# Patient Record
Sex: FEMALE | Race: WHITE | Employment: UNEMPLOYED | URBAN - METROPOLITAN AREA
[De-identification: names, ages, dates, MRNs, and addresses within clinical notes are randomized per-mention and may not be internally consistent; named-entity substitution may affect disease eponyms.]

---

## 2017-01-17 ENCOUNTER — TELEPHONE (OUTPATIENT)
Dept: FAMILY MEDICINE CLINIC | Facility: CLINIC | Age: 52
End: 2017-01-17

## 2017-01-17 NOTE — TELEPHONE ENCOUNTER
LMTCB,    Pt had 1300 appt today 1/17/17 to be seen by Dr. Scott Nunes, pt did not show. Per Dr. Eladia Mayfield request called pt to f/u on pts reason for not showing up. LMTCB to pt.

## 2017-01-25 ENCOUNTER — OFFICE VISIT (OUTPATIENT)
Dept: FAMILY MEDICINE CLINIC | Facility: CLINIC | Age: 52
End: 2017-01-25

## 2017-01-25 VITALS
WEIGHT: 182 LBS | TEMPERATURE: 98 F | HEART RATE: 69 BPM | BODY MASS INDEX: 28 KG/M2 | SYSTOLIC BLOOD PRESSURE: 128 MMHG | RESPIRATION RATE: 20 BRPM | DIASTOLIC BLOOD PRESSURE: 76 MMHG

## 2017-01-25 DIAGNOSIS — J01.01 ACUTE RECURRENT MAXILLARY SINUSITIS: ICD-10-CM

## 2017-01-25 DIAGNOSIS — G50.0 TRIGEMINAL NEURALGIA OF RIGHT SIDE OF FACE: Primary | ICD-10-CM

## 2017-01-25 PROCEDURE — 99213 OFFICE O/P EST LOW 20 MIN: CPT | Performed by: FAMILY MEDICINE

## 2017-01-25 PROCEDURE — 99212 OFFICE O/P EST SF 10 MIN: CPT | Performed by: FAMILY MEDICINE

## 2017-01-25 RX ORDER — POTASSIUM CHLORIDE 20 MEQ/1
20 TABLET, EXTENDED RELEASE ORAL DAILY
Qty: 90 TABLET | Refills: 1 | Status: SHIPPED | OUTPATIENT
Start: 2017-01-25 | End: 2017-08-15

## 2017-01-25 RX ORDER — AMOXICILLIN AND CLAVULANATE POTASSIUM 875; 125 MG/1; MG/1
1 TABLET, FILM COATED ORAL 2 TIMES DAILY
Qty: 20 TABLET | Refills: 0 | Status: SHIPPED | OUTPATIENT
Start: 2017-01-25 | End: 2017-02-04

## 2017-01-25 RX ORDER — FUROSEMIDE 20 MG/1
TABLET ORAL
Qty: 90 TABLET | Refills: 1 | Status: SHIPPED | OUTPATIENT
Start: 2017-01-25 | End: 2017-08-15

## 2017-01-25 NOTE — PROGRESS NOTES
HPI:    Patient ID: Tata Damico is a 47year old female.   Patient presents with:  Post Nasal Drip: x10 days  Headache: x10 days  Sinus Problem: facial pain    HPI  Pt is now back locally after spending considerable time in [place] with daughter who got ma Rfl: 1   Estrogens, Conjugated (PREMARIN) 0.625 MG/GM Vaginal Cream ONE APPLICATION EVERY OTHER DAY Disp: 1 Tube Rfl: 5     Allergies:No Known Allergies   PHYSICAL EXAM:   Physical Exam  Constitutional: appears well hydrated alert and responsive no acute d daily.      furosemide 20 MG Oral Tab 90 tablet 1      Sig: TAKE 1 TABLET BY MOUTH ONCE A DAY           Imaging & Referrals:  OP REFERRAL PAIN MANGEMENT         CD#[id number]

## 2017-03-13 RX ORDER — TEMAZEPAM 30 MG/1
CAPSULE ORAL
Qty: 30 CAPSULE | Refills: 5 | OUTPATIENT
Start: 2017-03-13 | End: 2017-08-15

## 2017-03-13 NOTE — TELEPHONE ENCOUNTER
Medication pending for review. If approved, please route to staff to call med in to pharmacy; or fax.     Last Rx: 9/14/16 #30 & 5-R    Recent Visits       Provider Department Primary Dx    1 month ago Beatriz Tobias MD [facility], [address] [place] [address],

## 2017-08-08 NOTE — TELEPHONE ENCOUNTER
Pt calling to request refill for furosemide 20 MG Oral Tab. Pt is completely out of medication also she has schedule appt with Dr. Roopa England on Aug 15 at 4:45. Shreya Mercedes please advise

## 2017-08-10 RX ORDER — FUROSEMIDE 20 MG/1
TABLET ORAL
Qty: 90 TABLET | Refills: 1 | Status: CANCELLED | OUTPATIENT
Start: 2017-08-10

## 2017-08-10 NOTE — TELEPHONE ENCOUNTER
Hypertensive Medications  Protocol Criteria:  · Appointment scheduled in the past 6 months or in the next 3 months  · BMP or CMP in the past 12 months  · Creatinine result < 2  Recent Outpatient Visits            6 months ago Trigeminal neuralgia of right

## 2017-08-11 NOTE — TELEPHONE ENCOUNTER
Patient called and stated still waiting for refill request for medication    Patient is currently out of medication-  Requesting a call back from nurse-

## 2017-08-11 NOTE — TELEPHONE ENCOUNTER
Pt called in to follow up on refill. Pt states she has been without medication since the 8th. Pt did make an appt with Dr. Rich Seth for next week. Please advise on refill.

## 2017-08-14 RX ORDER — FUROSEMIDE 20 MG/1
TABLET ORAL
Qty: 90 TABLET | Refills: 0 | OUTPATIENT
Start: 2017-08-14

## 2017-08-14 NOTE — TELEPHONE ENCOUNTER
Dr Magdalena Romeo -pt out of furosemide has appt tomorrow, requesting refill. Failed protocol due to no recent bloodwork on file.

## 2017-08-15 ENCOUNTER — OFFICE VISIT (OUTPATIENT)
Dept: FAMILY MEDICINE CLINIC | Facility: CLINIC | Age: 52
End: 2017-08-15

## 2017-08-15 VITALS
DIASTOLIC BLOOD PRESSURE: 76 MMHG | HEIGHT: 68 IN | WEIGHT: 182 LBS | TEMPERATURE: 98 F | SYSTOLIC BLOOD PRESSURE: 118 MMHG | HEART RATE: 66 BPM | RESPIRATION RATE: 18 BRPM | BODY MASS INDEX: 27.58 KG/M2

## 2017-08-15 DIAGNOSIS — G47.9 SLEEP DISORDER: ICD-10-CM

## 2017-08-15 DIAGNOSIS — I83.93 VARICOSE VEINS OF BOTH LOWER EXTREMITIES: Primary | ICD-10-CM

## 2017-08-15 PROCEDURE — 99212 OFFICE O/P EST SF 10 MIN: CPT | Performed by: FAMILY MEDICINE

## 2017-08-15 PROCEDURE — 99214 OFFICE O/P EST MOD 30 MIN: CPT | Performed by: FAMILY MEDICINE

## 2017-08-15 RX ORDER — TEMAZEPAM 30 MG/1
CAPSULE ORAL
Qty: 30 CAPSULE | Refills: 5 | Status: SHIPPED
Start: 2017-08-15 | End: 2018-03-01

## 2017-08-15 RX ORDER — TOPIRAMATE 25 MG/1
TABLET ORAL
COMMUNITY
Start: 2014-12-30 | End: 2017-08-15

## 2017-08-15 RX ORDER — POTASSIUM CHLORIDE 20 MEQ/1
TABLET, EXTENDED RELEASE ORAL
COMMUNITY
Start: 2013-09-28 | End: 2017-08-15 | Stop reason: CLARIF

## 2017-08-15 RX ORDER — ZOLMITRIPTAN 5 MG/1
SPRAY NASAL
COMMUNITY
Start: 2015-01-16 | End: 2018-03-20

## 2017-08-15 RX ORDER — TEMAZEPAM 30 MG/1
CAPSULE ORAL
Qty: 30 CAPSULE | Refills: 5 | Status: SHIPPED | OUTPATIENT
Start: 2017-08-15 | End: 2017-08-15

## 2017-08-15 RX ORDER — FUROSEMIDE 20 MG/1
TABLET ORAL
Qty: 90 TABLET | Refills: 3 | Status: SHIPPED | OUTPATIENT
Start: 2017-08-15 | End: 2018-11-15

## 2017-08-15 RX ORDER — POTASSIUM CHLORIDE 20 MEQ/1
20 TABLET, EXTENDED RELEASE ORAL DAILY
Qty: 90 TABLET | Refills: 1 | Status: SHIPPED | OUTPATIENT
Start: 2017-08-15 | End: 2019-02-18

## 2017-08-15 RX ORDER — FUROSEMIDE 20 MG/1
TABLET ORAL
COMMUNITY
Start: 2013-09-08 | End: 2017-08-15 | Stop reason: CLARIF

## 2017-08-15 RX ORDER — ELETRIPTAN HYDROBROMIDE 40 MG/1
TABLET, FILM COATED ORAL
COMMUNITY
Start: 2014-12-30 | End: 2018-03-20

## 2017-08-15 RX ORDER — AMITRIPTYLINE HYDROCHLORIDE 150 MG/1
TABLET, FILM COATED ORAL
COMMUNITY
Start: 2014-12-30 | End: 2017-08-15

## 2017-08-15 NOTE — PROGRESS NOTES
HPI:    Patient ID: Ju Vincent is a 47year old female. HPI  Patient presents with:  Establish Care  Medication Request  Varicose Veins  painful varicose veins. Review of Systems   Constitutional: Negative. Respiratory: Negative.     Ghislaine Fear NEEDED FOR SLEEP           Imaging & Referrals:  None       #[phone]

## 2018-03-02 NOTE — TELEPHONE ENCOUNTER
please advise as does not meet RN protocol for refill criteria    tamazepam LR 8/15/17 #30 W/5RF    Please respond to pool: EM [facility] LPN/CMA  Refill Protocol Appointment Criteria  · Appointment scheduled in the past 6 months or in the next 3 months  Rece

## 2018-03-05 NOTE — TELEPHONE ENCOUNTER
MJS please sign off so Rx can be phoned in - Thank You     Please respond to pool: LYN Perez 61 LPN/CMA

## 2018-03-08 NOTE — TELEPHONE ENCOUNTER
rx phoned in for 1 month. Pt has an appt for physical. Please just sign off on script so it will reflect on MAR. Also do you want labs prior to appt?

## 2018-03-09 RX ORDER — TEMAZEPAM 30 MG/1
CAPSULE ORAL
Qty: 30 CAPSULE | Refills: 0 | OUTPATIENT
Start: 2018-03-09 | End: 2018-03-20

## 2018-03-20 ENCOUNTER — OFFICE VISIT (OUTPATIENT)
Dept: FAMILY MEDICINE CLINIC | Facility: CLINIC | Age: 53
End: 2018-03-20

## 2018-03-20 VITALS
BODY MASS INDEX: 26.98 KG/M2 | HEART RATE: 69 BPM | WEIGHT: 178 LBS | DIASTOLIC BLOOD PRESSURE: 69 MMHG | SYSTOLIC BLOOD PRESSURE: 106 MMHG | TEMPERATURE: 98 F | HEIGHT: 68 IN

## 2018-03-20 DIAGNOSIS — R60.9 CHRONIC EDEMA: ICD-10-CM

## 2018-03-20 DIAGNOSIS — Z78.0 POST-MENOPAUSAL: ICD-10-CM

## 2018-03-20 DIAGNOSIS — Z00.00 ROUTINE GENERAL MEDICAL EXAMINATION AT A HEALTH CARE FACILITY: Primary | ICD-10-CM

## 2018-03-20 DIAGNOSIS — Z85.828 HISTORY OF BASAL CELL CANCER: ICD-10-CM

## 2018-03-20 PROCEDURE — 99396 PREV VISIT EST AGE 40-64: CPT | Performed by: FAMILY MEDICINE

## 2018-03-20 RX ORDER — TEMAZEPAM 30 MG/1
CAPSULE ORAL
Qty: 30 CAPSULE | Refills: 5 | Status: SHIPPED
Start: 2018-03-20 | End: 2018-10-01

## 2018-03-20 NOTE — PROGRESS NOTES
HPI:    Patient ID: Rossi Hastings is a 46year old female.     HPI  Patient presents with:  Routine Physical: medication refill for temezapan, permarin    Past Medical History:   Diagnosis Date   • Bowel obstruction ([facility] [address]    • Diabetes ([facility] [address]     TYPE NO nicking and no hemorrhage. The left eye shows no AV nicking and no hemorrhage. Neck: Normal range of motion. Neck supple. No thyroid mass and no thyromegaly present. Cardiovascular: Normal heart sounds.     Pulmonary/Chest: Breath sounds normal.

## 2018-05-03 ENCOUNTER — LAB ENCOUNTER (OUTPATIENT)
Dept: LAB | Age: 53
End: 2018-05-03
Attending: FAMILY MEDICINE
Payer: COMMERCIAL

## 2018-05-03 DIAGNOSIS — Z00.00 ROUTINE GENERAL MEDICAL EXAMINATION AT A HEALTH CARE FACILITY: ICD-10-CM

## 2018-05-03 PROCEDURE — 80061 LIPID PANEL: CPT

## 2018-05-03 PROCEDURE — 85025 COMPLETE CBC W/AUTO DIFF WBC: CPT

## 2018-05-03 PROCEDURE — 80053 COMPREHEN METABOLIC PANEL: CPT

## 2018-05-03 PROCEDURE — 82306 VITAMIN D 25 HYDROXY: CPT

## 2018-05-03 PROCEDURE — 36415 COLL VENOUS BLD VENIPUNCTURE: CPT

## 2018-05-03 PROCEDURE — 84443 ASSAY THYROID STIM HORMONE: CPT

## 2018-06-08 ENCOUNTER — OFFICE VISIT (OUTPATIENT)
Dept: FAMILY MEDICINE CLINIC | Facility: CLINIC | Age: 53
End: 2018-06-08

## 2018-06-08 VITALS
HEART RATE: 71 BPM | TEMPERATURE: 98 F | SYSTOLIC BLOOD PRESSURE: 113 MMHG | WEIGHT: 171 LBS | DIASTOLIC BLOOD PRESSURE: 70 MMHG | BODY MASS INDEX: 26 KG/M2

## 2018-06-08 DIAGNOSIS — G50.0 TRIGEMINAL NEURALGIA OF RIGHT SIDE OF FACE: ICD-10-CM

## 2018-06-08 DIAGNOSIS — D17.21 LIPOMA OF RIGHT UPPER EXTREMITY: Primary | ICD-10-CM

## 2018-06-08 PROCEDURE — 99212 OFFICE O/P EST SF 10 MIN: CPT | Performed by: FAMILY MEDICINE

## 2018-06-08 PROCEDURE — 99214 OFFICE O/P EST MOD 30 MIN: CPT | Performed by: FAMILY MEDICINE

## 2018-06-08 RX ORDER — CARBAMAZEPINE 200 MG/1
200 TABLET ORAL DAILY
Qty: 30 TABLET | Refills: 0 | Status: SHIPPED
Start: 2018-06-08 | End: 2018-07-19

## 2018-06-08 NOTE — PROGRESS NOTES
HPI:    Patient ID: Leonel Smith is a 46year old female. HPI  Patient presents with:  Lump: on RT forearm, no pain, lump is growing, for 6 months ago   also she is in a methadone clinic for trigeminal neuralgia and is weaning off.  She would like a

## 2018-06-12 ENCOUNTER — OFFICE VISIT (OUTPATIENT)
Dept: SURGERY | Facility: CLINIC | Age: 53
End: 2018-06-12

## 2018-06-12 VITALS — BODY MASS INDEX: 25.76 KG/M2 | WEIGHT: 170 LBS | HEIGHT: 68 IN

## 2018-06-12 DIAGNOSIS — K43.2 INCISIONAL HERNIA, WITHOUT OBSTRUCTION OR GANGRENE: ICD-10-CM

## 2018-06-12 DIAGNOSIS — D17.21 LIPOMA OF RIGHT UPPER EXTREMITY: Primary | ICD-10-CM

## 2018-06-12 PROCEDURE — 99204 OFFICE O/P NEW MOD 45 MIN: CPT | Performed by: SURGERY

## 2018-06-12 PROCEDURE — 99212 OFFICE O/P EST SF 10 MIN: CPT | Performed by: SURGERY

## 2018-06-13 NOTE — PROGRESS NOTES
HPI:    Patient ID: Sandy Kolb is a 46year old female presenting with Patient presents with:  Lump: Lump on right forearm. Recently began growing. No pain, no drainage. Has had the lump for six to nine months. Was very small. Slater August     HPI    Past Problem Relation Age of Onset   • Diabetes Father    • Arthritis Mother    • Seizure Disorder Mother    • Cancer Maternal Grandfather      lung; smoker   • Hypertension Brother        Social History  Social History   Marital status:   Spouse name: N Head: Normocephalic and atraumatic. Neck: Normal range of motion. Neck supple. Cardiovascular: Normal rate, regular rhythm and intact distal pulses. Pulmonary/Chest: Effort normal and breath sounds normal.   Abdominal: Soft. Normal appearance.  She e

## 2018-06-29 ENCOUNTER — TELEPHONE (OUTPATIENT)
Dept: SURGERY | Facility: CLINIC | Age: 53
End: 2018-06-29

## 2018-06-29 DIAGNOSIS — R10.84 GENERALIZED ABDOMINAL PAIN: ICD-10-CM

## 2018-06-29 DIAGNOSIS — K43.2 INCISIONAL HERNIA, WITHOUT OBSTRUCTION OR GANGRENE: ICD-10-CM

## 2018-06-29 DIAGNOSIS — D17.30 LIPOMA OF SKIN: Primary | ICD-10-CM

## 2018-07-18 ENCOUNTER — OFFICE VISIT (OUTPATIENT)
Dept: SURGERY | Facility: CLINIC | Age: 53
End: 2018-07-18
Payer: COMMERCIAL

## 2018-07-18 DIAGNOSIS — K43.2 INCISIONAL HERNIA, WITHOUT OBSTRUCTION OR GANGRENE: ICD-10-CM

## 2018-07-18 DIAGNOSIS — R10.84 GENERALIZED ABDOMINAL PAIN: Primary | ICD-10-CM

## 2018-07-18 PROCEDURE — 99212 OFFICE O/P EST SF 10 MIN: CPT | Performed by: SURGERY

## 2018-07-18 PROCEDURE — L0625 LO FLEX L1-BELOW L5 PRE OTS: HCPCS | Performed by: SURGERY

## 2018-07-18 RX ORDER — ONDANSETRON 4 MG/1
4 TABLET, ORALLY DISINTEGRATING ORAL EVERY 8 HOURS PRN
Qty: 30 TABLET | Refills: 0 | Status: SHIPPED | OUTPATIENT
Start: 2018-07-18 | End: 2018-08-06

## 2018-07-19 RX ORDER — DOCUSATE SODIUM 100 MG/1
100 CAPSULE, LIQUID FILLED ORAL DAILY
COMMUNITY

## 2018-07-19 RX ORDER — MULTIVIT-MIN/IRON FUM/FOLIC AC 7.5 MG-4
1 TABLET ORAL DAILY
COMMUNITY

## 2018-07-19 RX ORDER — POLYETHYLENE GLYCOL 3350 17 G/17G
17 POWDER, FOR SOLUTION ORAL DAILY
COMMUNITY
End: 2018-09-21

## 2018-07-19 NOTE — TELEPHONE ENCOUNTER
Received call from PATs to change surgery to AM admit rather than outpatient surgery.  Surgical case change request submitted per request.

## 2018-07-19 NOTE — PROGRESS NOTES
Pt now reports intermittent obstructive symptoms and diarrhea. Improves with lying down and reducing bulge. Abd:  Incisional hernias reducible and non tender. AP:  Symptomatic reducible incisional hernias.   Plan for open incisional hernia repair wit

## 2018-07-22 ENCOUNTER — APPOINTMENT (OUTPATIENT)
Dept: LAB | Facility: HOSPITAL | Age: 53
End: 2018-07-22
Attending: SURGERY
Payer: COMMERCIAL

## 2018-07-22 ENCOUNTER — HOSPITAL ENCOUNTER (OUTPATIENT)
Dept: CT IMAGING | Facility: HOSPITAL | Age: 53
Discharge: HOME | End: 2018-07-22
Attending: SURGERY
Payer: COMMERCIAL

## 2018-07-22 DIAGNOSIS — Z01.818 PREOP TESTING: ICD-10-CM

## 2018-07-22 DIAGNOSIS — R10.84 GENERALIZED ABDOMINAL PAIN: ICD-10-CM

## 2018-07-22 LAB
ANION GAP SERPL CALC-SCNC: 7 MMOL/L (ref 0–18)
BUN SERPL-MCNC: 9 MG/DL (ref 8–20)
BUN/CREAT SERPL: 8.3 (ref 10–20)
CALCIUM SERPL-MCNC: 9.2 MG/DL (ref 8.5–10.5)
CHLORIDE SERPL-SCNC: 102 MMOL/L (ref 95–110)
CO2 SERPL-SCNC: 28 MMOL/L (ref 22–32)
CREAT BLD-MCNC: 1.1 MG/DL (ref 0.5–1.5)
CREAT SERPL-MCNC: 1.08 MG/DL (ref 0.5–1.5)
GLUCOSE SERPL-MCNC: 78 MG/DL (ref 70–99)
OSMOLALITY UR CALC.SUM OF ELEC: 282 MOSM/KG (ref 275–295)
POTASSIUM SERPL-SCNC: 4.9 MMOL/L (ref 3.3–5.1)
SODIUM SERPL-SCNC: 137 MMOL/L (ref 136–144)

## 2018-07-22 PROCEDURE — 80048 BASIC METABOLIC PNL TOTAL CA: CPT

## 2018-07-22 PROCEDURE — 36415 COLL VENOUS BLD VENIPUNCTURE: CPT

## 2018-07-22 PROCEDURE — 74177 CT ABD & PELVIS W/CONTRAST: CPT | Performed by: SURGERY

## 2018-07-22 PROCEDURE — 82565 ASSAY OF CREATININE: CPT

## 2018-07-23 ENCOUNTER — TELEPHONE (OUTPATIENT)
Dept: SURGERY | Facility: CLINIC | Age: 53
End: 2018-07-23

## 2018-07-24 ENCOUNTER — TELEPHONE (OUTPATIENT)
Dept: SURGERY | Facility: CLINIC | Age: 53
End: 2018-07-24

## 2018-07-24 NOTE — TELEPHONE ENCOUNTER
Contacted patient. Informed her of results per Dr. Lalo Reyes: two incisional hernias containing intestine as expected. Proceed with surgery as planned. Patient verbalized understanding of results.     Patient asked if the surgery will result in a colostomy and wa

## 2018-07-24 NOTE — TELEPHONE ENCOUNTER
MD Mary Lou Braswell, RN   Caller: Unspecified (Today, 12:10 PM)             Two incisional hernias containing intestine as expected.  Proceed with surgery as planned.     Previous Messages

## 2018-07-25 NOTE — TELEPHONE ENCOUNTER
MD [facility], RN   Caller: Unspecified (Yesterday, 12:10 PM)             No colostomy but the incision will be the length of her current midline scar.

## 2018-07-25 NOTE — TELEPHONE ENCOUNTER
Contacted patient. Informed her of Dr. De La Fuente Born response below. She verbalized understanding and asked about recovery time. I informed patient that generally activity restrictions are no heavy lifting over 15 pounds for at least 4-6 weeks after surgery.  The f

## 2018-07-26 ENCOUNTER — ANESTHESIA (OUTPATIENT)
Dept: SURGERY | Facility: HOSPITAL | Age: 53
DRG: 355 | End: 2018-07-26
Payer: COMMERCIAL

## 2018-07-26 ENCOUNTER — ANESTHESIA EVENT (OUTPATIENT)
Dept: SURGERY | Facility: HOSPITAL | Age: 53
DRG: 355 | End: 2018-07-26
Payer: COMMERCIAL

## 2018-07-26 ENCOUNTER — HOSPITAL ENCOUNTER (INPATIENT)
Facility: HOSPITAL | Age: 53
LOS: 5 days | Discharge: HOME | DRG: 355 | End: 2018-07-31
Attending: SURGERY | Admitting: SURGERY
Payer: COMMERCIAL

## 2018-07-26 ENCOUNTER — SURGERY (OUTPATIENT)
Age: 53
End: 2018-07-26

## 2018-07-26 DIAGNOSIS — D17.30 LIPOMA OF SKIN: ICD-10-CM

## 2018-07-26 DIAGNOSIS — Z01.818 PREOP TESTING: Primary | ICD-10-CM

## 2018-07-26 PROBLEM — K43.0 INCISIONAL HERNIA WITH OBSTRUCTION BUT NO GANGRENE: Status: ACTIVE | Noted: 2018-06-12

## 2018-07-26 PROCEDURE — 49568 REPAIR INCIS HERNIA W MESH: CPT | Performed by: SURGERY

## 2018-07-26 PROCEDURE — 49561 REPAIR INCISIONAL HERNIA,STRANG: CPT | Performed by: SURGERY

## 2018-07-26 PROCEDURE — 0KXK0ZZ TRANSFER RIGHT ABDOMEN MUSCLE, OPEN APPROACH: ICD-10-PCS | Performed by: SURGERY

## 2018-07-26 PROCEDURE — 99232 SBSQ HOSP IP/OBS MODERATE 35: CPT | Performed by: HOSPITALIST

## 2018-07-26 PROCEDURE — 0WUF0JZ SUPPLEMENT ABDOMINAL WALL WITH SYNTHETIC SUBSTITUTE, OPEN APPROACH: ICD-10-PCS | Performed by: SURGERY

## 2018-07-26 DEVICE — BARD MESH
Type: IMPLANTABLE DEVICE | Site: ABDOMEN | Status: FUNCTIONAL
Brand: BARD MESH

## 2018-07-26 RX ORDER — POLYETHYLENE GLYCOL 3350 17 G/17G
17 POWDER, FOR SOLUTION ORAL DAILY PRN
Status: DISCONTINUED | OUTPATIENT
Start: 2018-07-26 | End: 2018-07-31

## 2018-07-26 RX ORDER — MORPHINE SULFATE 4 MG/ML
2 INJECTION, SOLUTION INTRAMUSCULAR; INTRAVENOUS EVERY 10 MIN PRN
Status: DISCONTINUED | OUTPATIENT
Start: 2018-07-26 | End: 2018-07-26 | Stop reason: HOSPADM

## 2018-07-26 RX ORDER — HYDROMORPHONE HYDROCHLORIDE 1 MG/ML
0.5 INJECTION, SOLUTION INTRAMUSCULAR; INTRAVENOUS; SUBCUTANEOUS EVERY 5 MIN PRN
Status: COMPLETED | OUTPATIENT
Start: 2018-07-26 | End: 2018-07-26

## 2018-07-26 RX ORDER — SODIUM CHLORIDE, SODIUM LACTATE, POTASSIUM CHLORIDE, CALCIUM CHLORIDE 600; 310; 30; 20 MG/100ML; MG/100ML; MG/100ML; MG/100ML
INJECTION, SOLUTION INTRAVENOUS CONTINUOUS
Status: DISCONTINUED | OUTPATIENT
Start: 2018-07-26 | End: 2018-07-26 | Stop reason: HOSPADM

## 2018-07-26 RX ORDER — LIDOCAINE HYDROCHLORIDE 10 MG/ML
INJECTION, SOLUTION EPIDURAL; INFILTRATION; INTRACAUDAL; PERINEURAL AS NEEDED
Status: DISCONTINUED | OUTPATIENT
Start: 2018-07-26 | End: 2018-07-26 | Stop reason: SURG

## 2018-07-26 RX ORDER — HYDROCODONE BITARTRATE AND ACETAMINOPHEN 5; 325 MG/1; MG/1
1 TABLET ORAL AS NEEDED
Status: DISCONTINUED | OUTPATIENT
Start: 2018-07-26 | End: 2018-07-26 | Stop reason: HOSPADM

## 2018-07-26 RX ORDER — NALOXONE HYDROCHLORIDE 0.4 MG/ML
80 INJECTION, SOLUTION INTRAMUSCULAR; INTRAVENOUS; SUBCUTANEOUS AS NEEDED
Status: DISCONTINUED | OUTPATIENT
Start: 2018-07-26 | End: 2018-07-26 | Stop reason: HOSPADM

## 2018-07-26 RX ORDER — MORPHINE SULFATE 2 MG/ML
2 INJECTION, SOLUTION INTRAMUSCULAR; INTRAVENOUS EVERY 2 HOUR PRN
Status: DISCONTINUED | OUTPATIENT
Start: 2018-07-26 | End: 2018-07-31

## 2018-07-26 RX ORDER — ONDANSETRON 2 MG/ML
4 INJECTION INTRAMUSCULAR; INTRAVENOUS EVERY 6 HOURS PRN
Status: DISCONTINUED | OUTPATIENT
Start: 2018-07-26 | End: 2018-07-31

## 2018-07-26 RX ORDER — ROCURONIUM BROMIDE 10 MG/ML
INJECTION, SOLUTION INTRAVENOUS AS NEEDED
Status: DISCONTINUED | OUTPATIENT
Start: 2018-07-26 | End: 2018-07-26 | Stop reason: SURG

## 2018-07-26 RX ORDER — GLYCOPYRROLATE 0.2 MG/ML
INJECTION INTRAMUSCULAR; INTRAVENOUS AS NEEDED
Status: DISCONTINUED | OUTPATIENT
Start: 2018-07-26 | End: 2018-07-26 | Stop reason: SURG

## 2018-07-26 RX ORDER — MORPHINE SULFATE 4 MG/ML
8 INJECTION, SOLUTION INTRAMUSCULAR; INTRAVENOUS EVERY 2 HOUR PRN
Status: DISCONTINUED | OUTPATIENT
Start: 2018-07-26 | End: 2018-07-31

## 2018-07-26 RX ORDER — BUPIVACAINE HYDROCHLORIDE 5 MG/ML
INJECTION, SOLUTION EPIDURAL; INTRACAUDAL AS NEEDED
Status: DISCONTINUED | OUTPATIENT
Start: 2018-07-26 | End: 2018-07-26 | Stop reason: HOSPADM

## 2018-07-26 RX ORDER — HYDROCODONE BITARTRATE AND ACETAMINOPHEN 5; 325 MG/1; MG/1
2 TABLET ORAL AS NEEDED
Status: DISCONTINUED | OUTPATIENT
Start: 2018-07-26 | End: 2018-07-26 | Stop reason: HOSPADM

## 2018-07-26 RX ORDER — MORPHINE SULFATE 4 MG/ML
2 INJECTION, SOLUTION INTRAMUSCULAR; INTRAVENOUS EVERY 30 MIN PRN
Status: DISCONTINUED | OUTPATIENT
Start: 2018-07-26 | End: 2018-07-31

## 2018-07-26 RX ORDER — ONDANSETRON 2 MG/ML
4 INJECTION INTRAMUSCULAR; INTRAVENOUS EVERY 6 HOURS PRN
Status: DISCONTINUED | OUTPATIENT
Start: 2018-07-26 | End: 2018-07-30

## 2018-07-26 RX ORDER — FAMOTIDINE 20 MG/1
20 TABLET ORAL ONCE
Status: DISCONTINUED | OUTPATIENT
Start: 2018-07-26 | End: 2018-07-26 | Stop reason: HOSPADM

## 2018-07-26 RX ORDER — ACETAMINOPHEN 500 MG
1000 TABLET ORAL ONCE
Status: COMPLETED | OUTPATIENT
Start: 2018-07-26 | End: 2018-07-26

## 2018-07-26 RX ORDER — KETOROLAC TROMETHAMINE 15 MG/ML
15 INJECTION, SOLUTION INTRAMUSCULAR; INTRAVENOUS EVERY 6 HOURS PRN
Status: ACTIVE | OUTPATIENT
Start: 2018-07-26 | End: 2018-07-28

## 2018-07-26 RX ORDER — MORPHINE SULFATE 4 MG/ML
4 INJECTION, SOLUTION INTRAMUSCULAR; INTRAVENOUS EVERY 2 HOUR PRN
Status: DISCONTINUED | OUTPATIENT
Start: 2018-07-26 | End: 2018-07-31

## 2018-07-26 RX ORDER — HYDROCODONE BITARTRATE AND ACETAMINOPHEN 5; 325 MG/1; MG/1
2 TABLET ORAL EVERY 4 HOURS PRN
Status: DISCONTINUED | OUTPATIENT
Start: 2018-07-26 | End: 2018-07-27

## 2018-07-26 RX ORDER — NALBUPHINE HCL 10 MG/ML
2.5 AMPUL (ML) INJECTION EVERY 4 HOURS PRN
Status: DISCONTINUED | OUTPATIENT
Start: 2018-07-26 | End: 2018-07-31

## 2018-07-26 RX ORDER — MORPHINE SULFATE 4 MG/ML
4 INJECTION, SOLUTION INTRAMUSCULAR; INTRAVENOUS EVERY 10 MIN PRN
Status: DISCONTINUED | OUTPATIENT
Start: 2018-07-26 | End: 2018-07-26 | Stop reason: HOSPADM

## 2018-07-26 RX ORDER — SODIUM PHOSPHATE, DIBASIC AND SODIUM PHOSPHATE, MONOBASIC 7; 19 G/133ML; G/133ML
1 ENEMA RECTAL ONCE AS NEEDED
Status: DISCONTINUED | OUTPATIENT
Start: 2018-07-26 | End: 2018-07-31

## 2018-07-26 RX ORDER — KETOROLAC TROMETHAMINE 30 MG/ML
30 INJECTION, SOLUTION INTRAMUSCULAR; INTRAVENOUS EVERY 6 HOURS PRN
Status: DISPENSED | OUTPATIENT
Start: 2018-07-26 | End: 2018-07-28

## 2018-07-26 RX ORDER — NALOXONE HYDROCHLORIDE 0.4 MG/ML
0.08 INJECTION, SOLUTION INTRAMUSCULAR; INTRAVENOUS; SUBCUTANEOUS
Status: DISCONTINUED | OUTPATIENT
Start: 2018-07-26 | End: 2018-07-31

## 2018-07-26 RX ORDER — MORPHINE SULFATE 10 MG/ML
6 INJECTION, SOLUTION INTRAMUSCULAR; INTRAVENOUS EVERY 10 MIN PRN
Status: DISCONTINUED | OUTPATIENT
Start: 2018-07-26 | End: 2018-07-26 | Stop reason: HOSPADM

## 2018-07-26 RX ORDER — NEOSTIGMINE METHYLSULFATE 0.5 MG/ML
INJECTION INTRAVENOUS AS NEEDED
Status: DISCONTINUED | OUTPATIENT
Start: 2018-07-26 | End: 2018-07-26 | Stop reason: SURG

## 2018-07-26 RX ORDER — ONDANSETRON 2 MG/ML
4 INJECTION INTRAMUSCULAR; INTRAVENOUS ONCE AS NEEDED
Status: COMPLETED | OUTPATIENT
Start: 2018-07-26 | End: 2018-07-26

## 2018-07-26 RX ORDER — SODIUM CHLORIDE, SODIUM LACTATE, POTASSIUM CHLORIDE, CALCIUM CHLORIDE 600; 310; 30; 20 MG/100ML; MG/100ML; MG/100ML; MG/100ML
INJECTION, SOLUTION INTRAVENOUS CONTINUOUS
Status: DISCONTINUED | OUTPATIENT
Start: 2018-07-26 | End: 2018-07-26 | Stop reason: ALTCHOICE

## 2018-07-26 RX ORDER — ONDANSETRON 2 MG/ML
INJECTION INTRAMUSCULAR; INTRAVENOUS AS NEEDED
Status: DISCONTINUED | OUTPATIENT
Start: 2018-07-26 | End: 2018-07-26 | Stop reason: SURG

## 2018-07-26 RX ORDER — FIBRINOGEN HUMAN AND THROMBIN HUMAN 1 ML
KIT TOPICAL AS NEEDED
Status: DISCONTINUED | OUTPATIENT
Start: 2018-07-26 | End: 2018-07-26 | Stop reason: HOSPADM

## 2018-07-26 RX ORDER — DOCUSATE SODIUM 100 MG/1
100 CAPSULE, LIQUID FILLED ORAL 2 TIMES DAILY
Status: DISCONTINUED | OUTPATIENT
Start: 2018-07-26 | End: 2018-07-31

## 2018-07-26 RX ORDER — DIPHENHYDRAMINE HYDROCHLORIDE 50 MG/ML
12.5 INJECTION INTRAMUSCULAR; INTRAVENOUS EVERY 4 HOURS PRN
Status: DISCONTINUED | OUTPATIENT
Start: 2018-07-26 | End: 2018-07-31

## 2018-07-26 RX ORDER — BISACODYL 10 MG
10 SUPPOSITORY, RECTAL RECTAL
Status: DISCONTINUED | OUTPATIENT
Start: 2018-07-26 | End: 2018-07-31

## 2018-07-26 RX ORDER — HYDROCODONE BITARTRATE AND ACETAMINOPHEN 5; 325 MG/1; MG/1
1 TABLET ORAL EVERY 4 HOURS PRN
Status: DISCONTINUED | OUTPATIENT
Start: 2018-07-26 | End: 2018-07-27

## 2018-07-26 RX ORDER — MIDAZOLAM HYDROCHLORIDE 1 MG/ML
INJECTION INTRAMUSCULAR; INTRAVENOUS AS NEEDED
Status: DISCONTINUED | OUTPATIENT
Start: 2018-07-26 | End: 2018-07-26 | Stop reason: SURG

## 2018-07-26 RX ORDER — CEFAZOLIN SODIUM/WATER 2 G/20 ML
2 SYRINGE (ML) INTRAVENOUS ONCE
Status: COMPLETED | OUTPATIENT
Start: 2018-07-26 | End: 2018-07-26

## 2018-07-26 RX ORDER — DEXAMETHASONE SODIUM PHOSPHATE 4 MG/ML
VIAL (ML) INJECTION AS NEEDED
Status: DISCONTINUED | OUTPATIENT
Start: 2018-07-26 | End: 2018-07-26 | Stop reason: SURG

## 2018-07-26 RX ORDER — METOCLOPRAMIDE 10 MG/1
10 TABLET ORAL ONCE
Status: DISCONTINUED | OUTPATIENT
Start: 2018-07-26 | End: 2018-07-26 | Stop reason: HOSPADM

## 2018-07-26 RX ORDER — METOCLOPRAMIDE HYDROCHLORIDE 5 MG/ML
10 INJECTION INTRAMUSCULAR; INTRAVENOUS EVERY 6 HOURS PRN
Status: DISCONTINUED | OUTPATIENT
Start: 2018-07-26 | End: 2018-07-31

## 2018-07-26 RX ADMIN — ROCURONIUM BROMIDE 40 MG: 10 INJECTION, SOLUTION INTRAVENOUS at 15:20:00

## 2018-07-26 RX ADMIN — SODIUM CHLORIDE, SODIUM LACTATE, POTASSIUM CHLORIDE, CALCIUM CHLORIDE: 600; 310; 30; 20 INJECTION, SOLUTION INTRAVENOUS at 15:45:00

## 2018-07-26 RX ADMIN — MIDAZOLAM HYDROCHLORIDE 2 MG: 1 INJECTION INTRAMUSCULAR; INTRAVENOUS at 15:00:00

## 2018-07-26 RX ADMIN — CEFAZOLIN SODIUM/WATER 2 G: 2 G/20 ML SYRINGE (ML) INTRAVENOUS at 15:19:00

## 2018-07-26 RX ADMIN — ONDANSETRON 4 MG: 2 INJECTION INTRAMUSCULAR; INTRAVENOUS at 16:54:00

## 2018-07-26 RX ADMIN — ROCURONIUM BROMIDE 20 MG: 10 INJECTION, SOLUTION INTRAVENOUS at 15:50:00

## 2018-07-26 RX ADMIN — DEXAMETHASONE SODIUM PHOSPHATE 4 MG: 4 MG/ML VIAL (ML) INJECTION at 15:10:00

## 2018-07-26 RX ADMIN — SODIUM CHLORIDE, SODIUM LACTATE, POTASSIUM CHLORIDE, CALCIUM CHLORIDE: 600; 310; 30; 20 INJECTION, SOLUTION INTRAVENOUS at 17:22:00

## 2018-07-26 RX ADMIN — NEOSTIGMINE METHYLSULFATE 4 MG: 0.5 INJECTION INTRAVENOUS at 16:59:00

## 2018-07-26 RX ADMIN — GLYCOPYRROLATE 0.6 MG: 0.2 INJECTION INTRAMUSCULAR; INTRAVENOUS at 16:59:00

## 2018-07-26 RX ADMIN — SODIUM CHLORIDE, SODIUM LACTATE, POTASSIUM CHLORIDE, CALCIUM CHLORIDE: 600; 310; 30; 20 INJECTION, SOLUTION INTRAVENOUS at 14:59:00

## 2018-07-26 RX ADMIN — LIDOCAINE HYDROCHLORIDE 50 MG: 10 INJECTION, SOLUTION EPIDURAL; INFILTRATION; INTRACAUDAL; PERINEURAL at 15:10:00

## 2018-07-26 RX ADMIN — ROCURONIUM BROMIDE 10 MG: 10 INJECTION, SOLUTION INTRAVENOUS at 15:10:00

## 2018-07-26 NOTE — OPERATIVE REPORT
Operative Report    Patient Name:  Naila Washington  MR:  [id number]  :  1965  DOS:  17    Preop Dx: Incisional hernia  Postop Dx:   Incisional hernia, and adhesions  Procedure:  Procedure(s):  incisional hernia repair with mesh, lysis of adh time by at least 30 minutes. We excised the hernia sacs from the subcutaneous tissue and the fascia. The fascia edges were debrided. The fascia defect measured approximately 5 by 5 cm and 3 by 3 cm.   I made the decision to fix the hernia via the retrore

## 2018-07-26 NOTE — ANESTHESIA POSTPROCEDURE EVALUATION
Patient: Naila Washington    Procedure Summary     Date:  07/26/18 Room / Location:  [address] [place] [address] / [address] [place] [address] MAIN OR    Anesthesia Start:  9257 Anesthesia Stop:  2362    Procedure:   HERNIA INCISIONAL REPAIR (N/A Abdomen) Diagnosis:       Lipoma of skin

## 2018-07-26 NOTE — H&P
HP Update:    Pt seen and examined in the preoperative holding area. No significant clinical update noted. Plan to proceed with an incisional hernia repair with mesh possible abdominal wall reconstruction via myocutaneous flap advancement.       Alveta Flow, with release of internal herniations and                resolution of the bowel obstruction  2010: REVISE/REPAIR FINGER/TOE NERVE Right      Comment: repair R finger nerve injury  No date: SINUS SURGERY PROC UNLISTED      Comment: sinus proced carBAMazepine 200 MG Oral Tab Take 1 tablet (200 mg total) by mouth daily.  Dx: trigeminal neurolgia Disp: 30 tablet Rfl: 0         Allergies:No Known Allergies   PHYSICAL EXAM:   Ht 5' 8\" (1.727 m)   Wt 170 lb (77.1 kg)   BMI 25.85 kg/m²   Physical Exam

## 2018-07-26 NOTE — ANESTHESIA PREPROCEDURE EVALUATION
Anesthesia PreOp Note    HPI:     Shi Rooney is a 46year old female who presents for preoperative consultation requested by: Jamison Wynne MD    Date of Surgery: 7/26/2018    Procedure(s):   HERNIA INCISIONAL REPAIR  Indication: Lipoma of skin [D17.30] adhesions                with release of internal herniations and                resolution of the bowel obstruction  07/13/2014: PART REMOVAL COLON W END COLOSTOMY      Comment: after bowel obstruction  2010: REVISE/REPAIR FINGER/TOE NERVE Right      Comm 20 MEQ Oral Tab CR Take 1 tablet (20 mEq total) by mouth daily.  Disp: 90 tablet Rfl: 1 7/25/2018 at Unknown time       Current Facility-Administered Medications Ordered in Epic:  lactated ringers infusion  Intravenous Continuous Yobani Ahn MD Last Rate: 20 Her temperature is 98 °F (36.7 °C). Her pulse is 72. Her respiration is 14 and oxygen saturation is 98%.     07/19/18  1450 07/26/18  1313   Pulse:  72   Resp:  14   Temp:  98 °F (36.7 °C)   SpO2:  98%   Weight: 75.3 kg (166 lb) 74.1 kg (163 lb 5 oz)   Alan

## 2018-07-26 NOTE — PROGRESS NOTES
[facility]OSP - [facility]    Progress Note    Anabel Adkins Patient Status:  Hospital Outpatient Surgery    1965 MRN [id number]   Location [address] [facility] Attending Gayla Best MD   Hosp Day # 0 BROOKS Caballero WBC 5.8 05/03/2018   HGB 12.5 05/03/2018   HCT 36.2 05/03/2018    05/03/2018   CREATSERUM 1.08 07/22/2018   BUN 9 07/22/2018    07/22/2018   K 4.9 07/22/2018    07/22/2018   CO2 28 07/22/2018   GLU 78 07/22/2018   CA 9.2 07/22/2018   A

## 2018-07-26 NOTE — ANESTHESIA PROCEDURE NOTES
Airway  Date/Time: 7/26/2018 3:25 PM  Urgency: elective      General Information and Staff    Patient location during procedure: OR  Anesthesiologist: Jerson Garcia  Resident/CRNA: Trell ELLER  Performed: CRNA     Indications and Patient Conditi

## 2018-07-27 PROCEDURE — 99233 SBSQ HOSP IP/OBS HIGH 50: CPT | Performed by: HOSPITALIST

## 2018-07-27 RX ORDER — SODIUM CHLORIDE 9 MG/ML
INJECTION, SOLUTION INTRAVENOUS
Status: COMPLETED
Start: 2018-07-27 | End: 2018-07-27

## 2018-07-27 RX ORDER — 0.9 % SODIUM CHLORIDE 0.9 %
VIAL (ML) INJECTION
Status: DISPENSED
Start: 2018-07-27 | End: 2018-07-28

## 2018-07-27 RX ORDER — 0.9 % SODIUM CHLORIDE 0.9 %
VIAL (ML) INJECTION
Status: COMPLETED
Start: 2018-07-27 | End: 2018-07-27

## 2018-07-27 RX ORDER — HYDROCODONE BITARTRATE AND ACETAMINOPHEN 5; 325 MG/1; MG/1
2 TABLET ORAL EVERY 4 HOURS
Status: DISCONTINUED | OUTPATIENT
Start: 2018-07-27 | End: 2018-07-30

## 2018-07-27 NOTE — PROGRESS NOTES
[place] [facility] - [facility]    Progress Note    Cathypeonielruss Lucille Patient Status:  Inpatient    1965 MRN [id number]   Location [facility] 4W/SW/SE Attending Snehal Miller MD   Hosp Day # 1 PCP Kasia Elliott DO     Assessment and Plan: I.V. (mL/kg)  2950 (39.8)     Total Intake(mL/kg)  2950 (39.8) 716 (9.7)    Urine (mL/kg/hr)  625 250 (0.5)    Blood  100     Total Output   725 250    Net   +2225 +466                 Exam:   /61 (BP Location: Right arm)   Pulse 56   Temp 98.6 °F (3

## 2018-07-27 NOTE — PROGRESS NOTES
[facility]    Progress Note    Souravonielruss Adkins Patient Status:  Inpatient    1965 MRN [id number]   Location [facility] 4W/SW/SE Attending Maria East MD   [facility] Day # 1 PCP Lindsay George DO       Subjective:   Maci Huntley lysis of adhesions, bilateral myocutaneous advancement, PAIN CONTROL WITH PCA PUMP IV TORADOL, CLD, DVT PROPHYLAXIS.    Full liquid diet and advance to soft   Ambulate TID       Quality:  · DVT Prophylaxis: SCDs  · CODE status: Full code       Brianna Lee

## 2018-07-28 LAB
ANION GAP SERPL CALC-SCNC: 4 MMOL/L (ref 0–18)
BASOPHILS # BLD: 0 K/UL (ref 0–0.2)
BASOPHILS NFR BLD: 0 %
BUN SERPL-MCNC: 12 MG/DL (ref 8–20)
BUN/CREAT SERPL: 11.8 (ref 10–20)
CALCIUM SERPL-MCNC: 8.7 MG/DL (ref 8.5–10.5)
CHLORIDE SERPL-SCNC: 104 MMOL/L (ref 95–110)
CO2 SERPL-SCNC: 28 MMOL/L (ref 22–32)
CREAT SERPL-MCNC: 1.02 MG/DL (ref 0.5–1.5)
EOSINOPHIL # BLD: 0.1 K/UL (ref 0–0.7)
EOSINOPHIL NFR BLD: 3 %
ERYTHROCYTE [DISTWIDTH] IN BLOOD BY AUTOMATED COUNT: 14 % (ref 11–15)
GLUCOSE SERPL-MCNC: 77 MG/DL (ref 70–99)
HCT VFR BLD AUTO: 30.9 % (ref 35–48)
HGB BLD-MCNC: 10.7 G/DL (ref 12–16)
LYMPHOCYTES # BLD: 2.4 K/UL (ref 1–4)
LYMPHOCYTES NFR BLD: 48 %
MCH RBC QN AUTO: 30.2 PG (ref 27–32)
MCHC RBC AUTO-ENTMCNC: 34.4 G/DL (ref 32–37)
MCV RBC AUTO: 87.6 FL (ref 80–100)
MONOCYTES # BLD: 0.2 K/UL (ref 0–1)
MONOCYTES NFR BLD: 5 %
NEUTROPHILS # BLD AUTO: 2.3 K/UL (ref 1.8–7.7)
NEUTROPHILS NFR BLD: 45 %
OSMOLALITY UR CALC.SUM OF ELEC: 281 MOSM/KG (ref 275–295)
PLATELET # BLD AUTO: 81 K/UL (ref 140–400)
PMV BLD AUTO: 8.4 FL (ref 7.4–10.3)
POTASSIUM SERPL-SCNC: 4.5 MMOL/L (ref 3.3–5.1)
RBC # BLD AUTO: 3.53 M/UL (ref 3.7–5.4)
SODIUM SERPL-SCNC: 136 MMOL/L (ref 136–144)
WBC # BLD AUTO: 5.1 K/UL (ref 4–11)

## 2018-07-28 PROCEDURE — 99232 SBSQ HOSP IP/OBS MODERATE 35: CPT | Performed by: HOSPITALIST

## 2018-07-28 RX ORDER — 0.9 % SODIUM CHLORIDE 0.9 %
VIAL (ML) INJECTION
Status: COMPLETED
Start: 2018-07-28 | End: 2018-07-28

## 2018-07-28 RX ORDER — SODIUM CHLORIDE 9 MG/ML
INJECTION, SOLUTION INTRAVENOUS
Status: COMPLETED
Start: 2018-07-28 | End: 2018-07-28

## 2018-07-28 NOTE — PROGRESS NOTES
[place] [facility] - [facility]    Progress Note    Lilianakirillpeonielruss Adkins Patient Status:  Inpatient    1965 MRN [id number]   Location [facility] 4W/SW/SE Attending Derrek Chaudhry MD   Hosp Day # 2 PCP Swetha Luna DO     Assessment and Plan:

## 2018-07-28 NOTE — PROGRESS NOTES
[place] [facility] - [facility]    Progress Note    Anabel Adkins Patient Status:  Inpatient    1965 MRN [id number]   Location [facility] 4W/SW/SE Attending Nguyen Srinivasan MD   [address] Day # 2 PCP Yo Vyas DO       Subjective:   An Hernandez obstruction but no gangrene  S/P incisional hernia repair with mesh, lysis of adhesions, bilateral myocutaneous advancement, PAIN CONTROL WITH PCA PUMP, NORCO, AND IV TORADOL, CLD, DVT PROPHYLAXIS.    Full liquid diet and advance to soft   Ambulate TID   PC

## 2018-07-29 PROCEDURE — 99233 SBSQ HOSP IP/OBS HIGH 50: CPT | Performed by: HOSPITALIST

## 2018-07-29 RX ORDER — 0.9 % SODIUM CHLORIDE 0.9 %
VIAL (ML) INJECTION
Status: COMPLETED
Start: 2018-07-29 | End: 2018-07-29

## 2018-07-29 NOTE — PROGRESS NOTES
[place] [facility] - [facility]    Progress Note    Anabel Adkins Patient Status:  Inpatient    1965 MRN [id number]   Location [facility] 4W/SW/SE Attending Lydia Parikh MD   Hosp Day # 3 PCP Marylu Ferris DO     Assessment and Plan: 2.72 05/03/2018                     Jaymie Gomez MD  7/29/2018

## 2018-07-29 NOTE — PROGRESS NOTES
[place] [facility] - [facility]    Progress Note    Anabel Adkins Patient Status:  Inpatient    1965 MRN [id number]   Location [facility] 4W/SW/SE Attending Carly Toledo MD   [address] Day # 3 PCP Sesar Garcia DO       Subjective:   Ana Strickland **OR** morphINE sulfate **OR** morphINE sulfate, PEG 3350, magnesium hydroxide, bisacodyl, FLEET ENEMA, ondansetron HCl, Metoclopramide HCl, ondansetron HCl, Naloxone HCl, DiphenhydrAMINE HCl, Nalbuphine HCl, morphINE sulfate      Assessment and Plan:

## 2018-07-30 LAB
ANION GAP SERPL CALC-SCNC: 5 MMOL/L (ref 0–18)
BASOPHILS # BLD: 0 K/UL (ref 0–0.2)
BASOPHILS NFR BLD: 0 %
BUN SERPL-MCNC: 10 MG/DL (ref 8–20)
BUN/CREAT SERPL: 13.5 (ref 10–20)
CALCIUM SERPL-MCNC: 9 MG/DL (ref 8.5–10.5)
CHLORIDE SERPL-SCNC: 107 MMOL/L (ref 95–110)
CO2 SERPL-SCNC: 26 MMOL/L (ref 22–32)
CREAT SERPL-MCNC: 0.74 MG/DL (ref 0.5–1.5)
EOSINOPHIL # BLD: 0.2 K/UL (ref 0–0.7)
EOSINOPHIL NFR BLD: 5 %
ERYTHROCYTE [DISTWIDTH] IN BLOOD BY AUTOMATED COUNT: 14 % (ref 11–15)
GLUCOSE SERPL-MCNC: 82 MG/DL (ref 70–99)
HCT VFR BLD AUTO: 32.3 % (ref 35–48)
HGB BLD-MCNC: 11.2 G/DL (ref 12–16)
LYMPHOCYTES # BLD: 2.1 K/UL (ref 1–4)
LYMPHOCYTES NFR BLD: 44 %
MCH RBC QN AUTO: 30.1 PG (ref 27–32)
MCHC RBC AUTO-ENTMCNC: 34.6 G/DL (ref 32–37)
MCV RBC AUTO: 87 FL (ref 80–100)
MONOCYTES # BLD: 0.2 K/UL (ref 0–1)
MONOCYTES NFR BLD: 5 %
NEUTROPHILS # BLD AUTO: 2.2 K/UL (ref 1.8–7.7)
NEUTROPHILS NFR BLD: 47 %
OSMOLALITY UR CALC.SUM OF ELEC: 284 MOSM/KG (ref 275–295)
PLATELET # BLD AUTO: 116 K/UL (ref 140–400)
PMV BLD AUTO: 8.1 FL (ref 7.4–10.3)
POTASSIUM SERPL-SCNC: 4.3 MMOL/L (ref 3.3–5.1)
RBC # BLD AUTO: 3.71 M/UL (ref 3.7–5.4)
SODIUM SERPL-SCNC: 138 MMOL/L (ref 136–144)
WBC # BLD AUTO: 4.7 K/UL (ref 4–11)

## 2018-07-30 PROCEDURE — 99233 SBSQ HOSP IP/OBS HIGH 50: CPT | Performed by: HOSPITALIST

## 2018-07-30 RX ORDER — 0.9 % SODIUM CHLORIDE 0.9 %
VIAL (ML) INJECTION
Status: COMPLETED
Start: 2018-07-30 | End: 2018-07-30

## 2018-07-30 RX ORDER — IBUPROFEN 600 MG/1
600 TABLET ORAL EVERY 6 HOURS PRN
Status: DISCONTINUED | OUTPATIENT
Start: 2018-07-30 | End: 2018-07-31

## 2018-07-30 RX ORDER — HYDROCODONE BITARTRATE AND ACETAMINOPHEN 10; 325 MG/1; MG/1
2 TABLET ORAL EVERY 4 HOURS PRN
Status: DISCONTINUED | OUTPATIENT
Start: 2018-07-30 | End: 2018-07-30

## 2018-07-30 RX ORDER — HYDROCODONE BITARTRATE AND ACETAMINOPHEN 10; 325 MG/1; MG/1
2 TABLET ORAL EVERY 4 HOURS
Status: DISCONTINUED | OUTPATIENT
Start: 2018-07-30 | End: 2018-07-31

## 2018-07-30 NOTE — PLAN OF CARE
PAIN - ADULT    • Verbalizes/displays adequate comfort level or patient's stated pain goal Not Progressing          Patient Centered Care    • Patient preferences are identified and integrated in the patient's plan of care [address]

## 2018-07-30 NOTE — PROGRESS NOTES
[place] [facility] - [facility]    Progress Note    Cathypeonielruss Adkins Patient Status:  Inpatient    1965 MRN [id number]   Location [facility] 4W/SW/SE Attending Maria Esther Goyal MD   [address] Day # 4 PCP Bill Corrales DO       Subjective:   Lynnette Matute **OR** morphINE sulfate **OR** morphINE sulfate, PEG 3350, magnesium hydroxide, bisacodyl, FLEET ENEMA, ondansetron HCl, Metoclopramide HCl, ondansetron HCl, Naloxone HCl, DiphenhydrAMINE HCl, Nalbuphine HCl, morphINE sulfate      Assessment and Plan:

## 2018-07-31 VITALS
HEIGHT: 68 IN | RESPIRATION RATE: 18 BRPM | WEIGHT: 163.31 LBS | OXYGEN SATURATION: 96 % | DIASTOLIC BLOOD PRESSURE: 64 MMHG | TEMPERATURE: 98 F | HEART RATE: 65 BPM | SYSTOLIC BLOOD PRESSURE: 110 MMHG | BODY MASS INDEX: 24.75 KG/M2

## 2018-07-31 LAB
ANION GAP SERPL CALC-SCNC: 9 MMOL/L (ref 0–18)
BASOPHILS # BLD: 0 K/UL (ref 0–0.2)
BASOPHILS NFR BLD: 1 %
BUN SERPL-MCNC: 10 MG/DL (ref 8–20)
BUN/CREAT SERPL: 12.3 (ref 10–20)
CALCIUM SERPL-MCNC: 8.9 MG/DL (ref 8.5–10.5)
CHLORIDE SERPL-SCNC: 103 MMOL/L (ref 95–110)
CO2 SERPL-SCNC: 25 MMOL/L (ref 22–32)
CREAT SERPL-MCNC: 0.81 MG/DL (ref 0.5–1.5)
EOSINOPHIL # BLD: 0.2 K/UL (ref 0–0.7)
EOSINOPHIL NFR BLD: 4 %
ERYTHROCYTE [DISTWIDTH] IN BLOOD BY AUTOMATED COUNT: 14.1 % (ref 11–15)
GLUCOSE SERPL-MCNC: 76 MG/DL (ref 70–99)
HCT VFR BLD AUTO: 30.7 % (ref 35–48)
HGB BLD-MCNC: 10.6 G/DL (ref 12–16)
LYMPHOCYTES # BLD: 2.7 K/UL (ref 1–4)
LYMPHOCYTES NFR BLD: 54 %
MCH RBC QN AUTO: 30 PG (ref 27–32)
MCHC RBC AUTO-ENTMCNC: 34.7 G/DL (ref 32–37)
MCV RBC AUTO: 86.5 FL (ref 80–100)
MONOCYTES # BLD: 0.2 K/UL (ref 0–1)
MONOCYTES NFR BLD: 4 %
NEUTROPHILS # BLD AUTO: 1.8 K/UL (ref 1.8–7.7)
NEUTROPHILS NFR BLD: 37 %
OSMOLALITY UR CALC.SUM OF ELEC: 282 MOSM/KG (ref 275–295)
PLATELET # BLD AUTO: 120 K/UL (ref 140–400)
PMV BLD AUTO: 7.7 FL (ref 7.4–10.3)
POTASSIUM SERPL-SCNC: 3.9 MMOL/L (ref 3.3–5.1)
RBC # BLD AUTO: 3.55 M/UL (ref 3.7–5.4)
SODIUM SERPL-SCNC: 137 MMOL/L (ref 136–144)
WBC # BLD AUTO: 4.9 K/UL (ref 4–11)

## 2018-07-31 PROCEDURE — 99239 HOSP IP/OBS DSCHRG MGMT >30: CPT | Performed by: HOSPITALIST

## 2018-07-31 RX ORDER — IBUPROFEN 600 MG/1
600 TABLET ORAL EVERY 6 HOURS PRN
Qty: 12 TABLET | Refills: 0 | Status: SHIPPED | OUTPATIENT
Start: 2018-07-31 | End: 2018-09-21 | Stop reason: ALTCHOICE

## 2018-07-31 RX ORDER — HYDROCODONE BITARTRATE AND ACETAMINOPHEN 10; 325 MG/1; MG/1
1 TABLET ORAL EVERY 4 HOURS PRN
Qty: 42 TABLET | Refills: 0 | Status: SHIPPED | OUTPATIENT
Start: 2018-07-31 | End: 2018-08-06

## 2018-07-31 NOTE — PROGRESS NOTES
[facility]    Progress Note    Anabel Adkins Patient Status:  Inpatient    1965 MRN [id number]   Location [facility] 4W/SW/SE Attending Lydia Parikh MD   Hosp Day # 5 PCP Marylu Ferris DO     Assessment and Plan:

## 2018-07-31 NOTE — PLAN OF CARE
DISCHARGE PLANNING    • Discharge to home or other facility with appropriate resources Adequate for Discharge        GASTROINTESTINAL - ADULT    • Maintains or returns to baseline bowel function Adequate for Discharge        PAIN - ADULT    • Verbalizes/di

## 2018-07-31 NOTE — DISCHARGE SUMMARY
[facility]    Discharge Summary    Anabel Adkins Patient Status:  Inpatient    1965 MRN [id number]   Location [facility] 4W/SW/SE Attending Kalee Lagos MD   Hosp Day # 5 PCP Guzman Hurtado DO     Date of Admission: 7 7/26/18 HERNIA INCISIONAL REPAIR Felecia Gibbs  [facility] MAIN OR Comp            Discharge Plan:   Discharge Condition: Stable    Current Discharge Medication List    New Orders    HYDROcodone-acetaminophen  MG Oral Tab  Take 1 tablet by mouth every 4 (fou 0     docusate sodium 100 MG Caps  Commonly known as:  COLACE      Take 100 mg by mouth daily.    Refills:  0     Estrogens, Conjugated 0.625 MG/GM Crea  Commonly known as:  PREMARIN      ONE APPLICATION EVERY OTHER DAY   Quantity:  1 Tube  Refills:  11 ventral/umbilical/inguinal/incisional hernia repair      1. You have an incision with absorbable sutures underneath the skin so no suture removal are needed. You can remove the clear plastic dressing called a “Tegaderm” covering your incision.  You may not that occurs, then you might need a rectal suppository or an enema to treat the constipation. 7.  You may drive when you are no longer taking prescription pain medications with narcotics.   If your degree of discomfort is minimal, extra strength tylenol a

## 2018-07-31 NOTE — PROGRESS NOTES
[place] [facility] - [facility]    Progress Note    Lilianakirillpeonielruss Adkins Patient Status:  Inpatient    1965 MRN [id number]   Location [facility] 4W/SW/SE Attending Niels Dunn MD   Hosp Day # 4 PCP Bettie Manuel DO     Assessment and Plan: Catarina Ugarte MD  7/30/2018

## 2018-08-02 ENCOUNTER — TELEPHONE (OUTPATIENT)
Dept: FAMILY MEDICINE CLINIC | Facility: CLINIC | Age: 53
End: 2018-08-02

## 2018-08-06 ENCOUNTER — OFFICE VISIT (OUTPATIENT)
Dept: FAMILY MEDICINE CLINIC | Facility: CLINIC | Age: 53
End: 2018-08-06
Payer: COMMERCIAL

## 2018-08-06 VITALS
TEMPERATURE: 98 F | WEIGHT: 167.5 LBS | BODY MASS INDEX: 25 KG/M2 | HEART RATE: 68 BPM | SYSTOLIC BLOOD PRESSURE: 112 MMHG | DIASTOLIC BLOOD PRESSURE: 69 MMHG

## 2018-08-06 DIAGNOSIS — K43.9 VENTRAL HERNIA WITHOUT OBSTRUCTION OR GANGRENE: Primary | ICD-10-CM

## 2018-08-06 PROCEDURE — 99214 OFFICE O/P EST MOD 30 MIN: CPT | Performed by: FAMILY MEDICINE

## 2018-08-06 PROCEDURE — 99212 OFFICE O/P EST SF 10 MIN: CPT | Performed by: FAMILY MEDICINE

## 2018-08-06 RX ORDER — HYDROCODONE BITARTRATE AND ACETAMINOPHEN 10; 325 MG/1; MG/1
1 TABLET ORAL EVERY 6 HOURS PRN
Qty: 30 TABLET | Refills: 0 | Status: SHIPPED | OUTPATIENT
Start: 2018-08-06 | End: 2018-09-21 | Stop reason: ALTCHOICE

## 2018-08-06 NOTE — PROGRESS NOTES
HPI:    Patient ID: Devendra Ford is a 46year old female. HPI  Patient presents with:  Post-Op: f/u Hernia Repair 7/26  Pain controlled with medication. Review of Systems   Constitutional: Negative. Respiratory: Negative.     Cardiovascular: Ventral hernia without obstruction or gangrene  (primary encounter diagnosis)    Pain controlled on medication. May continue until seen by general surgeon. No orders of the defined types were placed in this encounter.       Meds This Visit:  No prescri

## 2018-08-08 ENCOUNTER — TELEPHONE (OUTPATIENT)
Dept: SURGERY | Facility: CLINIC | Age: 53
End: 2018-08-08

## 2018-08-15 ENCOUNTER — OFFICE VISIT (OUTPATIENT)
Dept: SURGERY | Facility: CLINIC | Age: 53
End: 2018-08-15
Payer: COMMERCIAL

## 2018-08-15 DIAGNOSIS — Z09 POSTOPERATIVE EXAMINATION: Primary | ICD-10-CM

## 2018-08-15 PROCEDURE — 99024 POSTOP FOLLOW-UP VISIT: CPT | Performed by: SURGERY

## 2018-08-15 PROCEDURE — 99212 OFFICE O/P EST SF 10 MIN: CPT | Performed by: SURGERY

## 2018-08-16 NOTE — PAYOR COMM NOTE
Mirian Nones: [id number]  APPROVED 7/26/18 - 7/28/18-  REQUESTING ADDITIONAL DAYS      7/29/18  Subjective:   Sandy Kolb is a(n) 46year old female who still  States her abdominal pain is better 6/10.  She had a bowel movement this morning.      Objective:   Bl obstruction but no gangrene  S/P incisional hernia repair with mesh POD 3, lysis of adhesions, bilateral myocutaneous advancement, PAIN CONTROL WITH PCA PUMP, NORCO  DVT PROPHYLAXIS.    General diet   Ambulate TID   No bowel movement yet - continue stool so 07/30/2018    07/30/2018   CO2 26 07/30/2018   GLU 82 07/30/2018   CA 9.0 07/30/2018             Date of Discharge: 7/31/2018

## 2018-09-18 ENCOUNTER — OFFICE VISIT (OUTPATIENT)
Dept: SURGERY | Facility: CLINIC | Age: 53
End: 2018-09-18
Payer: COMMERCIAL

## 2018-09-18 DIAGNOSIS — Z09 POSTOPERATIVE EXAMINATION: Primary | ICD-10-CM

## 2018-09-18 PROCEDURE — 99024 POSTOP FOLLOW-UP VISIT: CPT | Performed by: SURGERY

## 2018-09-18 PROCEDURE — 99212 OFFICE O/P EST SF 10 MIN: CPT | Performed by: SURGERY

## 2018-09-18 NOTE — PROGRESS NOTES
S:  Parvin Garcia is a 46year old female sp ventral hernia repair with mesh  Doing well, tolerating a general diet with normal bowel habits      O:  VSS  GEN:  No acute distress  Abd:  Soft, NTND, incision C/D/I, hernia repair intact      Assessment

## 2018-09-19 ENCOUNTER — TELEPHONE (OUTPATIENT)
Dept: SURGERY | Facility: CLINIC | Age: 53
End: 2018-09-19

## 2018-09-19 DIAGNOSIS — L98.9 SKIN LESION OF RIGHT ARM: Primary | ICD-10-CM

## 2018-10-02 RX ORDER — TEMAZEPAM 30 MG/1
CAPSULE ORAL
Qty: 30 CAPSULE | Refills: 4 | OUTPATIENT
Start: 2018-10-02 | End: 2019-02-18

## 2018-10-03 NOTE — TELEPHONE ENCOUNTER
Please respond to pool: LYN Perez 62 LPN/MONO--please call in    [phone] [facility], [phone]   Medication Detail      Disp Refills Start End    TEMAZEPAM 30 MG Oral Cap 30 capsule 4 10/2/2018

## 2018-11-15 RX ORDER — FUROSEMIDE 20 MG/1
TABLET ORAL
Qty: 90 TABLET | Refills: 0 | Status: SHIPPED | OUTPATIENT
Start: 2018-11-15 | End: 2019-02-18

## 2018-12-12 ENCOUNTER — TELEPHONE (OUTPATIENT)
Dept: FAMILY MEDICINE CLINIC | Facility: CLINIC | Age: 53
End: 2018-12-12

## 2018-12-12 NOTE — TELEPHONE ENCOUNTER
Per pharmacy fax med on back order pharmacy asking if can dispense 15 mg please fax new rx with new dosage and qty or call pharmacy to authorize change      Current Outpatient Medications:   •  TEMAZEPAM 30 MG Oral Cap, TAKE ONE CAPSULE BY MOUTH NIGHTLY AS

## 2019-01-15 RX ORDER — TEMAZEPAM 15 MG/1
CAPSULE ORAL
Qty: 60 CAPSULE | Refills: 0 | Status: SHIPPED
Start: 2019-01-15 | End: 2019-02-18

## 2019-01-15 NOTE — TELEPHONE ENCOUNTER
Faxed Temazepam prescription to [place] in [facility]. Fax was successful.

## 2019-01-15 NOTE — TELEPHONE ENCOUNTER
No Protocol on this med.      Requested Prescriptions     Pending Prescriptions Disp Refills   • TEMAZEPAM 15 MG Oral Cap [Pharmacy Med Name: Temazepam Oral Capsule 15 MG] 60 capsule 0     Sig: TAKE 2 CAPSULES BY MOUTH NIGHTLY AS NEEDED FOR SLEEP       Last

## 2019-02-12 NOTE — TELEPHONE ENCOUNTER
No Protocol on this med. Controlled medication pending for review. If approved needs to be called in or faxed by on-site staff.       Requested Prescriptions     Pending Prescriptions Disp Refills   • TEMAZEPAM 15 MG Oral Cap [Pharmacy Med Name: Erin Dowd

## 2019-02-16 RX ORDER — TEMAZEPAM 15 MG/1
CAPSULE ORAL
Qty: 60 CAPSULE | Refills: 0 | Status: CANCELLED | OUTPATIENT
Start: 2019-02-16

## 2019-02-18 ENCOUNTER — APPOINTMENT (OUTPATIENT)
Dept: LAB | Age: 54
End: 2019-02-18
Attending: FAMILY MEDICINE
Payer: COMMERCIAL

## 2019-02-18 ENCOUNTER — OFFICE VISIT (OUTPATIENT)
Dept: FAMILY MEDICINE CLINIC | Facility: CLINIC | Age: 54
End: 2019-02-18
Payer: COMMERCIAL

## 2019-02-18 VITALS
HEART RATE: 67 BPM | SYSTOLIC BLOOD PRESSURE: 117 MMHG | WEIGHT: 163 LBS | TEMPERATURE: 98 F | BODY MASS INDEX: 25 KG/M2 | DIASTOLIC BLOOD PRESSURE: 76 MMHG

## 2019-02-18 DIAGNOSIS — R11.0 NAUSEA: ICD-10-CM

## 2019-02-18 DIAGNOSIS — K59.09 OTHER CONSTIPATION: Primary | ICD-10-CM

## 2019-02-18 PROCEDURE — 99212 OFFICE O/P EST SF 10 MIN: CPT | Performed by: FAMILY MEDICINE

## 2019-02-18 PROCEDURE — 99214 OFFICE O/P EST MOD 30 MIN: CPT | Performed by: FAMILY MEDICINE

## 2019-02-18 RX ORDER — FUROSEMIDE 20 MG/1
TABLET ORAL
Qty: 90 TABLET | Refills: 1 | Status: SHIPPED | OUTPATIENT
Start: 2019-02-18 | End: 2020-04-02

## 2019-02-18 RX ORDER — POTASSIUM CHLORIDE 20 MEQ/1
20 TABLET, EXTENDED RELEASE ORAL DAILY
Qty: 90 TABLET | Refills: 1 | Status: SHIPPED | OUTPATIENT
Start: 2019-02-18 | End: 2019-08-12

## 2019-02-18 RX ORDER — TEMAZEPAM 15 MG/1
30 CAPSULE ORAL NIGHTLY
Qty: 60 CAPSULE | Refills: 5 | Status: SHIPPED
Start: 2019-02-18 | End: 2019-09-13

## 2019-02-19 NOTE — PROGRESS NOTES
HPI:    Patient ID: Sayra Croft is a 53year old female. HPI  Patient presents with:  Loss Of Appetite  Constipation  History of abdominal surgeries and bowel obstruction. Review of Systems   Constitutional: Negative. Respiratory: Negative. Platelet      TSH [E]      Meds This Visit:  Requested Prescriptions     Signed Prescriptions Disp Refills   • furosemide 20 MG Oral Tab 90 tablet 1     Sig: TAKE ONE TABLET BY MOUTH ONE TIME DAILY as needed.    • Potassium Chloride ER 20 MEQ Oral Tab CR 90

## 2019-07-01 ENCOUNTER — TELEPHONE (OUTPATIENT)
Dept: FAMILY MEDICINE CLINIC | Facility: CLINIC | Age: 54
End: 2019-07-01
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
## 2019-07-02 ENCOUNTER — OFFICE VISIT (OUTPATIENT)
Dept: FAMILY MEDICINE CLINIC | Facility: CLINIC | Age: 54
End: 2019-07-02
Payer: COMMERCIAL

## 2019-07-02 VITALS
SYSTOLIC BLOOD PRESSURE: 122 MMHG | DIASTOLIC BLOOD PRESSURE: 75 MMHG | BODY MASS INDEX: 23.79 KG/M2 | HEIGHT: 68 IN | TEMPERATURE: 98 F | HEART RATE: 67 BPM | WEIGHT: 157 LBS

## 2019-07-02 DIAGNOSIS — M54.50 ACUTE MIDLINE LOW BACK PAIN WITHOUT SCIATICA: Primary | ICD-10-CM

## 2019-07-02 PROCEDURE — 99214 OFFICE O/P EST MOD 30 MIN: CPT | Performed by: FAMILY MEDICINE

## 2019-07-02 RX ORDER — CYCLOBENZAPRINE HCL 10 MG
10 TABLET ORAL 3 TIMES DAILY
Qty: 30 TABLET | Refills: 0 | Status: SHIPPED | OUTPATIENT
Start: 2019-07-02 | End: 2019-07-22

## 2019-07-02 RX ORDER — DICLOFENAC SODIUM 75 MG/1
75 TABLET, DELAYED RELEASE ORAL 2 TIMES DAILY
Qty: 30 TABLET | Refills: 0 | Status: SHIPPED | OUTPATIENT
Start: 2019-07-02 | End: 2019-07-14

## 2019-07-02 RX ORDER — CYCLOBENZAPRINE HCL 10 MG
TABLET ORAL
COMMUNITY
Start: 2019-06-20 | End: 2020-07-23 | Stop reason: ALTCHOICE
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
## 2019-07-09 ENCOUNTER — OFFICE VISIT (OUTPATIENT)
Dept: FAMILY MEDICINE CLINIC | Facility: CLINIC | Age: 54
End: 2019-07-09
Payer: COMMERCIAL

## 2019-07-09 VITALS
WEIGHT: 158 LBS | SYSTOLIC BLOOD PRESSURE: 106 MMHG | HEART RATE: 69 BPM | TEMPERATURE: 98 F | DIASTOLIC BLOOD PRESSURE: 65 MMHG | HEIGHT: 68 IN | BODY MASS INDEX: 23.95 KG/M2

## 2019-07-09 DIAGNOSIS — M54.50 ACUTE MIDLINE LOW BACK PAIN WITHOUT SCIATICA: Primary | ICD-10-CM

## 2019-07-09 DIAGNOSIS — M54.50 ACUTE BILATERAL LOW BACK PAIN WITHOUT SCIATICA: ICD-10-CM

## 2019-07-09 PROCEDURE — 99214 OFFICE O/P EST MOD 30 MIN: CPT | Performed by: FAMILY MEDICINE
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
## 2019-07-15 NOTE — TELEPHONE ENCOUNTER
Patient was left a message to call back. Transfer to [phone]    Does patient still need medication?     Requested Prescriptions     Pending Prescriptions Disp Refills   • DICLOFENAC SODIUM 75 MG Oral Tab EC [Pharmacy Med Name: Diclofenac Sodium Dr 75 Mg Tab P

## 2019-07-19 RX ORDER — DICLOFENAC SODIUM 75 MG/1
75 TABLET, DELAYED RELEASE ORAL 2 TIMES DAILY
Qty: 30 TABLET | Refills: 0 | Status: SHIPPED | OUTPATIENT
Start: 2019-07-19 | End: 2019-08-01

## 2019-08-02 RX ORDER — DICLOFENAC SODIUM 75 MG/1
TABLET, DELAYED RELEASE ORAL
Qty: 60 TABLET | Refills: 1 | Status: SHIPPED | OUTPATIENT
Start: 2019-08-02 | End: 2020-07-23 | Stop reason: ALTCHOICE

## 2019-08-05 ENCOUNTER — MED REC SCAN ONLY (OUTPATIENT)
Dept: FAMILY MEDICINE CLINIC | Facility: CLINIC | Age: 54
End: 2019-08-05

## 2019-08-05 NOTE — PROGRESS NOTES
[facility] inital evaluation  consult from 8/1/19, forms put in yellow folder, will be faxed and  sent to scan

## 2019-08-16 RX ORDER — POTASSIUM CHLORIDE 20 MEQ/1
TABLET, EXTENDED RELEASE ORAL
Qty: 30 TABLET | Refills: 0 | Status: SHIPPED | OUTPATIENT
Start: 2019-08-16 | End: 2020-07-23

## 2019-08-16 NOTE — TELEPHONE ENCOUNTER
Script for 30-day supply with note to pharmacy about patient needs appt with PCP sent to pharmacy.     Please reply to pool: EM CALL CENTER--please schedule patient with Dr Jayro Mims for follow-up appt, medications

## 2019-09-13 NOTE — TELEPHONE ENCOUNTER
Controlled medication pending for review. If approved needs to be called in or faxed by on-site staff.     Last Rx: 02/18/19  LOV: 07/09/19

## 2019-09-14 RX ORDER — TEMAZEPAM 15 MG/1
CAPSULE ORAL
Qty: 60 CAPSULE | Refills: 3 | Status: SHIPPED | OUTPATIENT
Start: 2019-09-14 | End: 2019-11-18

## 2019-09-17 ENCOUNTER — MED REC SCAN ONLY (OUTPATIENT)
Dept: FAMILY MEDICINE CLINIC | Facility: CLINIC | Age: 54
End: 2019-09-17

## 2019-09-25 ENCOUNTER — MED REC SCAN ONLY (OUTPATIENT)
Dept: FAMILY MEDICINE CLINIC | Facility: CLINIC | Age: 54
End: 2019-09-25

## 2019-09-28 NOTE — PROGRESS NOTES
[facility] progress note from 9/19/19, form signed, fax was successful forms sent to scan

## 2019-11-18 ENCOUNTER — TELEPHONE (OUTPATIENT)
Dept: FAMILY MEDICINE CLINIC | Facility: CLINIC | Age: 54
End: 2019-11-18

## 2019-11-18 RX ORDER — TEMAZEPAM 15 MG/1
CAPSULE ORAL
Qty: 60 CAPSULE | Refills: 3 | Status: SHIPPED | OUTPATIENT
Start: 2019-11-18 | End: 2020-05-01

## 2019-11-18 NOTE — TELEPHONE ENCOUNTER
Patient is calling to follow up regarding medication Temazepam. Patient states she has been out of medication for a couple days.     Please advise

## 2019-11-18 NOTE — TELEPHONE ENCOUNTER
Lissa from [place] stated TEMAZEPAM 15 MG Oral Cap take 2 tablets once daily is on back order. Can we send TEMAZEPAM 30 MG Oral Cap QD instead? Please advise. Thank you.

## 2020-04-02 RX ORDER — FUROSEMIDE 20 MG/1
TABLET ORAL
Qty: 90 TABLET | Refills: 0 | Status: SHIPPED | OUTPATIENT
Start: 2020-04-02 | End: 2020-07-23

## 2020-05-01 RX ORDER — TEMAZEPAM 15 MG/1
CAPSULE ORAL
Qty: 60 CAPSULE | Refills: 1 | Status: SHIPPED | OUTPATIENT
Start: 2020-05-01 | End: 2020-07-03

## 2020-07-03 RX ORDER — TEMAZEPAM 15 MG/1
CAPSULE ORAL
Qty: 60 CAPSULE | Refills: 0 | Status: SHIPPED | OUTPATIENT
Start: 2020-07-03 | End: 2020-07-23

## 2020-07-23 ENCOUNTER — OFFICE VISIT (OUTPATIENT)
Dept: FAMILY MEDICINE CLINIC | Facility: CLINIC | Age: 55
End: 2020-07-23
Payer: COMMERCIAL

## 2020-07-23 ENCOUNTER — LAB ENCOUNTER (OUTPATIENT)
Dept: LAB | Age: 55
End: 2020-07-23
Attending: FAMILY MEDICINE
Payer: COMMERCIAL

## 2020-07-23 VITALS
TEMPERATURE: 99 F | WEIGHT: 149 LBS | HEART RATE: 68 BPM | SYSTOLIC BLOOD PRESSURE: 117 MMHG | HEIGHT: 68 IN | BODY MASS INDEX: 22.58 KG/M2 | DIASTOLIC BLOOD PRESSURE: 73 MMHG

## 2020-07-23 DIAGNOSIS — I83.813 VARICOSE VEINS OF BOTH LOWER EXTREMITIES WITH PAIN: Primary | ICD-10-CM

## 2020-07-23 DIAGNOSIS — D50.8 OTHER IRON DEFICIENCY ANEMIA: ICD-10-CM

## 2020-07-23 DIAGNOSIS — I83.813 VARICOSE VEINS OF BOTH LOWER EXTREMITIES WITH PAIN: ICD-10-CM

## 2020-07-23 PROBLEM — Z01.818 PREOP TESTING: Status: RESOLVED | Noted: 2018-07-26 | Resolved: 2020-07-23

## 2020-07-23 LAB
ALBUMIN SERPL-MCNC: 4 G/DL (ref 3.4–5)
ALBUMIN/GLOB SERPL: 1.1 {RATIO} (ref 1–2)
ALP LIVER SERPL-CCNC: 95 U/L (ref 46–118)
ALT SERPL-CCNC: 17 U/L (ref 13–56)
ANION GAP SERPL CALC-SCNC: 1 MMOL/L (ref 0–18)
AST SERPL-CCNC: 15 U/L (ref 15–37)
BASOPHILS # BLD AUTO: 0.01 X10(3) UL (ref 0–0.2)
BASOPHILS NFR BLD AUTO: 0.2 %
BILIRUB SERPL-MCNC: 0.5 MG/DL (ref 0.1–2)
BUN BLD-MCNC: 13 MG/DL (ref 7–18)
BUN/CREAT SERPL: 11.8 (ref 10–20)
CALCIUM BLD-MCNC: 9.2 MG/DL (ref 8.5–10.1)
CHLORIDE SERPL-SCNC: 105 MMOL/L (ref 98–112)
CO2 SERPL-SCNC: 32 MMOL/L (ref 21–32)
CREAT BLD-MCNC: 1.1 MG/DL (ref 0.55–1.02)
DEPRECATED RDW RBC AUTO: 41.1 FL (ref 35.1–46.3)
EOSINOPHIL # BLD AUTO: 0.03 X10(3) UL (ref 0–0.7)
EOSINOPHIL NFR BLD AUTO: 0.7 %
ERYTHROCYTE [DISTWIDTH] IN BLOOD BY AUTOMATED COUNT: 12.9 % (ref 11–15)
GLOBULIN PLAS-MCNC: 3.5 G/DL (ref 2.8–4.4)
GLUCOSE BLD-MCNC: 77 MG/DL (ref 70–99)
HCT VFR BLD AUTO: 35.7 % (ref 35–48)
HGB BLD-MCNC: 12.2 G/DL (ref 12–16)
IMM GRANULOCYTES # BLD AUTO: 0.01 X10(3) UL (ref 0–1)
IMM GRANULOCYTES NFR BLD: 0.2 %
LYMPHOCYTES # BLD AUTO: 1.73 X10(3) UL (ref 1–4)
LYMPHOCYTES NFR BLD AUTO: 37.8 %
M PROTEIN MFR SERPL ELPH: 7.5 G/DL (ref 6.4–8.2)
MCH RBC QN AUTO: 29.8 PG (ref 26–34)
MCHC RBC AUTO-ENTMCNC: 34.2 G/DL (ref 31–37)
MCV RBC AUTO: 87.3 FL (ref 80–100)
MONOCYTES # BLD AUTO: 0.26 X10(3) UL (ref 0.1–1)
MONOCYTES NFR BLD AUTO: 5.7 %
NEUTROPHILS # BLD AUTO: 2.54 X10 (3) UL (ref 1.5–7.7)
NEUTROPHILS # BLD AUTO: 2.54 X10(3) UL (ref 1.5–7.7)
NEUTROPHILS NFR BLD AUTO: 55.4 %
OSMOLALITY SERPL CALC.SUM OF ELEC: 285 MOSM/KG (ref 275–295)
PATIENT FASTING Y/N/NP: YES
PLATELET # BLD AUTO: 135 10(3)UL (ref 150–450)
POTASSIUM SERPL-SCNC: 4.5 MMOL/L (ref 3.5–5.1)
RBC # BLD AUTO: 4.09 X10(6)UL (ref 3.8–5.3)
SODIUM SERPL-SCNC: 138 MMOL/L (ref 136–145)
WBC # BLD AUTO: 4.6 X10(3) UL (ref 4–11)

## 2020-07-23 PROCEDURE — 80053 COMPREHEN METABOLIC PANEL: CPT

## 2020-07-23 PROCEDURE — 99214 OFFICE O/P EST MOD 30 MIN: CPT | Performed by: FAMILY MEDICINE

## 2020-07-23 PROCEDURE — 85025 COMPLETE CBC W/AUTO DIFF WBC: CPT

## 2020-07-23 PROCEDURE — 3078F DIAST BP <80 MM HG: CPT | Performed by: FAMILY MEDICINE

## 2020-07-23 PROCEDURE — 3074F SYST BP LT 130 MM HG: CPT | Performed by: FAMILY MEDICINE

## 2020-07-23 PROCEDURE — 3008F BODY MASS INDEX DOCD: CPT | Performed by: FAMILY MEDICINE

## 2020-07-23 PROCEDURE — 36415 COLL VENOUS BLD VENIPUNCTURE: CPT

## 2020-07-23 RX ORDER — FUROSEMIDE 20 MG/1
TABLET ORAL
Qty: 90 TABLET | Refills: 1 | Status: SHIPPED | OUTPATIENT
Start: 2020-07-23 | End: 2021-05-22

## 2020-07-23 RX ORDER — PANTOPRAZOLE SODIUM 40 MG/1
40 TABLET, DELAYED RELEASE ORAL
Qty: 30 TABLET | Refills: 2 | Status: SHIPPED | OUTPATIENT
Start: 2020-07-23 | End: 2020-10-16

## 2020-07-23 RX ORDER — ZOLPIDEM TARTRATE 10 MG/1
10 TABLET ORAL NIGHTLY
Qty: 30 TABLET | Refills: 2 | Status: SHIPPED | OUTPATIENT
Start: 2020-07-23 | End: 2020-08-18

## 2020-07-23 RX ORDER — POTASSIUM CHLORIDE 20 MEQ/1
TABLET, EXTENDED RELEASE ORAL
Qty: 90 TABLET | Refills: 1 | Status: SHIPPED | OUTPATIENT
Start: 2020-07-23 | End: 2021-05-22

## 2020-07-23 NOTE — PROGRESS NOTES
HPI:    Patient ID: Giovanna Shankar is a 63year old female.     HPI  Patient presents with:  Leg Pain: leg pain on both legs where vericose veins are with buldging, has not taken potassium  Nausea: stomach feeling nausea, taking OTC meds     Review of Sy Differential W Platelet [E]      Comp Metabolic Panel (14) [E]      Meds This Visit:  Requested Prescriptions     Signed Prescriptions Disp Refills   • furosemide 20 MG Oral Tab 90 tablet 1     Sig: TAKE ONE TABLET BY MOUTH DAILY AS NEEDED   • Potassium Ch

## 2020-07-27 NOTE — H&P
[facility], [facility] - Gastroenterology                                                                                                               Reason for consult: a from Last 6 Encounters:  07/28/20 : 149 lb (67.6 kg)  07/23/20 : 149 lb (67.6 kg)  07/09/19 : 158 lb (71.7 kg)  07/02/19 : 157 lb (71.2 kg)  02/18/19 : 163 lb (73.9 kg)  08/06/18 : 167 lb 8 oz (76 kg)       History, Medications, Allergies, ROS:      Past M procedure, extensive lysis of adhesions, partial colon resection, excision of accessory spleen, takedown of splenic flexure, insertion of drains, rigid proctosigmoidoscopy for eval of distal anataomy and bowel prep of distal rectum, ureterolysis   • SINUS Allergies    ROS:   CONSTITUTIONAL: negative for fevers, chills, sweats and weight loss  EYES Negative for red eyes, yellow eyes, changes in vision  HEENT: Negative for dysphagia and hoarseness  RESPIRATORY: Negative for cough and shortness of breath  CARD uL    RBC 4.09 3.80 - 5.30 x10(6)uL    HGB 12.2 12.0 - 16.0 g/dL    HCT 35.7 35.0 - 48.0 %    MCV 87.3 80.0 - 100.0 fL    MCH 29.8 26.0 - 34.0 pg    MCHC 34.2 31.0 - 37.0 g/dL    RDW-SD 41.1 35.1 - 46.3 fL    RDW 12.9 11.0 - 15.0 %    .0 (L) 150.0 - complete set of results can be found in Results Review.      ASSESSMENT/PLAN:   Naheed Mane is a 63year old year-old female with history as detailed below:    #constipation  #gen abd pain  #nausea   #dyspepsia  She is here today for evaluation of her were prescribed Suprep for the bowel prep and it is too expensive or not covered by insurance, it is okay to substitute Trilyte (or any similar generic prep). This can be done by notifying the pharmacy or calling our office.      Orders This Visit:  No orde

## 2020-07-28 ENCOUNTER — OFFICE VISIT (OUTPATIENT)
Dept: GASTROENTEROLOGY | Facility: CLINIC | Age: 55
End: 2020-07-28
Payer: COMMERCIAL

## 2020-07-28 ENCOUNTER — TELEPHONE (OUTPATIENT)
Dept: GASTROENTEROLOGY | Facility: CLINIC | Age: 55
End: 2020-07-28

## 2020-07-28 VITALS
SYSTOLIC BLOOD PRESSURE: 116 MMHG | BODY MASS INDEX: 22.58 KG/M2 | HEART RATE: 62 BPM | DIASTOLIC BLOOD PRESSURE: 65 MMHG | HEIGHT: 68 IN | WEIGHT: 149 LBS

## 2020-07-28 DIAGNOSIS — R10.13 DYSPEPSIA: ICD-10-CM

## 2020-07-28 DIAGNOSIS — R10.84 GENERALIZED ABDOMINAL PAIN: Primary | ICD-10-CM

## 2020-07-28 DIAGNOSIS — K59.00 CONSTIPATION, UNSPECIFIED CONSTIPATION TYPE: ICD-10-CM

## 2020-07-28 DIAGNOSIS — R11.0 NAUSEA: ICD-10-CM

## 2020-07-28 PROCEDURE — 99244 OFF/OP CNSLTJ NEW/EST MOD 40: CPT | Performed by: NURSE PRACTITIONER

## 2020-07-28 PROCEDURE — 3078F DIAST BP <80 MM HG: CPT | Performed by: NURSE PRACTITIONER

## 2020-07-28 PROCEDURE — 3008F BODY MASS INDEX DOCD: CPT | Performed by: NURSE PRACTITIONER

## 2020-07-28 PROCEDURE — 3074F SYST BP LT 130 MM HG: CPT | Performed by: NURSE PRACTITIONER

## 2020-07-28 RX ORDER — ONDANSETRON 4 MG/1
4 TABLET, ORALLY DISINTEGRATING ORAL EVERY 8 HOURS PRN
Qty: 60 TABLET | Refills: 0 | Status: SHIPPED | OUTPATIENT
Start: 2020-07-28 | End: 2020-09-03

## 2020-07-28 NOTE — PATIENT INSTRUCTIONS
-pantoprazole 40 mg once daily  -miralax twice daily every day and squatty potty  -ct abdomen and pelvis  1. Schedule colonoscopy/egd with Dr. Paula thompson/ [facility] [Diagnosis: dyspepsia, abd pain, constipation]    2.   bowel prep from pharmacy ([facility])

## 2020-07-28 NOTE — TELEPHONE ENCOUNTER
Scheduled for:  Colonoscopy [facility] [facility]  Provider Name: Dr Iqra Santoyo  Date: Cristobal Corley 8/04/2020   Location: [facility]   Sedation: MAC    Time: 12:00 pm, pt is aware that [address] will day before procedure with arrival time   Prep: split dose suprep    Meds/Allergies Graham

## 2020-08-01 ENCOUNTER — LAB REQUISITION (OUTPATIENT)
Dept: LAB | Facility: HOSPITAL | Age: 55
End: 2020-08-01
Payer: COMMERCIAL

## 2020-08-01 ENCOUNTER — HOSPITAL ENCOUNTER (OUTPATIENT)
Dept: CT IMAGING | Facility: HOSPITAL | Age: 55
Discharge: HOME | End: 2020-08-01
Attending: NURSE PRACTITIONER
Payer: COMMERCIAL

## 2020-08-01 DIAGNOSIS — Z20.828 CONTACT WITH AND (SUSPECTED) EXPOSURE TO OTHER VIRAL COMMUNICABLE DISEASES: ICD-10-CM

## 2020-08-01 DIAGNOSIS — K59.00 CONSTIPATION, UNSPECIFIED CONSTIPATION TYPE: ICD-10-CM

## 2020-08-01 DIAGNOSIS — R11.0 NAUSEA: ICD-10-CM

## 2020-08-01 DIAGNOSIS — R10.84 GENERALIZED ABDOMINAL PAIN: ICD-10-CM

## 2020-08-01 PROCEDURE — 74177 CT ABD & PELVIS W/CONTRAST: CPT | Performed by: NURSE PRACTITIONER

## 2020-08-02 LAB — SARS-COV-2 RNA RESP QL NAA+PROBE: NOT DETECTED

## 2020-08-03 ENCOUNTER — TELEPHONE (OUTPATIENT)
Dept: GASTROENTEROLOGY | Facility: CLINIC | Age: 55
End: 2020-08-03

## 2020-08-03 DIAGNOSIS — R16.1 SPLENOMEGALY: Primary | ICD-10-CM

## 2020-08-03 DIAGNOSIS — D69.6 THROMBOCYTOPENIA (HCC): ICD-10-CM

## 2020-08-03 NOTE — TELEPHONE ENCOUNTER
Ct A/P 8/1/120: Advised to start miralax and squatty at last visit and ppi. Splenomegaly and plt 135- She has not seen hematology and placed referral for consultation.   Plan for cln/egd as scheduled    All results and recommendations reviewed with pt a

## 2020-08-04 ENCOUNTER — SURGERY CENTER DOCUMENTATION (OUTPATIENT)
Dept: SURGERY | Age: 55
End: 2020-08-04

## 2020-08-04 ENCOUNTER — LAB REQUISITION (OUTPATIENT)
Dept: LAB | Facility: HOSPITAL | Age: 55
End: 2020-08-04
Payer: COMMERCIAL

## 2020-08-04 DIAGNOSIS — R10.9 UNSPECIFIED ABDOMINAL PAIN: ICD-10-CM

## 2020-08-04 DIAGNOSIS — R11.0 NAUSEA: ICD-10-CM

## 2020-08-04 DIAGNOSIS — K59.00 CONSTIPATION, UNSPECIFIED: ICD-10-CM

## 2020-08-04 PROCEDURE — 45385 COLONOSCOPY W/LESION REMOVAL: CPT | Performed by: INTERNAL MEDICINE

## 2020-08-04 PROCEDURE — 88305 TISSUE EXAM BY PATHOLOGIST: CPT | Performed by: INTERNAL MEDICINE

## 2020-08-04 PROCEDURE — 88312 SPECIAL STAINS GROUP 1: CPT | Performed by: INTERNAL MEDICINE

## 2020-08-04 PROCEDURE — 45380 COLONOSCOPY AND BIOPSY: CPT | Performed by: INTERNAL MEDICINE

## 2020-08-04 PROCEDURE — 43239 EGD BIOPSY SINGLE/MULTIPLE: CPT | Performed by: INTERNAL MEDICINE

## 2020-08-04 NOTE — PROCEDURES
ESOPHAGOGASTRODUODENOSCOPY (EGD) & COLONOSCOPY REPORT    Sandy JAMES 1965 Age 63year old   PCP Amaury Hermosillo DO Endoscopist Monica Bishop MD     Date of procedure: 20    Procedure: EGD w/ cold biopsy & Colonoscopy w/cold snare, co the colon were good with washing. I then carefully withdrew the instrument from the patient who tolerated the procedure well. Complications: None    EGD findings:      1. Esophagus: The squamocolumnar junction was noted at 39 cm and appeared regluar.  Guero Rodrigez duodenal and gastric biopsies.  Ascending and rectal polyps

## 2020-08-06 ENCOUNTER — OFFICE VISIT (OUTPATIENT)
Dept: HEMATOLOGY/ONCOLOGY | Facility: HOSPITAL | Age: 55
End: 2020-08-06
Attending: INTERNAL MEDICINE
Payer: COMMERCIAL

## 2020-08-06 ENCOUNTER — LAB ENCOUNTER (OUTPATIENT)
Dept: LAB | Facility: HOSPITAL | Age: 55
End: 2020-08-06
Attending: INTERNAL MEDICINE
Payer: COMMERCIAL

## 2020-08-06 VITALS
HEIGHT: 68 IN | OXYGEN SATURATION: 100 % | RESPIRATION RATE: 16 BRPM | TEMPERATURE: 97 F | SYSTOLIC BLOOD PRESSURE: 127 MMHG | BODY MASS INDEX: 22.58 KG/M2 | DIASTOLIC BLOOD PRESSURE: 71 MMHG | HEART RATE: 71 BPM | WEIGHT: 149 LBS

## 2020-08-06 DIAGNOSIS — D69.6 THROMBOCYTOPENIA (HCC): ICD-10-CM

## 2020-08-06 DIAGNOSIS — R22.43 LOCALIZED SWELLING OF BOTH LOWER LEGS: ICD-10-CM

## 2020-08-06 DIAGNOSIS — R16.1 SPLENOMEGALY: Primary | ICD-10-CM

## 2020-08-06 DIAGNOSIS — R16.1 SPLENOMEGALY: ICD-10-CM

## 2020-08-06 LAB
HAV AB SER QL IA: NONREACTIVE
HBV CORE AB SERPL QL IA: NONREACTIVE
HBV SURFACE AB SER QL: NONREACTIVE
HBV SURFACE AB SERPL IA-ACNC: <3.1 MIU/ML
HBV SURFACE AG SERPL QL IA: NONREACTIVE
HCV AB SERPL QL IA: NONREACTIVE
IGA SERPL-MCNC: <7.83 MG/DL (ref 70–312)
IGM SERPL-MCNC: 693 MG/DL (ref 43–279)
IMMUNOGLOBULIN PNL SER-MCNC: 494 MG/DL (ref 791–1643)

## 2020-08-06 PROCEDURE — 81270 JAK2 GENE: CPT

## 2020-08-06 PROCEDURE — 99244 OFF/OP CNSLTJ NEW/EST MOD 40: CPT | Performed by: INTERNAL MEDICINE

## 2020-08-06 PROCEDURE — 80500 HEPATITIS A B + C PROFILE: CPT

## 2020-08-06 PROCEDURE — 86334 IMMUNOFIX E-PHORESIS SERUM: CPT

## 2020-08-06 PROCEDURE — 86704 HEP B CORE ANTIBODY TOTAL: CPT

## 2020-08-06 PROCEDURE — 83883 ASSAY NEPHELOMETRY NOT SPEC: CPT

## 2020-08-06 PROCEDURE — 87340 HEPATITIS B SURFACE AG IA: CPT

## 2020-08-06 PROCEDURE — 86706 HEP B SURFACE ANTIBODY: CPT

## 2020-08-06 PROCEDURE — 87389 HIV-1 AG W/HIV-1&-2 AB AG IA: CPT

## 2020-08-06 PROCEDURE — 86708 HEPATITIS A ANTIBODY: CPT

## 2020-08-06 PROCEDURE — 82784 ASSAY IGA/IGD/IGG/IGM EACH: CPT

## 2020-08-06 PROCEDURE — 36415 COLL VENOUS BLD VENIPUNCTURE: CPT

## 2020-08-06 PROCEDURE — 86803 HEPATITIS C AB TEST: CPT

## 2020-08-06 PROCEDURE — 84165 PROTEIN E-PHORESIS SERUM: CPT

## 2020-08-06 PROCEDURE — 81403 MOPATH PROCEDURE LEVEL 4: CPT

## 2020-08-06 PROCEDURE — 86038 ANTINUCLEAR ANTIBODIES: CPT

## 2020-08-06 NOTE — PROGRESS NOTES
Hematology Consultation Note    Patient Name: Ghulam Peres   YOB: 1965   Medical Record Number: [id number]   CSN: [id number]   Consulting Physician: Alon Mcneill MD  Referring Physician(s): Hoda Hernandez  Date of Consultation: 8/6/ extremities    • Visual impairment     wears eye glasses       Past Surgical History:  Past Surgical History:   Procedure Laterality Date   • BRAIN SURGERY Right 2005    craniotomy to treat trigeminal neuralgia   • CHOLECYSTECTOMY  2013   • COLONOSCOPY  10 History:  Social History    Socioeconomic History      Marital status:       Spouse name: Not on file      Number of children: Not on file      Years of education: Not on file      Highest education level: Not on file    Occupational History      No Bike Helmet: Not Asked        Seat Belt: Not Asked        Self-Exams: Not Asked    Social History Narrative      Kay Klinefelter is  x 35 yrs to [facility]. Mother of 2 children. 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 She and her family live in [place].       Annamaria Yang symptoms, nausea, vomiting, change in bowel habits, diarrhea, constipation. +nausea with some abdominal pain, +GERD symptoms  Integument/breast: Negative for rash, skin lesions, and pruritus.   Hematologic/lymphatic: Negative for easy bruising, bleeding, an 07/23/2020 03:06 PM    GFRNAA 56 (L) 07/23/2020 03:06 PM    CA 9.2 07/23/2020 03:06 PM    ALB 4.0 07/23/2020 03:06 PM     07/23/2020 03:06 PM    K 4.5 07/23/2020 03:06 PM     07/23/2020 03:06 PM    CO2 32.0 07/23/2020 03:06 PM    ALKPHO 95 07/2 completed CT abdominal imaging    --We discussed the plan to rule out possible infections with HIV and hepatitis testing which she agreed to as well as to screen for autoimmune phenomenon with UBALDO with reflex titer  --Discussed with patient that we would s

## 2020-08-10 LAB
JAK2 GENE, V617F MUTATION, QUALITATIVE: NOT DETECTED
NUCLEAR IGG TITR SER IF: NEGATIVE {TITER}

## 2020-08-11 ENCOUNTER — NURSE TRIAGE (OUTPATIENT)
Dept: FAMILY MEDICINE CLINIC | Facility: CLINIC | Age: 55
End: 2020-08-11

## 2020-08-11 ENCOUNTER — LAB ENCOUNTER (OUTPATIENT)
Dept: LAB | Facility: HOSPITAL | Age: 55
End: 2020-08-11
Attending: INTERNAL MEDICINE
Payer: COMMERCIAL

## 2020-08-11 DIAGNOSIS — R16.1 SPLENOMEGALY: ICD-10-CM

## 2020-08-11 LAB
JAK2 EXON 12 MUTATION ANAL PCR: NOT DETECTED
KAPPA FREE LIGHT CHAIN: 2.04 MG/DL (ref 0.33–1.94)
KAPPA/LAMBDA FLC RATIO: 0.86 (ref 0.26–1.65)
LAMBDA FREE LIGHT CHAIN: 2.38 MG/DL (ref 0.57–2.63)
M PROTEIN 24H UR ELPH-MRATE: 176 MG/24 HR (ref ?–149.1)
SPECIMEN VOL UR: 1600 ML

## 2020-08-11 PROCEDURE — 86335 IMMUNFIX E-PHORSIS/URINE/CSF: CPT

## 2020-08-11 PROCEDURE — 84156 ASSAY OF PROTEIN URINE: CPT

## 2020-08-11 PROCEDURE — 84166 PROTEIN E-PHORESIS/URINE/CSF: CPT

## 2020-08-11 NOTE — TELEPHONE ENCOUNTER
Action Requested: Summary for Provider     []  Critical Lab, Recommendations Needed  [] Need Additional Advice  []   FYI    []   Need Orders  [] Need Medications Sent to Pharmacy  []  Other     SUMMARY:  Per patient she is experiencing nausea and gastric r

## 2020-08-13 ENCOUNTER — HOSPITAL ENCOUNTER (OUTPATIENT)
Dept: CV DIAGNOSTICS | Facility: HOSPITAL | Age: 55
Discharge: HOME | End: 2020-08-13
Attending: INTERNAL MEDICINE
Payer: COMMERCIAL

## 2020-08-13 ENCOUNTER — HOSPITAL ENCOUNTER (OUTPATIENT)
Dept: CT IMAGING | Facility: HOSPITAL | Age: 55
Discharge: HOME | End: 2020-08-13
Attending: INTERNAL MEDICINE
Payer: COMMERCIAL

## 2020-08-13 ENCOUNTER — HOSPITAL ENCOUNTER (OUTPATIENT)
Dept: GENERAL RADIOLOGY | Facility: HOSPITAL | Age: 55
Discharge: HOME | End: 2020-08-13
Attending: INTERNAL MEDICINE
Payer: COMMERCIAL

## 2020-08-13 DIAGNOSIS — R16.1 SPLENOMEGALY: ICD-10-CM

## 2020-08-13 DIAGNOSIS — R22.43 LOCALIZED SWELLING OF BOTH LOWER LEGS: ICD-10-CM

## 2020-08-13 LAB
ALBUMIN SERPL ELPH-MCNC: 4.29 G/DL (ref 3.75–5.21)
ALBUMIN/GLOB SERPL: 1.71 {RATIO} (ref 1–2)
ALPHA1 GLOB SERPL ELPH-MCNC: 0.33 G/DL (ref 0.19–0.46)
ALPHA2 GLOB SERPL ELPH-MCNC: 0.65 G/DL (ref 0.48–1.05)
B-GLOBULIN SERPL ELPH-MCNC: 0.64 G/DL (ref 0.68–1.23)
GAMMA GLOB SERPL ELPH-MCNC: 0.88 G/DL (ref 0.62–1.7)
M PROTEIN MFR SERPL ELPH: 6.8 G/DL (ref 6.4–8.2)

## 2020-08-13 PROCEDURE — 70491 CT SOFT TISSUE NECK W/DYE: CPT | Performed by: INTERNAL MEDICINE

## 2020-08-13 PROCEDURE — 93306 TTE W/DOPPLER COMPLETE: CPT | Performed by: INTERNAL MEDICINE

## 2020-08-13 PROCEDURE — 77075 RADEX OSSEOUS SURVEY COMPL: CPT | Performed by: INTERNAL MEDICINE

## 2020-08-13 PROCEDURE — 71260 CT THORAX DX C+: CPT | Performed by: INTERNAL MEDICINE

## 2020-08-14 ENCOUNTER — TELEPHONE (OUTPATIENT)
Dept: GASTROENTEROLOGY | Facility: CLINIC | Age: 55
End: 2020-08-14

## 2020-08-14 ENCOUNTER — OFFICE VISIT (OUTPATIENT)
Dept: FAMILY MEDICINE CLINIC | Facility: CLINIC | Age: 55
End: 2020-08-14
Payer: COMMERCIAL

## 2020-08-14 VITALS
DIASTOLIC BLOOD PRESSURE: 70 MMHG | HEART RATE: 80 BPM | BODY MASS INDEX: 23.86 KG/M2 | WEIGHT: 152 LBS | OXYGEN SATURATION: 98 % | HEIGHT: 67 IN | TEMPERATURE: 98 F | SYSTOLIC BLOOD PRESSURE: 126 MMHG

## 2020-08-14 DIAGNOSIS — R16.1 SPLEEN ENLARGEMENT: Primary | ICD-10-CM

## 2020-08-14 DIAGNOSIS — D69.3 THROMBOCYTOPENIA, IDIOPATHIC (HCC): ICD-10-CM

## 2020-08-14 PROCEDURE — 3074F SYST BP LT 130 MM HG: CPT | Performed by: FAMILY MEDICINE

## 2020-08-14 PROCEDURE — 3008F BODY MASS INDEX DOCD: CPT | Performed by: FAMILY MEDICINE

## 2020-08-14 PROCEDURE — 99214 OFFICE O/P EST MOD 30 MIN: CPT | Performed by: FAMILY MEDICINE

## 2020-08-14 PROCEDURE — 3078F DIAST BP <80 MM HG: CPT | Performed by: FAMILY MEDICINE

## 2020-08-14 NOTE — TELEPHONE ENCOUNTER
----- Message from Monica Bishop MD sent at 8/14/2020  1:51 PM CDT -----  GI staff: please place recall for colonoscopy in 3 years   e

## 2020-08-14 NOTE — TELEPHONE ENCOUNTER
Called pt per MD direction to inform that that there are no Â new changes based on labs, 1 of the tumor markers as mildly elevated, as we expected. Â She has treatment-related cytopenias, but no other new concerning findings. Â Pt verbalized understanding. Discussed with pt that we are in the middle of veriifying insurance authorization for her Yasmin Bowles. Py usually receives from [employer]. Pt currently on last week of Ibrance and then has week off. Will contact pt when auth obtained and plan in place. Recall colonoscopy in 3 years placed. HM updated. Dr. Vinny Cuevas notified patient in result note.

## 2020-08-14 NOTE — PROGRESS NOTES
HPI:    Patient ID: Shauna Velazquez is a 63year old female. HPI  Review recent radiology and lab tests ordered through other physicians. All test results are not completed. She has mild abdominal pain and mild nausea.   Had upper endoscopy awaiting r ([facility])    Chronic low plts. For the past 3-4 years. Not below 100k. Seen by hematology due to mild spleen enlargement on CT ordered by GI. Has follow up for both. Takes lasix as needed for mild ankle edema. Never severe edema.  Other mild diuretics did

## 2020-08-18 ENCOUNTER — OFFICE VISIT (OUTPATIENT)
Dept: FAMILY MEDICINE CLINIC | Facility: CLINIC | Age: 55
End: 2020-08-18
Payer: COMMERCIAL

## 2020-08-18 VITALS
DIASTOLIC BLOOD PRESSURE: 66 MMHG | BODY MASS INDEX: 23.86 KG/M2 | WEIGHT: 152 LBS | HEART RATE: 55 BPM | HEIGHT: 67 IN | TEMPERATURE: 97 F | SYSTOLIC BLOOD PRESSURE: 127 MMHG

## 2020-08-18 DIAGNOSIS — M54.50 ACUTE RIGHT-SIDED LOW BACK PAIN WITHOUT SCIATICA: Primary | ICD-10-CM

## 2020-08-18 DIAGNOSIS — G47.9 SLEEP DISORDER: ICD-10-CM

## 2020-08-18 PROCEDURE — 99214 OFFICE O/P EST MOD 30 MIN: CPT | Performed by: FAMILY MEDICINE

## 2020-08-18 PROCEDURE — 3008F BODY MASS INDEX DOCD: CPT | Performed by: FAMILY MEDICINE

## 2020-08-18 PROCEDURE — 3074F SYST BP LT 130 MM HG: CPT | Performed by: FAMILY MEDICINE

## 2020-08-18 PROCEDURE — 3078F DIAST BP <80 MM HG: CPT | Performed by: FAMILY MEDICINE

## 2020-08-18 RX ORDER — TEMAZEPAM 15 MG/1
15 CAPSULE ORAL NIGHTLY PRN
Qty: 30 CAPSULE | Refills: 5 | Status: SHIPPED | OUTPATIENT
Start: 2020-08-18 | End: 2020-10-05

## 2020-08-18 NOTE — PROGRESS NOTES
HPI:    Patient ID: Eris Nelson is a 63year old female.     HPI  Patient presents with:  Back Pain: peristant back pain  follow up   Medication Problem: current medication for sleeping is not helping, having nightmare, awake at night, wants to change anxious. She does not exhibit a depressed mood. ASSESSMENT/PLAN:   Acute right-sided low back pain without sciatica  (primary encounter diagnosis)  Sleep disorder    Treated her back today with some mild OMT. Tolerated well.    Change sleeper

## 2020-08-21 ENCOUNTER — OFFICE VISIT (OUTPATIENT)
Dept: HEMATOLOGY/ONCOLOGY | Facility: HOSPITAL | Age: 55
End: 2020-08-21
Attending: INTERNAL MEDICINE
Payer: COMMERCIAL

## 2020-08-21 VITALS
BODY MASS INDEX: 22.73 KG/M2 | RESPIRATION RATE: 16 BRPM | SYSTOLIC BLOOD PRESSURE: 115 MMHG | WEIGHT: 150 LBS | HEIGHT: 68 IN | TEMPERATURE: 97 F | HEART RATE: 71 BPM | OXYGEN SATURATION: 99 % | DIASTOLIC BLOOD PRESSURE: 61 MMHG

## 2020-08-21 DIAGNOSIS — R91.1 PULMONARY NODULE: ICD-10-CM

## 2020-08-21 DIAGNOSIS — R76.8 ELEVATED SERUM IMMUNOGLOBULIN FREE LIGHT CHAIN LEVEL: Primary | ICD-10-CM

## 2020-08-21 DIAGNOSIS — Z87.891 HISTORY OF TOBACCO USE: ICD-10-CM

## 2020-08-21 PROCEDURE — 99214 OFFICE O/P EST MOD 30 MIN: CPT | Performed by: INTERNAL MEDICINE

## 2020-08-21 NOTE — PROGRESS NOTES
Hematology Progress Note    Patient Name: Emely Guo   YOB: 1965   Medical Record Number: [id number]   CSN: [id number]   Consulting Physician: Galina Thakkar MD  Referring Physician(s): No ref.  provider found  Date of Visit: 8/21/2020 ([facility] [address] 2011    basal cell on right eyebrow   • Hemorrhoids     hemoorhoidectomy   • History of MRSA infection     blister on toe MRSA+ approximately 2012   • Infected dental carries 2010    dental extractions   • Injury of digital nerve of finger 2010    R ureterolysis   • SINUS SURGERY        to treat sinusitis   • VENTRAL HERNIA REPAIR  07/26/2018    incisional hernia repair with mesh, lysis of adhesions, bilateral myocutaneous advancement       Family Medical History:  Family History   Problem Relation Ag Emotionally abused: Not on file        Physically abused: Not on file        Forced sexual activity: Not on file    Other Topics      Concerns:         Service: Not Asked        Blood Transfusions: Not Asked        Caffeine Concern:  Yes Negative for chills, fevers. +fatigue, +night sweats x 1.5 yrs, +decreased appetite with weight loss in the last few weeks  Eyes: Negative for visual disturbance, irritation and redness.   Respiratory: Negative for cough, hemoptysis, chest pain, or dyspnea 07/23/2020 03:06 PM    MCH 29.8 07/23/2020 03:06 PM    MCHC 34.2 07/23/2020 03:06 PM    RDW 12.9 07/23/2020 03:06 PM    NEPRELIM 2.54 07/23/2020 03:06 PM    .0 (L) 07/23/2020 03:06 PM       Lab Results   Component Value Date/Time    GLU 77 07/23/202 were normal.  2. Tricuspid valve: Mild-moderate regurgitation. No previous study was available for comparison.     Impression:    (R76.8) Elevated serum immunoglobulin free light chain level  (primary encounter diagnosis)  Plan: Archana Iglesias PROTEIN, 24 VINOD pulmonary nodule noted on imaging    --discussed her negative HIV and hepatitis testing results as well as negative screen for autoimmune phenomenon with UBALDO with reflex titer  --Discussed with patient that we would screen for multiple myeloma with appropr

## 2020-08-25 ENCOUNTER — OFFICE VISIT (OUTPATIENT)
Dept: SURGERY | Facility: CLINIC | Age: 55
End: 2020-08-25
Payer: COMMERCIAL

## 2020-08-25 VITALS — HEIGHT: 68 IN | BODY MASS INDEX: 22.73 KG/M2 | WEIGHT: 150 LBS

## 2020-08-25 DIAGNOSIS — D17.21 LIPOMA OF RIGHT UPPER EXTREMITY: Primary | ICD-10-CM

## 2020-08-25 PROCEDURE — 3008F BODY MASS INDEX DOCD: CPT | Performed by: SURGERY

## 2020-08-25 PROCEDURE — 99213 OFFICE O/P EST LOW 20 MIN: CPT | Performed by: SURGERY

## 2020-08-25 NOTE — H&P (VIEW-ONLY)
HPI:    Patient ID: Shyann Pepe is a 63year old female presenting with Patient presents with:  Lump: Pt here to discuss removal of two lipomas on right arm. Pt c/o mild tenderness to area. Pt states lumps are increasing in size. Sunny Cagle     HPI    Past • REVISION OF COLOSTOMY,COMPLICATED  80/71/5232    lap reversal of Syomne procedure, extensive lysis of adhesions, partial colon resection, excision of accessory spleen, takedown of splenic flexure, insertion of drains, rigid proctosigmoidoscopy for eval Attends Temple service: Not on file        Active member of club or organization: Not on file        Attends meetings of clubs or organizations: Not on file        Relationship status: Not on file      Intimate partner violence:        Fear of cur • Potassium Chloride ER 20 MEQ Oral Tab CR TAKE ONE TABLET BY MOUTH ONE TIME DAILY as needed with furosemide 90 tablet 1   • Pantoprazole Sodium 40 MG Oral Tab EC Take 1 tablet (40 mg total) by mouth every morning before breakfast. 30 tablet 2   • Estrogen

## 2020-08-25 NOTE — H&P
HPI:    Patient ID: Ravindra Yeager is a 63year old female presenting with Patient presents with:  Lump: Pt here to discuss removal of two lipomas on right arm. Pt c/o mild tenderness to area. Pt states lumps are increasing in size. Deacon Washington     HPI    Past • REVISION OF COLOSTOMY,COMPLICATED  57/85/4469    lap reversal of Symone procedure, extensive lysis of adhesions, partial colon resection, excision of accessory spleen, takedown of splenic flexure, insertion of drains, rigid proctosigmoidoscopy for eval Attends Adventism service: Not on file        Active member of club or organization: Not on file        Attends meetings of clubs or organizations: Not on file        Relationship status: Not on file      Intimate partner violence:        Fear of cur • Potassium Chloride ER 20 MEQ Oral Tab CR TAKE ONE TABLET BY MOUTH ONE TIME DAILY as needed with furosemide 90 tablet 1   • Pantoprazole Sodium 40 MG Oral Tab EC Take 1 tablet (40 mg total) by mouth every morning before breakfast. 30 tablet 2   • Estrogen

## 2020-09-01 ENCOUNTER — APPOINTMENT (OUTPATIENT)
Dept: LAB | Age: 55
End: 2020-09-01
Attending: SURGERY
Payer: COMMERCIAL

## 2020-09-01 ENCOUNTER — TELEPHONE (OUTPATIENT)
Dept: SURGERY | Facility: CLINIC | Age: 55
End: 2020-09-01

## 2020-09-01 DIAGNOSIS — Z01.818 PRE-OP TESTING: ICD-10-CM

## 2020-09-01 DIAGNOSIS — D17.21 LIPOMA OF RIGHT UPPER EXTREMITY: Primary | ICD-10-CM

## 2020-09-01 LAB — SARS-COV-2 RNA RESP QL NAA+PROBE: NOT DETECTED

## 2020-09-01 NOTE — TELEPHONE ENCOUNTER
Patient accepted offer for procedure 9/2/2020. Changed procedure date and added to Dr. Chencho Haji calendar.

## 2020-09-01 NOTE — TELEPHONE ENCOUNTER
Left detailed message on personal VM to call back and let us know if she would like her surgery performed on 9/2/2020.

## 2020-09-02 ENCOUNTER — ANESTHESIA (OUTPATIENT)
Dept: SURGERY | Facility: HOSPITAL | Age: 55
End: 2020-09-02
Payer: COMMERCIAL

## 2020-09-02 ENCOUNTER — HOSPITAL ENCOUNTER (OUTPATIENT)
Facility: HOSPITAL | Age: 55
Setting detail: HOSPITAL OUTPATIENT SURGERY
Discharge: HOME | End: 2020-09-02
Attending: SURGERY | Admitting: SURGERY
Payer: COMMERCIAL

## 2020-09-02 ENCOUNTER — ANESTHESIA EVENT (OUTPATIENT)
Dept: SURGERY | Facility: HOSPITAL | Age: 55
End: 2020-09-02
Payer: COMMERCIAL

## 2020-09-02 VITALS
SYSTOLIC BLOOD PRESSURE: 108 MMHG | OXYGEN SATURATION: 100 % | RESPIRATION RATE: 16 BRPM | BODY MASS INDEX: 22.9 KG/M2 | DIASTOLIC BLOOD PRESSURE: 54 MMHG | HEIGHT: 68 IN | TEMPERATURE: 98 F | HEART RATE: 51 BPM | WEIGHT: 151.13 LBS

## 2020-09-02 DIAGNOSIS — D17.21 LIPOMA OF RIGHT UPPER EXTREMITY: ICD-10-CM

## 2020-09-02 DIAGNOSIS — Z01.818 PRE-OP TESTING: Primary | ICD-10-CM

## 2020-09-02 PROCEDURE — 11403 EXC TR-EXT B9+MARG 2.1-3CM: CPT | Performed by: SURGERY

## 2020-09-02 PROCEDURE — 11402 EXC TR-EXT B9+MARG 1.1-2 CM: CPT | Performed by: SURGERY

## 2020-09-02 PROCEDURE — 0JBG0ZZ EXCISION OF RIGHT LOWER ARM SUBCUTANEOUS TISSUE AND FASCIA, OPEN APPROACH: ICD-10-PCS | Performed by: SURGERY

## 2020-09-02 RX ORDER — CEFAZOLIN SODIUM/WATER 2 G/20 ML
2 SYRINGE (ML) INTRAVENOUS ONCE
Status: COMPLETED | OUTPATIENT
Start: 2020-09-02 | End: 2020-09-02

## 2020-09-02 RX ORDER — MORPHINE SULFATE 4 MG/ML
2 INJECTION, SOLUTION INTRAMUSCULAR; INTRAVENOUS EVERY 10 MIN PRN
Status: DISCONTINUED | OUTPATIENT
Start: 2020-09-02 | End: 2020-09-02

## 2020-09-02 RX ORDER — HYDROCODONE BITARTRATE AND ACETAMINOPHEN 5; 325 MG/1; MG/1
1 TABLET ORAL AS NEEDED
Status: DISCONTINUED | OUTPATIENT
Start: 2020-09-02 | End: 2020-09-02

## 2020-09-02 RX ORDER — POLYETHYLENE GLYCOL 3350 17 G/17G
17 POWDER, FOR SOLUTION ORAL DAILY
Qty: 14 PACKET | Refills: 0 | Status: SHIPPED | OUTPATIENT
Start: 2020-09-02 | End: 2020-09-16

## 2020-09-02 RX ORDER — HYDROCODONE BITARTRATE AND ACETAMINOPHEN 5; 325 MG/1; MG/1
2 TABLET ORAL AS NEEDED
Status: DISCONTINUED | OUTPATIENT
Start: 2020-09-02 | End: 2020-09-02

## 2020-09-02 RX ORDER — HYDROCODONE BITARTRATE AND ACETAMINOPHEN 5; 325 MG/1; MG/1
1 TABLET ORAL EVERY 4 HOURS PRN
Qty: 15 TABLET | Refills: 0 | Status: SHIPPED | OUTPATIENT
Start: 2020-09-02 | End: 2020-09-14 | Stop reason: ALTCHOICE

## 2020-09-02 RX ORDER — HYDROMORPHONE HYDROCHLORIDE 1 MG/ML
0.4 INJECTION, SOLUTION INTRAMUSCULAR; INTRAVENOUS; SUBCUTANEOUS EVERY 5 MIN PRN
Status: DISCONTINUED | OUTPATIENT
Start: 2020-09-02 | End: 2020-09-02

## 2020-09-02 RX ORDER — MIDAZOLAM HYDROCHLORIDE 1 MG/ML
INJECTION INTRAMUSCULAR; INTRAVENOUS AS NEEDED
Status: DISCONTINUED | OUTPATIENT
Start: 2020-09-02 | End: 2020-09-02 | Stop reason: SURG

## 2020-09-02 RX ORDER — METOCLOPRAMIDE 10 MG/1
10 TABLET ORAL ONCE
Status: DISCONTINUED | OUTPATIENT
Start: 2020-09-02 | End: 2020-09-02 | Stop reason: HOSPADM

## 2020-09-02 RX ORDER — ONDANSETRON 2 MG/ML
4 INJECTION INTRAMUSCULAR; INTRAVENOUS ONCE AS NEEDED
Status: DISCONTINUED | OUTPATIENT
Start: 2020-09-02 | End: 2020-09-02

## 2020-09-02 RX ORDER — SODIUM CHLORIDE, SODIUM LACTATE, POTASSIUM CHLORIDE, CALCIUM CHLORIDE 600; 310; 30; 20 MG/100ML; MG/100ML; MG/100ML; MG/100ML
INJECTION, SOLUTION INTRAVENOUS CONTINUOUS
Status: DISCONTINUED | OUTPATIENT
Start: 2020-09-02 | End: 2020-09-02

## 2020-09-02 RX ORDER — ACETAMINOPHEN 500 MG
1000 TABLET ORAL ONCE
Status: COMPLETED | OUTPATIENT
Start: 2020-09-02 | End: 2020-09-02

## 2020-09-02 RX ORDER — FAMOTIDINE 20 MG/1
20 TABLET ORAL ONCE
Status: DISCONTINUED | OUTPATIENT
Start: 2020-09-02 | End: 2020-09-02 | Stop reason: HOSPADM

## 2020-09-02 RX ORDER — MORPHINE SULFATE 4 MG/ML
4 INJECTION, SOLUTION INTRAMUSCULAR; INTRAVENOUS EVERY 10 MIN PRN
Status: DISCONTINUED | OUTPATIENT
Start: 2020-09-02 | End: 2020-09-02

## 2020-09-02 RX ORDER — HYDROMORPHONE HYDROCHLORIDE 1 MG/ML
0.6 INJECTION, SOLUTION INTRAMUSCULAR; INTRAVENOUS; SUBCUTANEOUS EVERY 5 MIN PRN
Status: DISCONTINUED | OUTPATIENT
Start: 2020-09-02 | End: 2020-09-02

## 2020-09-02 RX ORDER — ONDANSETRON 2 MG/ML
INJECTION INTRAMUSCULAR; INTRAVENOUS AS NEEDED
Status: DISCONTINUED | OUTPATIENT
Start: 2020-09-02 | End: 2020-09-02 | Stop reason: SURG

## 2020-09-02 RX ORDER — PROCHLORPERAZINE EDISYLATE 5 MG/ML
5 INJECTION INTRAMUSCULAR; INTRAVENOUS ONCE AS NEEDED
Status: DISCONTINUED | OUTPATIENT
Start: 2020-09-02 | End: 2020-09-02

## 2020-09-02 RX ORDER — LIDOCAINE HYDROCHLORIDE 10 MG/ML
INJECTION, SOLUTION EPIDURAL; INFILTRATION; INTRACAUDAL; PERINEURAL AS NEEDED
Status: DISCONTINUED | OUTPATIENT
Start: 2020-09-02 | End: 2020-09-02 | Stop reason: SURG

## 2020-09-02 RX ORDER — HALOPERIDOL 5 MG/ML
0.25 INJECTION INTRAMUSCULAR ONCE AS NEEDED
Status: DISCONTINUED | OUTPATIENT
Start: 2020-09-02 | End: 2020-09-02

## 2020-09-02 RX ORDER — BUPIVACAINE HYDROCHLORIDE AND EPINEPHRINE 5; 5 MG/ML; UG/ML
INJECTION, SOLUTION PERINEURAL AS NEEDED
Status: DISCONTINUED | OUTPATIENT
Start: 2020-09-02 | End: 2020-09-02 | Stop reason: HOSPADM

## 2020-09-02 RX ORDER — HYDROMORPHONE HYDROCHLORIDE 1 MG/ML
0.2 INJECTION, SOLUTION INTRAMUSCULAR; INTRAVENOUS; SUBCUTANEOUS EVERY 5 MIN PRN
Status: DISCONTINUED | OUTPATIENT
Start: 2020-09-02 | End: 2020-09-02

## 2020-09-02 RX ORDER — NALOXONE HYDROCHLORIDE 0.4 MG/ML
80 INJECTION, SOLUTION INTRAMUSCULAR; INTRAVENOUS; SUBCUTANEOUS AS NEEDED
Status: DISCONTINUED | OUTPATIENT
Start: 2020-09-02 | End: 2020-09-02

## 2020-09-02 RX ORDER — MORPHINE SULFATE 10 MG/ML
6 INJECTION, SOLUTION INTRAMUSCULAR; INTRAVENOUS EVERY 10 MIN PRN
Status: DISCONTINUED | OUTPATIENT
Start: 2020-09-02 | End: 2020-09-02

## 2020-09-02 RX ORDER — BUPIVACAINE HYDROCHLORIDE 5 MG/ML
INJECTION, SOLUTION EPIDURAL; INTRACAUDAL AS NEEDED
Status: DISCONTINUED | OUTPATIENT
Start: 2020-09-02 | End: 2020-09-02 | Stop reason: HOSPADM

## 2020-09-02 RX ADMIN — CEFAZOLIN SODIUM/WATER 2 G: 2 G/20 ML SYRINGE (ML) INTRAVENOUS at 07:35:00

## 2020-09-02 RX ADMIN — ONDANSETRON 4 MG: 2 INJECTION INTRAMUSCULAR; INTRAVENOUS at 07:52:00

## 2020-09-02 RX ADMIN — SODIUM CHLORIDE, SODIUM LACTATE, POTASSIUM CHLORIDE, CALCIUM CHLORIDE: 600; 310; 30; 20 INJECTION, SOLUTION INTRAVENOUS at 08:18:00

## 2020-09-02 RX ADMIN — MIDAZOLAM HYDROCHLORIDE 2 MG: 1 INJECTION INTRAMUSCULAR; INTRAVENOUS at 07:31:00

## 2020-09-02 RX ADMIN — LIDOCAINE HYDROCHLORIDE 30 MG: 10 INJECTION, SOLUTION EPIDURAL; INFILTRATION; INTRACAUDAL; PERINEURAL at 07:33:00

## 2020-09-02 NOTE — OPERATIVE REPORT
Operative Report    Patient Name:  Devendra Blackwell  MR:  [id number]  :  1965  DOS:  20    Preop Dx:  Lipoma of right upper extremity [D17.21] x 2  Postop Dx:  same  Procedure:   Excision of right forearm lipoma times two (3x2 cm and 2 x 2 cm

## 2020-09-02 NOTE — INTERVAL H&P NOTE
Pre-op Diagnosis: Lipoma of right upper extremity [D17.21]    The above referenced H&P was reviewed by Georgina Virgen MD on 9/2/2020, the patient was examined and no significant changes have occurred in the patient's condition since the H&P was performed.   I di

## 2020-09-02 NOTE — ANESTHESIA POSTPROCEDURE EVALUATION
Patient: Mitzy Figueroa    Procedure Summary     Date:  09/02/20 Room / Location:  [facility] OR  / [facility] OR    Anesthesia Start:  0861 Anesthesia Stop:  0055    Procedure:  EXCISION OF LESION/LIPOMA (Right ) Diagnosis:       Lipoma of right upper ex

## 2020-09-03 RX ORDER — ONDANSETRON 4 MG/1
4 TABLET, ORALLY DISINTEGRATING ORAL EVERY 8 HOURS PRN
Qty: 60 TABLET | Refills: 0 | Status: SHIPPED | OUTPATIENT
Start: 2020-09-03 | End: 2020-09-30

## 2020-09-03 NOTE — TELEPHONE ENCOUNTER
Requested Prescriptions     Pending Prescriptions Disp Refills   • ondansetron 4 MG Oral Tablet Dispersible 60 tablet 0     Sig: Take 1 tablet (4 mg total) by mouth every 8 (eight) hours as needed for Nausea.      Last seen CLN/EGD 8/04/2020  LR 7/28/2020

## 2020-09-14 ENCOUNTER — OFFICE VISIT (OUTPATIENT)
Dept: SURGERY | Facility: CLINIC | Age: 55
End: 2020-09-14
Payer: COMMERCIAL

## 2020-09-14 DIAGNOSIS — M67.442 GANGLION OF LEFT HAND: Primary | ICD-10-CM

## 2020-09-14 PROCEDURE — 99243 OFF/OP CNSLTJ NEW/EST LOW 30: CPT | Performed by: PLASTIC SURGERY

## 2020-09-14 NOTE — PROGRESS NOTES
Patient request for surgery signed by patient and witnessed and signed by RN. Patient to take OTC meds post-operatively; patient instructed to call the office if post-operative pain is not relieved w/OTC meds.   Pre-Surgical Instruction Handout, Vee Carrion

## 2020-09-14 NOTE — H&P (VIEW-ONLY)
Yonny Ruby is a 63year old female that presents with Patient presents with:  Cyst: left small finger      REFERRED BY:  Ramandeep Kelly    Pacemaker: No  Latex Allergy: no  Coumadin: No  Plavix: No  Other anticoagulants: No  Cardiac stents: No    H impairment     wears eye glasses        SURGICAL  Past Surgical History:   Procedure Laterality Date   • BRAIN SURGERY Right 2005    craniotomy to treat trigeminal neuralgia   • CHOLECYSTECTOMY  2013   • COLONOSCOPY  10/01/2014    Dr Jhony Stiles   • DESTRUCTION S Powd Pack Take 17 g by mouth daily for 14 days. 14 packet 0   • temazepam 15 MG Oral Cap Take 1 capsule (15 mg total) by mouth nightly as needed for Sleep.  30 capsule 5   • furosemide 20 MG Oral Tab TAKE ONE TABLET BY MOUTH DAILY AS NEEDED 90 tablet 1   • Inspection - Normal, No abdominal tenderness  NEURO: Memory intact  PSYCH: Oriented to person, place, time, and situation, Appropriate mood and affect      Hand Physical Exam:     LSF radial PIP 1 cm firm freely movable nontender mass    ASSESSMENT/PLAN:

## 2020-09-14 NOTE — H&P
Dominguez Cook is a 63year old female that presents with Patient presents with:  Cyst: left small finger      REFERRED BY:  Carole Mcgregor    Pacemaker: No  Latex Allergy: no  Coumadin: No  Plavix: No  Other anticoagulants: No  Cardiac stents: No    H impairment     wears eye glasses        SURGICAL  Past Surgical History:   Procedure Laterality Date   • BRAIN SURGERY Right 2005    craniotomy to treat trigeminal neuralgia   • CHOLECYSTECTOMY  2013   • COLONOSCOPY  10/01/2014    Dr Richi Nation   • DESTRUCTION S Powd Pack Take 17 g by mouth daily for 14 days. 14 packet 0   • temazepam 15 MG Oral Cap Take 1 capsule (15 mg total) by mouth nightly as needed for Sleep.  30 capsule 5   • furosemide 20 MG Oral Tab TAKE ONE TABLET BY MOUTH DAILY AS NEEDED 90 tablet 1   • Inspection - Normal, No abdominal tenderness  NEURO: Memory intact  PSYCH: Oriented to person, place, time, and situation, Appropriate mood and affect      Hand Physical Exam:     LSF radial PIP 1 cm firm freely movable nontender mass    ASSESSMENT/PLAN:

## 2020-09-17 ENCOUNTER — TELEPHONE (OUTPATIENT)
Dept: SURGERY | Facility: CLINIC | Age: 55
End: 2020-09-17

## 2020-09-17 DIAGNOSIS — M67.442: Primary | ICD-10-CM

## 2020-09-30 ENCOUNTER — LAB ENCOUNTER (OUTPATIENT)
Dept: LAB | Facility: HOSPITAL | Age: 55
End: 2020-09-30
Attending: PLASTIC SURGERY
Payer: COMMERCIAL

## 2020-09-30 DIAGNOSIS — Z01.812 PRE-PROCEDURE LAB EXAM: Primary | ICD-10-CM

## 2020-09-30 RX ORDER — ONDANSETRON 4 MG/1
TABLET, ORALLY DISINTEGRATING ORAL
Qty: 60 TABLET | Refills: 0 | Status: SHIPPED | OUTPATIENT
Start: 2020-09-30 | End: 2020-10-29

## 2020-09-30 NOTE — TELEPHONE ENCOUNTER
Requested Prescriptions     Pending Prescriptions Disp Refills   • ONDANSETRON 4 MG Oral Tablet Dispersible [Pharmacy Med Name: Ondansetron 4 Mg Odt Tab Auro] 60 tablet 0     Sig: DISSOLVE ONE TABLET IN MOUTH EVERY EIGHT HOURS AS NEEDED FOR NAUSEA     lov-

## 2020-10-02 ENCOUNTER — HOSPITAL ENCOUNTER (OUTPATIENT)
Facility: HOSPITAL | Age: 55
Setting detail: HOSPITAL OUTPATIENT SURGERY
Discharge: HOME | End: 2020-10-02
Attending: PLASTIC SURGERY | Admitting: PLASTIC SURGERY
Payer: COMMERCIAL

## 2020-10-02 ENCOUNTER — ANESTHESIA EVENT (OUTPATIENT)
Dept: SURGERY | Facility: HOSPITAL | Age: 55
End: 2020-10-02
Payer: COMMERCIAL

## 2020-10-02 ENCOUNTER — ANESTHESIA (OUTPATIENT)
Dept: SURGERY | Facility: HOSPITAL | Age: 55
End: 2020-10-02
Payer: COMMERCIAL

## 2020-10-02 ENCOUNTER — HOSPITAL DOCUMENTATION (OUTPATIENT)
Dept: SURGERY | Facility: CLINIC | Age: 55
End: 2020-10-02

## 2020-10-02 VITALS
OXYGEN SATURATION: 100 % | HEART RATE: 54 BPM | SYSTOLIC BLOOD PRESSURE: 126 MMHG | RESPIRATION RATE: 16 BRPM | HEIGHT: 68 IN | DIASTOLIC BLOOD PRESSURE: 63 MMHG | TEMPERATURE: 98 F | WEIGHT: 145 LBS | BODY MASS INDEX: 21.98 KG/M2

## 2020-10-02 DIAGNOSIS — D21.12 SOFT TISSUE BENIGN NEOPLASM, UPPER EXTREMITY, LEFT: Primary | ICD-10-CM

## 2020-10-02 DIAGNOSIS — Z01.818 PREOP TESTING: Primary | ICD-10-CM

## 2020-10-02 PROCEDURE — 0LB80ZZ EXCISION OF LEFT HAND TENDON, OPEN APPROACH: ICD-10-PCS | Performed by: PLASTIC SURGERY

## 2020-10-02 PROCEDURE — 26116 EXC HAND TUM DEEP < 1.5 CM: CPT | Performed by: PLASTIC SURGERY

## 2020-10-02 RX ORDER — HYDROCODONE BITARTRATE AND ACETAMINOPHEN 5; 325 MG/1; MG/1
1 TABLET ORAL AS NEEDED
Status: DISCONTINUED | OUTPATIENT
Start: 2020-10-02 | End: 2020-10-02

## 2020-10-02 RX ORDER — HYDROCODONE BITARTRATE AND ACETAMINOPHEN 7.5; 325 MG/1; MG/1
1 TABLET ORAL
Qty: 10 TABLET | Refills: 0 | Status: SHIPPED | OUTPATIENT
Start: 2020-10-02 | End: 2020-10-05 | Stop reason: ALTCHOICE

## 2020-10-02 RX ORDER — HYDROMORPHONE HYDROCHLORIDE 1 MG/ML
0.4 INJECTION, SOLUTION INTRAMUSCULAR; INTRAVENOUS; SUBCUTANEOUS EVERY 5 MIN PRN
Status: DISCONTINUED | OUTPATIENT
Start: 2020-10-02 | End: 2020-10-02

## 2020-10-02 RX ORDER — MIDAZOLAM HYDROCHLORIDE 1 MG/ML
INJECTION INTRAMUSCULAR; INTRAVENOUS AS NEEDED
Status: DISCONTINUED | OUTPATIENT
Start: 2020-10-02 | End: 2020-10-02 | Stop reason: SURG

## 2020-10-02 RX ORDER — MORPHINE SULFATE 4 MG/ML
4 INJECTION, SOLUTION INTRAMUSCULAR; INTRAVENOUS EVERY 10 MIN PRN
Status: DISCONTINUED | OUTPATIENT
Start: 2020-10-02 | End: 2020-10-02

## 2020-10-02 RX ORDER — MORPHINE SULFATE 4 MG/ML
2 INJECTION, SOLUTION INTRAMUSCULAR; INTRAVENOUS EVERY 10 MIN PRN
Status: DISCONTINUED | OUTPATIENT
Start: 2020-10-02 | End: 2020-10-02

## 2020-10-02 RX ORDER — SODIUM CHLORIDE, SODIUM LACTATE, POTASSIUM CHLORIDE, CALCIUM CHLORIDE 600; 310; 30; 20 MG/100ML; MG/100ML; MG/100ML; MG/100ML
INJECTION, SOLUTION INTRAVENOUS CONTINUOUS
Status: DISCONTINUED | OUTPATIENT
Start: 2020-10-02 | End: 2020-10-02

## 2020-10-02 RX ORDER — HYDROMORPHONE HYDROCHLORIDE 1 MG/ML
0.6 INJECTION, SOLUTION INTRAMUSCULAR; INTRAVENOUS; SUBCUTANEOUS EVERY 5 MIN PRN
Status: DISCONTINUED | OUTPATIENT
Start: 2020-10-02 | End: 2020-10-02

## 2020-10-02 RX ORDER — NALOXONE HYDROCHLORIDE 0.4 MG/ML
80 INJECTION, SOLUTION INTRAMUSCULAR; INTRAVENOUS; SUBCUTANEOUS AS NEEDED
Status: DISCONTINUED | OUTPATIENT
Start: 2020-10-02 | End: 2020-10-02

## 2020-10-02 RX ORDER — HYDROCODONE BITARTRATE AND ACETAMINOPHEN 5; 325 MG/1; MG/1
2 TABLET ORAL AS NEEDED
Status: DISCONTINUED | OUTPATIENT
Start: 2020-10-02 | End: 2020-10-02

## 2020-10-02 RX ORDER — METOCLOPRAMIDE 10 MG/1
10 TABLET ORAL ONCE
Status: DISCONTINUED | OUTPATIENT
Start: 2020-10-02 | End: 2020-10-02 | Stop reason: HOSPADM

## 2020-10-02 RX ORDER — LIDOCAINE HYDROCHLORIDE 10 MG/ML
INJECTION, SOLUTION EPIDURAL; INFILTRATION; INTRACAUDAL; PERINEURAL AS NEEDED
Status: DISCONTINUED | OUTPATIENT
Start: 2020-10-02 | End: 2020-10-02 | Stop reason: SURG

## 2020-10-02 RX ORDER — HYDROMORPHONE HYDROCHLORIDE 1 MG/ML
0.2 INJECTION, SOLUTION INTRAMUSCULAR; INTRAVENOUS; SUBCUTANEOUS EVERY 5 MIN PRN
Status: DISCONTINUED | OUTPATIENT
Start: 2020-10-02 | End: 2020-10-02

## 2020-10-02 RX ORDER — LIDOCAINE HYDROCHLORIDE 10 MG/ML
INJECTION, SOLUTION EPIDURAL; INFILTRATION; INTRACAUDAL; PERINEURAL AS NEEDED
Status: DISCONTINUED | OUTPATIENT
Start: 2020-10-02 | End: 2020-10-02 | Stop reason: HOSPADM

## 2020-10-02 RX ORDER — HALOPERIDOL 5 MG/ML
0.25 INJECTION INTRAMUSCULAR ONCE AS NEEDED
Status: DISCONTINUED | OUTPATIENT
Start: 2020-10-02 | End: 2020-10-02

## 2020-10-02 RX ORDER — ACETAMINOPHEN 500 MG
500 TABLET ORAL EVERY 4 HOURS PRN
Status: DISCONTINUED | OUTPATIENT
Start: 2020-10-02 | End: 2020-10-02

## 2020-10-02 RX ORDER — MORPHINE SULFATE 10 MG/ML
6 INJECTION, SOLUTION INTRAMUSCULAR; INTRAVENOUS EVERY 10 MIN PRN
Status: DISCONTINUED | OUTPATIENT
Start: 2020-10-02 | End: 2020-10-02

## 2020-10-02 RX ORDER — ACETAMINOPHEN 500 MG
1000 TABLET ORAL ONCE
Status: COMPLETED | OUTPATIENT
Start: 2020-10-02 | End: 2020-10-02

## 2020-10-02 RX ORDER — FAMOTIDINE 20 MG/1
20 TABLET ORAL ONCE
Status: DISCONTINUED | OUTPATIENT
Start: 2020-10-02 | End: 2020-10-02 | Stop reason: HOSPADM

## 2020-10-02 RX ORDER — KETOROLAC TROMETHAMINE 30 MG/ML
INJECTION, SOLUTION INTRAMUSCULAR; INTRAVENOUS AS NEEDED
Status: DISCONTINUED | OUTPATIENT
Start: 2020-10-02 | End: 2020-10-02 | Stop reason: SURG

## 2020-10-02 RX ORDER — ONDANSETRON 2 MG/ML
4 INJECTION INTRAMUSCULAR; INTRAVENOUS ONCE AS NEEDED
Status: DISCONTINUED | OUTPATIENT
Start: 2020-10-02 | End: 2020-10-02

## 2020-10-02 RX ORDER — PROCHLORPERAZINE EDISYLATE 5 MG/ML
5 INJECTION INTRAMUSCULAR; INTRAVENOUS ONCE AS NEEDED
Status: DISCONTINUED | OUTPATIENT
Start: 2020-10-02 | End: 2020-10-02

## 2020-10-02 RX ADMIN — KETOROLAC TROMETHAMINE 30 MG: 30 INJECTION, SOLUTION INTRAMUSCULAR; INTRAVENOUS at 11:51:00

## 2020-10-02 RX ADMIN — MIDAZOLAM HYDROCHLORIDE 2 MG: 1 INJECTION INTRAMUSCULAR; INTRAVENOUS at 11:14:00

## 2020-10-02 RX ADMIN — LIDOCAINE HYDROCHLORIDE 50 MG: 10 INJECTION, SOLUTION EPIDURAL; INFILTRATION; INTRACAUDAL; PERINEURAL at 11:17:00

## 2020-10-02 RX ADMIN — SODIUM CHLORIDE, SODIUM LACTATE, POTASSIUM CHLORIDE, CALCIUM CHLORIDE: 600; 310; 30; 20 INJECTION, SOLUTION INTRAVENOUS at 11:55:00

## 2020-10-02 NOTE — INTERVAL H&P NOTE
Pre-op Diagnosis: ganglion    The above referenced H&P was reviewed by Tank Almendarez MD on 10/2/2020, the patient was examined and no significant changes have occurred in the patient's condition since the H&P was performed.   I discussed with the p

## 2020-10-02 NOTE — ANESTHESIA PREPROCEDURE EVALUATION
Anesthesia PreOp Note    HPI:     Eris Nelson is a 63year old female who presents for preoperative consultation requested by: Daysi Avilez MD    Date of Surgery: 10/2/2020    Procedure(s):  HAND GANGLION EXCISION  Indication: ganglion    R • DESTRUCTION SKIN LESION(S)  2010    destrruction of lesions, vulva, simple; metrorrhagia   • DILATION/CURETTAGE,DIAGNOSTIC  2013   • EXCISION OF LESION/LIPOMA Right 9/2/2020    Performed by Venancio Masters MD at [facility] OR   • EXTRACTION ERUPTED TOOTH/EXR  2 •  Potassium Chloride ER 20 MEQ Oral Tab CR, TAKE ONE TABLET BY MOUTH ONE TIME DAILY as needed with furosemide, Disp: 90 tablet, Rfl: 1, 9/30/2020    •  Pantoprazole Sodium 40 MG Oral Tab EC, Take 1 tablet (40 mg total) by mouth every morning before breakf Worry: Not on file        Inability: Not on file      Transportation needs        Medical: Not on file        Non-medical: Not on file    Tobacco Use      Smoking status: Former Smoker        Packs/day: 0.25        Years: 4.00        Pack years: 1 Bad sunburns in the past: Not Asked        Tanning Salons in the Past: Not Asked        Hx of Radiation Treatments: Not Asked        Regular use of sun block: Not Asked    Social History Narrative      Santosh Soto is  x 35 yrs to [facility].  Mother of 2 I have informed Amber Will and/or legal guardian or family member of the nature of the anesthetic plan, benefits, risks including possible dental damage if relevant, major complications, and any alternative forms of anesthetic management.    All of t

## 2020-10-02 NOTE — BRIEF OP NOTE
Pre-Operative Diagnosis: ganglion     Post-Operative Diagnosis: ganglion      Procedure Performed:   Procedure(s):  excision mass left small finger    Surgeon(s) and Role:     * Junito Lawson MD - Primary    Assistant(s):        Surgical Findings:

## 2020-10-02 NOTE — ANESTHESIA POSTPROCEDURE EVALUATION
Patient: Rossi Hastings    Procedure Summary     Date: 10/02/20 Room / Location: [facility] OR 01 / [facility] OR    Anesthesia Start: 0635 Anesthesia Stop: 1200    Procedure: HAND GANGLION EXCISION (Left Hand) Diagnosis: (ganglion)    Surgeons: Giulia Davis

## 2020-10-03 ENCOUNTER — TELEPHONE (OUTPATIENT)
Dept: SURGERY | Facility: CLINIC | Age: 55
End: 2020-10-03

## 2020-10-03 NOTE — TELEPHONE ENCOUNTER
Left message for post-operative patient to please call the office with any questions and/or concerns. Reminded patient of next OT appointment 10/5/2020  Dr. Annita Ingram notified.

## 2020-10-03 NOTE — OPERATIVE REPORT
[facility]    PATIENT'S NAME: Judd Grove   ATTENDING PHYSICIAN: Francesca Wade MD   OPERATING PHYSICIAN: Francesca Wade MD   PATIENT ACCOUNT#:   [id number]    LOCATION:  [address]  MEDICAL RECORD #:   [id number] 21:39:15  [facility] [id number]  [facility]/    cc: MD Brendan Manning DO

## 2020-10-05 ENCOUNTER — TELEPHONE (OUTPATIENT)
Dept: SURGERY | Facility: CLINIC | Age: 55
End: 2020-10-05

## 2020-10-05 ENCOUNTER — OFFICE VISIT (OUTPATIENT)
Dept: FAMILY MEDICINE CLINIC | Facility: CLINIC | Age: 55
End: 2020-10-05
Payer: COMMERCIAL

## 2020-10-05 ENCOUNTER — OFFICE VISIT (OUTPATIENT)
Dept: SURGERY | Facility: CLINIC | Age: 55
End: 2020-10-05
Payer: COMMERCIAL

## 2020-10-05 VITALS
DIASTOLIC BLOOD PRESSURE: 60 MMHG | HEIGHT: 68 IN | WEIGHT: 152 LBS | SYSTOLIC BLOOD PRESSURE: 106 MMHG | HEART RATE: 65 BPM | BODY MASS INDEX: 23.04 KG/M2 | TEMPERATURE: 97 F

## 2020-10-05 DIAGNOSIS — G47.9 SLEEP DISORDER: ICD-10-CM

## 2020-10-05 DIAGNOSIS — R11.0 NAUSEA: Primary | ICD-10-CM

## 2020-10-05 DIAGNOSIS — K21.9 GASTROESOPHAGEAL REFLUX DISEASE WITHOUT ESOPHAGITIS: ICD-10-CM

## 2020-10-05 DIAGNOSIS — M62.81 DISTAL MUSCLE WEAKNESS: ICD-10-CM

## 2020-10-05 DIAGNOSIS — M25.642 STIFFNESS OF JOINT, HAND, LEFT: Primary | ICD-10-CM

## 2020-10-05 PROCEDURE — 3074F SYST BP LT 130 MM HG: CPT | Performed by: FAMILY MEDICINE

## 2020-10-05 PROCEDURE — 3008F BODY MASS INDEX DOCD: CPT | Performed by: FAMILY MEDICINE

## 2020-10-05 PROCEDURE — 3078F DIAST BP <80 MM HG: CPT | Performed by: FAMILY MEDICINE

## 2020-10-05 PROCEDURE — 99214 OFFICE O/P EST MOD 30 MIN: CPT | Performed by: FAMILY MEDICINE

## 2020-10-05 RX ORDER — TEMAZEPAM 15 MG/1
15 CAPSULE ORAL NIGHTLY PRN
Qty: 30 CAPSULE | Refills: 5 | Status: SHIPPED | OUTPATIENT
Start: 2020-10-05 | End: 2021-04-20

## 2020-10-05 NOTE — PROGRESS NOTES
HPI:    Patient ID: Butch Llanos is a 63year old female.     HPI  Patient presents with:  Nausea: persistant nausea for months, starts in morning, worsens with dairy,    Lab Results: explain results of endoscopy, which since procedure symptoms started 30 tablet 2   • Estrogens, Conjugated (PREMARIN) 0.625 MG/GM Vaginal Cream      • Digestive Enzymes (DIGESTIVE ENZYME OR) Take 1 capsule by mouth daily. • VITAMIN D, ERGOCALCIFEROL, OR Take 1 capsule by mouth daily.      • Multiple Vitamins-Minerals (MU

## 2020-10-05 NOTE — PROGRESS NOTES
Left small finger mass Post - Op Note:    Subjective:  The LSF is a little stiff:      Objective:  Occupational Therapy completed the following educational areas status post elective Left small finger massremoval procedure:    1)  Dressing was removed and h

## 2020-10-07 NOTE — TELEPHONE ENCOUNTER
Spoke w/ patient. Patient told per Dr. Manas Ivy pathology results from surgery,  Was a benign cyst.  Patient Verbalized understanding. Patient Instructed to call the office with any questions and/or concerns. Dr. Manas Ivy notified.

## 2020-10-15 ENCOUNTER — OFFICE VISIT (OUTPATIENT)
Dept: SURGERY | Facility: CLINIC | Age: 55
End: 2020-10-15
Payer: COMMERCIAL

## 2020-10-15 DIAGNOSIS — M62.81 DISTAL MUSCLE WEAKNESS: ICD-10-CM

## 2020-10-15 DIAGNOSIS — M25.642 STIFFNESS OF JOINT, HAND, LEFT: Primary | ICD-10-CM

## 2020-10-15 PROCEDURE — 97110 THERAPEUTIC EXERCISES: CPT | Performed by: OCCUPATIONAL THERAPIST

## 2020-10-15 PROCEDURE — 97166 OT EVAL MOD COMPLEX 45 MIN: CPT | Performed by: OCCUPATIONAL THERAPIST

## 2020-10-15 NOTE — PROGRESS NOTES
OCCUPATIONAL THERAPY EVALUATION:   Naheed Mane   [id number]       SUBJECTIVE:    HX of Injury: LSF mass  Chief Complaint:   No complaints.     Precautions: None  Premorbid Functional Status: Independent w/ Occ. duties, Independent w/ driving / sitting plan and concur.    Francesca Wade MD

## 2020-10-16 RX ORDER — PANTOPRAZOLE SODIUM 40 MG/1
40 TABLET, DELAYED RELEASE ORAL
Qty: 30 TABLET | Refills: 0 | Status: SHIPPED | OUTPATIENT
Start: 2020-10-16 | End: 2020-11-23

## 2020-10-29 RX ORDER — ONDANSETRON 4 MG/1
4 TABLET, ORALLY DISINTEGRATING ORAL EVERY 8 HOURS PRN
Qty: 60 TABLET | Refills: 0 | Status: SHIPPED | OUTPATIENT
Start: 2020-10-29 | End: 2020-11-23

## 2020-10-29 NOTE — TELEPHONE ENCOUNTER
Requested Prescriptions     Pending Prescriptions Disp Refills   • ondansetron 4 MG Oral Tablet Dispersible 60 tablet 0     Sig: Place 1 tablet (4 mg total) under the tongue every 8 (eight) hours as needed for Nausea.      lov-8/4/20  lr-9/30/20

## 2020-11-23 RX ORDER — PANTOPRAZOLE SODIUM 40 MG/1
40 TABLET, DELAYED RELEASE ORAL
Qty: 30 TABLET | Refills: 5 | Status: SHIPPED | OUTPATIENT
Start: 2020-11-23 | End: 2021-05-17

## 2020-11-23 RX ORDER — ONDANSETRON 4 MG/1
4 TABLET, ORALLY DISINTEGRATING ORAL EVERY 8 HOURS PRN
Qty: 60 TABLET | Refills: 0 | Status: SHIPPED | OUTPATIENT
Start: 2020-11-23 | End: 2020-12-18

## 2020-11-23 NOTE — TELEPHONE ENCOUNTER
Requested Prescriptions     Pending Prescriptions Disp Refills   • ondansetron 4 MG Oral Tablet Dispersible 60 tablet 0     Sig: Place 1 tablet (4 mg total) under the tongue every 8 (eight) hours as needed for Nausea.    Lr: 10/29/2020  Lov: 7/28/2020  Had

## 2020-12-18 NOTE — TELEPHONE ENCOUNTER
Requested Prescriptions     Pending Prescriptions Disp Refills   • ondansetron 4 MG Oral Tablet Dispersible 60 tablet 0     Sig: Place 1 tablet (4 mg total) under the tongue every 8 (eight) hours as needed for Nausea.      lov-7/28/20  lr-11/23/20

## 2020-12-21 RX ORDER — ONDANSETRON 4 MG/1
4 TABLET, ORALLY DISINTEGRATING ORAL EVERY 8 HOURS PRN
Qty: 60 TABLET | Refills: 0 | Status: SHIPPED | OUTPATIENT
Start: 2020-12-21 | End: 2021-01-21

## 2020-12-21 RX ORDER — ONDANSETRON 4 MG/1
4 TABLET, ORALLY DISINTEGRATING ORAL EVERY 8 HOURS PRN
Qty: 60 TABLET | Refills: 0 | OUTPATIENT
Start: 2020-12-21

## 2021-01-20 NOTE — TELEPHONE ENCOUNTER
Requested Prescriptions     Pending Prescriptions Disp Refills   • ONDANSETRON 4 MG Oral Tablet Dispersible [Pharmacy Med Name: Ondansetron 4 Mg Odt Tab Auro] 60 tablet 0     Sig: DISSOLVE ONE TABLET IN MOUTH EVERY EIGHT HOURS AS NEEDED FOR NAUSEA     LR:

## 2021-01-21 RX ORDER — ONDANSETRON 4 MG/1
TABLET, ORALLY DISINTEGRATING ORAL
Qty: 60 TABLET | Refills: 0 | Status: SHIPPED | OUTPATIENT
Start: 2021-01-21 | End: 2021-02-15

## 2021-02-01 ENCOUNTER — LAB ENCOUNTER (OUTPATIENT)
Dept: LAB | Facility: HOSPITAL | Age: 56
End: 2021-02-01
Attending: INTERNAL MEDICINE
Payer: COMMERCIAL

## 2021-02-01 ENCOUNTER — OFFICE VISIT (OUTPATIENT)
Dept: GASTROENTEROLOGY | Facility: CLINIC | Age: 56
End: 2021-02-01
Payer: COMMERCIAL

## 2021-02-01 VITALS
HEART RATE: 69 BPM | SYSTOLIC BLOOD PRESSURE: 128 MMHG | WEIGHT: 154 LBS | DIASTOLIC BLOOD PRESSURE: 76 MMHG | BODY MASS INDEX: 23.34 KG/M2 | HEIGHT: 68 IN | TEMPERATURE: 99 F

## 2021-02-01 DIAGNOSIS — R76.8 ELEVATED SERUM IMMUNOGLOBULIN FREE LIGHT CHAIN LEVEL: ICD-10-CM

## 2021-02-01 DIAGNOSIS — Z86.010 HISTORY OF COLON POLYPS: ICD-10-CM

## 2021-02-01 DIAGNOSIS — K59.00 CONSTIPATION, UNSPECIFIED CONSTIPATION TYPE: Primary | ICD-10-CM

## 2021-02-01 DIAGNOSIS — R14.0 BLOATING: ICD-10-CM

## 2021-02-01 DIAGNOSIS — R11.0 NAUSEA: ICD-10-CM

## 2021-02-01 LAB
ALBUMIN SERPL-MCNC: 3.8 G/DL
ALBUMIN SERPL-MCNC: 3.8 G/DL (ref 3.4–5)
ALBUMIN/GLOB SERPL: 1.2 {RATIO} (ref 1–2)
ALP LIVER SERPL-CCNC: 92 U/L
ALP SERPL-CCNC: 92 U/L
ALT SERPL-CCNC: 18 U/L
ALT SERPL-CCNC: 18 UNITS/L
ANION GAP SERPL CALC-SCNC: 3 MMOL/L
ANION GAP SERPL CALC-SCNC: 3 MMOL/L (ref 0–18)
AST SERPL-CCNC: 15 U/L (ref 15–37)
AST SERPL-CCNC: 15 UNITS/L
BASOPHILS # BLD AUTO: 0.02 X10(3) UL (ref 0–0.2)
BASOPHILS NFR BLD AUTO: 0.6 %
BILIRUB SERPL-MCNC: 0.5 MG/DL
BILIRUB SERPL-MCNC: 0.5 MG/DL (ref 0.1–2)
BUN BLD-MCNC: 15 MG/DL (ref 7–18)
BUN SERPL-MCNC: 15 MG/DL
BUN/CREAT SERPL: 13.8
BUN/CREAT SERPL: 13.8 (ref 10–20)
CALCIUM BLD-MCNC: 9.4 MG/DL (ref 8.5–10.1)
CALCIUM SERPL-MCNC: 9.4 MG/DL
CHLORIDE SERPL-SCNC: 104 MMOL/L
CHLORIDE SERPL-SCNC: 104 MMOL/L (ref 98–112)
CO2 SERPL-SCNC: 31 MMOL/L
CO2 SERPL-SCNC: 31 MMOL/L (ref 21–32)
CREAT BLD-MCNC: 1.09 MG/DL
CREAT SERPL-MCNC: 1.09 MG/DL
DEPRECATED RDW RBC AUTO: 41.2 FL (ref 35.1–46.3)
EOSINOPHIL # BLD AUTO: 0.03 X10(3) UL (ref 0–0.7)
EOSINOPHIL NFR BLD AUTO: 0.9 %
ERYTHROCYTE [DISTWIDTH] IN BLOOD BY AUTOMATED COUNT: 13.3 % (ref 11–15)
GLOBULIN PLAS-MCNC: 3.3 G/DL (ref 2.8–4.4)
GLOBULIN SER-MCNC: 3.3 G/DL
GLUCOSE BLD-MCNC: 78 MG/DL (ref 70–99)
GLUCOSE SERPL-MCNC: 78 MG/DL
HCT VFR BLD AUTO: 33.2 %
HGB BLD-MCNC: 11.3 G/DL
IGA SERPL-MCNC: 35.3 MG/DL (ref 70–312)
IGM SERPL-MCNC: 563 MG/DL (ref 43–279)
IMM GRANULOCYTES # BLD AUTO: 0.01 X10(3) UL (ref 0–1)
IMM GRANULOCYTES NFR BLD: 0.3 %
IMMUNOGLOBULIN PNL SER-MCNC: 515 MG/DL (ref 791–1643)
LENGTH OF FAST TIME PATIENT: YES H
LYMPHOCYTES # BLD AUTO: 1.49 X10(3) UL (ref 1–4)
LYMPHOCYTES NFR BLD AUTO: 43.1 %
M PROTEIN MFR SERPL ELPH: 7.1 G/DL (ref 6.4–8.2)
MCH RBC QN AUTO: 29 PG (ref 26–34)
MCHC RBC AUTO-ENTMCNC: 34 G/DL (ref 31–37)
MCV RBC AUTO: 85.3 FL
MONOCYTES # BLD AUTO: 0.16 X10(3) UL (ref 0.1–1)
MONOCYTES NFR BLD AUTO: 4.6 %
NEUTROPHILS # BLD AUTO: 1.75 X10 (3) UL (ref 1.5–7.7)
NEUTROPHILS # BLD AUTO: 1.75 X10(3) UL (ref 1.5–7.7)
NEUTROPHILS NFR BLD AUTO: 50.5 %
OSMOLALITY SERPL CALC.SUM OF ELEC: 286 MOSM/KG (ref 275–295)
PATIENT FASTING Y/N/NP: YES
PLATELET # BLD AUTO: 128 10(3)UL (ref 150–450)
POTASSIUM SERPL-SCNC: 4.3 MMOL/L
POTASSIUM SERPL-SCNC: 4.3 MMOL/L (ref 3.5–5.1)
PROT SERPL-MCNC: 7.1 G/DL
RBC # BLD AUTO: 3.89 X10(6)UL
SODIUM SERPL-SCNC: 138 MMOL/L
SODIUM SERPL-SCNC: 138 MMOL/L (ref 136–145)
WBC # BLD AUTO: 3.5 X10(3) UL (ref 4–11)

## 2021-02-01 PROCEDURE — 82784 ASSAY IGA/IGD/IGG/IGM EACH: CPT

## 2021-02-01 PROCEDURE — 84165 PROTEIN E-PHORESIS SERUM: CPT

## 2021-02-01 PROCEDURE — 3008F BODY MASS INDEX DOCD: CPT | Performed by: NURSE PRACTITIONER

## 2021-02-01 PROCEDURE — 83883 ASSAY NEPHELOMETRY NOT SPEC: CPT

## 2021-02-01 PROCEDURE — 99214 OFFICE O/P EST MOD 30 MIN: CPT | Performed by: NURSE PRACTITIONER

## 2021-02-01 PROCEDURE — 80053 COMPREHEN METABOLIC PANEL: CPT

## 2021-02-01 PROCEDURE — 36415 COLL VENOUS BLD VENIPUNCTURE: CPT

## 2021-02-01 PROCEDURE — 86334 IMMUNOFIX E-PHORESIS SERUM: CPT

## 2021-02-01 PROCEDURE — 3074F SYST BP LT 130 MM HG: CPT | Performed by: NURSE PRACTITIONER

## 2021-02-01 PROCEDURE — 85025 COMPLETE CBC W/AUTO DIFF WBC: CPT

## 2021-02-01 PROCEDURE — 3078F DIAST BP <80 MM HG: CPT | Performed by: NURSE PRACTITIONER

## 2021-02-01 RX ORDER — LINACLOTIDE 72 UG/1
72 CAPSULE, GELATIN COATED ORAL DAILY
Qty: 30 CAPSULE | Refills: 3 | Status: SHIPPED | OUTPATIENT
Start: 2021-02-01 | End: 2021-03-03

## 2021-02-01 NOTE — PATIENT INSTRUCTIONS
-repeat colonoscopy 8/2023  -linzess for constipation  -fodmap diet trial  -call Burt Franksimi [phone] in 2 weeks and then consider antibiotic for treatment of small intestinal bacterial overgrowth if no improvement

## 2021-02-01 NOTE — PROGRESS NOTES
[address] [facility] [address] Gastroenterology                                                                                                               Reason for consult: f on right eyebrow   • Hemorrhoids     hemoorhoidectomy   • History of MRSA infection     blister on toe MRSA+ approximately 2012   • Infected dental carries 2010    dental extractions   • Injury of digital nerve of finger 2010    R finger nerve injury   • M Symone procedure, extensive lysis of adhesions, partial colon resection, excision of accessory spleen, takedown of splenic flexure, insertion of drains, rigid proctosigmoidoscopy for eval of distal anataomy and bowel prep of distal rectum, ureterolysis (DIGESTIVE ENZYME OR) Take 1 capsule by mouth daily. • VITAMIN D, ERGOCALCIFEROL, OR Take 1 capsule by mouth daily. • Multiple Vitamins-Minerals (MULTI-VITAMIN/MINERALS) Oral Tab Take 1 tablet by mouth daily.      • docusate sodium 100 MG Oral Cap T pylorus was patent. The gastric mucosa appeared erythematous so biopsies taken for h pylori. Retroflexion revealed a normal fundus and a normal cardia. 3. Duodenum: The duodenal mucosa appeared normal in the 1st and 2nd portion of the duodenum.  Biopsies related to post cholecystectomy changes. 3. Interval repair of previously seen bowel containing ventral hernias without evidence for recurrent hernia. 4. Large amount of stool throughout the colon suggests constipation.      5. Lesser findings as ab every attempt is made to correct errors during dictation, discrepancies may still exist.

## 2021-02-02 LAB
KAPPA LC FREE SER-MCNC: 2.74 MG/DL (ref 0.33–1.94)
KAPPA LC FREE/LAMBDA FREE SER NEPH: 1 {RATIO} (ref 0.26–1.65)
LAMBDA LC FREE SERPL-MCNC: 2.73 MG/DL (ref 0.57–2.63)

## 2021-02-04 LAB
ALBUMIN SERPL ELPH-MCNC: 4.33 G/DL (ref 3.75–5.21)
ALBUMIN/GLOB SERPL: 1.69 {RATIO} (ref 1–2)
ALPHA1 GLOB SERPL ELPH-MCNC: 0.32 G/DL (ref 0.19–0.46)
ALPHA2 GLOB SERPL ELPH-MCNC: 0.69 G/DL (ref 0.48–1.05)
B-GLOBULIN SERPL ELPH-MCNC: 0.66 G/DL (ref 0.68–1.23)
GAMMA GLOB SERPL ELPH-MCNC: 0.89 G/DL (ref 0.62–1.7)
M PROTEIN MFR SERPL ELPH: 6.9 G/DL (ref 6.4–8.2)
M-SPIKE 1: 0.17 G/DL (ref ?–0)

## 2021-02-05 ENCOUNTER — HOSPITAL ENCOUNTER (OUTPATIENT)
Dept: GENERAL RADIOLOGY | Facility: HOSPITAL | Age: 56
Discharge: HOME | End: 2021-02-05
Attending: INTERNAL MEDICINE
Payer: COMMERCIAL

## 2021-02-05 DIAGNOSIS — R76.8 ELEVATED SERUM IMMUNOGLOBULIN FREE LIGHT CHAIN LEVEL: ICD-10-CM

## 2021-02-05 PROCEDURE — 77075 RADEX OSSEOUS SURVEY COMPL: CPT | Performed by: INTERNAL MEDICINE

## 2021-02-11 ENCOUNTER — LAB ENCOUNTER (OUTPATIENT)
Dept: LAB | Facility: HOSPITAL | Age: 56
End: 2021-02-11
Attending: INTERNAL MEDICINE
Payer: COMMERCIAL

## 2021-02-11 DIAGNOSIS — R76.8 ELEVATED SERUM IMMUNOGLOBULIN FREE LIGHT CHAIN LEVEL: ICD-10-CM

## 2021-02-11 LAB
M PROTEIN 24H UR ELPH-MRATE: 123 MG/24 HR (ref ?–149.1)
SPECIMEN VOL UR: 1500 ML

## 2021-02-11 PROCEDURE — 86335 IMMUNFIX E-PHORSIS/URINE/CSF: CPT

## 2021-02-11 PROCEDURE — 84166 PROTEIN E-PHORESIS/URINE/CSF: CPT

## 2021-02-11 PROCEDURE — 84156 ASSAY OF PROTEIN URINE: CPT

## 2021-02-15 RX ORDER — ONDANSETRON 4 MG/1
4 TABLET, ORALLY DISINTEGRATING ORAL EVERY 8 HOURS PRN
Qty: 60 TABLET | Refills: 0 | Status: SHIPPED | OUTPATIENT
Start: 2021-02-15 | End: 2021-03-05

## 2021-02-15 RX ORDER — ONDANSETRON 4 MG/1
4 TABLET, ORALLY DISINTEGRATING ORAL EVERY 8 HOURS PRN
Qty: 60 TABLET | Refills: 0 | Status: CANCELLED | OUTPATIENT
Start: 2021-02-15

## 2021-02-15 NOTE — TELEPHONE ENCOUNTER
Requested Prescriptions     Pending Prescriptions Disp Refills   • ondansetron 4 MG Oral Tablet Dispersible 60 tablet 0     Sig: Place 1 tablet (4 mg total) under the tongue every 8 (eight) hours as needed for Nausea.      LOV: 02/01/2021  LR: 01/21/2021

## 2021-02-22 ENCOUNTER — OFFICE VISIT (OUTPATIENT)
Dept: HEMATOLOGY/ONCOLOGY | Facility: HOSPITAL | Age: 56
End: 2021-02-22
Attending: INTERNAL MEDICINE
Payer: COMMERCIAL

## 2021-02-22 ENCOUNTER — LAB ENCOUNTER (OUTPATIENT)
Dept: LAB | Facility: HOSPITAL | Age: 56
End: 2021-02-22
Attending: INTERNAL MEDICINE
Payer: COMMERCIAL

## 2021-02-22 VITALS
TEMPERATURE: 98 F | RESPIRATION RATE: 16 BRPM | DIASTOLIC BLOOD PRESSURE: 67 MMHG | OXYGEN SATURATION: 100 % | WEIGHT: 150 LBS | SYSTOLIC BLOOD PRESSURE: 117 MMHG | HEIGHT: 68 IN | BODY MASS INDEX: 22.73 KG/M2 | HEART RATE: 72 BPM

## 2021-02-22 DIAGNOSIS — D47.2 MGUS (MONOCLONAL GAMMOPATHY OF UNKNOWN SIGNIFICANCE): Primary | ICD-10-CM

## 2021-02-22 DIAGNOSIS — D47.2 MGUS (MONOCLONAL GAMMOPATHY OF UNKNOWN SIGNIFICANCE): ICD-10-CM

## 2021-02-22 DIAGNOSIS — D47.2 IGM LAMBDA MONOCLONAL GAMMOPATHY: ICD-10-CM

## 2021-02-22 LAB
BASOPHILS # BLD AUTO: 0.03 X10(3) UL (ref 0–0.2)
BASOPHILS NFR BLD AUTO: 0.6 %
DEPRECATED RDW RBC AUTO: 41 FL (ref 35.1–46.3)
EOSINOPHIL # BLD AUTO: 0.02 X10(3) UL (ref 0–0.7)
EOSINOPHIL NFR BLD AUTO: 0.4 %
ERYTHROCYTE [DISTWIDTH] IN BLOOD BY AUTOMATED COUNT: 13.1 % (ref 11–15)
HCT VFR BLD AUTO: 35.8 %
HCT VFR BLD CALC: 35.8 %
HGB BLD-MCNC: 12.1 G/DL
HGB BLD-MCNC: 12.1 G/DL
IMM GRANULOCYTES # BLD AUTO: 0.01 X10(3) UL (ref 0–1)
IMM GRANULOCYTES NFR BLD: 0.2 %
LYMPHOCYTES # BLD AUTO: 1.96 X10(3) UL (ref 1–4)
LYMPHOCYTES NFR BLD AUTO: 42.4 %
MCH RBC QN AUTO: 29.2 PG
MCH RBC QN AUTO: 29.2 PG (ref 26–34)
MCHC RBC AUTO-ENTMCNC: 33.8 G/DL
MCHC RBC AUTO-ENTMCNC: 33.8 G/DL (ref 31–37)
MCV RBC AUTO: 86.3 FL
MCV RBC AUTO: 86.3 FL
MONOCYTES # BLD AUTO: 0.32 X10(3) UL (ref 0.1–1)
MONOCYTES NFR BLD AUTO: 6.9 %
NEUTROPHILS # BLD AUTO: 2.28 X10 (3) UL (ref 1.5–7.7)
NEUTROPHILS # BLD AUTO: 2.28 X10(3) UL (ref 1.5–7.7)
NEUTROPHILS NFR BLD AUTO: 49.5 %
PLATELET # BLD AUTO: 152 10(3)UL (ref 150–450)
PLATELET # BLD: 152 K/MCL
RBC # BLD AUTO: 4.15 X10(6)UL
RBC # BLD: 4.15 10*6/UL
WBC # BLD AUTO: 4.6 X10(3) UL (ref 4–11)
WBC # BLD: 4.6 K/MCL

## 2021-02-22 PROCEDURE — 36415 COLL VENOUS BLD VENIPUNCTURE: CPT

## 2021-02-22 PROCEDURE — 85025 COMPLETE CBC W/AUTO DIFF WBC: CPT

## 2021-02-22 PROCEDURE — 99214 OFFICE O/P EST MOD 30 MIN: CPT | Performed by: INTERNAL MEDICINE

## 2021-02-22 NOTE — PROGRESS NOTES
Hematology Progress Note    Patient Name: Sandy Kolb   YOB: 1965   Medical Record Number: [id number]   CSN: [id number]   Consulting Physician: Lawson Del Valle MD  Referring Physician(s): Amaury Hermosillo  Date of Visit: 2/22/2021     [facility] her 8/2020 initial evaluation that has continued. Pain is intermittent and moves to different locations of her bafk. Her repeat skeletal survey imaging again shows findings of age-related changes of the spine.  Patient has some chronic fatigue which is stab 10/2/2020    Performed by Hector Serrano MD at [facility] OR   • HEMORRHOIDECTOMY     • [address] [facility] N/A 7/26/2018    Performed by Kandaec Silverman MD at [facility] OR   • HERNIA SURGERY  07/26/2018    incisional hernia repair with mesh, lysis of needs        Medical: Not on file        Non-medical: Not on file    Tobacco Use      Smoking status: Former Smoker        Packs/day: 0.25        Years: 4.00        Pack years: 1        Types: Cigarettes        Quit date: 7/16/2018        Years since [facility] Past: Not Asked        Hx of Radiation Treatments: Not Asked        Regular use of sun block: Not Asked    Social History Narrative      Cynthia Landers is  x 35 yrs to [facility]. Mother of 2 children. 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  She and her family live in [place] vomiting, change in bowel habits, diarrhea, +nausea with some abdominal pain; consistent with IBS with constipation, +GERD symptoms  Integument/breast: Negative for rash, skin lesions, and pruritus.   Hematologic/lymphatic: Negative for easy bruising, bleed 1.09 (H) 02/01/2021 11:05 AM    GFRNAA 56 (L) 02/01/2021 11:05 AM    CA 9.4 02/01/2021 11:05 AM    ALB 3.8 02/01/2021 11:05 AM    ALB 4.33 02/01/2021 11:05 AM     02/01/2021 11:05 AM    K 4.3 02/01/2021 11:05 AM     02/01/2021 11:05 AM    CO2 3 8/1/2020 prior to consultation    --rec for repeat CT chest in 1 yr (8/2021) based on small pulmonary nodule noted on imaging    --discussed her negative HIV and hepatitis testing results as well as negative screen for autoimmune phenomenon with UBALDO with r Favorably, her repeat CBC+differential which is now within normal limits.      --However, as she has an IgM subclass and inform the patient that she might have another condition known as Waldenstrom's macroglobulinemia, which can be associated with hypervis

## 2021-02-26 RX ORDER — MIDAZOLAM HYDROCHLORIDE 1 MG/ML
1 INJECTION INTRAMUSCULAR; INTRAVENOUS EVERY 5 MIN PRN
Status: DISCONTINUED | OUTPATIENT
Start: 2021-03-01 | End: 2021-03-03

## 2021-02-26 RX ORDER — NALOXONE HYDROCHLORIDE 0.4 MG/ML
80 INJECTION, SOLUTION INTRAMUSCULAR; INTRAVENOUS; SUBCUTANEOUS AS NEEDED
Status: DISCONTINUED | OUTPATIENT
Start: 2021-02-26 | End: 2021-03-03

## 2021-02-26 RX ORDER — SODIUM CHLORIDE 9 MG/ML
INJECTION, SOLUTION INTRAVENOUS CONTINUOUS
Status: DISCONTINUED | OUTPATIENT
Start: 2021-02-26 | End: 2021-03-03

## 2021-02-26 RX ORDER — FLUMAZENIL 0.1 MG/ML
0.2 INJECTION, SOLUTION INTRAVENOUS AS NEEDED
Status: DISCONTINUED | OUTPATIENT
Start: 2021-02-26 | End: 2021-03-03

## 2021-02-27 ENCOUNTER — LAB ENCOUNTER (OUTPATIENT)
Dept: LAB | Facility: HOSPITAL | Age: 56
End: 2021-02-27
Attending: INTERNAL MEDICINE
Payer: COMMERCIAL

## 2021-02-27 DIAGNOSIS — D47.2 MGUS (MONOCLONAL GAMMOPATHY OF UNKNOWN SIGNIFICANCE): ICD-10-CM

## 2021-02-27 LAB — SARS-COV-2 RNA RESP QL NAA+PROBE: NOT DETECTED

## 2021-03-01 ENCOUNTER — HOSPITAL ENCOUNTER (OUTPATIENT)
Dept: CT IMAGING | Facility: HOSPITAL | Age: 56
Discharge: HOME | End: 2021-03-01
Attending: INTERNAL MEDICINE
Payer: COMMERCIAL

## 2021-03-01 VITALS
OXYGEN SATURATION: 100 % | BODY MASS INDEX: 22.5 KG/M2 | HEART RATE: 62 BPM | HEIGHT: 66 IN | RESPIRATION RATE: 14 BRPM | SYSTOLIC BLOOD PRESSURE: 101 MMHG | WEIGHT: 140 LBS | DIASTOLIC BLOOD PRESSURE: 67 MMHG

## 2021-03-01 DIAGNOSIS — D47.2 MGUS (MONOCLONAL GAMMOPATHY OF UNKNOWN SIGNIFICANCE): ICD-10-CM

## 2021-03-01 PROCEDURE — 88342 IMHCHEM/IMCYTCHM 1ST ANTB: CPT | Performed by: INTERNAL MEDICINE

## 2021-03-01 PROCEDURE — 85097 BONE MARROW INTERPRETATION: CPT | Performed by: INTERNAL MEDICINE

## 2021-03-01 PROCEDURE — 88291 CYTO/MOLECULAR REPORT: CPT | Performed by: RADIOLOGY

## 2021-03-01 PROCEDURE — 77012 CT SCAN FOR NEEDLE BIOPSY: CPT | Performed by: INTERNAL MEDICINE

## 2021-03-01 PROCEDURE — 88237 TISSUE CULTURE BONE MARROW: CPT | Performed by: RADIOLOGY

## 2021-03-01 PROCEDURE — 88311 DECALCIFY TISSUE: CPT | Performed by: INTERNAL MEDICINE

## 2021-03-01 PROCEDURE — 88185 FLOWCYTOMETRY/TC ADD-ON: CPT | Performed by: RADIOLOGY

## 2021-03-01 PROCEDURE — 88184 FLOWCYTOMETRY/ TC 1 MARKER: CPT | Performed by: RADIOLOGY

## 2021-03-01 PROCEDURE — 88341 IMHCHEM/IMCYTCHM EA ADD ANTB: CPT | Performed by: INTERNAL MEDICINE

## 2021-03-01 PROCEDURE — 88360 TUMOR IMMUNOHISTOCHEM/MANUAL: CPT | Performed by: INTERNAL MEDICINE

## 2021-03-01 PROCEDURE — 88313 SPECIAL STAINS GROUP 2: CPT | Performed by: INTERNAL MEDICINE

## 2021-03-01 PROCEDURE — 38222 DX BONE MARROW BX & ASPIR: CPT | Performed by: INTERNAL MEDICINE

## 2021-03-01 PROCEDURE — 88264 CHROMOSOME ANALYSIS 20-25: CPT | Performed by: RADIOLOGY

## 2021-03-01 PROCEDURE — 88305 TISSUE EXAM BY PATHOLOGIST: CPT | Performed by: INTERNAL MEDICINE

## 2021-03-01 PROCEDURE — 88189 FLOWCYTOMETRY/READ 16 & >: CPT | Performed by: RADIOLOGY

## 2021-03-01 RX ORDER — MIDAZOLAM HYDROCHLORIDE 1 MG/ML
INJECTION INTRAMUSCULAR; INTRAVENOUS
Status: COMPLETED | OUTPATIENT
Start: 2021-03-01 | End: 2021-03-01

## 2021-03-01 RX ORDER — MIDAZOLAM HYDROCHLORIDE 1 MG/ML
INJECTION INTRAMUSCULAR; INTRAVENOUS
Status: DISPENSED
Start: 2021-03-01 | End: 2021-03-01

## 2021-03-01 RX ADMIN — MIDAZOLAM HYDROCHLORIDE 1 MG: 1 INJECTION INTRAMUSCULAR; INTRAVENOUS at 10:38:20

## 2021-03-01 RX ADMIN — MIDAZOLAM HYDROCHLORIDE 2 MG: 1 INJECTION INTRAMUSCULAR; INTRAVENOUS at 10:33:46

## 2021-03-01 NOTE — PROGRESS NOTES
Patient stable with recovery, juice provided. Review discharge, no NSAIDS post recovery.  Remains with ache pain, treat with tylenol when home

## 2021-03-01 NOTE — H&P
History & Physical Examination    Patient Name: Anika Alonso  MRN: [id number]  CSN: [id number]  YOB: 1965    Diagnosis: MGUS    Present Illness: MGUS    (Not in a hospital admission)      •  Midazolam HCl (VERSED) 2 MG/2ML injection, , , destrruction of lesions, vulva, simple; metrorrhagia   • DILATION/CURETTAGE,DIAGNOSTIC  2013   • EXCIS TUMOR/AVM,DEEP,HAND/FINGR Left 10/02/2020    Left small finger mass excision    • EXCISION OF LESION/LIPOMA Right 9/2/2020    Performed by Jonas Lainez MD 0.25        Years: 4.00        Pack years: 1        Types: Cigarettes        Quit date: 2018        Years since quittin.6      Smokeless tobacco: Never Used    Alcohol use: No      Alcohol/week: 0.0 standard drinks      SYSTEM Check if Review is N

## 2021-03-01 NOTE — PROGRESS NOTES
HEMATOLOGY PRESENT, SITE VERIFIED AND PREP WITH CHLORAPREP AND 1% LIDOCAINE.  [address] BONE MARROW COAXIAL GUIDE

## 2021-03-01 NOTE — PROGRESS NOTES
TRANSFER TO CART AFTER SITE CLEANED AND DRESSED WITH [employer] AND [address].  RETURN TO [facility] FOR RECOVERY

## 2021-03-02 LAB
CD10 CELLS NFR SPEC: 4 %
CD11C CELLS NFR SPEC: 7 %
CD14 CELLS NFR SPEC: <1 %
CD19 CELLS NFR SPEC: 34 %
CD19/CD10 CELLS: 3 %
CD20 CELLS NFR SPEC: 34 %
CD22 CELLS NFR SPEC: 34 %
CD23 CELLS NFR SPEC: 17 %
CD3 CELLS NFR SPEC: 50 %
CD3+CD4+ CELLS NFR SPEC: 30 %
CD3+CD4+ CELLS/CD3+CD8+ CLL SPEC: 1.8
CD3+CD8+ CELLS NFR SPEC: 17 %
CD33 CELLS NFR SPEC: 1 %
CD34 CELLS NFR SPEC: 46 %
CD45 CELLS NFR SPEC: 99 %
CD45-/CD38+ KAPPA: 38 %
CD45-/CD38+ LAMBDA: 37 %
CD5 CELLS NFR SPEC: 49 %
CD5/CD19 CELLS: 2 %
CD56 CELLS NFR SPEC: 10 %
CD56 CELLS NFR SPEC: 6 %
CD7 CELLS NFR SPEC: 51 %
CELL SURF KAPPA/LAMBDA RATIO: 1
CELL SURF LAMBDA LIGHT CHAIN: 16 %
CELL SURFACE KAPPA LIGHT CHAIN: 16 %
FMC7 CELLS NFR SPEC: 24 %

## 2021-03-05 NOTE — TELEPHONE ENCOUNTER
Requested Prescriptions     Pending Prescriptions Disp Refills   • ondansetron 4 MG Oral Tablet Dispersible 60 tablet 0     Sig: Place 1 tablet (4 mg total) under the tongue every 8 (eight) hours as needed for Nausea.      LOV  2/01/2021  LR  2/15/2021    e

## 2021-03-08 ENCOUNTER — OFFICE VISIT (OUTPATIENT)
Dept: HEMATOLOGY/ONCOLOGY | Facility: HOSPITAL | Age: 56
End: 2021-03-08
Attending: INTERNAL MEDICINE
Payer: COMMERCIAL

## 2021-03-08 VITALS
HEIGHT: 68 IN | HEART RATE: 73 BPM | WEIGHT: 150 LBS | DIASTOLIC BLOOD PRESSURE: 71 MMHG | BODY MASS INDEX: 22.73 KG/M2 | OXYGEN SATURATION: 100 % | RESPIRATION RATE: 16 BRPM | TEMPERATURE: 98 F | SYSTOLIC BLOOD PRESSURE: 128 MMHG

## 2021-03-08 DIAGNOSIS — D47.2 MGUS (MONOCLONAL GAMMOPATHY OF UNKNOWN SIGNIFICANCE): Primary | ICD-10-CM

## 2021-03-08 DIAGNOSIS — R16.1 SPLENOMEGALY: ICD-10-CM

## 2021-03-08 PROCEDURE — 99214 OFFICE O/P EST MOD 30 MIN: CPT | Performed by: INTERNAL MEDICINE

## 2021-03-08 RX ORDER — ONDANSETRON 4 MG/1
4 TABLET, ORALLY DISINTEGRATING ORAL EVERY 8 HOURS PRN
Qty: 60 TABLET | Refills: 0 | Status: SHIPPED | OUTPATIENT
Start: 2021-03-08 | End: 2021-03-29

## 2021-03-08 NOTE — PROGRESS NOTES
Hematology Progress Note    Patient Name: Roopa Butterfield   YOB: 1965   Medical Record Number: [id number]   CSN: [id number]   Consulting Physician: Ivan Israel MD  Referring Physician(s): Ayala Johnson  Date of Visit: 3/08/2021     [facility] metastatic disease identified. Patient is noted to have trilineage hematopoiesis. She tolerated the procedure well, without any pain, bleeding complications or signs of infection.  She was recently found to have an M-spike of only 0.17 g/dL, and IgM Lamda DILATION/CURETTAGE,DIAGNOSTIC  2013   • EXCIS TUMOR/AVM,DEEP,HAND/FINGR Left 10/02/2020    Left small finger mass excision    • EXCISION OF LESION/LIPOMA Right 9/2/2020    Performed by Pa Barrera MD at [address] [place] [address] OR   • EXTRACTION ERUPTED TOOTH/EXR  2010 file      Number of children: Not on file      Years of education: Not on file      Highest education level: Not on file    Occupational History      Not on file    Tobacco Use      Smoking status: Former Smoker        Packs/day: 0.25        Years: 4.00   Lack of Transportation (Medical):       Lack of Transportation (Non-Medical):   Physical Activity:       Days of Exercise per Week:       Minutes of Exercise per Session:   Stress:       Feeling of Stress :   Social Connections:       Frequency of Commun Negative for visual disturbance, irritation and redness. Respiratory: Negative for cough, hemoptysis, chest pain, or dyspnea on exertion.   Gastrointestinal: Negative for dysphagia, odynophagia, reflux symptoms, nausea, vomiting, change in bowel habits, di RDW 13.1 02/22/2021 02:10 PM    NEPRELIM 2.28 02/22/2021 02:10 PM    .0 02/22/2021 02:10 PM       Lab Results   Component Value Date/Time    GLU 78 02/01/2021 11:05 AM    BUN 15 02/01/2021 11:05 AM    CREATSERUM 1.09 (H) 02/01/2021 11:05 AM    GFRNA neutrophils 44%, lymphocytes 10%, blasts <1%, monocytes <1%. Prussian blue iron staining of single bone marrow aspirate smear shows mildly decreased storage iron. No ring sideroblasts seen.        Immunophenotyping of the bone marrow aspirate by flow c patient my strong clinical suspicion for this to be either undiagnosed heart failure based on her regular use of Lasix in the last 10 years associated with lower extremity edema; rec for TTE to assess her cardiac function  --her EF was preserved, but still just be a light chain MGUS. Recommending repeat MGUS testing in 6 months    --In late 2/2021, she returned for planned follow up and her repeat testing shows she has an IgM lambda MGUS with M spike of 0.17 g/dL.  Pt has no hypercalcemia, some mild renal im process only MGUS with low percentage involvement with plasma cells.     --Therefore, would rec she explores other nonmalignant causes for her splenomegaly      2.) Thrombocytopenia    --The patient has not been initiated on any new medications, suspect her

## 2021-03-09 RX ORDER — ONDANSETRON 4 MG/1
4 TABLET, ORALLY DISINTEGRATING ORAL EVERY 8 HOURS PRN
Qty: 60 TABLET | Refills: 0 | Status: CANCELLED | OUTPATIENT
Start: 2021-03-09

## 2021-03-17 ENCOUNTER — PATIENT MESSAGE (OUTPATIENT)
Dept: GASTROENTEROLOGY | Facility: CLINIC | Age: 56
End: 2021-03-17

## 2021-03-18 NOTE — TELEPHONE ENCOUNTER
Angeline Morton below, patient requesting a note as medical clearance for receiving COVID vaccine. Please advise if appropriate. Thank you!

## 2021-03-18 NOTE — TELEPHONE ENCOUNTER
From: Lisa Ascencio  To: [facility]. Nivia Barthel  Sent: 3/17/2021 7:05 PM CDT  Subject: Other    Hi Dr. Jose Loo is Jing Brock. My daughter was able to get me a Covid vaccine appointment for this Friday.  Since I’m under 65 she believes I may need a

## 2021-03-29 RX ORDER — LINACLOTIDE 145 UG/1
145 CAPSULE, GELATIN COATED ORAL DAILY
Qty: 30 CAPSULE | Refills: 1 | Status: SHIPPED | OUTPATIENT
Start: 2021-03-29 | End: 2021-06-01

## 2021-03-29 RX ORDER — ONDANSETRON 4 MG/1
4 TABLET, ORALLY DISINTEGRATING ORAL EVERY 8 HOURS PRN
Qty: 60 TABLET | Refills: 0 | Status: SHIPPED | OUTPATIENT
Start: 2021-03-29 | End: 2021-04-23

## 2021-03-29 NOTE — TELEPHONE ENCOUNTER
Requested Prescriptions     Pending Prescriptions Disp Refills   • ondansetron 4 MG Oral Tablet Dispersible 60 tablet 0     Sig: Place 1 tablet (4 mg total) under the tongue every 8 (eight) hours as needed for Nausea.          LOV:   02/01/2021  LR:   03/08

## 2021-04-09 ENCOUNTER — PATIENT MESSAGE (OUTPATIENT)
Dept: GASTROENTEROLOGY | Facility: CLINIC | Age: 56
End: 2021-04-09

## 2021-04-09 NOTE — TELEPHONE ENCOUNTER
From: Giovanna Shankar  To: Lawson Alonzo  Sent: 4/9/2021 9:44 AM CDT  Subject: Non-Urgent Medical Question    Hi. This is Sabrina Hiss. I’m still having bloating and stomach cramping and nauseous.  Could you please call in the medicine that

## 2021-04-20 RX ORDER — TEMAZEPAM 15 MG/1
15 CAPSULE ORAL NIGHTLY PRN
Qty: 30 CAPSULE | Refills: 5 | Status: SHIPPED | OUTPATIENT
Start: 2021-04-20 | End: 2021-09-30

## 2021-04-22 RX ORDER — TEMAZEPAM 15 MG/1
15 CAPSULE ORAL NIGHTLY PRN
Qty: 30 CAPSULE | Refills: 5 | OUTPATIENT
Start: 2021-04-22

## 2021-04-23 RX ORDER — ONDANSETRON 4 MG/1
4 TABLET, ORALLY DISINTEGRATING ORAL EVERY 8 HOURS PRN
Qty: 60 TABLET | Refills: 0 | Status: SHIPPED | OUTPATIENT
Start: 2021-04-23 | End: 2021-05-14

## 2021-04-23 NOTE — TELEPHONE ENCOUNTER
Requested Prescriptions     Pending Prescriptions Disp Refills   • ondansetron 4 MG Oral Tablet Dispersible 60 tablet 0     Sig: Place 1 tablet (4 mg total) under the tongue every 8 (eight) hours as needed for Nausea.      LOV  2/01/2021  Last CLN 8/02/2020

## 2021-05-14 RX ORDER — ELETRIPTAN HYDROBROMIDE 40 MG/1
40 TABLET, FILM COATED ORAL
COMMUNITY
Start: 2014-12-30 | End: 2021-05-17

## 2021-05-14 RX ORDER — TOPIRAMATE 25 MG/1
50 TABLET ORAL 2 TIMES DAILY
COMMUNITY
Start: 2014-12-30 | End: 2021-05-17

## 2021-05-14 RX ORDER — HYDROCODONE BITARTRATE AND ACETAMINOPHEN 5; 325 MG/1; MG/1
1 TABLET ORAL EVERY 6 HOURS PRN
COMMUNITY
Start: 2014-08-23 | End: 2021-05-17

## 2021-05-14 RX ORDER — NAPROXEN 500 MG/1
500 TABLET ORAL 2 TIMES DAILY
COMMUNITY
Start: 2014-08-23 | End: 2021-05-17

## 2021-05-14 RX ORDER — POTASSIUM CHLORIDE 20 MEQ/1
1 TABLET, EXTENDED RELEASE ORAL DAILY PRN
COMMUNITY
Start: 2013-09-28

## 2021-05-14 RX ORDER — TEMAZEPAM 15 MG/1
15 CAPSULE ORAL NIGHTLY
COMMUNITY
Start: 2021-04-20

## 2021-05-14 RX ORDER — ONDANSETRON 4 MG/1
4 TABLET, ORALLY DISINTEGRATING ORAL EVERY 8 HOURS PRN
COMMUNITY
Start: 2021-03-29

## 2021-05-14 RX ORDER — AMITRIPTYLINE HYDROCHLORIDE 150 MG/1
150 TABLET ORAL AT BEDTIME
COMMUNITY
Start: 2014-12-30 | End: 2021-05-17

## 2021-05-14 RX ORDER — PSEUDOEPHEDRINE HCL 30 MG
100 TABLET ORAL DAILY
COMMUNITY

## 2021-05-14 RX ORDER — PANTOPRAZOLE SODIUM 40 MG/1
40 TABLET, DELAYED RELEASE ORAL
COMMUNITY
Start: 2020-11-23

## 2021-05-14 RX ORDER — PROMETHAZINE HYDROCHLORIDE 25 MG/1
1 TABLET ORAL DAILY
COMMUNITY

## 2021-05-14 RX ORDER — ONDANSETRON 4 MG/1
4 TABLET, ORALLY DISINTEGRATING ORAL EVERY 8 HOURS PRN
Qty: 60 TABLET | Refills: 0 | Status: SHIPPED | OUTPATIENT
Start: 2021-05-14 | End: 2021-06-06

## 2021-05-14 RX ORDER — FUROSEMIDE 20 MG/1
20 TABLET ORAL 2 TIMES DAILY
COMMUNITY
Start: 2013-09-08 | End: 2021-06-16 | Stop reason: SDUPTHER

## 2021-05-14 NOTE — TELEPHONE ENCOUNTER
Dr. Farah Court    Patient requesting refill of below medication. LOV 2/1/2021  LR 4/23/2021    ondansetron 4 MG Oral Tablet Dispersible 60 tablet 0 4/23/2021    Sig:   Place 1 tablet (4 mg total) under the tongue every 8 (eight) hours as needed for Nausea.

## 2021-05-17 ENCOUNTER — OFFICE VISIT (OUTPATIENT)
Dept: CARDIOLOGY | Age: 56
End: 2021-05-17

## 2021-05-17 VITALS
RESPIRATION RATE: 18 BRPM | HEIGHT: 68 IN | HEART RATE: 69 BPM | SYSTOLIC BLOOD PRESSURE: 112 MMHG | BODY MASS INDEX: 22.88 KG/M2 | DIASTOLIC BLOOD PRESSURE: 72 MMHG | WEIGHT: 151 LBS

## 2021-05-17 DIAGNOSIS — R06.02 SHORTNESS OF BREATH: ICD-10-CM

## 2021-05-17 DIAGNOSIS — R94.31 ABNORMAL ELECTROCARDIOGRAM (ECG) (EKG): Primary | ICD-10-CM

## 2021-05-17 PROCEDURE — 93000 ELECTROCARDIOGRAM COMPLETE: CPT | Performed by: INTERNAL MEDICINE

## 2021-05-17 PROCEDURE — 99245 OFF/OP CONSLTJ NEW/EST HI 55: CPT | Performed by: INTERNAL MEDICINE

## 2021-05-17 RX ORDER — LINACLOTIDE 145 UG/1
145 CAPSULE, GELATIN COATED ORAL DAILY
COMMUNITY
Start: 2021-04-27 | End: 2022-08-13 | Stop reason: SDUPTHER

## 2021-05-17 RX ORDER — PANTOPRAZOLE SODIUM 40 MG/1
TABLET, DELAYED RELEASE ORAL
Qty: 30 TABLET | Refills: 0 | Status: SHIPPED | OUTPATIENT
Start: 2021-05-17 | End: 2021-06-17

## 2021-05-17 ASSESSMENT — PATIENT HEALTH QUESTIONNAIRE - PHQ9
2. FEELING DOWN, DEPRESSED OR HOPELESS: NOT AT ALL
SUM OF ALL RESPONSES TO PHQ9 QUESTIONS 1 AND 2: 0
CLINICAL INTERPRETATION OF PHQ9 SCORE: NO FURTHER SCREENING NEEDED
1. LITTLE INTEREST OR PLEASURE IN DOING THINGS: NOT AT ALL
SUM OF ALL RESPONSES TO PHQ9 QUESTIONS 1 AND 2: 0
CLINICAL INTERPRETATION OF PHQ2 SCORE: NO FURTHER SCREENING NEEDED

## 2021-05-22 RX ORDER — POTASSIUM CHLORIDE 20 MEQ/1
TABLET, EXTENDED RELEASE ORAL
Qty: 90 TABLET | Refills: 1 | Status: SHIPPED | OUTPATIENT
Start: 2021-05-22 | End: 2021-11-22

## 2021-05-22 RX ORDER — FUROSEMIDE 20 MG/1
TABLET ORAL
Qty: 90 TABLET | Refills: 1 | Status: SHIPPED | OUTPATIENT
Start: 2021-05-22 | End: 2022-06-09

## 2021-06-01 RX ORDER — LINACLOTIDE 145 UG/1
CAPSULE, GELATIN COATED ORAL
Qty: 30 CAPSULE | Refills: 0 | Status: SHIPPED | OUTPATIENT
Start: 2021-06-01 | End: 2021-06-28

## 2021-06-01 NOTE — TELEPHONE ENCOUNTER
Requested Prescriptions     Pending Prescriptions Disp Refills   • LINZESS 145 MCG Oral Cap [Pharmacy Med Name: Linzess 145 Mcg Cap [place]] 30 capsule 0     Sig: TAKE ONE CAPSULE BY MOUTH ONE TIME DAILY     LOV 2/01/2021  LR 3/29/2021 #30 w/ 1 refill

## 2021-06-08 ENCOUNTER — MED REC SCAN ONLY (OUTPATIENT)
Dept: FAMILY MEDICINE CLINIC | Facility: CLINIC | Age: 56
End: 2021-06-08

## 2021-06-08 RX ORDER — ONDANSETRON 4 MG/1
4 TABLET, ORALLY DISINTEGRATING ORAL EVERY 8 HOURS PRN
Qty: 60 TABLET | Refills: 0 | Status: SHIPPED | OUTPATIENT
Start: 2021-06-08 | End: 2021-06-28

## 2021-06-08 RX ORDER — ONDANSETRON 4 MG/1
4 TABLET, ORALLY DISINTEGRATING ORAL EVERY 8 HOURS PRN
Qty: 60 TABLET | Refills: 0 | Status: SHIPPED | OUTPATIENT
Start: 2021-06-08 | End: 2021-09-07

## 2021-06-08 NOTE — TELEPHONE ENCOUNTER
Requested Prescriptions     Pending Prescriptions Disp Refills   • ondansetron 4 MG Oral Tablet Dispersible 60 tablet 0     Sig: Place 1 tablet (4 mg total) under the tongue every 8 (eight) hours as needed for Nausea.      DUPLICATE

## 2021-06-08 NOTE — TELEPHONE ENCOUNTER
Requested Prescriptions     Pending Prescriptions Disp Refills   • ondansetron 4 MG Oral Tablet Dispersible 60 tablet 0     Sig: Place 1 tablet (4 mg total) under the tongue every 8 (eight) hours as needed for Nausea.        DUPLICATE REQUEST

## 2021-06-09 RX ORDER — ONDANSETRON 4 MG/1
TABLET, ORALLY DISINTEGRATING ORAL
Qty: 60 TABLET | Refills: 0 | OUTPATIENT
Start: 2021-06-09

## 2021-06-09 RX ORDER — ONDANSETRON 4 MG/1
4 TABLET, ORALLY DISINTEGRATING ORAL EVERY 8 HOURS PRN
Qty: 60 TABLET | Refills: 0 | OUTPATIENT
Start: 2021-06-09

## 2021-06-11 ENCOUNTER — LAB ENCOUNTER (OUTPATIENT)
Dept: LAB | Facility: HOSPITAL | Age: 56
End: 2021-06-11
Attending: INTERNAL MEDICINE
Payer: COMMERCIAL

## 2021-06-11 DIAGNOSIS — Z01.818 ENCOUNTER FOR OTHER PREPROCEDURAL EXAMINATION: ICD-10-CM

## 2021-06-11 DIAGNOSIS — Z11.59 ENCOUNTER FOR SCREENING FOR OTHER VIRAL DISEASES: ICD-10-CM

## 2021-06-12 LAB — SARS-COV-2 RNA RESP QL NAA+PROBE: NOT DETECTED

## 2021-06-14 ENCOUNTER — CLINICAL ABSTRACT (OUTPATIENT)
Dept: CARDIOLOGY | Age: 56
End: 2021-06-14

## 2021-06-14 ENCOUNTER — HOSPITAL ENCOUNTER (OUTPATIENT)
Dept: CV DIAGNOSTICS | Facility: HOSPITAL | Age: 56
Discharge: HOME | End: 2021-06-14
Attending: INTERNAL MEDICINE
Payer: COMMERCIAL

## 2021-06-14 DIAGNOSIS — I07.1 TRICUSPID REGURGITATION: ICD-10-CM

## 2021-06-14 DIAGNOSIS — R06.02 SHORTNESS OF BREATH: ICD-10-CM

## 2021-06-14 DIAGNOSIS — R93.1 ABNORMAL ULTRASOUND CARDIOGRAM: ICD-10-CM

## 2021-06-14 PROCEDURE — 93018 CV STRESS TEST I&R ONLY: CPT | Performed by: INTERNAL MEDICINE

## 2021-06-14 PROCEDURE — 93350 STRESS TTE ONLY: CPT | Performed by: INTERNAL MEDICINE

## 2021-06-14 PROCEDURE — 93017 CV STRESS TEST TRACING ONLY: CPT | Performed by: INTERNAL MEDICINE

## 2021-06-15 ENCOUNTER — HOSPITAL ENCOUNTER (OUTPATIENT)
Dept: CV DIAGNOSTICS | Age: 56
Discharge: HOME | End: 2021-06-15
Attending: INTERNAL MEDICINE
Payer: COMMERCIAL

## 2021-06-15 DIAGNOSIS — I07.1 TRICUSPID REGURGITATION: ICD-10-CM

## 2021-06-15 DIAGNOSIS — R93.1 ABNORMAL ULTRASOUND CARDIOGRAM: ICD-10-CM

## 2021-06-15 DIAGNOSIS — R06.02 SHORTNESS OF BREATH: ICD-10-CM

## 2021-06-15 PROCEDURE — 93306 TTE W/DOPPLER COMPLETE: CPT | Performed by: INTERNAL MEDICINE

## 2021-06-16 ENCOUNTER — OFFICE VISIT (OUTPATIENT)
Dept: CARDIOLOGY | Age: 56
End: 2021-06-16

## 2021-06-16 VITALS
SYSTOLIC BLOOD PRESSURE: 120 MMHG | DIASTOLIC BLOOD PRESSURE: 80 MMHG | BODY MASS INDEX: 22.73 KG/M2 | WEIGHT: 150 LBS | OXYGEN SATURATION: 95 % | HEART RATE: 65 BPM | HEIGHT: 68 IN

## 2021-06-16 DIAGNOSIS — I36.1 NONRHEUMATIC TRICUSPID (VALVE) INSUFFICIENCY: ICD-10-CM

## 2021-06-16 DIAGNOSIS — R94.31 ABNORMAL ELECTROCARDIOGRAM (ECG) (EKG): Primary | ICD-10-CM

## 2021-06-16 DIAGNOSIS — I10 ESSENTIAL HYPERTENSION: ICD-10-CM

## 2021-06-16 PROCEDURE — 3074F SYST BP LT 130 MM HG: CPT | Performed by: INTERNAL MEDICINE

## 2021-06-16 PROCEDURE — 99213 OFFICE O/P EST LOW 20 MIN: CPT | Performed by: INTERNAL MEDICINE

## 2021-06-16 RX ORDER — FUROSEMIDE 20 MG/1
20 TABLET ORAL 2 TIMES DAILY
Qty: 180 TABLET | Refills: 3 | Status: SHIPPED | OUTPATIENT
Start: 2021-06-16

## 2021-06-16 ASSESSMENT — PATIENT HEALTH QUESTIONNAIRE - PHQ9
CLINICAL INTERPRETATION OF PHQ2 SCORE: NO FURTHER SCREENING NEEDED
SUM OF ALL RESPONSES TO PHQ9 QUESTIONS 1 AND 2: 0
CLINICAL INTERPRETATION OF PHQ9 SCORE: NO FURTHER SCREENING NEEDED
SUM OF ALL RESPONSES TO PHQ9 QUESTIONS 1 AND 2: 0
2. FEELING DOWN, DEPRESSED OR HOPELESS: NOT AT ALL
1. LITTLE INTEREST OR PLEASURE IN DOING THINGS: NOT AT ALL

## 2021-06-17 RX ORDER — PANTOPRAZOLE SODIUM 40 MG/1
40 TABLET, DELAYED RELEASE ORAL
Qty: 30 TABLET | Refills: 5 | Status: SHIPPED | OUTPATIENT
Start: 2021-06-17 | End: 2021-09-08 | Stop reason: ALTCHOICE

## 2021-06-28 RX ORDER — LINACLOTIDE 145 UG/1
1 CAPSULE, GELATIN COATED ORAL DAILY
Qty: 30 CAPSULE | Refills: 0 | Status: SHIPPED | OUTPATIENT
Start: 2021-06-28 | End: 2021-07-26

## 2021-06-28 RX ORDER — ONDANSETRON 4 MG/1
4 TABLET, ORALLY DISINTEGRATING ORAL EVERY 8 HOURS PRN
Qty: 60 TABLET | Refills: 0 | Status: SHIPPED | OUTPATIENT
Start: 2021-06-28 | End: 2021-07-16

## 2021-06-28 NOTE — TELEPHONE ENCOUNTER
Dr. Garrison Essex-    Patient calling to refill zofran 4MG. Please review and sign pended orders if appropriate. Spoke with patient who confirmed still utilizing zofran prn and needs refill.      LOV: 02/01/2021 with Emmett Todd   LR: 06/08/2021   Future Appts: No

## 2021-06-28 NOTE — TELEPHONE ENCOUNTER
Areli Tafoya-    Patient requesting refill for Linzess 145 MCG. Please review and sign pended orders if appropriate. Confirmed with patient still utilizing medication for symptoms.      LOV: 02/01/2021   LR: 06/01/2021   Future Appts: None     LINZESS 145

## 2021-07-16 RX ORDER — ONDANSETRON 4 MG/1
4 TABLET, ORALLY DISINTEGRATING ORAL EVERY 8 HOURS PRN
Qty: 60 TABLET | Refills: 0 | Status: SHIPPED | OUTPATIENT
Start: 2021-07-16 | End: 2021-07-30

## 2021-07-16 NOTE — TELEPHONE ENCOUNTER
114 Roxanne Lentz to inquire on refill request received for ondansetron 4 mg. Prescription last filled 2021. Spoke with Mojgan Bray (pharmacist tech). Verified patient name and .      Per Mojgan Bray, last refill was sent in for a 20 day supply a

## 2021-07-16 NOTE — TELEPHONE ENCOUNTER
Dr. Yuli Cotter-    Pharmacy requesting refill for . Please review and sign pended orders if appropriate.      LOV: 02/01/2021   LR: 06/28/2021   Future Appts: None     ondansetron 4 MG Oral Tablet Dispersible 60 tablet 0 6/28/2021    Sig:   Place 1 tablet (4 mg

## 2021-07-26 RX ORDER — LINACLOTIDE 145 UG/1
CAPSULE, GELATIN COATED ORAL
Qty: 30 CAPSULE | Refills: 0 | Status: SHIPPED | OUTPATIENT
Start: 2021-07-26 | End: 2021-08-26

## 2021-07-26 NOTE — TELEPHONE ENCOUNTER
Requested Prescriptions     Pending Prescriptions Disp Refills   • LINZESS 145 MCG Oral Cap [Pharmacy Med Name: Linzess 145 Mcg Cap [place]] 30 capsule 0     Sig: TAKE ONE CAPSULE BY MOUTH ONE TIME DAILY       LOV:   02/01/2021  LR:   06/28/2021

## 2021-07-30 RX ORDER — ONDANSETRON 4 MG/1
4 TABLET, ORALLY DISINTEGRATING ORAL EVERY 8 HOURS PRN
Qty: 60 TABLET | Refills: 0 | Status: SHIPPED | OUTPATIENT
Start: 2021-07-30 | End: 2021-08-16

## 2021-07-30 NOTE — TELEPHONE ENCOUNTER
Dr. Vinny Cuevas-    Patient requesting refill for ondansetron 4 MG.  Please review and sign pended orders if appropriate.      LOV: 02/01/2021   LR: 07/16/2021   Future Appts: None      ondansetron 4 MG Oral Tablet Dispersible 60 tablet 0 6/28/2021     Sig:   Kirt

## 2021-08-16 ENCOUNTER — LAB ENCOUNTER (OUTPATIENT)
Dept: LAB | Facility: HOSPITAL | Age: 56
End: 2021-08-16
Attending: INTERNAL MEDICINE
Payer: COMMERCIAL

## 2021-08-16 DIAGNOSIS — D47.2 MGUS (MONOCLONAL GAMMOPATHY OF UNKNOWN SIGNIFICANCE): ICD-10-CM

## 2021-08-16 LAB
ALBUMIN SERPL-MCNC: 3.7 G/DL (ref 3.4–5)
ALBUMIN/GLOB SERPL: 1.1 {RATIO} (ref 1–2)
ALP LIVER SERPL-CCNC: 99 U/L
ALT SERPL-CCNC: 22 U/L
ANION GAP SERPL CALC-SCNC: 3 MMOL/L (ref 0–18)
AST SERPL-CCNC: 21 U/L (ref 15–37)
BASOPHILS # BLD AUTO: 0.02 X10(3) UL (ref 0–0.2)
BASOPHILS NFR BLD AUTO: 0.5 %
BILIRUB SERPL-MCNC: 0.5 MG/DL (ref 0.1–2)
BUN BLD-MCNC: 13 MG/DL (ref 7–18)
BUN/CREAT SERPL: 13.3 (ref 10–20)
CALCIUM BLD-MCNC: 9.4 MG/DL (ref 8.5–10.1)
CHLORIDE SERPL-SCNC: 102 MMOL/L (ref 98–112)
CO2 SERPL-SCNC: 30 MMOL/L (ref 21–32)
CREAT BLD-MCNC: 0.98 MG/DL
DEPRECATED RDW RBC AUTO: 40.8 FL (ref 35.1–46.3)
EOSINOPHIL # BLD AUTO: 0.02 X10(3) UL (ref 0–0.7)
EOSINOPHIL NFR BLD AUTO: 0.5 %
ERYTHROCYTE [DISTWIDTH] IN BLOOD BY AUTOMATED COUNT: 12.8 % (ref 11–15)
GLOBULIN PLAS-MCNC: 3.5 G/DL (ref 2.8–4.4)
GLUCOSE BLD-MCNC: 93 MG/DL (ref 70–99)
HCT VFR BLD AUTO: 34.9 %
HGB BLD-MCNC: 11.8 G/DL
IGA SERPL-MCNC: 33.6 MG/DL (ref 70–312)
IGM SERPL-MCNC: 772 MG/DL (ref 43–279)
IMM GRANULOCYTES # BLD AUTO: 0.01 X10(3) UL (ref 0–1)
IMM GRANULOCYTES NFR BLD: 0.2 %
IMMUNOGLOBULIN PNL SER-MCNC: 459 MG/DL (ref 791–1643)
LYMPHOCYTES # BLD AUTO: 1.95 X10(3) UL (ref 1–4)
LYMPHOCYTES NFR BLD AUTO: 45.1 %
M PROTEIN MFR SERPL ELPH: 7.2 G/DL (ref 6.4–8.2)
MCH RBC QN AUTO: 29.3 PG (ref 26–34)
MCHC RBC AUTO-ENTMCNC: 33.8 G/DL (ref 31–37)
MCV RBC AUTO: 86.6 FL
MONOCYTES # BLD AUTO: 0.27 X10(3) UL (ref 0.1–1)
MONOCYTES NFR BLD AUTO: 6.3 %
NEUTROPHILS # BLD AUTO: 2.05 X10 (3) UL (ref 1.5–7.7)
NEUTROPHILS # BLD AUTO: 2.05 X10(3) UL (ref 1.5–7.7)
NEUTROPHILS NFR BLD AUTO: 47.4 %
OSMOLALITY SERPL CALC.SUM OF ELEC: 280 MOSM/KG (ref 275–295)
PATIENT FASTING Y/N/NP: YES
PLATELET # BLD AUTO: 159 10(3)UL (ref 150–450)
POTASSIUM SERPL-SCNC: 4 MMOL/L (ref 3.5–5.1)
RBC # BLD AUTO: 4.03 X10(6)UL
SODIUM SERPL-SCNC: 135 MMOL/L (ref 136–145)
WBC # BLD AUTO: 4.3 X10(3) UL (ref 4–11)

## 2021-08-16 PROCEDURE — 86334 IMMUNOFIX E-PHORESIS SERUM: CPT

## 2021-08-16 PROCEDURE — 82784 ASSAY IGA/IGD/IGG/IGM EACH: CPT

## 2021-08-16 PROCEDURE — 83883 ASSAY NEPHELOMETRY NOT SPEC: CPT

## 2021-08-16 PROCEDURE — 85025 COMPLETE CBC W/AUTO DIFF WBC: CPT

## 2021-08-16 PROCEDURE — 80053 COMPREHEN METABOLIC PANEL: CPT

## 2021-08-16 PROCEDURE — 36415 COLL VENOUS BLD VENIPUNCTURE: CPT

## 2021-08-16 PROCEDURE — 84165 PROTEIN E-PHORESIS SERUM: CPT

## 2021-08-16 RX ORDER — ONDANSETRON 4 MG/1
4 TABLET, ORALLY DISINTEGRATING ORAL EVERY 8 HOURS PRN
Qty: 60 TABLET | Refills: 0 | Status: CANCELLED | OUTPATIENT
Start: 2021-08-16

## 2021-08-17 LAB
ALBUMIN SERPL ELPH-MCNC: 4.2 G/DL (ref 3.75–5.21)
ALBUMIN/GLOB SERPL: 1.68 {RATIO} (ref 1–2)
ALPHA1 GLOB SERPL ELPH-MCNC: 0.31 G/DL (ref 0.19–0.46)
ALPHA2 GLOB SERPL ELPH-MCNC: 0.67 G/DL (ref 0.48–1.05)
B-GLOBULIN SERPL ELPH-MCNC: 0.65 G/DL (ref 0.68–1.23)
GAMMA GLOB SERPL ELPH-MCNC: 0.87 G/DL (ref 0.62–1.7)
KAPPA LC FREE SER-MCNC: 2.48 MG/DL (ref 0.33–1.94)
KAPPA LC FREE/LAMBDA FREE SER NEPH: 1.1 {RATIO} (ref 0.26–1.65)
LAMBDA LC FREE SERPL-MCNC: 2.24 MG/DL (ref 0.57–2.63)
M PROTEIN MFR SERPL ELPH: 6.7 G/DL (ref 6.4–8.2)

## 2021-08-17 RX ORDER — ONDANSETRON 4 MG/1
TABLET, ORALLY DISINTEGRATING ORAL
Qty: 60 TABLET | Refills: 0 | OUTPATIENT
Start: 2021-08-17

## 2021-08-17 RX ORDER — ONDANSETRON 4 MG/1
4 TABLET, ORALLY DISINTEGRATING ORAL EVERY 8 HOURS PRN
Qty: 60 TABLET | Refills: 0 | Status: SHIPPED | OUTPATIENT
Start: 2021-08-17 | End: 2021-08-31

## 2021-08-20 ENCOUNTER — HOSPITAL ENCOUNTER (OUTPATIENT)
Dept: CT IMAGING | Age: 56
Discharge: HOME | End: 2021-08-20
Attending: INTERNAL MEDICINE
Payer: COMMERCIAL

## 2021-08-20 DIAGNOSIS — Z87.891 HISTORY OF TOBACCO USE: ICD-10-CM

## 2021-08-20 DIAGNOSIS — R91.1 PULMONARY NODULE: ICD-10-CM

## 2021-08-20 PROCEDURE — 71260 CT THORAX DX C+: CPT | Performed by: INTERNAL MEDICINE

## 2021-08-23 RX ORDER — LINACLOTIDE 145 UG/1
1 CAPSULE, GELATIN COATED ORAL DAILY
Qty: 30 CAPSULE | Refills: 0 | OUTPATIENT
Start: 2021-08-23

## 2021-08-27 NOTE — TELEPHONE ENCOUNTER
Rhonda Holder,     Patient requesting refill for Linzess 145 MCG/daily. Please review and sign pended orders if appropriate.      LOV: 02/01/2021   LR: 07/26/2021   Future Appts: 09/21/2021     LINZESS 145 MCG Oral Cap 30 capsule 0 7/26/2021     Sig: TAKE ON

## 2021-08-31 RX ORDER — ONDANSETRON 4 MG/1
4 TABLET, ORALLY DISINTEGRATING ORAL EVERY 8 HOURS PRN
Qty: 60 TABLET | Refills: 0 | Status: SHIPPED | OUTPATIENT
Start: 2021-08-31 | End: 2021-09-14

## 2021-08-31 RX ORDER — LINACLOTIDE 145 UG/1
1 CAPSULE, GELATIN COATED ORAL DAILY
Qty: 30 CAPSULE | Refills: 0 | Status: SHIPPED | OUTPATIENT
Start: 2021-08-31 | End: 2021-09-08 | Stop reason: DRUGHIGH

## 2021-08-31 NOTE — TELEPHONE ENCOUNTER
Diana Oshea,     Patient requesting refill for ondansetron 4 MG (q8hr prn). Please review and sign pended orders if appropriate.      LOV: 02/01/2021   LR: 08/17/2021   Future Appts: Office visit follow up 09/21/2021     ondansetron 4 MG Oral Tablet Disper

## 2021-09-01 ENCOUNTER — HOSPITAL ENCOUNTER (OUTPATIENT)
Dept: GENERAL RADIOLOGY | Age: 56
Discharge: HOME | End: 2021-09-01
Attending: INTERNAL MEDICINE
Payer: COMMERCIAL

## 2021-09-01 DIAGNOSIS — D47.2 MGUS (MONOCLONAL GAMMOPATHY OF UNKNOWN SIGNIFICANCE): ICD-10-CM

## 2021-09-01 PROCEDURE — 77075 RADEX OSSEOUS SURVEY COMPL: CPT | Performed by: INTERNAL MEDICINE

## 2021-09-06 NOTE — PROGRESS NOTES
[facility], [facility] - Gastroenterology                                                                                                               Reason for consult: f/u    Requesting physician or provider: DO Teddy Dubois Bowel obstruction ([facility] [address]    • Cancer ([facility] [address] 2011    basal cell on right eyebrow   • Hemorrhoids     hemoorhoidectomy   • History of MRSA infection     blister on toe MRSA+ approximately 2012   • Infected dental carries 2010    dental extractions   • Injury o prep of distal rectum, ureterolysis   • SINUS SURGERY        to treat sinusitis   • VENTRAL HERNIA REPAIR  07/26/2018    incisional hernia repair with mesh, lysis of adhesions, bilateral myocutaneous advancement      Family Hx:   Family History   Problem R ENZYME OR) Take 1 capsule by mouth daily. • VITAMIN D, ERGOCALCIFEROL, OR Take 1 capsule by mouth daily. • Multiple Vitamins-Minerals (MULTI-VITAMIN/MINERALS) Oral Tab Take 1 tablet by mouth daily.      • docusate sodium 100 MG Oral Cap Take 100 mg symptoms. If no improvement consider trial of xifaxan. Discussed avoidance of high fodmap foods.     #h/o cln polyps  Due for crc screening 2023 unless otherwise indicated    #gerd  #nausea  She has on-going reflux complaints while on pantoprazole 40 mg/da

## 2021-09-07 ENCOUNTER — OFFICE VISIT (OUTPATIENT)
Dept: FAMILY MEDICINE CLINIC | Facility: CLINIC | Age: 56
End: 2021-09-07
Payer: COMMERCIAL

## 2021-09-07 VITALS
SYSTOLIC BLOOD PRESSURE: 111 MMHG | HEART RATE: 59 BPM | HEIGHT: 68 IN | DIASTOLIC BLOOD PRESSURE: 63 MMHG | WEIGHT: 156 LBS | BODY MASS INDEX: 23.64 KG/M2

## 2021-09-07 DIAGNOSIS — M54.50 ACUTE RIGHT-SIDED LOW BACK PAIN WITHOUT SCIATICA: Primary | ICD-10-CM

## 2021-09-07 DIAGNOSIS — M99.03 LUMBAR REGION SOMATIC DYSFUNCTION: ICD-10-CM

## 2021-09-07 PROCEDURE — 3008F BODY MASS INDEX DOCD: CPT | Performed by: FAMILY MEDICINE

## 2021-09-07 PROCEDURE — 3074F SYST BP LT 130 MM HG: CPT | Performed by: FAMILY MEDICINE

## 2021-09-07 PROCEDURE — 99213 OFFICE O/P EST LOW 20 MIN: CPT | Performed by: FAMILY MEDICINE

## 2021-09-07 PROCEDURE — 98925 OSTEOPATH MANJ 1-2 REGIONS: CPT | Performed by: FAMILY MEDICINE

## 2021-09-07 PROCEDURE — 3078F DIAST BP <80 MM HG: CPT | Performed by: FAMILY MEDICINE

## 2021-09-07 NOTE — PROGRESS NOTES
HPI/Subjective:   Patient ID: Rossi Hastings is a 61year old female. HPI  Patient presents with:  Back Pain: lower back pain for 1 month on and off, using heading pad  and advil,     History/Other:   Review of Systems   Constitutional: Negative. energy. Assessment & Plan:   Acute right-sided low back pain without sciatica  (primary encounter diagnosis)  Lumbar region somatic dysfunction    OMT today and home exercises demonstrated. She will return for reevaluation.   Recent basic bone surve

## 2021-09-08 ENCOUNTER — OFFICE VISIT (OUTPATIENT)
Dept: GASTROENTEROLOGY | Facility: CLINIC | Age: 56
End: 2021-09-08
Payer: COMMERCIAL

## 2021-09-08 VITALS
HEART RATE: 68 BPM | WEIGHT: 156 LBS | SYSTOLIC BLOOD PRESSURE: 105 MMHG | HEIGHT: 68 IN | DIASTOLIC BLOOD PRESSURE: 73 MMHG | BODY MASS INDEX: 23.64 KG/M2

## 2021-09-08 DIAGNOSIS — R14.0 BLOATING: ICD-10-CM

## 2021-09-08 DIAGNOSIS — R10.30 LOWER ABDOMINAL PAIN: ICD-10-CM

## 2021-09-08 DIAGNOSIS — K21.9 GASTROESOPHAGEAL REFLUX DISEASE, UNSPECIFIED WHETHER ESOPHAGITIS PRESENT: ICD-10-CM

## 2021-09-08 DIAGNOSIS — Z86.010 HISTORY OF COLON POLYPS: ICD-10-CM

## 2021-09-08 DIAGNOSIS — R11.0 NAUSEA: Primary | ICD-10-CM

## 2021-09-08 DIAGNOSIS — R14.0 GASSINESS: ICD-10-CM

## 2021-09-08 DIAGNOSIS — K59.00 CONSTIPATION, UNSPECIFIED CONSTIPATION TYPE: ICD-10-CM

## 2021-09-08 PROCEDURE — 3008F BODY MASS INDEX DOCD: CPT | Performed by: NURSE PRACTITIONER

## 2021-09-08 PROCEDURE — 3074F SYST BP LT 130 MM HG: CPT | Performed by: NURSE PRACTITIONER

## 2021-09-08 PROCEDURE — 99215 OFFICE O/P EST HI 40 MIN: CPT | Performed by: NURSE PRACTITIONER

## 2021-09-08 PROCEDURE — 3078F DIAST BP <80 MM HG: CPT | Performed by: NURSE PRACTITIONER

## 2021-09-08 RX ORDER — OMEPRAZOLE 40 MG/1
40 CAPSULE, DELAYED RELEASE ORAL DAILY
Qty: 30 CAPSULE | Refills: 11 | Status: SHIPPED | OUTPATIENT
Start: 2021-09-08

## 2021-09-08 RX ORDER — PROMETHAZINE HYDROCHLORIDE 25 MG/1
1 TABLET ORAL DAILY
COMMUNITY
End: 2022-02-07

## 2021-09-08 NOTE — PATIENT INSTRUCTIONS
-linzess 290 mcg constipation  -omeprazole 40 mg for reflux  -reflux diet modification  -cln 2023 unless otherwise indicated  -Call rajendra [place] [phone] with update in 2-4 weeks    Tips to Control Acid Reflux    To control acid reflux, you’ll need to make

## 2021-09-09 ENCOUNTER — OFFICE VISIT (OUTPATIENT)
Dept: HEMATOLOGY/ONCOLOGY | Facility: HOSPITAL | Age: 56
End: 2021-09-09
Attending: INTERNAL MEDICINE
Payer: COMMERCIAL

## 2021-09-09 VITALS
RESPIRATION RATE: 16 BRPM | TEMPERATURE: 98 F | OXYGEN SATURATION: 100 % | WEIGHT: 154 LBS | DIASTOLIC BLOOD PRESSURE: 73 MMHG | HEART RATE: 65 BPM | HEIGHT: 68 IN | SYSTOLIC BLOOD PRESSURE: 129 MMHG | BODY MASS INDEX: 23.34 KG/M2

## 2021-09-09 DIAGNOSIS — D47.2 MGUS (MONOCLONAL GAMMOPATHY OF UNKNOWN SIGNIFICANCE): Primary | ICD-10-CM

## 2021-09-09 PROCEDURE — 99213 OFFICE O/P EST LOW 20 MIN: CPT | Performed by: INTERNAL MEDICINE

## 2021-09-09 NOTE — PROGRESS NOTES
Hematology Progress Note    Patient Name: Ghulam Peres   YOB: 1965   Medical Record Number: [id number]   CSN: [id number]   Consulting Physician: Alon Mcneill MD  Referring Physician(s): Esdras Lin  Date of Visit: 9/09/2021     [facility] hematopoiesis. Interval History:  Patient returns for follow up of MGUS. She was recently found to have an M-spike of only 0.17 g/dL, and IgM Lamda noted.  Current labs show only a faint band of an IgM Lambda present    Lindsay Anton has some low lumbar back pain TUMOR/AVM,DEEP,HAND/FINGR Left 10/02/2020    Left small finger mass excision    • EXTRACTION ERUPTED TOOTH/EXR  2010    dental extractions, dental carries   • HEMORRHOIDECTOMY     • HERNIA SURGERY  07/26/2018    incisional hernia repair with mesh, lysis of Take 1 capsule by mouth daily before breakfast., Disp: 30 capsule, Rfl: 11  linaCLOtide (LINZESS) 290 MCG Oral Cap, Take 1 capsule (290 mcg total) by mouth every morning before breakfast., Disp: 30 capsule, Rfl: 3  ondansetron 4 MG Oral Tablet Dispersible, speech problems, gait problems and weakness. A comprehensive 14 point review of systems was completed. Pertinent positives and negatives noted in the the HPI.      Vital Signs:  /73 (BP Location: Left arm, Patient Position: Sitting, Cuff Size: medhat 9/1/2021  Impression   CONCLUSION:       1. No evidence of osteolytic or osteoblastic lesions. 2. Osteoarthritis. 3. Scoliosis. 4. Demineralization. 5. Atherosclerosis. 6. Postop changes abdomen/pelvis.    7. Little change from August 13, 2020.   and chest to rule out possible lymphoma as she just completed CT abdominal imaging    --In 8/2020, I was pleased to inform her that her imaging shows no concerning lymphadenopathy via CT nect+chest. She is already working with GI for the thickening of dist bone marrow to look for cause for the splenomegaly likely causing her thrombocytopenia. She was noted to have a new mild leukopenia as well.  I have discussed a plan of care for her to have a repeat CBC drawnp prior to her procedure to help bone marrow eval [facility]

## 2021-09-14 RX ORDER — ONDANSETRON 4 MG/1
4 TABLET, ORALLY DISINTEGRATING ORAL EVERY 8 HOURS PRN
Qty: 60 TABLET | Refills: 0 | Status: SHIPPED | OUTPATIENT
Start: 2021-09-14 | End: 2021-10-03

## 2021-09-14 NOTE — TELEPHONE ENCOUNTER
Requested Prescriptions     Pending Prescriptions Disp Refills   • ondansetron 4 MG Oral Tablet Dispersible 60 tablet 0     Sig: Place 1 tablet (4 mg total) under the tongue every 8 (eight) hours as needed for Nausea.         LOV    LR  9/8/21 - #30 with 11

## 2021-09-16 ENCOUNTER — OFFICE VISIT (OUTPATIENT)
Dept: FAMILY MEDICINE CLINIC | Facility: CLINIC | Age: 56
End: 2021-09-16
Payer: COMMERCIAL

## 2021-09-16 VITALS
BODY MASS INDEX: 22.88 KG/M2 | WEIGHT: 151 LBS | HEIGHT: 68 IN | HEART RATE: 58 BPM | SYSTOLIC BLOOD PRESSURE: 99 MMHG | DIASTOLIC BLOOD PRESSURE: 59 MMHG

## 2021-09-16 DIAGNOSIS — M99.03 LUMBAR REGION SOMATIC DYSFUNCTION: Primary | ICD-10-CM

## 2021-09-16 PROCEDURE — 3008F BODY MASS INDEX DOCD: CPT | Performed by: FAMILY MEDICINE

## 2021-09-16 PROCEDURE — 99213 OFFICE O/P EST LOW 20 MIN: CPT | Performed by: FAMILY MEDICINE

## 2021-09-16 PROCEDURE — 98925 OSTEOPATH MANJ 1-2 REGIONS: CPT | Performed by: FAMILY MEDICINE

## 2021-09-16 PROCEDURE — 3078F DIAST BP <80 MM HG: CPT | Performed by: FAMILY MEDICINE

## 2021-09-16 PROCEDURE — 3074F SYST BP LT 130 MM HG: CPT | Performed by: FAMILY MEDICINE

## 2021-09-16 RX ORDER — TRAMADOL HYDROCHLORIDE 50 MG/1
50 TABLET ORAL EVERY 6 HOURS PRN
Qty: 30 TABLET | Refills: 1 | Status: SHIPPED | OUTPATIENT
Start: 2021-09-16 | End: 2021-10-07

## 2021-09-16 RX ORDER — PANTOPRAZOLE SODIUM 40 MG/1
TABLET, DELAYED RELEASE ORAL
COMMUNITY
Start: 2021-09-11 | End: 2021-09-16

## 2021-09-16 NOTE — PROGRESS NOTES
Subjective:   Patient ID: Leonel Smith is a 61year old female. HPI  Patient presents with:  Back Pain  some improvement  Stomach issues still remain. Sees GI. Seeing Cards. History/Other:   Review of Systems   Constitutional: Negative.     Teena lumbar spine and SI right. Tolerated well. Used soft tissue and muscle energy. Assessment & Plan:   Lumbar region somatic dysfunction  (primary encounter diagnosis)    OMT today and home exercises demonstrated.    Orders Placed This Encounter

## 2021-09-16 NOTE — PROCEDURES
OMT today performed on the lumbar spine and SI right. Tolerated well. Used soft tissue and muscle energy.

## 2021-09-23 ENCOUNTER — OFFICE VISIT (OUTPATIENT)
Dept: FAMILY MEDICINE CLINIC | Facility: CLINIC | Age: 56
End: 2021-09-23
Payer: COMMERCIAL

## 2021-09-23 VITALS
RESPIRATION RATE: 16 BRPM | SYSTOLIC BLOOD PRESSURE: 108 MMHG | HEART RATE: 75 BPM | DIASTOLIC BLOOD PRESSURE: 68 MMHG | HEIGHT: 68 IN | WEIGHT: 151 LBS | BODY MASS INDEX: 22.88 KG/M2

## 2021-09-23 DIAGNOSIS — M99.03 LUMBAR REGION SOMATIC DYSFUNCTION: Primary | ICD-10-CM

## 2021-09-23 DIAGNOSIS — M54.50 ACUTE LEFT-SIDED LOW BACK PAIN WITHOUT SCIATICA: ICD-10-CM

## 2021-09-23 PROCEDURE — 3008F BODY MASS INDEX DOCD: CPT | Performed by: FAMILY MEDICINE

## 2021-09-23 PROCEDURE — 3074F SYST BP LT 130 MM HG: CPT | Performed by: FAMILY MEDICINE

## 2021-09-23 PROCEDURE — 98925 OSTEOPATH MANJ 1-2 REGIONS: CPT | Performed by: FAMILY MEDICINE

## 2021-09-23 PROCEDURE — 3078F DIAST BP <80 MM HG: CPT | Performed by: FAMILY MEDICINE

## 2021-09-23 PROCEDURE — 99214 OFFICE O/P EST MOD 30 MIN: CPT | Performed by: FAMILY MEDICINE

## 2021-09-23 NOTE — PROGRESS NOTES
Subjective:   Patient ID: Giovanna Shankar is a 61year old female. HPI  Patient presents with:  Back Pain: Patient present for follow up visit - Back pain - Tramadol helping    History/Other:   Review of Systems   Constitutional: Negative.     Musculos Left low back pain with lumbar and sacral somatic dysfunction     OMT today performed on the lumbar spine and SI left  Tolerated well. Used soft tissue and muscle energy.         Assessment & Plan:   Lumbar region somatic dysfunction  (primary encounter di

## 2021-09-23 NOTE — PROCEDURES
OMT today performed on the lumbar spine and SI left  Tolerated well. Used soft tissue and muscle energy.

## 2021-09-25 ENCOUNTER — HOSPITAL ENCOUNTER (OUTPATIENT)
Dept: GENERAL RADIOLOGY | Age: 56
Discharge: HOME | End: 2021-09-25
Attending: FAMILY MEDICINE
Payer: COMMERCIAL

## 2021-09-25 DIAGNOSIS — M99.03 LUMBAR REGION SOMATIC DYSFUNCTION: ICD-10-CM

## 2021-09-25 PROCEDURE — 72110 X-RAY EXAM L-2 SPINE 4/>VWS: CPT | Performed by: FAMILY MEDICINE

## 2021-09-30 ENCOUNTER — OFFICE VISIT (OUTPATIENT)
Dept: FAMILY MEDICINE CLINIC | Facility: CLINIC | Age: 56
End: 2021-09-30
Payer: COMMERCIAL

## 2021-09-30 VITALS
HEART RATE: 66 BPM | BODY MASS INDEX: 22.58 KG/M2 | HEIGHT: 68 IN | DIASTOLIC BLOOD PRESSURE: 76 MMHG | SYSTOLIC BLOOD PRESSURE: 120 MMHG | WEIGHT: 149 LBS

## 2021-09-30 DIAGNOSIS — M99.03 LUMBAR REGION SOMATIC DYSFUNCTION: ICD-10-CM

## 2021-09-30 DIAGNOSIS — M47.816 LUMBAR SPONDYLOSIS: Primary | ICD-10-CM

## 2021-09-30 PROCEDURE — 99213 OFFICE O/P EST LOW 20 MIN: CPT | Performed by: FAMILY MEDICINE

## 2021-09-30 PROCEDURE — 3078F DIAST BP <80 MM HG: CPT | Performed by: FAMILY MEDICINE

## 2021-09-30 PROCEDURE — 98925 OSTEOPATH MANJ 1-2 REGIONS: CPT | Performed by: FAMILY MEDICINE

## 2021-09-30 PROCEDURE — 3074F SYST BP LT 130 MM HG: CPT | Performed by: FAMILY MEDICINE

## 2021-09-30 PROCEDURE — 3008F BODY MASS INDEX DOCD: CPT | Performed by: FAMILY MEDICINE

## 2021-09-30 RX ORDER — TEMAZEPAM 15 MG/1
15 CAPSULE ORAL NIGHTLY PRN
Qty: 30 CAPSULE | Refills: 5 | Status: SHIPPED | OUTPATIENT
Start: 2021-09-30

## 2021-09-30 RX ORDER — METHYLPREDNISOLONE 4 MG/1
TABLET ORAL
Qty: 1 EACH | Refills: 0 | Status: SHIPPED | OUTPATIENT
Start: 2021-09-30 | End: 2021-10-26 | Stop reason: ALTCHOICE

## 2021-09-30 NOTE — PROGRESS NOTES
Subjective:   Patient ID: Yonny Ruby is a 61year old female. HPI  follo w up back pain. Minimal imprvement and completed her x ray. History/Other:   Review of Systems   Constitutional: Negative. Musculoskeletal: Positive for back pain. motion. Negative right straight leg raise test and negative left straight leg raise test.      Comments: Lumbar upper lumbar tenderness     OMT today performed on the lumbar spine and SI left  Tolerated well. Used soft tissue and muscle energy.         Ass

## 2021-10-04 RX ORDER — ONDANSETRON 4 MG/1
4 TABLET, ORALLY DISINTEGRATING ORAL EVERY 8 HOURS PRN
Qty: 60 TABLET | Refills: 0 | Status: SHIPPED | OUTPATIENT
Start: 2021-10-04 | End: 2021-10-21

## 2021-10-04 NOTE — TELEPHONE ENCOUNTER
Anderson Rodriguez--    Patient requesting refill for zofran & linzess 290 MCG. Only included zofran refill since last linzess prescription provided 3 refills. Please review and sign pended orders if appropriate.      LOV: 09/08/2021   LR: zofran-- 09/14/2021   F

## 2021-10-07 NOTE — TELEPHONE ENCOUNTER
Please review; protocol failed/no protocol    Requested Prescriptions   Pending Prescriptions Disp Refills    traMADol 50 MG Oral Tab 30 tablet 1     Sig: Take 1 tablet (50 mg total) by mouth every 6 (six) hours as needed for Pain.         There is no refil

## 2021-10-12 RX ORDER — TRAMADOL HYDROCHLORIDE 50 MG/1
50 TABLET ORAL EVERY 6 HOURS PRN
Qty: 30 TABLET | Refills: 1 | Status: SHIPPED | OUTPATIENT
Start: 2021-10-12 | End: 2021-10-26

## 2021-10-22 NOTE — TELEPHONE ENCOUNTER
[place]--    Patient requesting refill for ondansetron 4 mg. Please review and sign pended orders if appropriate.      LOV: 09/08/2021  LR: 10/04/2021   Future Appts: appointment with Dr. Louie Schmid 11/19/2021     ondansetron 4 MG Oral Tablet Dispersible 6

## 2021-10-24 RX ORDER — ONDANSETRON 4 MG/1
4 TABLET, ORALLY DISINTEGRATING ORAL EVERY 8 HOURS PRN
Qty: 60 TABLET | Refills: 0 | Status: SHIPPED | OUTPATIENT
Start: 2021-10-24 | End: 2021-11-14

## 2021-10-26 ENCOUNTER — OFFICE VISIT (OUTPATIENT)
Dept: FAMILY MEDICINE CLINIC | Facility: CLINIC | Age: 56
End: 2021-10-26
Payer: COMMERCIAL

## 2021-10-26 VITALS
SYSTOLIC BLOOD PRESSURE: 104 MMHG | HEIGHT: 68 IN | RESPIRATION RATE: 18 BRPM | BODY MASS INDEX: 22.43 KG/M2 | WEIGHT: 148 LBS | HEART RATE: 80 BPM | DIASTOLIC BLOOD PRESSURE: 66 MMHG

## 2021-10-26 DIAGNOSIS — M47.816 LUMBAR SPONDYLOSIS: Primary | ICD-10-CM

## 2021-10-26 PROCEDURE — 99213 OFFICE O/P EST LOW 20 MIN: CPT | Performed by: FAMILY MEDICINE

## 2021-10-26 PROCEDURE — 3078F DIAST BP <80 MM HG: CPT | Performed by: FAMILY MEDICINE

## 2021-10-26 PROCEDURE — 3008F BODY MASS INDEX DOCD: CPT | Performed by: FAMILY MEDICINE

## 2021-10-26 PROCEDURE — 3074F SYST BP LT 130 MM HG: CPT | Performed by: FAMILY MEDICINE

## 2021-10-26 RX ORDER — TRAMADOL HYDROCHLORIDE 50 MG/1
50 TABLET ORAL EVERY 6 HOURS PRN
Qty: 60 TABLET | Refills: 1 | Status: SHIPPED | OUTPATIENT
Start: 2021-10-26 | End: 2021-11-22

## 2021-10-26 NOTE — PROGRESS NOTES
Subjective:   Patient ID: Therese Shown is a 61year old female. HPI  Patient presents with:  Back Pain: Patient present for follow up visit - Back pain  Little to no improvement with PT.     History/Other:   Review of Systems   Constitutional: Negat improved with OMT or PT. Recommend physiatry follow up since she is having daily symptoms affecting her life. No orders of the defined types were placed in this encounter.       Meds This Visit:  Requested Prescriptions     Signed Prescriptions Disp Refi

## 2021-11-15 RX ORDER — ONDANSETRON 4 MG/1
4 TABLET, ORALLY DISINTEGRATING ORAL EVERY 8 HOURS PRN
Qty: 60 TABLET | Refills: 0 | Status: SHIPPED | OUTPATIENT
Start: 2021-11-15 | End: 2021-11-19

## 2021-11-15 NOTE — TELEPHONE ENCOUNTER
Requested Prescriptions     Pending Prescriptions Disp Refills   • ondansetron 4 MG Oral Tablet Dispersible 60 tablet 0     Sig: Place 1 tablet (4 mg total) under the tongue every 8 (eight) hours as needed for Nausea.       LOV  9/8/21     LR 10/24/21

## 2021-11-19 ENCOUNTER — OFFICE VISIT (OUTPATIENT)
Dept: GASTROENTEROLOGY | Facility: CLINIC | Age: 56
End: 2021-11-19
Payer: COMMERCIAL

## 2021-11-19 VITALS
HEIGHT: 68 IN | DIASTOLIC BLOOD PRESSURE: 90 MMHG | BODY MASS INDEX: 22.22 KG/M2 | WEIGHT: 146.63 LBS | HEART RATE: 77 BPM | SYSTOLIC BLOOD PRESSURE: 129 MMHG

## 2021-11-19 DIAGNOSIS — Z93.3 STATUS POST HARTMANN PROCEDURE (HCC): Primary | ICD-10-CM

## 2021-11-19 DIAGNOSIS — K58.1 IRRITABLE BOWEL SYNDROME WITH CONSTIPATION: ICD-10-CM

## 2021-11-19 DIAGNOSIS — R11.0 NAUSEA: ICD-10-CM

## 2021-11-19 PROCEDURE — 3008F BODY MASS INDEX DOCD: CPT | Performed by: INTERNAL MEDICINE

## 2021-11-19 PROCEDURE — 3080F DIAST BP >= 90 MM HG: CPT | Performed by: INTERNAL MEDICINE

## 2021-11-19 PROCEDURE — 3074F SYST BP LT 130 MM HG: CPT | Performed by: INTERNAL MEDICINE

## 2021-11-19 PROCEDURE — 99214 OFFICE O/P EST MOD 30 MIN: CPT | Performed by: INTERNAL MEDICINE

## 2021-11-19 RX ORDER — TEGASEROD 6 MG/1
6 TABLET ORAL
Qty: 180 TABLET | Refills: 3 | Status: SHIPPED | OUTPATIENT
Start: 2021-11-19 | End: 2022-02-17

## 2021-11-19 RX ORDER — PANTOPRAZOLE SODIUM 40 MG/1
40 TABLET, DELAYED RELEASE ORAL DAILY
COMMUNITY
Start: 2021-11-06

## 2021-11-19 RX ORDER — ONDANSETRON 4 MG/1
4 TABLET, ORALLY DISINTEGRATING ORAL EVERY 8 HOURS PRN
Qty: 60 TABLET | Refills: 5 | Status: SHIPPED | OUTPATIENT
Start: 2021-11-19

## 2021-11-19 RX ORDER — DICYCLOMINE HCL 20 MG
TABLET ORAL 3 TIMES DAILY PRN
Qty: 90 TABLET | Refills: 3 | Status: SHIPPED | OUTPATIENT
Start: 2021-11-19

## 2021-11-19 RX ORDER — LINACLOTIDE 290 UG/1
290 CAPSULE, GELATIN COATED ORAL DAILY
COMMUNITY
Start: 2021-11-02 | End: 2021-12-22

## 2021-11-19 NOTE — PROGRESS NOTES
HPI:    Patient ID: Yonny Ruby is a 61year old woman with an extremely complex surgery history including:    · Cholecystectomy surgery  · Emergency laparotomy July 2014 with sigmoid colon resection, colostomy due to Stercoral ulcer/perforation sigm kg)  09/09/21 : 154 lb (69.9 kg)  09/08/21 : 156 lb (70.8 kg)  09/07/21 : 156 lb (70.8 kg)  03/08/21 : 150 lb (68 kg)  03/01/21 : 140 lb (63.5 kg)  02/22/21 : 150 lb (68 kg)  02/01/21 : 154 lb (69.9 kg)  10/05/20 : 152 lb (68.9 kg)  10/01/20 : 145 lb (65.8 malignancy      ASSESSMENT/PLAN:   Radha McNairy a 61year old year-old female with history as detailed below:     #constipation  #bloating  #gassiness  #abd pain  She is here today for f/u.   She has on-going complaints of constipation and think blo cm and previously                       measuring 16.4 cm. PANCREAS:           Normal.   BILIARY:            No evidence for biliary dilatation.                       Postcholecystectomy.    ADRENALS:           Normal.   KIDNEYS:            Normal.   AORT sigmoid colon and rectum. Coexistent sigmoid colon      diverticula.  Small foci of gas along the posterior wall of the redundant      sigmoid colon may be secondary to focal sigmoid pneumatosis from      ischemia or less likely related to inflammatory isidro ====    8/01/2020    CT ABDOMEN + PELVIS (CONTRAST ONLY) (CPT=74177)       COMPARISON: [facility], CT ABDOMEN + PELVIS (CONTRAST ONLY) (CPT=74177), 7/22/2018, 11:32 AM.       INDICATIONS: R11.0 Nausea K59.00 Constipation, unspecified R1 spine similar to prior.  Multilevel degenerative disc and facet disease of the lower lumbar spine, similar to prior.  Negative for focal destructive bone lesion in the imaged volume. LUNG BASES: Mild scarring in the medial right lung base anteriorly. unchanged.     0.5 cm nodule abutting the right minor fissure (series 4, image 59) is unchanged and likely a benign inter fissural lymph node.  Calcified granuloma within the left lower lobe (series 4, image 84) has developed calcification in the interim. SURGEON:  Natasha Babinski, MD     ASSISTANT:  Radha Alvarez SA     DESCRIPTION OF PROCEDURE:  Under general endotracheal anesthesia   with the patient in supine position after all the routine precautions for   the performance of a case of this nature w feculent   abscess contained on the right side of the pelvis in between the right   adnexa and the rectosigmoid and the appendix.   The dissection was continued   carefully and all feculent contents were evacuated, irrigated and aspirated   from within the as much fluid   as possible from the most proximal colon. The orifice of the   appendectomy was closed in layers utilizing 2-0 chromic catgut and a sero-serosal   layer of 3-0 silk.      The distal colon was now brought as an end-colostomy in the left   lo staples, we wedged Telfa strips soaked in Betadine. The colostomy appliance was applied around the colon and the area of   the colostomy on the left lower quadrant. Appropriate dressings were   applied.         The patient was awakened and returned to t skin.  IV antibiotic was given and SCDs were placed for DVT prophylaxis.     The skin and subcutaneous tissue was infiltrated with 0.5% marcaine. An incision was made over the hernia from the epigastric area to the suprapubic area.   Two large incisional h procedure.        Rj Valderrama MD  ====================    ESOPHAGOGASTRODUODENOSCOPY (EGD) & COLONOSCOPY REPORT           Gwynda Spurling Miniscalcdedrick      1965 Age 63year old   PCP Sesar Garcia DO Endoscopist Devin Brito MD      Date of procedure:  folds. Photodocumentation was obtained. The bowel prep was good. Views of the colon were good with washing.  I then carefully withdrew the instrument from the patient who tolerated the procedure well.      Complications: None     EGD findings:       1. Esop · Preserved villous architecture.     B. Gastric biopsy:  · Gastric antral mucosa with reactive gastropathy and focal mild chronic inactive gastritis. · Negative for intestinal metaplasia or dysplasia.   · Diff-Quik stain (with appropriate control reacti woman with an extremely complex surgery history including:    · Cholecystectomy surgery  · Emergency laparotomy July 2014 with sigmoid colon resection, colostomy due to Stercoral ulcer/perforation sigmoid colon  · At least 2 lysis of adhesion surgeries  · today.  · Rifaximin antibiotic previously discussed; prescribed 4/13/2021  · Inadequate relief on maximum dose Linzess, even with added MiraLAX.  We will try changing to Zelnorm, which would be an excellent treatment for this patient's extreme combination o

## 2021-11-22 ENCOUNTER — OFFICE VISIT (OUTPATIENT)
Dept: FAMILY MEDICINE CLINIC | Facility: CLINIC | Age: 56
End: 2021-11-22
Payer: COMMERCIAL

## 2021-11-22 VITALS
DIASTOLIC BLOOD PRESSURE: 72 MMHG | SYSTOLIC BLOOD PRESSURE: 139 MMHG | HEART RATE: 76 BPM | WEIGHT: 145 LBS | BODY MASS INDEX: 21.98 KG/M2 | HEIGHT: 68 IN

## 2021-11-22 DIAGNOSIS — M99.03 LUMBAR REGION SOMATIC DYSFUNCTION: ICD-10-CM

## 2021-11-22 DIAGNOSIS — M47.816 LUMBAR SPONDYLOSIS: Primary | ICD-10-CM

## 2021-11-22 DIAGNOSIS — E87.6 HYPOKALEMIA: ICD-10-CM

## 2021-11-22 PROCEDURE — 99214 OFFICE O/P EST MOD 30 MIN: CPT | Performed by: FAMILY MEDICINE

## 2021-11-22 PROCEDURE — 3078F DIAST BP <80 MM HG: CPT | Performed by: FAMILY MEDICINE

## 2021-11-22 PROCEDURE — 3075F SYST BP GE 130 - 139MM HG: CPT | Performed by: FAMILY MEDICINE

## 2021-11-22 PROCEDURE — 3008F BODY MASS INDEX DOCD: CPT | Performed by: FAMILY MEDICINE

## 2021-11-22 RX ORDER — TRAMADOL HYDROCHLORIDE 50 MG/1
50 TABLET ORAL EVERY 6 HOURS PRN
Qty: 90 TABLET | Refills: 1 | Status: SHIPPED | OUTPATIENT
Start: 2021-11-22 | End: 2021-12-21

## 2021-11-22 RX ORDER — POTASSIUM CHLORIDE 20 MEQ/1
TABLET, EXTENDED RELEASE ORAL
Qty: 90 TABLET | Refills: 1 | Status: SHIPPED | OUTPATIENT
Start: 2021-11-22

## 2021-11-22 NOTE — PROGRESS NOTES
Subjective:   Patient ID: Jv Cotton is a 61year old female. HPI  Patient presents with:  Back Pain: lower back pain   acute on chronic condition. Also, needs potassium supplement refill.      History/Other:   Review of Systems   Constitutiona mg by mouth daily. Allergies:No Known Allergies    Objective:   Physical Exam  Vitals reviewed. Musculoskeletal:      Lumbar back: Tenderness present. Normal range of motion.  Negative right straight leg raise test and negative left straight leg ewing

## 2021-11-26 ENCOUNTER — TELEPHONE (OUTPATIENT)
Dept: GASTROENTEROLOGY | Facility: CLINIC | Age: 56
End: 2021-11-26

## 2021-11-26 NOTE — TELEPHONE ENCOUNTER
PPD Team    Please assist with prior authorization of Zelnorm. Patient with IBS-C, history of multiple abdominal surgeries with adhesions complicating the issue more. She has already tried and failed highest dose of linzess and Miralax. Thank you    Our office is not allowed to utilize [email or web address] at this time. I tried to call the customer service number on the back of the patient's insurance card. Got a message stating that they are closed for the Thanksgiving holiday so I could not initiate the authorization today.

## 2021-12-02 ENCOUNTER — TELEPHONE (OUTPATIENT)
Dept: NEUROLOGY | Facility: CLINIC | Age: 56
End: 2021-12-02

## 2021-12-02 ENCOUNTER — OFFICE VISIT (OUTPATIENT)
Dept: PHYSICAL MEDICINE AND REHAB | Facility: CLINIC | Age: 56
End: 2021-12-02
Payer: COMMERCIAL

## 2021-12-02 VITALS
HEIGHT: 68 IN | OXYGEN SATURATION: 94 % | WEIGHT: 145 LBS | BODY MASS INDEX: 21.98 KG/M2 | DIASTOLIC BLOOD PRESSURE: 68 MMHG | HEART RATE: 69 BPM | SYSTOLIC BLOOD PRESSURE: 120 MMHG

## 2021-12-02 DIAGNOSIS — M47.816 LUMBAR FACET ARTHROPATHY: Primary | ICD-10-CM

## 2021-12-02 PROCEDURE — 3078F DIAST BP <80 MM HG: CPT | Performed by: PHYSICAL MEDICINE & REHABILITATION

## 2021-12-02 PROCEDURE — 99204 OFFICE O/P NEW MOD 45 MIN: CPT | Performed by: PHYSICAL MEDICINE & REHABILITATION

## 2021-12-02 PROCEDURE — 3074F SYST BP LT 130 MM HG: CPT | Performed by: PHYSICAL MEDICINE & REHABILITATION

## 2021-12-02 PROCEDURE — 3008F BODY MASS INDEX DOCD: CPT | Performed by: PHYSICAL MEDICINE & REHABILITATION

## 2021-12-02 NOTE — PATIENT INSTRUCTIONS
If you do not hear from us within 3 business days about scheduling the injection please call the clinic or send a message through [phone] and ask if the injection has been approved.

## 2021-12-02 NOTE — H&P
History and Physical    C/C: low back pain    HPI: This is a 50-year-old female with past medical history of IBS, MGUS, acid reflux, multiple colon surgeries who presents with subacute lower back pain.   Symptoms started approximately 2 months ago, she awok denies  Fever: denies  Weight Gain: denies  Weight Loss: denies   Cardiovascular  Chest Pain: denies  Irregular Heartbeat: denies   Respiratory  Painful Breathing: denies  Wheezing: denies   Gastrointestinal  Bowel Incontinence: denies  Heartburn: denies of corticosteroids, and is unable to take NSAIDs.   I recommend that she continue with the physical therapy and home exercise program, and we get her set up for bilateral L4-5, L5-S1 facet joint injections under fluoroscopy, with the use of IV conscious sed

## 2021-12-02 NOTE — TELEPHONE ENCOUNTER
Called [facility] BS for authorization of approval of Bilateral L4-5, L5-S1 facet joint injections under fluoroscopy cpt codes [id number], [id number], 34559. NOTE: EOSC or CFS, sometime before the end of the month. Talked to Maninder.  Authorization is not req

## 2021-12-03 NOTE — TELEPHONE ENCOUNTER
Patient has been scheduled for a Bilateral L4-5, L5-S1 facet joint injections   Under IVCS  on 12/27/21 at the [facility]. Medications and allergies reviewed.  Patient informed to hold aspirins, nsaids, blood thinners, multivitamins, phentermine, vitamin E and

## 2021-12-06 ENCOUNTER — MED REC SCAN ONLY (OUTPATIENT)
Dept: FAMILY MEDICINE CLINIC | Facility: CLINIC | Age: 56
End: 2021-12-06

## 2021-12-07 ENCOUNTER — MED REC SCAN ONLY (OUTPATIENT)
Dept: PHYSICAL MEDICINE AND REHAB | Facility: CLINIC | Age: 56
End: 2021-12-07

## 2021-12-08 NOTE — TELEPHONE ENCOUNTER
Received a denial of Zelnorm from [facility]     Denied because Zelnorm is non-formulary    In order for a non formulary drug to be covered:     All formulary drugs on any tier will be/have been ineffective or    All covered formulary drugs on any tier would not be as effective as the non formulary drugs    All formulary drugs would have an adverse effect    Letter sent to scanning    Dr Subhash Beltrán,    The office visit note from 11/19/21 is unresolved

## 2021-12-13 PROBLEM — K58.1 IRRITABLE BOWEL SYNDROME WITH CONSTIPATION: Status: ACTIVE | Noted: 2021-12-13

## 2021-12-13 RX ORDER — PLECANATIDE 3 MG/1
3 TABLET ORAL DAILY
Qty: 90 TABLET | Refills: 3 | Status: SHIPPED | OUTPATIENT
Start: 2021-12-13 | End: 2022-01-26

## 2021-12-13 NOTE — TELEPHONE ENCOUNTER
Office visit of 11/19/2021 signed off, finalized. Ms. Alexandria Guerra has trialed and failed MiraLAX, Linzess 290 mcg + MiraLAX, and the fiber products. I would like to try Zelnorm next. Denial letter not yet scanned. Solo Ny do you know what other medications we need to try and fail before Zelnorm will be covered?

## 2021-12-13 NOTE — TELEPHONE ENCOUNTER
I called and spoke to Carmelo @ [address] [phone]    I did the appeal over the phone.     We should get a response in 24-72 hours    Pending Auth # [id number]

## 2021-12-13 NOTE — TELEPHONE ENCOUNTER
I received a letter from [address] [place] [address]    They received and reviewed an exception request for a non formulary prescription drug for Zelnorm 6 mg tablets nd that request was denied. All of the formulary medications have to be tried and failed    Pt has already tried Linzess.     She now has to try both Trulance and lubiprostone

## 2021-12-14 NOTE — TELEPHONE ENCOUNTER
Dr Jenna Teixeira,    I spoke to [facility] and explained the situation to her regarding her insurance and how she has to try these formulary medications first.    She will keep in close contact with me and let me know how it is going    She will pick the Trulance up and give it a try.

## 2021-12-14 NOTE — TELEPHONE ENCOUNTER
Noted. Let's play the Game and waste some money. Please call and advise Ms. Hernadez that I have sent in prescription for Trulance to [facility]. Please have her call us back in 2-3 weeks with update. If Trulance does not work, we move on to Bill Kasper which will not work and then we can try again for Zelnorm.

## 2021-12-21 NOTE — TELEPHONE ENCOUNTER
Please review. Protocol failed / No protocol. Requested Prescriptions   Pending Prescriptions Disp Refills    traMADol 50 MG Oral Tab 90 tablet 1     Sig: Take 1 tablet (50 mg total) by mouth every 6 (six) hours as needed for Pain.         There is no re

## 2021-12-22 RX ORDER — LINACLOTIDE 290 UG/1
CAPSULE, GELATIN COATED ORAL
Qty: 30 CAPSULE | Refills: 0 | Status: SHIPPED | OUTPATIENT
Start: 2021-12-22 | End: 2022-01-24

## 2021-12-22 NOTE — TELEPHONE ENCOUNTER
Requested Prescriptions     Pending Prescriptions Disp Refills   • LINZESS 290 MCG Oral Cap [Pharmacy Med Name: Linzess 290 Mcg Cap [place]] 30 capsule 0     Sig: TAKE ONE CAPSULE BY MOUTH IN THE MORNING WITH BREAKFAST        LOV   9/8/2021    LR  11/2/2021

## 2021-12-24 RX ORDER — TRAMADOL HYDROCHLORIDE 50 MG/1
50 TABLET ORAL EVERY 6 HOURS PRN
Qty: 90 TABLET | Refills: 1 | Status: SHIPPED | OUTPATIENT
Start: 2021-12-24 | End: 2022-02-07

## 2021-12-27 ENCOUNTER — OFFICE VISIT (OUTPATIENT)
Dept: SURGERY | Facility: CLINIC | Age: 56
End: 2021-12-27

## 2021-12-27 ENCOUNTER — TELEPHONE (OUTPATIENT)
Dept: GASTROENTEROLOGY | Facility: CLINIC | Age: 56
End: 2021-12-27

## 2021-12-27 DIAGNOSIS — M47.816 LUMBAR FACET ARTHROPATHY: Primary | ICD-10-CM

## 2021-12-27 PROCEDURE — 64493 INJ PARAVERT F JNT L/S 1 LEV: CPT | Performed by: PHYSICAL MEDICINE & REHABILITATION

## 2021-12-27 PROCEDURE — 99152 MOD SED SAME PHYS/QHP 5/>YRS: CPT | Performed by: PHYSICAL MEDICINE & REHABILITATION

## 2021-12-27 PROCEDURE — 64494 INJ PARAVERT F JNT L/S 2 LEV: CPT | Performed by: PHYSICAL MEDICINE & REHABILITATION

## 2021-12-27 NOTE — PROCEDURES
[facility] Z-JOINT/FACET INJECTIONS  NAME:  Naheed Mane    MR #:    [id number] :  1965     PHYSICIAN:  Jorge L Kendall DO        Operative Report    DATE OF PROCEDURE: 2021   PREOPERATIVE DIAGNOSES: 1.  Brisa Lui scheduled. Throughout the whole procedure, the patient's pulse oximetry and vital signs were monitored and they remained completely stable. Also, throughout the whole procedure, prior to injection of any medication, aspiration was performed.   No blood, f

## 2021-12-29 RX ORDER — LUBIPROSTONE 24 UG/1
24 CAPSULE ORAL 2 TIMES DAILY WITH MEALS
Qty: 60 CAPSULE | Refills: 11 | Status: SHIPPED | OUTPATIENT
Start: 2021-12-29 | End: 2022-01-28

## 2021-12-29 NOTE — TELEPHONE ENCOUNTER
Dr Gisela Rodgers,    I spoke to [facility]. The Trulance did not work    Can you please send in the lubiprostone (amitiza)?

## 2022-01-11 ENCOUNTER — TELEPHONE (OUTPATIENT)
Dept: NEUROLOGY | Facility: CLINIC | Age: 57
End: 2022-01-11

## 2022-01-11 ENCOUNTER — OFFICE VISIT (OUTPATIENT)
Dept: PHYSICAL MEDICINE AND REHAB | Facility: CLINIC | Age: 57
End: 2022-01-11
Payer: COMMERCIAL

## 2022-01-11 VITALS
OXYGEN SATURATION: 95 % | WEIGHT: 145 LBS | HEART RATE: 70 BPM | SYSTOLIC BLOOD PRESSURE: 120 MMHG | DIASTOLIC BLOOD PRESSURE: 70 MMHG | BODY MASS INDEX: 21.98 KG/M2 | HEIGHT: 68 IN

## 2022-01-11 DIAGNOSIS — M47.816 LUMBAR FACET ARTHROPATHY: Primary | ICD-10-CM

## 2022-01-11 PROCEDURE — 3074F SYST BP LT 130 MM HG: CPT | Performed by: PHYSICAL MEDICINE & REHABILITATION

## 2022-01-11 PROCEDURE — 99214 OFFICE O/P EST MOD 30 MIN: CPT | Performed by: PHYSICAL MEDICINE & REHABILITATION

## 2022-01-11 PROCEDURE — 3008F BODY MASS INDEX DOCD: CPT | Performed by: PHYSICAL MEDICINE & REHABILITATION

## 2022-01-11 PROCEDURE — 3078F DIAST BP <80 MM HG: CPT | Performed by: PHYSICAL MEDICINE & REHABILITATION

## 2022-01-11 RX ORDER — DIAZEPAM 10 MG/1
TABLET ORAL
Qty: 2 TABLET | Refills: 0 | Status: SHIPPED | OUTPATIENT
Start: 2022-01-11

## 2022-01-11 NOTE — TELEPHONE ENCOUNTER
Called [facility] BS for authorization of MRI SPINE LUMBAR (W+WO) (CPT=72158). Talked to Yesenia Solorzano. Authorization is not required. Reference #  [id number]  Pt. informed.

## 2022-01-11 NOTE — PROGRESS NOTES
Progress note    C/C: low back pain    HPI: 53-year-old female presents for follow-up. Underwent bilateral L4-L5, 5 S1 facet joint injections under fluoroscopy, IV conscious sedation for situational anxiety, on 12/27/2021.  She reports 30% relief of lower temazepam for years I prescribed her a higher dose of valium for claustrophobia. She was instructed to have a  while taking the medication. Follow up after MRI is done.     30 minutes spent precharting, conducting H&P, discussing treatment options

## 2022-01-24 NOTE — TELEPHONE ENCOUNTER
Requested Prescriptions     Pending Prescriptions Disp Refills   • linaCLOtide (LINZESS) 290 MCG Oral Cap 30 capsule 0     Sig: Take 1 capsule (290 mcg total) by mouth daily with breakfast.        LOV  9/8/2021    LR  12/22/2021    sb

## 2022-01-26 NOTE — TELEPHONE ENCOUNTER
Teo Shall-    Followed up with patient regarding MyChart below. Patient states per last office visit 02/01/2021, no change in symptoms previously reported (nasuea and stomach bloating still persisting).      Has been following low FODMAP diet, which has Calcipotriene Counseling:  I discussed with the patient the risks of calcipotriene including but not limited to erythema, scaling, itching, and irritation.

## 2022-01-31 ENCOUNTER — HOSPITAL ENCOUNTER (OUTPATIENT)
Dept: MRI IMAGING | Age: 57
Discharge: HOME | End: 2022-01-31
Attending: PHYSICAL MEDICINE & REHABILITATION
Payer: COMMERCIAL

## 2022-01-31 DIAGNOSIS — M47.816 LUMBAR FACET ARTHROPATHY: ICD-10-CM

## 2022-01-31 LAB — CREAT BLD-MCNC: 1 MG/DL

## 2022-01-31 PROCEDURE — 82565 ASSAY OF CREATININE: CPT

## 2022-01-31 PROCEDURE — 72158 MRI LUMBAR SPINE W/O & W/DYE: CPT | Performed by: PHYSICAL MEDICINE & REHABILITATION

## 2022-01-31 PROCEDURE — A9575 INJ GADOTERATE MEGLUMI 0.1ML: HCPCS | Performed by: PHYSICAL MEDICINE & REHABILITATION

## 2022-02-01 ENCOUNTER — TELEPHONE (OUTPATIENT)
Dept: NEUROLOGY | Facility: CLINIC | Age: 57
End: 2022-02-01

## 2022-02-01 NOTE — TELEPHONE ENCOUNTER
----- Message from Maria Da Silva sent at 2/1/2022  8:42 AM CST -----    ----- Message -----  From: Tamiko Carolina DO  Sent: 2/1/2022   8:42 AM CST  To: Tyler Cannon Nurse    MRI reviewed; mild osteoarthritic changes of the joints of the lower back, small left sided disc herniation/slipped disc in one of the levels of the lower back. Please have her follow up for re-evaluation.

## 2022-02-03 ENCOUNTER — OFFICE VISIT (OUTPATIENT)
Dept: PHYSICAL MEDICINE AND REHAB | Facility: CLINIC | Age: 57
End: 2022-02-03
Payer: COMMERCIAL

## 2022-02-03 VITALS — SYSTOLIC BLOOD PRESSURE: 128 MMHG | DIASTOLIC BLOOD PRESSURE: 80 MMHG | OXYGEN SATURATION: 98 % | HEART RATE: 73 BPM

## 2022-02-03 DIAGNOSIS — R51.9 CHRONIC RIGHT-SIDED HEADACHES: ICD-10-CM

## 2022-02-03 DIAGNOSIS — M47.816 LUMBAR FACET ARTHROPATHY: Primary | ICD-10-CM

## 2022-02-03 DIAGNOSIS — G50.0 TRIGEMINAL NEURALGIA OF RIGHT SIDE OF FACE: ICD-10-CM

## 2022-02-03 DIAGNOSIS — G89.29 CHRONIC RIGHT-SIDED HEADACHES: ICD-10-CM

## 2022-02-03 PROCEDURE — 99214 OFFICE O/P EST MOD 30 MIN: CPT | Performed by: PHYSICAL MEDICINE & REHABILITATION

## 2022-02-03 PROCEDURE — 3074F SYST BP LT 130 MM HG: CPT | Performed by: PHYSICAL MEDICINE & REHABILITATION

## 2022-02-03 PROCEDURE — 3079F DIAST BP 80-89 MM HG: CPT | Performed by: PHYSICAL MEDICINE & REHABILITATION

## 2022-02-03 NOTE — PATIENT INSTRUCTIONS
I ordered acupuncture. The other treatment options are upper lumbar facet joint injections, or a trial of duloxetine. If we were to do a trial of the duloxetine you would have to stop the tramadol.

## 2022-02-07 ENCOUNTER — OFFICE VISIT (OUTPATIENT)
Dept: FAMILY MEDICINE CLINIC | Facility: CLINIC | Age: 57
End: 2022-02-07
Payer: COMMERCIAL

## 2022-02-07 VITALS
HEIGHT: 68 IN | BODY MASS INDEX: 21.98 KG/M2 | SYSTOLIC BLOOD PRESSURE: 120 MMHG | HEART RATE: 71 BPM | DIASTOLIC BLOOD PRESSURE: 79 MMHG | WEIGHT: 145 LBS

## 2022-02-07 DIAGNOSIS — R16.1 SPLEEN ENLARGEMENT: ICD-10-CM

## 2022-02-07 DIAGNOSIS — M47.816 LUMBAR SPONDYLOSIS: Primary | ICD-10-CM

## 2022-02-07 PROCEDURE — 99214 OFFICE O/P EST MOD 30 MIN: CPT | Performed by: FAMILY MEDICINE

## 2022-02-07 PROCEDURE — 3074F SYST BP LT 130 MM HG: CPT | Performed by: FAMILY MEDICINE

## 2022-02-07 PROCEDURE — 3078F DIAST BP <80 MM HG: CPT | Performed by: FAMILY MEDICINE

## 2022-02-07 PROCEDURE — 3008F BODY MASS INDEX DOCD: CPT | Performed by: FAMILY MEDICINE

## 2022-02-07 RX ORDER — TRAMADOL HYDROCHLORIDE 50 MG/1
50 TABLET ORAL EVERY 6 HOURS PRN
Qty: 90 TABLET | Refills: 3 | Status: SHIPPED | OUTPATIENT
Start: 2022-02-07

## 2022-02-15 ENCOUNTER — OFFICE VISIT (OUTPATIENT)
Dept: SURGERY | Facility: CLINIC | Age: 57
End: 2022-02-15
Payer: COMMERCIAL

## 2022-02-15 DIAGNOSIS — R16.1 SPLENOMEGALY: Primary | ICD-10-CM

## 2022-02-15 PROCEDURE — 99212 OFFICE O/P EST SF 10 MIN: CPT | Performed by: SURGERY

## 2022-02-15 NOTE — PROGRESS NOTES
Patient presents with: Follow - Up: Pt here to discuss enlarged spleen. VSS  Gen:  NAD  Abd:  Soft, NTND, midline incision healed. No evidence of incisional hernia. AP:  Asymptomatic splenomegaly. Etiology of splenomegaly unclear. Pt continues to f/u with Dr. Anderson Stauffer months. No indication for splenectomy at this time therefore continue observation.

## 2022-02-22 ENCOUNTER — TELEPHONE (OUTPATIENT)
Dept: PHYSICAL MEDICINE AND REHAB | Facility: CLINIC | Age: 57
End: 2022-02-22

## 2022-02-22 NOTE — TELEPHONE ENCOUNTER
Referral for Acupuncture was not worked when placed 2/3/22    Initiated authorization for Acupuncture CPT [id number] with Lisa Araujo at . [address]  Case #[id number].   Coverage is active    Status: DENIED-Acupuncture is NOT A COVERED BENEFIT on the patients plan    Patient notified via [employer]

## 2022-02-25 ENCOUNTER — TELEPHONE (OUTPATIENT)
Dept: FAMILY MEDICINE CLINIC | Facility: CLINIC | Age: 57
End: 2022-02-25

## 2022-02-25 NOTE — TELEPHONE ENCOUNTER
RN Sent [employer] Message to confirm that medication has been discontinued. Patient Review of Clinical Information    Problems   The patient or proxy has not reviewed this information. Medications   The patient or proxy has not reviewed this information, and there are updates pending:   Requested Medication Removals Start Date Reported By  Comments   diazePAM 10 MG Tabs 1/11/2022 Tsering Washingtonscalco      Allergies   The patient or proxy has not reviewed this information.

## 2022-03-01 RX ORDER — LINACLOTIDE 290 UG/1
CAPSULE, GELATIN COATED ORAL
Qty: 30 CAPSULE | Refills: 0 | Status: SHIPPED | OUTPATIENT
Start: 2022-03-01 | End: 2022-03-30

## 2022-03-05 ENCOUNTER — LAB ENCOUNTER (OUTPATIENT)
Dept: LAB | Facility: HOSPITAL | Age: 57
End: 2022-03-05
Attending: INTERNAL MEDICINE
Payer: COMMERCIAL

## 2022-03-05 DIAGNOSIS — D47.2 MGUS (MONOCLONAL GAMMOPATHY OF UNKNOWN SIGNIFICANCE): ICD-10-CM

## 2022-03-05 LAB
ALBUMIN SERPL-MCNC: 4.1 G/DL (ref 3.4–5)
ALBUMIN/GLOB SERPL: 1.4 {RATIO} (ref 1–2)
ALP LIVER SERPL-CCNC: 88 U/L
ALT SERPL-CCNC: 19 U/L
ANION GAP SERPL CALC-SCNC: 4 MMOL/L (ref 0–18)
AST SERPL-CCNC: 16 U/L (ref 15–37)
BASOPHILS NFR BLD AUTO: 0.4 %
BILIRUB SERPL-MCNC: 0.5 MG/DL (ref 0.1–2)
BUN BLD-MCNC: 11 MG/DL (ref 7–18)
BUN/CREAT SERPL: 9.3 (ref 10–20)
CALCIUM BLD-MCNC: 9.7 MG/DL (ref 8.5–10.1)
CHLORIDE SERPL-SCNC: 100 MMOL/L (ref 98–112)
CO2 SERPL-SCNC: 31 MMOL/L (ref 21–32)
CREAT BLD-MCNC: 1.18 MG/DL
DEPRECATED RDW RBC AUTO: 41 FL (ref 35.1–46.3)
EOSINOPHIL NFR BLD AUTO: 1 %
ERYTHROCYTE [DISTWIDTH] IN BLOOD BY AUTOMATED COUNT: 12.7 % (ref 11–15)
FASTING STATUS PATIENT QL REPORTED: YES
GLOBULIN PLAS-MCNC: 2.9 G/DL (ref 2.8–4.4)
GLUCOSE BLD-MCNC: 112 MG/DL (ref 70–99)
HCT VFR BLD AUTO: 37.7 %
HGB BLD-MCNC: 12.6 G/DL
IGA SERPL-MCNC: 35.6 MG/DL (ref 70–312)
IGM SERPL-MCNC: 908 MG/DL (ref 43–279)
IMM GRANULOCYTES # BLD AUTO: 0.01 X10(3) UL (ref 0–1)
IMM GRANULOCYTES NFR BLD: 0.2 %
IMMUNOGLOBULIN PNL SER-MCNC: 482 MG/DL (ref 791–1643)
LYMPHOCYTES # BLD AUTO: 2.31 X10(3) UL (ref 1–4)
LYMPHOCYTES NFR BLD AUTO: 45.5 %
MCH RBC QN AUTO: 29.5 PG (ref 26–34)
MCHC RBC AUTO-ENTMCNC: 33.4 G/DL (ref 31–37)
MCV RBC AUTO: 88.3 FL
MONOCYTES # BLD AUTO: 0.28 X10(3) UL (ref 0.1–1)
MONOCYTES NFR BLD AUTO: 5.5 %
NEUTROPHILS # BLD AUTO: 2.41 X10 (3) UL (ref 1.5–7.7)
NEUTROPHILS # BLD AUTO: 2.41 X10(3) UL (ref 1.5–7.7)
NEUTROPHILS NFR BLD AUTO: 47.4 %
OSMOLALITY SERPL CALC.SUM OF ELEC: 280 MOSM/KG (ref 275–295)
POTASSIUM SERPL-SCNC: 3.8 MMOL/L (ref 3.5–5.1)
PROT SERPL-MCNC: 7 G/DL (ref 6.4–8.2)
PROT UR-MCNC: 18.1 MG/DL
RBC # BLD AUTO: 4.27 X10(6)UL
SODIUM SERPL-SCNC: 135 MMOL/L (ref 136–145)
WBC # BLD AUTO: 5.1 X10(3) UL (ref 4–11)

## 2022-03-05 PROCEDURE — 86334 IMMUNOFIX E-PHORESIS SERUM: CPT

## 2022-03-05 PROCEDURE — 80053 COMPREHEN METABOLIC PANEL: CPT

## 2022-03-05 PROCEDURE — 83521 IG LIGHT CHAINS FREE EACH: CPT

## 2022-03-05 PROCEDURE — 84165 PROTEIN E-PHORESIS SERUM: CPT

## 2022-03-05 PROCEDURE — 36415 COLL VENOUS BLD VENIPUNCTURE: CPT

## 2022-03-05 PROCEDURE — 85025 COMPLETE CBC W/AUTO DIFF WBC: CPT

## 2022-03-05 PROCEDURE — 82784 ASSAY IGA/IGD/IGG/IGM EACH: CPT

## 2022-03-05 PROCEDURE — 86335 IMMUNFIX E-PHORSIS/URINE/CSF: CPT

## 2022-03-05 PROCEDURE — 84166 PROTEIN E-PHORESIS/URINE/CSF: CPT

## 2022-03-07 LAB
ALBUMIN SERPL ELPH-MCNC: 4.31 G/DL (ref 3.75–5.21)
ALBUMIN/GLOB SERPL: 1.6 {RATIO} (ref 1–2)
ALPHA1 GLOB SERPL ELPH-MCNC: 0.34 G/DL (ref 0.19–0.46)
ALPHA2 GLOB SERPL ELPH-MCNC: 0.71 G/DL (ref 0.48–1.05)
B-GLOBULIN SERPL ELPH-MCNC: 0.69 G/DL (ref 0.68–1.23)
KAPPA LC FREE SER-MCNC: 2.82 MG/DL (ref 0.33–1.94)
KAPPA LC FREE/LAMBDA FREE SER NEPH: 1.07 {RATIO} (ref 0.26–1.65)
LAMBDA LC FREE SERPL-MCNC: 2.65 MG/DL (ref 0.57–2.63)
PROT SERPL-MCNC: 7 G/DL (ref 6.4–8.2)

## 2022-03-10 ENCOUNTER — TELEPHONE (OUTPATIENT)
Dept: HEMATOLOGY/ONCOLOGY | Facility: HOSPITAL | Age: 57
End: 2022-03-10

## 2022-03-17 ENCOUNTER — OFFICE VISIT (OUTPATIENT)
Dept: HEMATOLOGY/ONCOLOGY | Facility: HOSPITAL | Age: 57
End: 2022-03-17
Attending: INTERNAL MEDICINE
Payer: COMMERCIAL

## 2022-03-17 VITALS
HEART RATE: 90 BPM | HEIGHT: 68 IN | WEIGHT: 143 LBS | SYSTOLIC BLOOD PRESSURE: 130 MMHG | BODY MASS INDEX: 21.67 KG/M2 | DIASTOLIC BLOOD PRESSURE: 75 MMHG | TEMPERATURE: 98 F | RESPIRATION RATE: 16 BRPM | OXYGEN SATURATION: 98 %

## 2022-03-17 DIAGNOSIS — D47.2 IGM LAMBDA MONOCLONAL GAMMOPATHY: Primary | ICD-10-CM

## 2022-03-17 DIAGNOSIS — R91.1 PULMONARY NODULE: ICD-10-CM

## 2022-03-17 PROCEDURE — 99214 OFFICE O/P EST MOD 30 MIN: CPT | Performed by: INTERNAL MEDICINE

## 2022-03-20 RX ORDER — ONDANSETRON 4 MG/1
4 TABLET, ORALLY DISINTEGRATING ORAL EVERY 8 HOURS PRN
Qty: 60 TABLET | Refills: 5 | Status: SHIPPED | OUTPATIENT
Start: 2022-03-20 | End: 2022-07-08

## 2022-03-20 RX ORDER — ONDANSETRON 4 MG/1
4 TABLET, ORALLY DISINTEGRATING ORAL EVERY 8 HOURS PRN
Qty: 60 TABLET | Refills: 5 | OUTPATIENT
Start: 2022-03-20

## 2022-03-22 RX ORDER — TEMAZEPAM 15 MG/1
15 CAPSULE ORAL NIGHTLY PRN
Qty: 90 CAPSULE | Refills: 1 | OUTPATIENT
Start: 2022-03-22

## 2022-03-22 RX ORDER — TEMAZEPAM 15 MG/1
15 CAPSULE ORAL NIGHTLY PRN
Qty: 30 CAPSULE | Refills: 1 | Status: SHIPPED | OUTPATIENT
Start: 2022-03-22

## 2022-03-22 NOTE — TELEPHONE ENCOUNTER
Please review refill protocol failed/ no protocol  Requested Prescriptions   Pending Prescriptions Disp Refills    TEMAZEPAM 15 MG Oral Cap [Pharmacy Med Name: Temazepam 15 Mg Cap Spec] 30 capsule 0     Sig: TAKE 1 CAPSULE BY MOUTH EVERY NIGHT AT BEDTIME AS NEEDED SLEEP        There is no refill protocol information for this order

## 2022-03-22 NOTE — TELEPHONE ENCOUNTER
Please review refill protocol failed/ no protocol  Requested Prescriptions   Pending Prescriptions Disp Refills    temazepam 15 MG Oral Cap 30 capsule 5     Sig: Take 1 capsule (15 mg total) by mouth nightly as needed for Sleep.         There is no refill protocol information for this order

## 2022-03-30 RX ORDER — PANTOPRAZOLE SODIUM 40 MG/1
TABLET, DELAYED RELEASE ORAL
Qty: 30 TABLET | Refills: 0 | OUTPATIENT
Start: 2022-03-30

## 2022-04-26 ENCOUNTER — OFFICE VISIT (OUTPATIENT)
Dept: FAMILY MEDICINE CLINIC | Facility: CLINIC | Age: 57
End: 2022-04-26
Payer: COMMERCIAL

## 2022-04-26 VITALS
BODY MASS INDEX: 21.98 KG/M2 | HEART RATE: 85 BPM | HEIGHT: 68 IN | WEIGHT: 145 LBS | DIASTOLIC BLOOD PRESSURE: 72 MMHG | SYSTOLIC BLOOD PRESSURE: 121 MMHG

## 2022-04-26 DIAGNOSIS — M47.816 LUMBAR SPONDYLOSIS: Primary | ICD-10-CM

## 2022-04-26 PROCEDURE — 3078F DIAST BP <80 MM HG: CPT | Performed by: FAMILY MEDICINE

## 2022-04-26 PROCEDURE — 3074F SYST BP LT 130 MM HG: CPT | Performed by: FAMILY MEDICINE

## 2022-04-26 PROCEDURE — 3008F BODY MASS INDEX DOCD: CPT | Performed by: FAMILY MEDICINE

## 2022-04-26 PROCEDURE — 99214 OFFICE O/P EST MOD 30 MIN: CPT | Performed by: FAMILY MEDICINE

## 2022-04-26 RX ORDER — TRAMADOL HYDROCHLORIDE 50 MG/1
50 TABLET ORAL EVERY 6 HOURS PRN
Qty: 90 TABLET | Refills: 3 | Status: SHIPPED | OUTPATIENT
Start: 2022-04-26

## 2022-05-14 NOTE — TELEPHONE ENCOUNTER
Please review; protocol failed/no protocol. Requested Prescriptions   Pending Prescriptions Disp Refills    temazepam 15 MG Oral Cap 30 capsule 1     Sig: Take 1 capsule (15 mg total) by mouth nightly as needed for Sleep. There is no refill protocol information for this order            Recent Outpatient Visits              2 weeks ago Lumbar spondylosis    [address] [facility] [place], [address] [place], [place]    Office Visit    1 month ago IgM lambda monoclonal gammopathy    [facility] Hematology Oncology Pham Huffman MD    Office Visit    2 months ago Splenomegaly    [facility] Romario Salcido MD    Office Visit    3 months ago Lumbar spondylosis    [address] [facility], [address] [place], [place]    Office Visit    3 months ago Lumbar facet arthropathy    [facility] Zelda Evans DO    Office Visit             Future Appointments         Provider Department Appt Notes    In 4 months Pham Huffman MD [facility] Hematology Oncology FOLLOW UP VISIT. CL  6M

## 2022-05-17 RX ORDER — TEMAZEPAM 15 MG/1
CAPSULE ORAL
Qty: 30 CAPSULE | Refills: 0 | OUTPATIENT
Start: 2022-05-17

## 2022-05-17 RX ORDER — TEMAZEPAM 15 MG/1
15 CAPSULE ORAL NIGHTLY PRN
Qty: 90 CAPSULE | Refills: 1 | Status: SHIPPED | OUTPATIENT
Start: 2022-05-17

## 2022-05-17 RX ORDER — POTASSIUM CHLORIDE 20 MEQ/1
TABLET, EXTENDED RELEASE ORAL
Qty: 90 TABLET | Refills: 0 | Status: SHIPPED | OUTPATIENT
Start: 2022-05-17

## 2022-05-17 NOTE — TELEPHONE ENCOUNTER
Please review. Protocol Failed or has No Protocol. Requested Prescriptions   Pending Prescriptions Disp Refills    POTASSIUM CHLORIDE 20 MEQ Oral Tab CR [Pharmacy Med Name: Potassium Chloride Er 20 Meq Tab Amne] 90 tablet 0     Sig: TAKE ONE TABLET BY MOUTH ONE TIME DAILY as needed with furosemide        There is no refill protocol information for this order           Recent Outpatient Visits              3 weeks ago Lumbar spondylosis    [facility] [place], [place]    Office Visit    2 months ago IgM lambda monoclonal gammopathy    [facility] Hematology Oncology Osman Garcia MD    Office Visit    3 months ago Splenomegaly    [facility] Larissa Freeman MD    Office Visit    3 months ago Lumbar spondylosis    [facility], [address] [place], [place]    Office Visit    3 months ago Lumbar facet arthropathy    [address] [facility] Coy Morales DO    Office Visit            Future Appointments         Provider Department Appt Notes    In 4 months Osman Garcia MD [facility] Hematology Oncology FOLLOW UP VISIT. CL  6M

## 2022-06-10 RX ORDER — FUROSEMIDE 20 MG/1
TABLET ORAL
Qty: 90 TABLET | Refills: 1 | Status: SHIPPED | OUTPATIENT
Start: 2022-06-10

## 2022-06-10 NOTE — TELEPHONE ENCOUNTER
Refill passed per [facility] protocol. Requested Prescriptions   Pending Prescriptions Disp Refills    furosemide 20 MG Oral Tab 90 tablet 1     Sig: TAKE ONE TABLET BY MOUTH DAILY AS NEEDED        Hypertensive Medications Protocol Passed - 6/9/2022  5:09 PM        Passed - CMP or BMP in past 12 months        Passed - Appointment in past 6 or next 3 months        Passed - GFR Non- > 50     Lab Results   Component Value Date    GFRNAA 51 (L) 03/05/2022                        Recent Outpatient Visits              1 month ago Lumbar spondylosis    [facility], [address] [place] [address]    Office Visit    2 months ago IgM lambda monoclonal gammopathy    [facility] Hematology Oncology Nicola Kinney MD    Office Visit    3 months ago Splenomegaly    [facility] Leticia Tobar MD    Office Visit    4 months ago Lumbar spondylosis    [facility], [address] [place], [place]    Office Visit    4 months ago Lumbar facet arthropathy    [facility] Mino Boswell DO    Office Visit            Future Appointments         Provider Department Appt Notes    In 3 months Nicola Kinney MD [facility] AND [facility] Hematology Oncology FOLLOW UP VISIT. CL  6M

## 2022-07-10 RX ORDER — ONDANSETRON 4 MG/1
4 TABLET, ORALLY DISINTEGRATING ORAL EVERY 8 HOURS PRN
Qty: 60 TABLET | Refills: 5 | Status: SHIPPED | OUTPATIENT
Start: 2022-07-10

## 2022-07-14 ENCOUNTER — OFFICE VISIT (OUTPATIENT)
Dept: FAMILY MEDICINE CLINIC | Facility: CLINIC | Age: 57
End: 2022-07-14
Payer: COMMERCIAL

## 2022-07-14 VITALS
HEART RATE: 86 BPM | WEIGHT: 142 LBS | DIASTOLIC BLOOD PRESSURE: 71 MMHG | HEIGHT: 68 IN | BODY MASS INDEX: 21.52 KG/M2 | SYSTOLIC BLOOD PRESSURE: 127 MMHG

## 2022-07-14 DIAGNOSIS — M99.03 LUMBAR REGION SOMATIC DYSFUNCTION: ICD-10-CM

## 2022-07-14 DIAGNOSIS — M47.816 LUMBAR SPONDYLOSIS: Primary | ICD-10-CM

## 2022-07-14 PROCEDURE — 3008F BODY MASS INDEX DOCD: CPT | Performed by: FAMILY MEDICINE

## 2022-07-14 PROCEDURE — 99214 OFFICE O/P EST MOD 30 MIN: CPT | Performed by: FAMILY MEDICINE

## 2022-07-14 PROCEDURE — 3074F SYST BP LT 130 MM HG: CPT | Performed by: FAMILY MEDICINE

## 2022-07-14 PROCEDURE — 3078F DIAST BP <80 MM HG: CPT | Performed by: FAMILY MEDICINE

## 2022-07-14 RX ORDER — BACLOFEN 10 MG/1
10 TABLET ORAL NIGHTLY PRN
Qty: 21 TABLET | Refills: 1 | Status: SHIPPED | OUTPATIENT
Start: 2022-07-14

## 2022-07-14 RX ORDER — TORSEMIDE 20 MG/1
20 TABLET ORAL DAILY
COMMUNITY
Start: 2022-06-20

## 2022-07-14 RX ORDER — TRAMADOL HYDROCHLORIDE 50 MG/1
50 TABLET ORAL EVERY 6 HOURS PRN
Qty: 90 TABLET | Refills: 3 | Status: SHIPPED | OUTPATIENT
Start: 2022-07-14

## 2022-08-10 RX ORDER — POTASSIUM CHLORIDE 20 MEQ/1
TABLET, EXTENDED RELEASE ORAL
Qty: 90 TABLET | Refills: 0 | Status: SHIPPED | OUTPATIENT
Start: 2022-08-10 | End: 2022-12-02

## 2022-08-13 ENCOUNTER — WALK IN (OUTPATIENT)
Dept: URGENT CARE | Age: 57
End: 2022-08-13

## 2022-08-13 VITALS
DIASTOLIC BLOOD PRESSURE: 72 MMHG | RESPIRATION RATE: 17 BRPM | HEART RATE: 79 BPM | WEIGHT: 140 LBS | BODY MASS INDEX: 21.22 KG/M2 | SYSTOLIC BLOOD PRESSURE: 122 MMHG | HEIGHT: 68 IN | OXYGEN SATURATION: 98 % | TEMPERATURE: 97 F

## 2022-08-13 DIAGNOSIS — J01.90 ACUTE BACTERIAL RHINOSINUSITIS: Primary | ICD-10-CM

## 2022-08-13 DIAGNOSIS — B96.89 ACUTE BACTERIAL RHINOSINUSITIS: Primary | ICD-10-CM

## 2022-08-13 PROBLEM — D17.21 LIPOMA OF RIGHT UPPER EXTREMITY: Status: ACTIVE | Noted: 2020-08-25

## 2022-08-13 PROBLEM — K43.0 INCISIONAL HERNIA WITH OBSTRUCTION BUT NO GANGRENE: Status: ACTIVE | Noted: 2018-06-12

## 2022-08-13 PROBLEM — R16.1 SPLENOMEGALY: Status: ACTIVE | Noted: 2022-02-15

## 2022-08-13 PROBLEM — I83.813 VARICOSE VEINS OF BOTH LOWER EXTREMITIES WITH PAIN: Status: ACTIVE | Noted: 2020-07-23

## 2022-08-13 PROBLEM — K58.1 IRRITABLE BOWEL SYNDROME WITH CONSTIPATION: Status: ACTIVE | Noted: 2021-12-13

## 2022-08-13 PROCEDURE — 3074F SYST BP LT 130 MM HG: CPT | Performed by: NURSE PRACTITIONER

## 2022-08-13 PROCEDURE — 3078F DIAST BP <80 MM HG: CPT | Performed by: NURSE PRACTITIONER

## 2022-08-13 PROCEDURE — 99203 OFFICE O/P NEW LOW 30 MIN: CPT | Performed by: NURSE PRACTITIONER

## 2022-08-13 RX ORDER — LINACLOTIDE 290 UG/1
CAPSULE, GELATIN COATED ORAL
COMMUNITY
Start: 2022-08-03

## 2022-08-13 RX ORDER — BACLOFEN 10 MG/1
10 TABLET ORAL
COMMUNITY
Start: 2022-08-01

## 2022-08-13 RX ORDER — TORSEMIDE 20 MG/1
20 TABLET ORAL DAILY
COMMUNITY
Start: 2022-07-21 | End: 2022-08-13 | Stop reason: SDUPTHER

## 2022-08-13 RX ORDER — OMEPRAZOLE 40 MG/1
CAPSULE, DELAYED RELEASE ORAL
COMMUNITY
Start: 2022-06-29

## 2022-08-13 RX ORDER — TRAMADOL HYDROCHLORIDE 50 MG/1
TABLET ORAL
COMMUNITY
Start: 2022-08-12 | End: 2023-03-10 | Stop reason: SDUPTHER

## 2022-08-13 RX ORDER — ZOLPIDEM TARTRATE 10 MG/1
TABLET ORAL
COMMUNITY
End: 2022-08-13 | Stop reason: ALTCHOICE

## 2022-08-13 RX ORDER — AMOXICILLIN AND CLAVULANATE POTASSIUM 875; 125 MG/1; MG/1
1 TABLET, FILM COATED ORAL 2 TIMES DAILY
Qty: 20 TABLET | Refills: 0 | Status: SHIPPED | OUTPATIENT
Start: 2022-08-13 | End: 2022-08-23

## 2022-08-13 ASSESSMENT — ENCOUNTER SYMPTOMS
NAUSEA: 0
DIARRHEA: 0
SINUS PAIN: 1
HEADACHES: 1
DIZZINESS: 0
FATIGUE: 1
COUGH: 1
CHILLS: 1
SORE THROAT: 1
ABDOMINAL PAIN: 0
RHINORRHEA: 1
SHORTNESS OF BREATH: 0
FEVER: 1
VOMITING: 0
SINUS PRESSURE: 1

## 2022-08-22 ENCOUNTER — HOSPITAL ENCOUNTER (OUTPATIENT)
Dept: CT IMAGING | Facility: HOSPITAL | Age: 57
Discharge: HOME | End: 2022-08-22
Attending: INTERNAL MEDICINE
Payer: COMMERCIAL

## 2022-08-22 DIAGNOSIS — R91.1 PULMONARY NODULE: ICD-10-CM

## 2022-08-22 LAB
CREAT BLD-MCNC: 1.1 MG/DL
GFR SERPLBLD BASED ON 1.73 SQ M-ARVRAT: 58 ML/MIN/1.73M2 (ref 60–?)

## 2022-08-22 PROCEDURE — 82565 ASSAY OF CREATININE: CPT

## 2022-08-22 PROCEDURE — 71260 CT THORAX DX C+: CPT | Performed by: INTERNAL MEDICINE

## 2022-09-13 RX ORDER — DICYCLOMINE HCL 20 MG
TABLET ORAL 3 TIMES DAILY PRN
Qty: 90 TABLET | Refills: 1 | Status: SHIPPED | OUTPATIENT
Start: 2022-09-13

## 2022-09-13 RX ORDER — OMEPRAZOLE 40 MG/1
40 CAPSULE, DELAYED RELEASE ORAL
Qty: 30 CAPSULE | Refills: 11 | Status: SHIPPED | OUTPATIENT
Start: 2022-09-13

## 2022-09-13 NOTE — TELEPHONE ENCOUNTER
Omeprazole 40 mg/daily refilled per rx protocol. See office notes from visit with Dr. Moose Hinojosa 11/19/21.

## 2022-09-14 ENCOUNTER — LAB ENCOUNTER (OUTPATIENT)
Dept: LAB | Facility: HOSPITAL | Age: 57
End: 2022-09-14
Attending: INTERNAL MEDICINE
Payer: COMMERCIAL

## 2022-09-14 DIAGNOSIS — D47.2 IGM LAMBDA MONOCLONAL GAMMOPATHY: ICD-10-CM

## 2022-09-14 LAB
ALBUMIN SERPL-MCNC: 3.8 G/DL (ref 3.4–5)
ALBUMIN/GLOB SERPL: 1.2 {RATIO} (ref 1–2)
ALP LIVER SERPL-CCNC: 104 U/L
ALT SERPL-CCNC: 21 U/L
ANION GAP SERPL CALC-SCNC: 8 MMOL/L (ref 0–18)
AST SERPL-CCNC: 16 U/L (ref 15–37)
BASOPHILS # BLD AUTO: 0.02 X10(3) UL (ref 0–0.2)
BASOPHILS NFR BLD AUTO: 0.4 %
BILIRUB SERPL-MCNC: 0.4 MG/DL (ref 0.1–2)
BUN BLD-MCNC: 12 MG/DL (ref 7–18)
BUN/CREAT SERPL: 12.8 (ref 10–20)
CALCIUM BLD-MCNC: 9.6 MG/DL (ref 8.5–10.1)
CHLORIDE SERPL-SCNC: 101 MMOL/L (ref 98–112)
CO2 SERPL-SCNC: 29 MMOL/L (ref 21–32)
CREAT BLD-MCNC: 0.94 MG/DL
DEPRECATED RDW RBC AUTO: 40.5 FL (ref 35.1–46.3)
EOSINOPHIL # BLD AUTO: 0.05 X10(3) UL (ref 0–0.7)
EOSINOPHIL NFR BLD AUTO: 1.1 %
ERYTHROCYTE [DISTWIDTH] IN BLOOD BY AUTOMATED COUNT: 12.8 % (ref 11–15)
FASTING STATUS PATIENT QL REPORTED: YES
GFR SERPLBLD BASED ON 1.73 SQ M-ARVRAT: 70 ML/MIN/1.73M2 (ref 60–?)
GLOBULIN PLAS-MCNC: 3.3 G/DL (ref 2.8–4.4)
GLUCOSE BLD-MCNC: 77 MG/DL (ref 70–99)
HCT VFR BLD AUTO: 35 %
HGB BLD-MCNC: 12.2 G/DL
IGA SERPL-MCNC: 34 MG/DL (ref 70–312)
IGM SERPL-MCNC: 896 MG/DL (ref 43–279)
IMM GRANULOCYTES # BLD AUTO: 0.01 X10(3) UL (ref 0–1)
IMM GRANULOCYTES NFR BLD: 0.2 %
IMMUNOGLOBULIN PNL SER-MCNC: 451 MG/DL (ref 791–1643)
LYMPHOCYTES # BLD AUTO: 2.45 X10(3) UL (ref 1–4)
LYMPHOCYTES NFR BLD AUTO: 52.6 %
MCH RBC QN AUTO: 30.2 PG (ref 26–34)
MCHC RBC AUTO-ENTMCNC: 34.9 G/DL (ref 31–37)
MCV RBC AUTO: 86.6 FL
MONOCYTES # BLD AUTO: 0.43 X10(3) UL (ref 0.1–1)
MONOCYTES NFR BLD AUTO: 9.2 %
NEUTROPHILS # BLD AUTO: 1.7 X10 (3) UL (ref 1.5–7.7)
NEUTROPHILS # BLD AUTO: 1.7 X10(3) UL (ref 1.5–7.7)
NEUTROPHILS NFR BLD AUTO: 36.5 %
OSMOLALITY SERPL CALC.SUM OF ELEC: 285 MOSM/KG (ref 275–295)
PLATELET # BLD AUTO: 161 10(3)UL (ref 150–450)
POTASSIUM SERPL-SCNC: 3.8 MMOL/L (ref 3.5–5.1)
PROT SERPL-MCNC: 7.1 G/DL (ref 6.4–8.2)
PROT UR-MCNC: 16.6 MG/DL
RBC # BLD AUTO: 4.04 X10(6)UL
SODIUM SERPL-SCNC: 138 MMOL/L (ref 136–145)
WBC # BLD AUTO: 4.7 X10(3) UL (ref 4–11)

## 2022-09-14 PROCEDURE — 83521 IG LIGHT CHAINS FREE EACH: CPT

## 2022-09-14 PROCEDURE — 84165 PROTEIN E-PHORESIS SERUM: CPT

## 2022-09-14 PROCEDURE — 86334 IMMUNOFIX E-PHORESIS SERUM: CPT

## 2022-09-14 PROCEDURE — 86335 IMMUNFIX E-PHORSIS/URINE/CSF: CPT

## 2022-09-14 PROCEDURE — 80053 COMPREHEN METABOLIC PANEL: CPT

## 2022-09-14 PROCEDURE — 36415 COLL VENOUS BLD VENIPUNCTURE: CPT

## 2022-09-14 PROCEDURE — 82784 ASSAY IGA/IGD/IGG/IGM EACH: CPT

## 2022-09-14 PROCEDURE — 84166 PROTEIN E-PHORESIS/URINE/CSF: CPT

## 2022-09-14 PROCEDURE — 85025 COMPLETE CBC W/AUTO DIFF WBC: CPT

## 2022-09-15 LAB
KAPPA LC FREE SER-MCNC: 2.75 MG/DL (ref 0.33–1.94)
KAPPA LC FREE/LAMBDA FREE SER NEPH: 1.05 {RATIO} (ref 0.26–1.65)
LAMBDA LC FREE SERPL-MCNC: 2.61 MG/DL (ref 0.57–2.63)

## 2022-09-19 ENCOUNTER — OFFICE VISIT (OUTPATIENT)
Dept: HEMATOLOGY/ONCOLOGY | Facility: HOSPITAL | Age: 57
End: 2022-09-19
Attending: INTERNAL MEDICINE
Payer: COMMERCIAL

## 2022-09-19 VITALS
TEMPERATURE: 98 F | BODY MASS INDEX: 21.52 KG/M2 | DIASTOLIC BLOOD PRESSURE: 67 MMHG | WEIGHT: 142 LBS | SYSTOLIC BLOOD PRESSURE: 119 MMHG | HEIGHT: 68 IN | OXYGEN SATURATION: 100 % | RESPIRATION RATE: 16 BRPM | HEART RATE: 78 BPM

## 2022-09-19 DIAGNOSIS — R16.1 SPLENOMEGALY: Primary | ICD-10-CM

## 2022-09-19 DIAGNOSIS — R61 NIGHT SWEATS: ICD-10-CM

## 2022-09-19 DIAGNOSIS — K92.1 HEMATOCHEZIA: ICD-10-CM

## 2022-09-19 DIAGNOSIS — D47.2 MGUS (MONOCLONAL GAMMOPATHY OF UNKNOWN SIGNIFICANCE): ICD-10-CM

## 2022-09-19 LAB
ALBUMIN SERPL ELPH-MCNC: 4.22 G/DL (ref 3.75–5.21)
ALBUMIN/GLOB SERPL: 1.63 {RATIO} (ref 1–2)
ALPHA1 GLOB SERPL ELPH-MCNC: 0.31 G/DL (ref 0.19–0.46)
ALPHA2 GLOB SERPL ELPH-MCNC: 0.68 G/DL (ref 0.48–1.05)
B-GLOBULIN SERPL ELPH-MCNC: 0.65 G/DL (ref 0.68–1.23)
GAMMA GLOB SERPL ELPH-MCNC: 0.95 G/DL (ref 0.62–1.7)
PROT SERPL-MCNC: 6.8 G/DL (ref 6.4–8.2)

## 2022-09-19 PROCEDURE — 99214 OFFICE O/P EST MOD 30 MIN: CPT | Performed by: INTERNAL MEDICINE

## 2022-09-22 NOTE — TELEPHONE ENCOUNTER
Please review. Protocol failed/ No protocol. Requested Prescriptions   Pending Prescriptions Disp Refills    baclofen 10 MG Oral Tab 21 tablet 1     Sig: Take 1 tablet (10 mg total) by mouth nightly as needed. There is no refill protocol information for this order           Recent Outpatient Visits              3 days ago Splenomegaly    [facility] Hematology Oncology Teresita Posey MD    Office Visit    2 months ago Lumbar spondylosis    [facility], [address] [place], [place]    Office Visit    4 months ago Lumbar spondylosis    [address]    Office Visit    6 months ago IgM lambda monoclonal gammopathy    [facility] Teresita Posey MD    Office Visit    7 months ago Splenomegaly    [facility] Roverto Caruso MD    Office Visit            Future Appointments         Provider Department Appt Notes    In 1 week [address] [facility] [address] [facility] [address] Stomach issues and spleen    In 5 months Teresita Posey MD [facility] AND [facility] Hematology Oncology FOLLOW UP VISIT. CL  6M

## 2022-09-26 RX ORDER — BACLOFEN 10 MG/1
10 TABLET ORAL NIGHTLY PRN
Qty: 30 TABLET | Refills: 1 | Status: SHIPPED | OUTPATIENT
Start: 2022-09-26 | End: 2023-04-21

## 2022-09-29 ENCOUNTER — HOSPITAL ENCOUNTER (OUTPATIENT)
Dept: CT IMAGING | Facility: HOSPITAL | Age: 57
Discharge: HOME | End: 2022-09-29
Attending: INTERNAL MEDICINE
Payer: COMMERCIAL

## 2022-09-29 DIAGNOSIS — R61 NIGHT SWEATS: ICD-10-CM

## 2022-09-29 DIAGNOSIS — R16.1 SPLENOMEGALY: ICD-10-CM

## 2022-09-29 PROCEDURE — 74177 CT ABD & PELVIS W/CONTRAST: CPT | Performed by: INTERNAL MEDICINE

## 2022-10-11 NOTE — TELEPHONE ENCOUNTER
Please review; protocol failed/no protocol. Requested Prescriptions   Pending Prescriptions Disp Refills    traMADol 50 MG Oral Tab 90 tablet 3     Sig: Take 1 tablet (50 mg total) by mouth every 6 (six) hours as needed for Pain. There is no refill protocol information for this order            Recent Outpatient Visits              3 weeks ago Splenomegaly    [facility] AND [facility] Hematology Oncology Dorothy Landis MD    Office Visit    2 months ago Lumbar spondylosis    [facility], [address] [place] [address]    Office Visit    5 months ago Lumbar spondylosis    [address]    Office Visit    6 months ago IgM lambda monoclonal gammopathy    [facility] AND [facility]ematology Oncology Dorothy Landis MD    Office Visit    7 months ago Splenomegaly    [facility] Beckie Olvera MD    Office Visit             Future Appointments         Provider Department Appt Notes    In 5 months Dorothy Landis MD [facility] AND [facility] Hematology Oncology FOLLOW UP VISIT. CL  6M

## 2022-10-13 RX ORDER — TRAMADOL HYDROCHLORIDE 50 MG/1
50 TABLET ORAL EVERY 6 HOURS PRN
Qty: 90 TABLET | Refills: 3 | Status: SHIPPED | OUTPATIENT
Start: 2022-10-13

## 2022-11-02 ENCOUNTER — TELEPHONE (OUTPATIENT)
Dept: ORTHOPEDICS CLINIC | Facility: CLINIC | Age: 57
End: 2022-11-02

## 2022-11-02 DIAGNOSIS — M54.50 LOW BACK PAIN, UNSPECIFIED BACK PAIN LATERALITY, UNSPECIFIED CHRONICITY, UNSPECIFIED WHETHER SCIATICA PRESENT: Primary | ICD-10-CM

## 2022-11-02 NOTE — TELEPHONE ENCOUNTER
XR ordered and scheduled. [employer] message sent to patient to inform her and ask her to arrive 15-20 minutes early.

## 2022-11-07 ENCOUNTER — HOSPITAL ENCOUNTER (OUTPATIENT)
Dept: GENERAL RADIOLOGY | Age: 57
Discharge: HOME | End: 2022-11-07
Attending: STUDENT IN AN ORGANIZED HEALTH CARE EDUCATION/TRAINING PROGRAM
Payer: COMMERCIAL

## 2022-11-07 ENCOUNTER — OFFICE VISIT (OUTPATIENT)
Dept: ORTHOPEDICS CLINIC | Facility: CLINIC | Age: 57
End: 2022-11-07
Payer: COMMERCIAL

## 2022-11-07 VITALS — WEIGHT: 138 LBS | BODY MASS INDEX: 20.92 KG/M2 | HEIGHT: 68 IN

## 2022-11-07 DIAGNOSIS — G89.29 CHRONIC LOW BACK PAIN WITHOUT SCIATICA, UNSPECIFIED BACK PAIN LATERALITY: Primary | ICD-10-CM

## 2022-11-07 DIAGNOSIS — M54.50 CHRONIC LOW BACK PAIN WITHOUT SCIATICA, UNSPECIFIED BACK PAIN LATERALITY: Primary | ICD-10-CM

## 2022-11-07 DIAGNOSIS — M54.50 LOW BACK PAIN, UNSPECIFIED BACK PAIN LATERALITY, UNSPECIFIED CHRONICITY, UNSPECIFIED WHETHER SCIATICA PRESENT: ICD-10-CM

## 2022-11-07 PROCEDURE — 3008F BODY MASS INDEX DOCD: CPT | Performed by: STUDENT IN AN ORGANIZED HEALTH CARE EDUCATION/TRAINING PROGRAM

## 2022-11-07 PROCEDURE — 99204 OFFICE O/P NEW MOD 45 MIN: CPT | Performed by: STUDENT IN AN ORGANIZED HEALTH CARE EDUCATION/TRAINING PROGRAM

## 2022-11-07 PROCEDURE — 72100 X-RAY EXAM L-S SPINE 2/3 VWS: CPT | Performed by: STUDENT IN AN ORGANIZED HEALTH CARE EDUCATION/TRAINING PROGRAM

## 2022-11-10 RX ORDER — TEMAZEPAM 15 MG/1
15 CAPSULE ORAL NIGHTLY PRN
Qty: 90 CAPSULE | Refills: 1 | Status: SHIPPED | OUTPATIENT
Start: 2022-11-10

## 2022-11-29 RX ORDER — LINACLOTIDE 290 UG/1
CAPSULE, GELATIN COATED ORAL
Qty: 30 CAPSULE | Refills: 0 | Status: SHIPPED | OUTPATIENT
Start: 2022-11-29

## 2022-11-30 ENCOUNTER — TELEPHONE (OUTPATIENT)
Dept: GASTROENTEROLOGY | Facility: CLINIC | Age: 57
End: 2022-11-30

## 2022-11-30 ENCOUNTER — OFFICE VISIT (OUTPATIENT)
Dept: GASTROENTEROLOGY | Facility: CLINIC | Age: 57
End: 2022-11-30
Payer: COMMERCIAL

## 2022-11-30 VITALS
HEIGHT: 68 IN | HEART RATE: 81 BPM | WEIGHT: 142 LBS | BODY MASS INDEX: 21.52 KG/M2 | SYSTOLIC BLOOD PRESSURE: 106 MMHG | DIASTOLIC BLOOD PRESSURE: 73 MMHG

## 2022-11-30 DIAGNOSIS — K62.5 BRIGHT RED BLOOD PER RECTUM: ICD-10-CM

## 2022-11-30 DIAGNOSIS — R11.0 NAUSEA: ICD-10-CM

## 2022-11-30 DIAGNOSIS — Z86.010 PERSONAL HISTORY OF COLONIC POLYPS: Primary | ICD-10-CM

## 2022-11-30 DIAGNOSIS — K62.5 BRBPR (BRIGHT RED BLOOD PER RECTUM): ICD-10-CM

## 2022-11-30 DIAGNOSIS — R93.89 ABNORMAL CT SCAN: Primary | ICD-10-CM

## 2022-11-30 DIAGNOSIS — K63.5 POLYP OF COLON, UNSPECIFIED PART OF COLON, UNSPECIFIED TYPE: ICD-10-CM

## 2022-11-30 DIAGNOSIS — K59.00 CONSTIPATION, UNSPECIFIED CONSTIPATION TYPE: ICD-10-CM

## 2022-11-30 DIAGNOSIS — K21.9 GASTROESOPHAGEAL REFLUX DISEASE, UNSPECIFIED WHETHER ESOPHAGITIS PRESENT: ICD-10-CM

## 2022-11-30 PROCEDURE — 3008F BODY MASS INDEX DOCD: CPT | Performed by: NURSE PRACTITIONER

## 2022-11-30 PROCEDURE — 3078F DIAST BP <80 MM HG: CPT | Performed by: NURSE PRACTITIONER

## 2022-11-30 PROCEDURE — 3074F SYST BP LT 130 MM HG: CPT | Performed by: NURSE PRACTITIONER

## 2022-11-30 PROCEDURE — 99215 OFFICE O/P EST HI 40 MIN: CPT | Performed by: NURSE PRACTITIONER

## 2022-11-30 RX ORDER — OMEPRAZOLE 40 MG/1
40 CAPSULE, DELAYED RELEASE ORAL 2 TIMES DAILY
Qty: 60 CAPSULE | Refills: 0 | Status: SHIPPED | OUTPATIENT
Start: 2022-11-30 | End: 2022-12-30

## 2022-11-30 RX ORDER — PRUCALOPRIDE 2 MG/1
2 TABLET, FILM COATED ORAL DAILY
Qty: 30 TABLET | Refills: 3 | Status: SHIPPED | OUTPATIENT
Start: 2022-11-30 | End: 2022-12-30

## 2022-11-30 RX ORDER — LORAZEPAM 0.5 MG/1
0.5 TABLET ORAL ONCE
Qty: 1 TABLET | Refills: 0 | Status: CANCELLED | OUTPATIENT
Start: 2022-11-30 | End: 2022-11-30

## 2022-11-30 RX ORDER — POLYETHYLENE GLYCOL 3350, SODIUM CHLORIDE, SODIUM BICARBONATE, POTASSIUM CHLORIDE 420; 11.2; 5.72; 1.48 G/4L; G/4L; G/4L; G/4L
POWDER, FOR SOLUTION ORAL
Qty: 4000 ML | Refills: 0 | Status: SHIPPED | OUTPATIENT
Start: 2022-11-30

## 2022-11-30 NOTE — TELEPHONE ENCOUNTER
Scheduled for: Colonoscopy [address]   Provider Name: Dr Colt Becker  Date: Renuka Rawls 12/22/2022    Location: [facility]   Sedation: MAC    Time: 11:15 am, (pt is aware that Windy Barnett will call the day before to confirm arrival time)  Prep: split trilyte   Meds/Allergies Reconciled?:  NKDA  Diagnosis with codes: HCP Z86.010, BRBPR K62.5   Was patient informed to call insurance with codes (Y/N):  Yes  Referral sent?: Yes    [facility] or [address] [place] [address] notified?: Electronic case request was sent to Windy Barnett via [employer]. Medication Orders: Pt is aware to NOT take iron pills, herbal meds and diet supplements for 7 days before exam. Also to NOT take any form of alcohol, recreational drugs and any forms of ED meds 24 hours before exam.      Misc Orders: Patient was informed that they will need a COVID 19 test prior to their procedure. Patient verbally understood & will await a phone call from [facility] to schedule. Further instructions given by staff:  Instructions given and pt verbalized understanding

## 2022-12-05 ENCOUNTER — PATIENT MESSAGE (OUTPATIENT)
Dept: GASTROENTEROLOGY | Facility: CLINIC | Age: 57
End: 2022-12-05

## 2022-12-06 RX ORDER — DIAZEPAM 10 MG/1
TABLET ORAL
Qty: 2 TABLET | Refills: 0 | Status: SHIPPED | OUTPATIENT
Start: 2022-12-06

## 2022-12-06 NOTE — TELEPHONE ENCOUNTER
apn reviewed pts chart and prior to previous mri had pre-medication for claustrophobia with  diazepam  1 tab PO 1 hour before MRI; 2nd tablet PO 30 minutes before MRI PRN claustrophobia. Must have . rx sent to md for co-sign.

## 2022-12-14 ENCOUNTER — HOSPITAL ENCOUNTER (OUTPATIENT)
Dept: MRI IMAGING | Facility: HOSPITAL | Age: 57
Discharge: HOME | End: 2022-12-14
Attending: NURSE PRACTITIONER
Payer: COMMERCIAL

## 2022-12-14 DIAGNOSIS — R93.89 ABNORMAL CT SCAN: ICD-10-CM

## 2022-12-14 DIAGNOSIS — R11.0 NAUSEA: ICD-10-CM

## 2022-12-14 DIAGNOSIS — K59.00 CONSTIPATION, UNSPECIFIED CONSTIPATION TYPE: ICD-10-CM

## 2022-12-14 DIAGNOSIS — K63.5 POLYP OF COLON, UNSPECIFIED PART OF COLON, UNSPECIFIED TYPE: ICD-10-CM

## 2022-12-14 DIAGNOSIS — K21.9 GASTROESOPHAGEAL REFLUX DISEASE, UNSPECIFIED WHETHER ESOPHAGITIS PRESENT: ICD-10-CM

## 2022-12-14 PROCEDURE — A9575 INJ GADOTERATE MEGLUMI 0.1ML: HCPCS | Performed by: NURSE PRACTITIONER

## 2022-12-14 PROCEDURE — 74183 MRI ABD W/O CNTR FLWD CNTR: CPT | Performed by: NURSE PRACTITIONER

## 2022-12-14 RX ORDER — GADOTERATE MEGLUMINE 376.9 MG/ML
15 INJECTION INTRAVENOUS
Status: COMPLETED | OUTPATIENT
Start: 2022-12-14 | End: 2022-12-14

## 2022-12-14 RX ADMIN — GADOTERATE MEGLUMINE 12 ML: 376.9 INJECTION INTRAVENOUS at 13:52:00

## 2022-12-15 ENCOUNTER — PATIENT MESSAGE (OUTPATIENT)
Dept: GASTROENTEROLOGY | Facility: CLINIC | Age: 57
End: 2022-12-15

## 2022-12-22 ENCOUNTER — SURGERY CENTER DOCUMENTATION (OUTPATIENT)
Dept: SURGERY | Age: 57
End: 2022-12-22

## 2022-12-27 RX ORDER — LINACLOTIDE 290 UG/1
CAPSULE, GELATIN COATED ORAL
Qty: 30 CAPSULE | Refills: 0 | Status: SHIPPED | OUTPATIENT
Start: 2022-12-27

## 2022-12-28 ENCOUNTER — MED REC SCAN ONLY (OUTPATIENT)
Dept: FAMILY MEDICINE CLINIC | Facility: CLINIC | Age: 57
End: 2022-12-28

## 2023-01-03 RX ORDER — ONDANSETRON 4 MG/1
4 TABLET, ORALLY DISINTEGRATING ORAL EVERY 8 HOURS PRN
Qty: 60 TABLET | Refills: 3 | Status: SHIPPED | OUTPATIENT
Start: 2023-01-03

## 2023-01-05 RX ORDER — ONDANSETRON 4 MG/1
TABLET, ORALLY DISINTEGRATING ORAL
Qty: 60 TABLET | Refills: 0 | OUTPATIENT
Start: 2023-01-05

## 2023-01-05 NOTE — TELEPHONE ENCOUNTER
Please review refill protocol failed/ no protocol  Requested Prescriptions   Pending Prescriptions Disp Refills    traMADol 50 MG Oral Tab 90 tablet 3     Sig: Take 1 tablet (50 mg total) by mouth every 6 (six) hours as needed for Pain.        There is no refill protocol information for this order

## 2023-01-16 RX ORDER — TRAMADOL HYDROCHLORIDE 50 MG/1
50 TABLET ORAL EVERY 6 HOURS PRN
Qty: 90 TABLET | Refills: 3 | Status: SHIPPED | OUTPATIENT
Start: 2023-01-16

## 2023-01-16 RX ORDER — TRAMADOL HYDROCHLORIDE 50 MG/1
50 TABLET ORAL EVERY 6 HOURS PRN
Qty: 90 TABLET | Refills: 3 | Status: SHIPPED
Start: 2023-01-16

## 2023-03-10 ENCOUNTER — OFFICE VISIT (OUTPATIENT)
Dept: URGENT CARE | Age: 58
End: 2023-03-10

## 2023-03-10 VITALS
HEART RATE: 70 BPM | TEMPERATURE: 98.7 F | RESPIRATION RATE: 16 BRPM | DIASTOLIC BLOOD PRESSURE: 80 MMHG | SYSTOLIC BLOOD PRESSURE: 120 MMHG

## 2023-03-10 DIAGNOSIS — J06.9 ACUTE URI: Primary | ICD-10-CM

## 2023-03-10 PROCEDURE — 99213 OFFICE O/P EST LOW 20 MIN: CPT | Performed by: NURSE PRACTITIONER

## 2023-03-10 PROCEDURE — 3079F DIAST BP 80-89 MM HG: CPT | Performed by: NURSE PRACTITIONER

## 2023-03-10 PROCEDURE — 3074F SYST BP LT 130 MM HG: CPT | Performed by: NURSE PRACTITIONER

## 2023-03-10 RX ORDER — PRUCALOPRIDE 2 MG/1
1 TABLET, FILM COATED ORAL DAILY
COMMUNITY
Start: 2023-02-24

## 2023-03-10 RX ORDER — TRAMADOL HYDROCHLORIDE 50 MG/1
50 TABLET ORAL
COMMUNITY
Start: 2023-01-16

## 2023-03-10 RX ORDER — OMEPRAZOLE 40 MG/1
40 CAPSULE, DELAYED RELEASE ORAL
COMMUNITY
Start: 2022-09-13

## 2023-03-10 RX ORDER — TORSEMIDE 20 MG/1
20 TABLET ORAL DAILY
COMMUNITY
Start: 2023-02-24

## 2023-03-14 RX ORDER — ONDANSETRON 4 MG/1
4 TABLET, ORALLY DISINTEGRATING ORAL EVERY 8 HOURS PRN
Qty: 60 TABLET | Refills: 3 | Status: SHIPPED | OUTPATIENT
Start: 2023-03-14

## 2023-03-16 ENCOUNTER — LAB ENCOUNTER (OUTPATIENT)
Dept: LAB | Facility: HOSPITAL | Age: 58
End: 2023-03-16
Attending: INTERNAL MEDICINE
Payer: COMMERCIAL

## 2023-03-16 DIAGNOSIS — D47.2 MGUS (MONOCLONAL GAMMOPATHY OF UNKNOWN SIGNIFICANCE): ICD-10-CM

## 2023-03-16 LAB
ALBUMIN SERPL-MCNC: 3.9 G/DL (ref 3.4–5)
ALBUMIN/GLOB SERPL: 1.1 {RATIO} (ref 1–2)
ALP LIVER SERPL-CCNC: 108 U/L
ALT SERPL-CCNC: 18 U/L
ANION GAP SERPL CALC-SCNC: 6 MMOL/L (ref 0–18)
AST SERPL-CCNC: 15 U/L (ref 15–37)
BASOPHILS # BLD AUTO: 0.03 X10(3) UL (ref 0–0.2)
BASOPHILS NFR BLD AUTO: 0.5 %
BILIRUB SERPL-MCNC: 0.4 MG/DL (ref 0.1–2)
BUN BLD-MCNC: 16 MG/DL (ref 7–18)
BUN/CREAT SERPL: 15.2 (ref 10–20)
CALCIUM BLD-MCNC: 9.7 MG/DL (ref 8.5–10.1)
CHLORIDE SERPL-SCNC: 101 MMOL/L (ref 98–112)
CO2 SERPL-SCNC: 32 MMOL/L (ref 21–32)
CREAT BLD-MCNC: 1.05 MG/DL
DEPRECATED RDW RBC AUTO: 38.6 FL (ref 35.1–46.3)
EOSINOPHIL # BLD AUTO: 0.08 X10(3) UL (ref 0–0.7)
EOSINOPHIL NFR BLD AUTO: 1.4 %
ERYTHROCYTE [DISTWIDTH] IN BLOOD BY AUTOMATED COUNT: 12.3 % (ref 11–15)
FASTING STATUS PATIENT QL REPORTED: YES
GFR SERPLBLD BASED ON 1.73 SQ M-ARVRAT: 61 ML/MIN/1.73M2 (ref 60–?)
GLOBULIN PLAS-MCNC: 3.5 G/DL (ref 2.8–4.4)
GLUCOSE BLD-MCNC: 114 MG/DL (ref 70–99)
HCT VFR BLD AUTO: 36.7 %
HGB BLD-MCNC: 12.5 G/DL
IGA SERPL-MCNC: 35.2 MG/DL (ref 70–312)
IGM SERPL-MCNC: 974 MG/DL (ref 43–279)
IMM GRANULOCYTES # BLD AUTO: 0.01 X10(3) UL (ref 0–1)
IMM GRANULOCYTES NFR BLD: 0.2 %
IMMUNOGLOBULIN PNL SER-MCNC: 491 MG/DL (ref 791–1643)
LYMPHOCYTES # BLD AUTO: 2.64 X10(3) UL (ref 1–4)
LYMPHOCYTES NFR BLD AUTO: 46.6 %
MCH RBC QN AUTO: 29.4 PG (ref 26–34)
MCHC RBC AUTO-ENTMCNC: 34.1 G/DL (ref 31–37)
MCV RBC AUTO: 86.4 FL
MONOCYTES # BLD AUTO: 0.27 X10(3) UL (ref 0.1–1)
MONOCYTES NFR BLD AUTO: 4.8 %
NEUTROPHILS # BLD AUTO: 2.63 X10 (3) UL (ref 1.5–7.7)
NEUTROPHILS # BLD AUTO: 2.63 X10(3) UL (ref 1.5–7.7)
NEUTROPHILS NFR BLD AUTO: 46.5 %
OSMOLALITY SERPL CALC.SUM OF ELEC: 290 MOSM/KG (ref 275–295)
PLATELET # BLD AUTO: 191 10(3)UL (ref 150–450)
POTASSIUM SERPL-SCNC: 3.3 MMOL/L (ref 3.5–5.1)
PROT SERPL-MCNC: 7.4 G/DL (ref 6.4–8.2)
PROT UR-MCNC: 23 MG/DL
RBC # BLD AUTO: 4.25 X10(6)UL
SODIUM SERPL-SCNC: 139 MMOL/L (ref 136–145)
WBC # BLD AUTO: 5.7 X10(3) UL (ref 4–11)

## 2023-03-16 PROCEDURE — 80053 COMPREHEN METABOLIC PANEL: CPT

## 2023-03-16 PROCEDURE — 84165 PROTEIN E-PHORESIS SERUM: CPT

## 2023-03-16 PROCEDURE — 84156 ASSAY OF PROTEIN URINE: CPT

## 2023-03-16 PROCEDURE — 86334 IMMUNOFIX E-PHORESIS SERUM: CPT

## 2023-03-16 PROCEDURE — 36415 COLL VENOUS BLD VENIPUNCTURE: CPT

## 2023-03-16 PROCEDURE — 82784 ASSAY IGA/IGD/IGG/IGM EACH: CPT

## 2023-03-16 PROCEDURE — 85025 COMPLETE CBC W/AUTO DIFF WBC: CPT

## 2023-03-16 PROCEDURE — 86335 IMMUNFIX E-PHORSIS/URINE/CSF: CPT

## 2023-03-16 PROCEDURE — 84166 PROTEIN E-PHORESIS/URINE/CSF: CPT

## 2023-03-16 PROCEDURE — 83521 IG LIGHT CHAINS FREE EACH: CPT

## 2023-03-20 ENCOUNTER — OFFICE VISIT (OUTPATIENT)
Dept: HEMATOLOGY/ONCOLOGY | Facility: HOSPITAL | Age: 58
End: 2023-03-20
Attending: INTERNAL MEDICINE
Payer: COMMERCIAL

## 2023-03-20 VITALS
TEMPERATURE: 98 F | OXYGEN SATURATION: 100 % | BODY MASS INDEX: 22.19 KG/M2 | WEIGHT: 146.38 LBS | HEIGHT: 68.25 IN | RESPIRATION RATE: 16 BRPM | SYSTOLIC BLOOD PRESSURE: 126 MMHG | HEART RATE: 74 BPM | DIASTOLIC BLOOD PRESSURE: 69 MMHG

## 2023-03-20 DIAGNOSIS — Z87.891 HISTORY OF TOBACCO USE: ICD-10-CM

## 2023-03-20 DIAGNOSIS — D47.2 MGUS (MONOCLONAL GAMMOPATHY OF UNKNOWN SIGNIFICANCE): ICD-10-CM

## 2023-03-20 DIAGNOSIS — R16.1 SPLENOMEGALY: ICD-10-CM

## 2023-03-20 DIAGNOSIS — R91.1 PULMONARY NODULE: Primary | ICD-10-CM

## 2023-03-20 LAB
KAPPA LC FREE SER-MCNC: 3.1 MG/DL (ref 0.33–1.94)
KAPPA LC FREE/LAMBDA FREE SER NEPH: 1.08 {RATIO} (ref 0.26–1.65)
LAMBDA LC FREE SERPL-MCNC: 2.87 MG/DL (ref 0.57–2.63)

## 2023-03-20 PROCEDURE — 99214 OFFICE O/P EST MOD 30 MIN: CPT | Performed by: INTERNAL MEDICINE

## 2023-03-20 RX ORDER — PRUCALOPRIDE 2 MG/1
1 TABLET, FILM COATED ORAL DAILY
COMMUNITY
Start: 2023-02-24

## 2023-03-21 LAB
ALBUMIN SERPL ELPH-MCNC: 4.38 G/DL (ref 3.75–5.21)
ALBUMIN/GLOB SERPL: 1.61 {RATIO} (ref 1–2)
ALPHA1 GLOB SERPL ELPH-MCNC: 0.33 G/DL (ref 0.19–0.46)
ALPHA2 GLOB SERPL ELPH-MCNC: 0.7 G/DL (ref 0.48–1.05)
B-GLOBULIN SERPL ELPH-MCNC: 0.7 G/DL (ref 0.68–1.23)
GAMMA GLOB SERPL ELPH-MCNC: 0.99 G/DL (ref 0.62–1.7)
PROT SERPL-MCNC: 7.1 G/DL (ref 6.4–8.2)

## 2023-03-24 ENCOUNTER — HOSPITAL ENCOUNTER (OUTPATIENT)
Dept: GENERAL RADIOLOGY | Age: 58
Discharge: HOME | End: 2023-03-24
Attending: INTERNAL MEDICINE
Payer: COMMERCIAL

## 2023-03-24 DIAGNOSIS — D47.2 MGUS (MONOCLONAL GAMMOPATHY OF UNKNOWN SIGNIFICANCE): ICD-10-CM

## 2023-03-24 PROCEDURE — 77075 RADEX OSSEOUS SURVEY COMPL: CPT | Performed by: INTERNAL MEDICINE

## 2023-04-14 RX ORDER — TRAMADOL HYDROCHLORIDE 50 MG/1
50 TABLET ORAL EVERY 6 HOURS PRN
Qty: 90 TABLET | Refills: 2 | Status: SHIPPED | OUTPATIENT
Start: 2023-04-14 | End: 2023-04-17

## 2023-04-17 ENCOUNTER — OFFICE VISIT (OUTPATIENT)
Dept: FAMILY MEDICINE CLINIC | Facility: CLINIC | Age: 58
End: 2023-04-17

## 2023-04-17 VITALS
WEIGHT: 145.63 LBS | HEIGHT: 68.25 IN | DIASTOLIC BLOOD PRESSURE: 67 MMHG | OXYGEN SATURATION: 99 % | HEART RATE: 67 BPM | BODY MASS INDEX: 22.07 KG/M2 | SYSTOLIC BLOOD PRESSURE: 120 MMHG

## 2023-04-17 DIAGNOSIS — M47.816 LUMBAR SPONDYLOSIS: Primary | ICD-10-CM

## 2023-04-17 DIAGNOSIS — E87.6 HYPOKALEMIA: ICD-10-CM

## 2023-04-17 DIAGNOSIS — M54.50 LUMBAR BACK PAIN: ICD-10-CM

## 2023-04-17 PROCEDURE — 99214 OFFICE O/P EST MOD 30 MIN: CPT | Performed by: FAMILY MEDICINE

## 2023-04-17 PROCEDURE — 3074F SYST BP LT 130 MM HG: CPT | Performed by: FAMILY MEDICINE

## 2023-04-17 PROCEDURE — 3078F DIAST BP <80 MM HG: CPT | Performed by: FAMILY MEDICINE

## 2023-04-17 PROCEDURE — 3008F BODY MASS INDEX DOCD: CPT | Performed by: FAMILY MEDICINE

## 2023-04-17 RX ORDER — TRAMADOL HYDROCHLORIDE 50 MG/1
50 TABLET ORAL EVERY 6 HOURS PRN
Qty: 90 TABLET | Refills: 3 | Status: SHIPPED | OUTPATIENT
Start: 2023-04-17

## 2023-04-19 ENCOUNTER — LAB ENCOUNTER (OUTPATIENT)
Dept: LAB | Age: 58
End: 2023-04-19
Attending: FAMILY MEDICINE
Payer: COMMERCIAL

## 2023-04-19 DIAGNOSIS — E87.6 HYPOKALEMIA: ICD-10-CM

## 2023-04-19 LAB — POTASSIUM SERPL-SCNC: 3.4 MMOL/L (ref 3.5–5.1)

## 2023-04-19 PROCEDURE — 84132 ASSAY OF SERUM POTASSIUM: CPT

## 2023-04-19 PROCEDURE — 36415 COLL VENOUS BLD VENIPUNCTURE: CPT

## 2023-04-25 RX ORDER — POTASSIUM CHLORIDE 20 MEQ/1
20 TABLET, EXTENDED RELEASE ORAL DAILY
Qty: 30 TABLET | Refills: 0 | Status: SHIPPED | OUTPATIENT
Start: 2023-04-25

## 2023-05-07 RX ORDER — TEMAZEPAM 15 MG/1
15 CAPSULE ORAL NIGHTLY PRN
Qty: 90 CAPSULE | Refills: 0 | OUTPATIENT
Start: 2023-05-07

## 2023-05-08 ENCOUNTER — LAB ENCOUNTER (OUTPATIENT)
Dept: LAB | Age: 58
End: 2023-05-08
Attending: INTERNAL MEDICINE
Payer: COMMERCIAL

## 2023-05-08 DIAGNOSIS — R60.0 LOCALIZED EDEMA: ICD-10-CM

## 2023-05-08 DIAGNOSIS — R94.31 ABNORMAL ELECTROCARDIOGRAM: Primary | ICD-10-CM

## 2023-05-08 LAB
ANION GAP SERPL CALC-SCNC: 4 MMOL/L (ref 0–18)
BUN BLD-MCNC: 15 MG/DL (ref 7–18)
BUN/CREAT SERPL: 14.7 (ref 10–20)
CALCIUM BLD-MCNC: 9.8 MG/DL (ref 8.5–10.1)
CHLORIDE SERPL-SCNC: 103 MMOL/L (ref 98–112)
CO2 SERPL-SCNC: 30 MMOL/L (ref 21–32)
CREAT BLD-MCNC: 1.02 MG/DL
FASTING STATUS PATIENT QL REPORTED: YES
GFR SERPLBLD BASED ON 1.73 SQ M-ARVRAT: 63 ML/MIN/1.73M2 (ref 60–?)
GLUCOSE BLD-MCNC: 81 MG/DL (ref 70–99)
OSMOLALITY SERPL CALC.SUM OF ELEC: 284 MOSM/KG (ref 275–295)
POTASSIUM SERPL-SCNC: 3.8 MMOL/L (ref 3.5–5.1)
SODIUM SERPL-SCNC: 137 MMOL/L (ref 136–145)

## 2023-05-08 PROCEDURE — 80048 BASIC METABOLIC PNL TOTAL CA: CPT

## 2023-05-08 PROCEDURE — 36415 COLL VENOUS BLD VENIPUNCTURE: CPT

## 2023-05-08 RX ORDER — TEMAZEPAM 15 MG/1
15 CAPSULE ORAL NIGHTLY PRN
Qty: 90 CAPSULE | Refills: 1 | Status: SHIPPED | OUTPATIENT
Start: 2023-05-08

## 2023-05-11 RX ORDER — PRUCALOPRIDE 2 MG/1
TABLET, FILM COATED ORAL
Qty: 30 TABLET | Refills: 0 | Status: SHIPPED | OUTPATIENT
Start: 2023-05-11

## 2023-05-21 RX ORDER — ONDANSETRON 4 MG/1
4 TABLET, ORALLY DISINTEGRATING ORAL EVERY 8 HOURS PRN
Qty: 60 TABLET | Refills: 3 | Status: SHIPPED | OUTPATIENT
Start: 2023-05-21

## 2023-06-21 RX ORDER — PRUCALOPRIDE 2 MG/1
TABLET, FILM COATED ORAL
Qty: 30 TABLET | Refills: 0 | Status: SHIPPED | OUTPATIENT
Start: 2023-06-21

## 2023-06-27 ENCOUNTER — OFFICE VISIT (OUTPATIENT)
Dept: OBGYN CLINIC | Facility: CLINIC | Age: 58
End: 2023-06-27

## 2023-06-27 VITALS
BODY MASS INDEX: 21.77 KG/M2 | SYSTOLIC BLOOD PRESSURE: 136 MMHG | WEIGHT: 143.63 LBS | HEIGHT: 68 IN | DIASTOLIC BLOOD PRESSURE: 86 MMHG | HEART RATE: 85 BPM

## 2023-06-27 DIAGNOSIS — Z12.4 SCREENING FOR MALIGNANT NEOPLASM OF CERVIX: ICD-10-CM

## 2023-06-27 DIAGNOSIS — Z12.31 SCREENING MAMMOGRAM FOR BREAST CANCER: ICD-10-CM

## 2023-06-27 DIAGNOSIS — N95.1 VAGINAL DRYNESS, MENOPAUSAL: ICD-10-CM

## 2023-06-27 DIAGNOSIS — Z01.419 ENCOUNTER FOR GYNECOLOGICAL EXAMINATION (GENERAL) (ROUTINE) WITHOUT ABNORMAL FINDINGS: Primary | ICD-10-CM

## 2023-06-27 PROCEDURE — 3008F BODY MASS INDEX DOCD: CPT | Performed by: STUDENT IN AN ORGANIZED HEALTH CARE EDUCATION/TRAINING PROGRAM

## 2023-06-27 PROCEDURE — 3079F DIAST BP 80-89 MM HG: CPT | Performed by: STUDENT IN AN ORGANIZED HEALTH CARE EDUCATION/TRAINING PROGRAM

## 2023-06-27 PROCEDURE — 99386 PREV VISIT NEW AGE 40-64: CPT | Performed by: STUDENT IN AN ORGANIZED HEALTH CARE EDUCATION/TRAINING PROGRAM

## 2023-06-27 PROCEDURE — 3075F SYST BP GE 130 - 139MM HG: CPT | Performed by: STUDENT IN AN ORGANIZED HEALTH CARE EDUCATION/TRAINING PROGRAM

## 2023-06-27 RX ORDER — OSPEMIFENE 60 MG/1
60 TABLET, FILM COATED ORAL DAILY
Qty: 30 TABLET | Refills: 11 | Status: SHIPPED | OUTPATIENT
Start: 2023-06-27 | End: 2023-07-27

## 2023-06-28 LAB — HPV I/H RISK 1 DNA SPEC QL NAA+PROBE: NEGATIVE

## 2023-07-12 NOTE — ANESTHESIA PREPROCEDURE EVALUATION
Anesthesia PreOp Note    HPI:     Eris Nelson is a 63year old female who presents for preoperative consultation requested by: Shreya Randhawa MD    Date of Surgery: 9/2/2020    Procedure(s):  EXCISION OF LESION/LIPOMA  Indication: Lipoma of right upper e DESTRUCTION SKIN LESION(S)  2010    destrruction of lesions, vulva, simple; metrorrhagia   • DILATION/CURETTAGE,DIAGNOSTIC  2013   • EXTRACTION ERUPTED TOOTH/EXR  2010    dental extractions, dental carries   • HEMORRHOIDECTOMY     • HERNIA INCISIONAL REPAI mouth every morning before breakfast., Disp: 30 tablet, Rfl: 2, 9/1/2020  Estrogens, Conjugated (PREMARIN) 0.625 MG/GM Vaginal Cream, , Disp: , Rfl: , 8/31/2020  Digestive Enzymes (DIGESTIVE ENZYME OR), Take 1 capsule by mouth daily. , Disp: , Rfl: , 8/31/2 Smokeless tobacco: Never Used    Substance and Sexual Activity      Alcohol use: No        Alcohol/week: 0.0 standard drinks      Drug use: No      Sexual activity: Not on file    Lifestyle      Physical activity:        Days per week: Not on file        M 1.10 (H) 07/23/2020    GLU 77 07/23/2020    CA 9.2 07/23/2020          Vital Signs: Body mass index is 22.98 kg/m². height is 1.727 m (5' 8\") and weight is 68.5 kg (151 lb 2 oz). Her oral temperature is 98 °F (36.7 °C).  Her blood pressure is 118/69 and Detail Level: Detailed

## 2023-07-20 NOTE — TELEPHONE ENCOUNTER
Requested Prescriptions     Pending Prescriptions Disp Refills    ondansetron 4 MG Oral Tablet Dispersible 60 tablet 3     Sig: Place 1 tablet (4 mg total) under the tongue every 8 (eight) hours as needed for Nausea. Lr: 2023               qty:60 tablet      refills:3  Lov: 2021    Called patient ,verified  , to confirm the refill request if she needs more or does she still have any left since there were 3 refills since 2023 and she is almost running out of them . Last Colon was in 2022  Next Appt:  With Gastroenterology (Praneeth Glaser MD)  2023 at 11:00 AM

## 2023-07-21 ENCOUNTER — OFFICE VISIT (OUTPATIENT)
Dept: ORTHOPEDICS CLINIC | Facility: CLINIC | Age: 58
End: 2023-07-21
Payer: COMMERCIAL

## 2023-07-21 VITALS — BODY MASS INDEX: 21.67 KG/M2 | WEIGHT: 143 LBS | HEIGHT: 68 IN

## 2023-07-21 DIAGNOSIS — M48.062 SPINAL STENOSIS OF LUMBAR REGION WITH NEUROGENIC CLAUDICATION: Primary | ICD-10-CM

## 2023-07-21 PROCEDURE — 99214 OFFICE O/P EST MOD 30 MIN: CPT | Performed by: STUDENT IN AN ORGANIZED HEALTH CARE EDUCATION/TRAINING PROGRAM

## 2023-07-21 PROCEDURE — 3008F BODY MASS INDEX DOCD: CPT | Performed by: STUDENT IN AN ORGANIZED HEALTH CARE EDUCATION/TRAINING PROGRAM

## 2023-07-21 RX ORDER — GABAPENTIN 100 MG/1
100 CAPSULE ORAL 3 TIMES DAILY
Qty: 60 CAPSULE | Refills: 0 | Status: SHIPPED | OUTPATIENT
Start: 2023-07-21

## 2023-07-21 RX ORDER — DIAZEPAM 5 MG/1
5 TABLET ORAL ONCE
Qty: 1 TABLET | Refills: 0 | Status: SHIPPED | OUTPATIENT
Start: 2023-07-21 | End: 2023-07-21

## 2023-07-24 RX ORDER — ONDANSETRON 4 MG/1
4 TABLET, ORALLY DISINTEGRATING ORAL EVERY 8 HOURS PRN
Qty: 60 TABLET | Refills: 3 | Status: SHIPPED | OUTPATIENT
Start: 2023-07-24

## 2023-07-25 RX ORDER — PRUCALOPRIDE 2 MG/1
1 TABLET, FILM COATED ORAL DAILY
Qty: 30 TABLET | Refills: 0 | Status: SHIPPED | OUTPATIENT
Start: 2023-07-25

## 2023-07-25 NOTE — TELEPHONE ENCOUNTER
Requested Prescriptions     Pending Prescriptions Disp Refills    MOTEGRITY 2 MG Oral Tab [Pharmacy Med Name: Motegrity 2 Mg Tab [facility]] 30 tablet 0     Sig: TAKE ONE TABLET BY MOUTH ONE TIME DAILY       LOV 11/30/2022  LR  6/21/2023    em

## 2023-08-01 RX ORDER — DIAZEPAM 5 MG/1
TABLET ORAL
Qty: 1 TABLET | Refills: 0 | Status: SHIPPED | OUTPATIENT
Start: 2023-08-01

## 2023-08-01 NOTE — TELEPHONE ENCOUNTER
5 mg Diazepam was ordered for pts MRI on 23  MRI scheduled for 8/3/23    Pt requesting 10 mg diazepam, 2 tabs, instead due to claustraphobia. She has previously had this dosing prescribed by another provider in 2022. Spoke with pt, states in past has had as high as 15mg diazepam for proicedures which worked better but \"did okay\" with the 2 tabs. Discussed techniques that can be helpful in MRI, pt states she has tried them and it made her feel jumpier. Pt confirmed she DOES still have 5 mg diazepam that was ordered . Pt confirmed she will have a . Please advise. Medication Quantity Refills Start    diazePAM 10 MG Oral Tab (Discontinued) 2 tablet 0 2022    Si tab PO 1 hour before MRI; 2nd tablet PO 30 minutes before MRI PRN claustrophobia. Must have . Route:   (none)              To call back back.

## 2023-08-01 NOTE — TELEPHONE ENCOUNTER
Patient calling stating that she will need 2 pills of diazePAM (VALIUM) 5 MG Oral Tab instead of just one, in order to complete the MRI since she is claustrophobic. [facility] [address] [facility] [phone], [phone]       Please advise. Patient is scheduled for MRI on 08/3.

## 2023-08-03 ENCOUNTER — HOSPITAL ENCOUNTER (OUTPATIENT)
Dept: MRI IMAGING | Age: 58
Discharge: HOME | End: 2023-08-03
Attending: STUDENT IN AN ORGANIZED HEALTH CARE EDUCATION/TRAINING PROGRAM
Payer: COMMERCIAL

## 2023-08-03 DIAGNOSIS — M48.062 SPINAL STENOSIS OF LUMBAR REGION WITH NEUROGENIC CLAUDICATION: ICD-10-CM

## 2023-08-03 PROCEDURE — 72148 MRI LUMBAR SPINE W/O DYE: CPT | Performed by: STUDENT IN AN ORGANIZED HEALTH CARE EDUCATION/TRAINING PROGRAM

## 2023-08-04 DIAGNOSIS — M48.062 SPINAL STENOSIS OF LUMBAR REGION WITH NEUROGENIC CLAUDICATION: Primary | ICD-10-CM

## 2023-08-04 RX ORDER — GABAPENTIN 300 MG/1
300 CAPSULE ORAL 3 TIMES DAILY
Qty: 90 CAPSULE | Refills: 0 | Status: SHIPPED | OUTPATIENT
Start: 2023-08-04

## 2023-08-04 NOTE — PROGRESS NOTES
Reviewing MRI over the phone with patient. 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 Recommend trial of epidural steroid injections. Increased dosage of gabapentin.   And referred patient to physical therapy

## 2023-08-08 ENCOUNTER — OFFICE VISIT (OUTPATIENT)
Dept: PHYSICAL MEDICINE AND REHAB | Facility: CLINIC | Age: 58
End: 2023-08-08
Payer: COMMERCIAL

## 2023-08-08 ENCOUNTER — TELEPHONE (OUTPATIENT)
Dept: PHYSICAL MEDICINE AND REHAB | Facility: CLINIC | Age: 58
End: 2023-08-08

## 2023-08-08 VITALS
OXYGEN SATURATION: 92 % | HEART RATE: 73 BPM | SYSTOLIC BLOOD PRESSURE: 130 MMHG | DIASTOLIC BLOOD PRESSURE: 72 MMHG | BODY MASS INDEX: 22 KG/M2 | WEIGHT: 143 LBS

## 2023-08-08 DIAGNOSIS — M54.16 LUMBAR RADICULITIS: Primary | ICD-10-CM

## 2023-08-08 DIAGNOSIS — M47.816 LUMBAR FACET ARTHROPATHY: ICD-10-CM

## 2023-08-08 PROCEDURE — 3078F DIAST BP <80 MM HG: CPT | Performed by: PHYSICAL MEDICINE & REHABILITATION

## 2023-08-08 PROCEDURE — 3075F SYST BP GE 130 - 139MM HG: CPT | Performed by: PHYSICAL MEDICINE & REHABILITATION

## 2023-08-08 PROCEDURE — 99214 OFFICE O/P EST MOD 30 MIN: CPT | Performed by: PHYSICAL MEDICINE & REHABILITATION

## 2023-08-08 RX ORDER — PREGABALIN 50 MG/1
CAPSULE ORAL
Qty: 60 CAPSULE | Refills: 0 | Status: SHIPPED | OUTPATIENT
Start: 2023-08-08

## 2023-08-08 NOTE — TELEPHONE ENCOUNTER
[facility] called to verify Pregabalin order for this pt. Per pharmacy pt was prescribed Gabapentin on 08/04 by Dr Jefrey Frankel. Verified with Dr Chris Beach and Pt has stopped gabapentin due to having side effects (headache). -Called pharmacy and confirmed the Pregabalin order. No further actions needed at this time.

## 2023-08-16 ENCOUNTER — HOSPITAL ENCOUNTER (OUTPATIENT)
Dept: MAMMOGRAPHY | Age: 58
Discharge: HOME | End: 2023-08-16
Attending: STUDENT IN AN ORGANIZED HEALTH CARE EDUCATION/TRAINING PROGRAM
Payer: COMMERCIAL

## 2023-08-16 DIAGNOSIS — Z12.31 SCREENING MAMMOGRAM FOR BREAST CANCER: ICD-10-CM

## 2023-08-16 PROCEDURE — 77067 SCR MAMMO BI INCL CAD: CPT | Performed by: STUDENT IN AN ORGANIZED HEALTH CARE EDUCATION/TRAINING PROGRAM

## 2023-08-16 PROCEDURE — 77063 BREAST TOMOSYNTHESIS BI: CPT | Performed by: STUDENT IN AN ORGANIZED HEALTH CARE EDUCATION/TRAINING PROGRAM

## 2023-08-17 ENCOUNTER — TELEPHONE (OUTPATIENT)
Dept: OBGYN CLINIC | Facility: CLINIC | Age: 58
End: 2023-08-17

## 2023-08-17 DIAGNOSIS — R92.8 ABNORMAL MAMMOGRAM OF LEFT BREAST: Primary | ICD-10-CM

## 2023-08-18 ENCOUNTER — APPOINTMENT (OUTPATIENT)
Dept: ULTRASOUND IMAGING | Facility: HOSPITAL | Age: 58
End: 2023-08-18
Attending: STUDENT IN AN ORGANIZED HEALTH CARE EDUCATION/TRAINING PROGRAM
Payer: COMMERCIAL

## 2023-08-18 ENCOUNTER — HOSPITAL ENCOUNTER (OUTPATIENT)
Dept: ULTRASOUND IMAGING | Facility: HOSPITAL | Age: 58
Discharge: HOME | End: 2023-08-18
Attending: STUDENT IN AN ORGANIZED HEALTH CARE EDUCATION/TRAINING PROGRAM
Payer: COMMERCIAL

## 2023-08-18 ENCOUNTER — HOSPITAL ENCOUNTER (OUTPATIENT)
Dept: MAMMOGRAPHY | Facility: HOSPITAL | Age: 58
Discharge: HOME | End: 2023-08-18
Attending: STUDENT IN AN ORGANIZED HEALTH CARE EDUCATION/TRAINING PROGRAM
Payer: COMMERCIAL

## 2023-08-18 DIAGNOSIS — R92.8 ABNORMAL MAMMOGRAM: ICD-10-CM

## 2023-08-18 PROCEDURE — 76642 ULTRASOUND BREAST LIMITED: CPT | Performed by: STUDENT IN AN ORGANIZED HEALTH CARE EDUCATION/TRAINING PROGRAM

## 2023-08-18 PROCEDURE — 77061 BREAST TOMOSYNTHESIS UNI: CPT | Performed by: STUDENT IN AN ORGANIZED HEALTH CARE EDUCATION/TRAINING PROGRAM

## 2023-08-18 PROCEDURE — 77065 DX MAMMO INCL CAD UNI: CPT | Performed by: STUDENT IN AN ORGANIZED HEALTH CARE EDUCATION/TRAINING PROGRAM

## 2023-08-21 ENCOUNTER — TELEPHONE (OUTPATIENT)
Dept: OBGYN CLINIC | Facility: CLINIC | Age: 58
End: 2023-08-21

## 2023-08-21 NOTE — TELEPHONE ENCOUNTER
Called and discussed results with patient. Recommend 6 month follow-up mammogram. Pt verbalized understanding and all questions answered.

## 2023-08-22 ENCOUNTER — HOSPITAL ENCOUNTER (OUTPATIENT)
Dept: CT IMAGING | Facility: HOSPITAL | Age: 58
Discharge: HOME | End: 2023-08-22
Attending: INTERNAL MEDICINE
Payer: COMMERCIAL

## 2023-08-22 DIAGNOSIS — Z87.891 HISTORY OF TOBACCO USE: ICD-10-CM

## 2023-08-22 DIAGNOSIS — R91.1 PULMONARY NODULE: ICD-10-CM

## 2023-08-22 LAB
CREAT BLD-MCNC: 1.3 MG/DL
EGFRCR SERPLBLD CKD-EPI 2021: 47 ML/MIN/1.73M2 (ref 60–?)

## 2023-08-22 PROCEDURE — 71260 CT THORAX DX C+: CPT | Performed by: INTERNAL MEDICINE

## 2023-08-22 PROCEDURE — 82565 ASSAY OF CREATININE: CPT

## 2023-08-30 ENCOUNTER — OFFICE VISIT (OUTPATIENT)
Facility: CLINIC | Age: 58
End: 2023-08-30

## 2023-08-30 ENCOUNTER — TELEPHONE (OUTPATIENT)
Dept: GASTROENTEROLOGY | Facility: CLINIC | Age: 58
End: 2023-08-30

## 2023-08-30 ENCOUNTER — TELEPHONE (OUTPATIENT)
Facility: CLINIC | Age: 58
End: 2023-08-30

## 2023-08-30 VITALS
WEIGHT: 143 LBS | HEART RATE: 63 BPM | DIASTOLIC BLOOD PRESSURE: 80 MMHG | HEIGHT: 68 IN | BODY MASS INDEX: 21.67 KG/M2 | SYSTOLIC BLOOD PRESSURE: 117 MMHG

## 2023-08-30 DIAGNOSIS — Z93.3 STATUS POST HARTMANN PROCEDURE (HCC): ICD-10-CM

## 2023-08-30 DIAGNOSIS — Z86.010 PERSONAL HISTORY OF COLONIC POLYPS: Primary | ICD-10-CM

## 2023-08-30 DIAGNOSIS — K21.9 GASTROESOPHAGEAL REFLUX DISEASE, UNSPECIFIED WHETHER ESOPHAGITIS PRESENT: ICD-10-CM

## 2023-08-30 DIAGNOSIS — R11.0 NAUSEA: ICD-10-CM

## 2023-08-30 DIAGNOSIS — K58.1 IRRITABLE BOWEL SYNDROME WITH CONSTIPATION: Primary | ICD-10-CM

## 2023-08-30 DIAGNOSIS — Z86.010 HISTORY OF COLON POLYPS: Primary | ICD-10-CM

## 2023-08-30 DIAGNOSIS — K59.00 CONSTIPATION, UNSPECIFIED CONSTIPATION TYPE: ICD-10-CM

## 2023-08-30 PROCEDURE — 99215 OFFICE O/P EST HI 40 MIN: CPT | Performed by: INTERNAL MEDICINE

## 2023-08-30 PROCEDURE — 3008F BODY MASS INDEX DOCD: CPT | Performed by: INTERNAL MEDICINE

## 2023-08-30 PROCEDURE — 3079F DIAST BP 80-89 MM HG: CPT | Performed by: INTERNAL MEDICINE

## 2023-08-30 PROCEDURE — 3074F SYST BP LT 130 MM HG: CPT | Performed by: INTERNAL MEDICINE

## 2023-08-30 RX ORDER — PRUCALOPRIDE 2 MG/1
1 TABLET, FILM COATED ORAL DAILY
Qty: 30 TABLET | Refills: 0 | Status: SHIPPED | OUTPATIENT
Start: 2023-08-30 | End: 2023-08-30

## 2023-08-30 RX ORDER — PRUCALOPRIDE 2 MG/1
1 TABLET, FILM COATED ORAL DAILY
Qty: 90 TABLET | Refills: 3 | Status: SHIPPED | OUTPATIENT
Start: 2023-08-30

## 2023-08-30 RX ORDER — ONDANSETRON 4 MG/1
4 TABLET, ORALLY DISINTEGRATING ORAL EVERY 8 HOURS PRN
Qty: 90 TABLET | Refills: 11 | Status: SHIPPED | OUTPATIENT
Start: 2023-08-30

## 2023-08-30 RX ORDER — POLYETHYLENE GLYCOL 3350, SODIUM SULFATE ANHYDROUS, SODIUM BICARBONATE, SODIUM CHLORIDE, POTASSIUM CHLORIDE 236; 22.74; 6.74; 5.86; 2.97 G/4L; G/4L; G/4L; G/4L; G/4L
4 POWDER, FOR SOLUTION ORAL ONCE
Qty: 1 EACH | Refills: 0 | Status: SHIPPED | OUTPATIENT
Start: 2023-08-30 | End: 2023-08-30

## 2023-08-30 NOTE — PROGRESS NOTES
HPI:    Patient ID: Prudencio Beck is a 60year old woman with an extremely complex refractory constipation and abdominal surgery history including:    Cholecystectomy surgery 2013  Emergency laparotomy July 2014 with sigmoid colon resection, colostomy due to Stercoral ulcer/perforation sigmoid colon; with subsequent elective takedown of colostomy  At least 2 lysis of adhesion surgeries  At least 1 incisional hernia surgery including most recent 7/26/2018    Seeing Dr. Erin Gregorio for MGUS. Takes potassium and a water pill, looks like torsemide. Ms. Anitra Bauer presents today for follow-up of her IBS, likely sequelae of above extreme abdominal surgical history. Last saw Joseph Coates 11/30/2022 as below. There was concern for abnormal pancreatic duct on CT scan September 2022. Reassuring subsequent MRI MRCP 12/14/2022. At least 2 episodes of rectal bleeding September 2022 prior to visit with [facility]. Scheduled for colonoscopy examination for follow-up of colon polyps. Prescription therapy for her constipation has been a challenge. Last time, Ms. Anitra Bauer saw me for issues including the nausea and severe constipation refractory to maximum dose Linzess. Insurance would not cover [place] Farideh so per their restrictions we prescribed her the Trulance laxative on 12/13/2021, then lubiprostone 12/29/2021. These made no difference. Motegrity (Prucalopride) ultimately was covered after this. It looks like after ongoing prescriptions for Linzess through most of 2022, first prescription for Deshaun Farideh was written 11/30/2022, prescribed multiple times after that including most recently 7/25/2023.    9 months later, none of the 3 prescription laxatives have given complete relief. [place] Farideh seems to be the best yet. On the Deshaun Farideh, Ms. Hernadez describes having a bowel movement about every other day, unsatisfying and with copious mucus.     She does take an occasional dose of Dulcolax/bisacodyl which does give relief. Ongoing/unchanged severe numerous abdominal symptoms including nausea and acidity, heartburn symptoms pretty much all day. Ms. Bernadine Hernandez remains dependent on ondansetron antinausea medication. She states \"I am always nauseous. \"  Poor appetite. She takes the sublingual dissolving tablets. She continues to take the ondansetron 3 times daily for the nausea. As before, familiar with just about all OTC laxatives. Dulcolax seems to work better than most.    Also takes OTC digestive enzymes    Has been prescribed dicyclomine in the past.  Takes an occasional dose of dicyclomine for cramping. Ms. Bernadine Hernandez denies previous pelvic physical therapy.    ====================  Previous visit Mayte Coronado 11/30/2022:    Donna Balbuena is a 61year old year-old female with an extremely complex surgery history including:        She is here today for f/u     Cholecystectomy surgery  Emergency laparotomy July 2014 with sigmoid colon resection, colostomy due to Stercoral ulcer/perforation sigmoid colon; with elective takedown of colostomy  At least 2 lysis of adhesion surgeries  At least 1 incisional hernia surgery including most recent 7/26/2018     Seeing Dr. Carly Kramer for MGUS. Last visit w/ Dr. Karie Wright 11/2021  Extensive surgical history thought to be contributing to issues  rx sent for zelnorm-not approved by insurance so tried trulance w/o improvement and went back to linzess     Has tried xifaxan in past and she thinks did improve symptoms     Has been having worsening of her constipation w/ episode of brbpr. Worsening of gerd and nausea. Saw Dr. Carly Kramer     Had Ct a/p 9/2022  Development of mild pancreatic ductal dilatation without suspicious pancreatic mass or distal pancreatic atrophy. This finding is nonspecific but may relate to age-related ductal ectasia.        lfts normal 9/2022     she moves her bowels every 2-3 days. Had brbpr w/ bm in sept x 2.  No melena. No rectal pain     she has acid reflux and seems worse x last 6 mos. Has nausea w/ reflux. No vomiting. Feels bloated in abdomen and improves with bm. she denies dysphagia, odynophagia and/or globus. No abd pain. Has cramping in low belly prior to bm. Poor appetite 2/2 acid reflux and nausea. Weight is always up and down. NSAIDS: no  Tobacco: no  Alcohol: no  Marijuana: no  Illicit drugs: no     No FH GI malignancy     No history of adverse reaction to sedation  No RASHID  No anticoagulants  No pacemaker/defibrillator  No pain medications and/or sleep aides        Last colonoscopy: 9mm pendunculated adenomatous colon polyp recent colonoscopy examination August 2020  Last EGD:Reassuring examination August 2020     . .. ASSESSMENT/PLAN:   Terri Mckeon is a 61year old year-old female with an extremely complex surgery history:     #constipation  She has chronic constipation and decreased response to linzess over time. No improvement w/ trulance. Zelnorm not covered by insurance. Do not think amitiza is a good choice, because she already has nausea. Plan to request motegrity. Add miralax at night to linzess in am in interim. #colon polyps  #brbpr  Suspect reported brbpr likely hemorrhoidal, however had 3 polyps removed during last cln, largest of which was 9 mm. PATH hyperplastic x 2 in right colon. TA in rectum. 3 year crc screening interval advised. No fh gi malignancy. No anemia 9/2022. Plan for repeat now to exclude advanced polyp, etc.      #gerd  #nausea  Chronic, however seems worse x last 6 mos. Think constipation may be contributing (discussed above). Last egd 8/2020 and reassuring. Currently on omeprazole 40 mg daily and zofran. Recommend increasing omeprazole to bid x 4 weeks and then attempt to reduce dose back to daily. Continue zofran. Discussed reflux diet modification. CTM for need for repeat EGD.      #abnormal ct  Ct a/p 9/2022 remarkable for mild pancreatic ductal dilatation without suspicious pancreatic mass or distal pancreatic atrophy. This finding is nonspecific but may relate to age-related ductal ectasia. LFTs normal (9/2022). Finding new from comparison imaging and will get MRCP for further evaluation. Consider need for EUS. -mrcp - has claustrophobia and will send anxiolytic prior to imaging, understands will need  with medication  -omeprazole 40 mg twice daily x 4 weeks and then reduce dose to once daily  -continue zofran  -reflux diet modification  -linzess in am   -miralax in pm   -try for motegrity     -consider egd +/- eus     1. Schedule colonoscopy with [facility] w/ any MD [Diagnosis: colon polyps, brbpr]     2.  bowel prep from pharmacy (split trilyte)     3. Continue all medications for procedure     4. Read all bowel prep instructions carefully    ====================  Previous visit 11/19/2021:     Karol Ordoñez is a woman with an extremely complex surgery history including:    Cholecystectomy surgery 2013  Emergency laparotomy July 2014 with sigmoid colon resection, colostomy due to Stercoral ulcer/perforation sigmoid colon; with elective takedown of colostomy  At least 2 lysis of adhesion surgeries  At least 1 incisional hernia surgery including most recent 7/26/2018    Seeing Dr. Marquis Laureano for MGUS. Ms. Norma Madrid presents today for follow-up of her IBS, likely sequelae of above extreme abdominal surgical history. Extensive work-up to date has included several visits with Kacey Jones of our group, CT scan abdomen and pelvis and more recent CT scans of the chest; and EGD/colonoscopy examination which were reassuring as below. Issues discussed today:    1. Severe IBS-constipation, likely component of adhesions, sequelae of above surgical history    Ms. Hernadez describes ongoing, refractory constipation even on maximum dose Linzess 290 mcg.   On a daily dose of Linzess, Ms. Green Record states that she is only passing a bowel movement every 2-3 days. She continues to have significant symptoms including abdominal cramping nausea and bloating. Ms. Yue Ward states that she is eating much less due to the symptoms, only one meal per day by her estimation. Previously tried and failed MiraLAX laxative leading up to the [address]. She has tried combining MiraLAX with the Linzess without any significant effect. Does not recall previous trial of Bentyl/dicyclomine. Vegetarian    2. Worsening nausea past 9 months    Ms. Yue Ward states that the nausea preceded current tramadol pain medication due to back pain.   Zofran does relieve the nausea, asks for a new prescription    Wt Readings from Last 20 Encounters:  08/30/23 : 143 lb (64.9 kg)  08/08/23 : 143 lb (64.9 kg)  07/21/23 : 143 lb (64.9 kg)  06/27/23 : 143 lb 9.6 oz (65.1 kg)  04/17/23 : 145 lb 9.6 oz (66 kg)  03/20/23 : 146 lb 6.4 oz (66.4 kg)  11/30/22 : 142 lb (64.4 kg)  11/07/22 : 138 lb (62.6 kg)  09/19/22 : 142 lb (64.4 kg)  07/14/22 : 142 lb (64.4 kg)  04/26/22 : 145 lb (65.8 kg)  03/17/22 : 143 lb (64.9 kg)  02/07/22 : 145 lb (65.8 kg)  01/11/22 : 145 lb (65.8 kg)  12/02/21 : 145 lb (65.8 kg)  11/22/21 : 145 lb (65.8 kg)  11/19/21 : 146 lb 9.6 oz (66.5 kg)  10/26/21 : 148 lb (67.1 kg)  09/30/21 : 149 lb (67.6 kg)  09/23/21 : 151 lb (68.5 kg)            HPI    Review of Systems    ====================  Follow-up visit CHRISTIAN Francisco 9/8/2021:      Reason for consult: f/u     Requesting physician or provider: Wesley Manuel DO     Patient presents with:  Consult: nausea; acid reflux        HPI:   Amy Vick is a 61year old year-old female with history as detailed below:     she is here today for follow-up     Ct a/p 8/1/20  Question of gastritis or ulcer disease  S/p ccy changes  Constipation  Enlarged spleen-seeing Dr. Marlon Sosa     S/p cln/egd w/ Dr. Jefferson Roy 8/4/20-no h.pylori, celiac on path  She had 2 polyps removed, some of which were right sided and hperplastic and some of which were the pre-cancerous type. Recommendation to repeat cln in 3 years     Was still having bloating at last visit and plan to trial fodmap diet  Start linzess     Consider trial of xifaxan     she moves her bowels every other day while on linzess 145 mcg. Bloating and gas have gotten worse since last visit and seem to improve with bm.  she denies brbpr and/or melena. she endorses daily reflux symptoms while on pantoprazole 40 mg daily. Takes dose in am. she denies dysphagia, odynophagia and/or globus. she has mid line low belly pain when she is constipated. she has on-going nausea. vomiting x 3 that is bilious. Eats 1 meal a day 2/2 reflux complaints. Weight stable. Seeing Dr. Julianna Zamudio for MGUS     ccy approx 10 years ago  NSAIDS: no  Tobacco: quit years ago  Alcohol: no  Illicit drugs: no     No FH GI malignancy      ASSESSMENT/PLAN:   Cheyenne Farnsworth is a 61year old year-old female with history as detailed below:     #constipation  #bloating  #gassiness  #abd pain  She is here today for f/u. She has on-going complaints of constipation and think bloating/gas/abd pain likely related and recommend increasing her linzess to 290 mcg daily. Cln/EGD w/ duodenal bx, CT A/P w/o abnormality to explain symptoms. If no improvement consider trial of xifaxan. Discussed avoidance of high fodmap foods. #h/o cln polyps  Due for crc screening 2023 unless otherwise indicated     #gerd  #nausea  She has on-going reflux complaints while on pantoprazole 40 mg/daily as well as nausea with occasional bilious vomiting. EGD 2020 w/ unremarkable duodenal and gastric bx. Plan to switch to omeprazole 40 mg w/ plan to increase to bid x 4 weeks if no improvement in once daily therapy after 2 week trial. Follow reflux diet modification.  Consider referral to tertiary medical center for motility/ph testing if on-going symptoms in spite of bid ppi therapy.     -linzess 290 mcg constipation  -omeprazole 40 mg for reflux  -reflux diet modification  -cln 2023 unless otherwise indicated  -Call [facility] [phone] with update in 2-4 weeks  -See MD at next visit     ====================    Yareli Barreralock   Exam Description:   CT Abdomen Pelvis W Con   Date of Exam:  07/13/2014       This report includes an Addendum and supersedes previous reports for this   exam.PROCEDURE:    CT OF THE ABDOMEN AND PELVIS WITH CONTRAST       COMPARISON: [facility], CT ABD/PELV 300 [facility], 7/06/2011, 13:52. INDICATIONS: Generalized abdominal pain and constipation: r/o obstruction       TECHNIQUE:   CT abdomen and pelvis were obtained with non-ionic intravenous                contrast material.       FINDINGS:   LIVER:              Normal.   SPLEEN:             Splenomegaly with AP diameter of 16.8 cm and previously                       measuring 16.4 cm. PANCREAS:           Normal.   BILIARY:            No evidence for biliary dilatation. Postcholecystectomy. ADRENALS:           Normal.   KIDNEYS:            Normal.   AORTA/VASCULAR:     Atherosclerotic vascular calcification. Patent major                       abdominal aortic branches. No aneurysm or dissection. No portal venous or mesenteric gas. LYMPHADENOPATHY: None. GI/MESENTERY:       Large amount of liquid type stool in the colon mixed                       with solid stool with mild to moderate generalize                       colonic distention. More solid stool distending the                       proximal and mid sigmoid colon which has a transverse                       dimension of about 7.1 cm. Collapse of the distal                       sigmoid colon and rectum. Mild hazy infiltration of the                       fat around the mid sigmoid colon posterior wall of the                       sigmoid colon (image 75 series 3).  Few sigmoid colon                       diverticula. Normal opacified small bowel. ABDOMINAL WALL:     Small fat containing umbilical hernia. URINARY BLADDER:    Nearly collapsed limiting the evaluation. ASCITES:                         None. PELVIC ORGANS:      No suspect pelvic mass. BONES:              No suspect bone lesion. LUNG BASES:         Linear atelectasis or scarring in both lower lungs. OTHER:              Negative. CONCLUSION: A correction of the first word and at the conclusion appears in   parentheses next the word. 1. Clinical (Liquid) type stool mixed with some solid stool associated with      mild to moderate generalize colonic distention. More solid stool in the      sigmoid colon associated with moderate distention of the sigmoid colon      and hazy infiltration of the perisigmoid fat. Relative decompression of      the distal sigmoid colon and rectum. Coexistent sigmoid colon      diverticula. Small foci of gas along the posterior wall of the redundant      sigmoid colon may be secondary to focal sigmoid pneumatosis from      ischemia or less likely related to inflammatory changes from      diverticulitis. Relative decompression of the distal sigmoid colon and      rectum. No discrete obstructing mass. Suggest a followup sigmoidoscopy      or colonoscopy after resolution of the acute sigmoid colitis to exclude      a small intrinsic colonic lesion associated with narrowing. 2. Splenomegaly unchanged. 3. Postcholecystectomy. 4. Atherosclerotic vascular calcification. No aneurysm. 5. Fat-containing umbilical hernia unchanged. Dictated by (CST):  Alessandra Kline MD on 7/13/2014 at 14:16     Approved by   (CST):  Alessandra Kline MD on 7/13/2014 at 14:16       ADDENDUM:       Correction to number one in the conclusion is placed in parentheses above   in the report. The word clinical should be changed to liquid.        Dictated by (CST):  Alessandra Kline MD on 7/13/2014 at 20:49     Approved by   (CST):  Meliza Diaz MD on 7/13/2014 at 20:49                                                                  Electronically Verified                                             Yordy Faria MD [id number] 07/13/2014                                                                            20:50       D:   07/13/2014 2:16 P   T:   07/13/2014  2:16 P   ATTENDING:  Arik Myles MD [id number]   ORDERING:  Lizett Gaston       DICTATING:   BARRY Ralph    MD ID #:    [id number]   cc:  Arik Myles MD [id number] [facility] MD [phone]       ====    8/01/2020    CT ABDOMEN + PELVIS (CONTRAST ONLY) (CPY=48952)       COMPARISON: [facility], CT ABDOMEN + PELVIS (CONTRAST ONLY) (CPT=74177), 7/22/2018, 11:32 AM.       INDICATIONS: R11.0 Nausea K59.00 Constipation, unspecified R10.84 Generalized abdominal pain       TECHNIQUE: CT images of the abdomen and pelvis were obtained with non-ionic intravenous contrast material.        FINDINGS:   LIVER: No enlargement, atrophy, abnormal density, or significant focal lesion. GALLBLADDER: Surgically absent. BILIARY: Moderate prominence of the intrahepatic bile ducts similar to prior. Probably related to post cholecystectomy changes. SPLEEN: Markedly enlarged, gradient greater in craniocaudal dimension than the liver, measuring 18.2 cm. STOMACH: Diffuse thickening of the distal stomach, especially at the antrum. Duodenum also appears diffusely thickened. This is new from prior. PANCREAS: No lesion, fluid collection, ductal dilatation, or atrophy. ADRENALS: No defined mass or abnormal enlargement. KIDNEYS: Normal.  No mass, obstruction or calcification. AORTA/VASCULAR:   Moderate atherosclerotic calcifications. RETROPERITONEUM: No mass or enlarged adenopathy. BOWEL/MESENTERY: There is a large amount of stool throughout the colon.   It appears that there may have been a prior partial right colectomy. Cecum not definitively visualized. There is a surgical anastomosis at the rectosigmoid junction. ABDOMINAL WALL: There is been interval repair of the previously seen bowel containing ventral hernias. No evidence for recurrent hernia. URINARY BLADDER: Under distended, not well assessed. PELVIC NODES: No enlarged mass or adenopathy. PELVIC ORGANS: No visible mass. Pelvic organs appropriate for patient age. Surgical clips at both adnexa, possible previous oophorectomies. No definite ovarian tissue seen. BONES:   Mild left convex scoliosis of the lumbar spine similar to prior. Multilevel degenerative disc and facet disease of the lower lumbar spine, similar to prior. Negative for focal destructive bone lesion in the imaged volume. LUNG BASES: Mild scarring in the medial right lung base anteriorly. CONCLUSION:       1. New thickening of the distal stomach and proximal duodenum. Consider duodenitis/gastritis or ulcer disease. 2. Surgically absent gallbladder with moderate intrahepatic biliary ductal dilatation, similar to prior. Probably related to post cholecystectomy changes. 3. Interval repair of previously seen bowel containing ventral hernias without evidence for recurrent hernia. 4. Large amount of stool throughout the colon suggests constipation. 5. Lesser findings as above. Dictated by (CST): Xochilt Wright MD on 8/01/2020 at 10:26 PM       ====    8/20/2021    CT CHEST(CONTRAST ONLY) (CPT=71260)       COMPARISON: [facility], [facility]. for [facility], CT STN/CHEST W CONTRAST (CPT=70491/40177), 8/13/2020, 2:34 PM.       INDICATIONS: N15.583 History of tobacco use R91.1 Pulmonary nodule       TECHNIQUE: CT images of the chest were obtained with non-ionic intravenous contrast material.        FINDINGS:   LINES AND TUBES: None.        MEDIASTINUM/VASCULATURE: 2.5 x 0.4 cm prevascular lymph node is unchanged in size and configuration since 8/13/2020 and measures less than 1 cm in short axis. Otherwise, no axillary, hilar, mediastinal lymphadenopathy. The thoracic aorta is unremarkable    and not dilated. Mediastinal fat planes are preserved. Cardiac chambers are unremarkable. Pericardium is normal. The main pulmonary artery has a normal diameter and is otherwise unremarkable. LUNGS AND PLEURA: There is biapical scarring and pleural thickening. Mild paraseptal emphysema seen within the bilateral upper lobes. Again visualized is a 0.4 cm subpleural nodule within the right lung apex (series 4, image 14) which is unchanged. 0.5 cm nodule abutting the right minor fissure (series 4, image 59) is unchanged and likely a benign inter fissural lymph node. Calcified granuloma within the left lower lobe (series 4, image 84) has developed calcification in the interim. Scarring   with a focal area of bronchiectasis is seen within the medial aspect of the right middle lobe, unchanged. No pneumothorax. No consolidation. No pleural effusion. The trachea and central airways are unremarkable. CHEST WALL/BONES: There is degenerative disease of the thoracic spine. The chest wall and osseous structures are otherwise unremarkable. LIMITED ABDOMEN: The patient is post cholecystectomy. Mild prominence of the common bile duct and intrahepatic ducts, an expected finding post cholecystectomy. No gross intra-or extrahepatic biliary ductal dilation. Tortuous splenic artery and splenic   vein and a dilated splenic vein are partially seen. CONCLUSION:       Mild paraseptal emphysema, unchanged. Subcentimeter bilateral pulmonary nodules are unchanged likely benign. Chronic scarring with bronchiectasis within the medial aspect of the right middle lobe, unchanged. Multiple other incidental findings as described in the body of the report which are unchanged.              Dictated by (CST): Mady Mccloud MD on 8/20/2021 at 7:49 PM ====================    9/29/2022: CT ABDOMEN + PELVIS (CONTRAST ONLY) (DAV=29404)     COMPARISON: [facility], CT ABDOMEN + PELVIS (CONTRAST ONLY) (CPT=74177), 7/22/2018, 11:32 AM.  [facility], [address], 3/21/2015, 1:01 PM.  [facility], [address] [place] [address] [address], 7/13/2014,  1:16 PM.  [facility], CT ABD/PELV WO CON FOR APPY, 7/06/2011, 1:52 PM.  [facility], [facility]. [facility], CT CHEST(CONTRAST ONLY) (CPT=71260), 8/22/2022, 1:37 PM.  CT CHEST(CONTRAST ONLY) (CPT=71260), 8/20/2021, 4:03  PM.  CT STN/CHEST W CONTRAST (CPT=70491/87067), 8/13/2020, 2:34 PM.  [facility], CT ABDOMEN + PELVIS (CONTRAST ONLY) (CPT=74177), 8/01/2020, 10:01 AM.     INDICATIONS: Night sweats, Splenomegaly. History of MGUS. TECHNIQUE: Multidetector CT images of the abdomen and pelvis were obtained with non-ionic intravenous contrast material. Automated exposure control for dose reduction was used. FINDINGS:  LUNG BASES: The heart is normal in size. There is stable mild scarring in the right middle lobe and lingula. LIVER: No enlargement, atrophy, abnormal density, or significant focal lesion is identified. BILIARY: The gallbladder is again noted to be surgically absent. The common duct measures 8 mm in the sneha hepatis and tapers distally, which is within normal limits for the patient's post cholecystectomy state and is unchanged over several years. Mild central intrahepatic biliary ectasia is also stable and likely relates to the patient's post cholecystectomy state. PANCREAS: The main pancreatic duct is mildly dilated in the pancreatic head measuring 3 mm but tapers distally, new from prior studies. No suspicious pancreatic mass or distal pancreatic atrophy.   SPLEEN: The spleen is moderately enlarged measuring 17.6 cm craniocaudad, previously 18.3 cm on 08/01/2020, 16.4 cm on 03/21/2015 and 12.3 cm on 07/06/2011. ADRENALS:   No defined mass or abnormal enlargement. KIDNEYS:   Symmetric enhancement is seen without evidence of hydronephrosis or underlying solid masses. GI/MESENTERY: There are stable postoperative changes from a partial sigmoid colonic resection with colocolonic anastomosis at the level of the rectosigmoid colon. No bowel wall thickening or obstruction. The appendix is not seen with certainty. URINARY BLADDER: No visible calculus or focal wall thickening. PELVIC NODES: Surgical clips are seen in the pelvis bilaterally. No lymphadenopathy. PELVIC ORGANS: No visible mass. Pelvic organs appropriate for patient age. VASCULATURE:   No aneurysm is detected. There is mild calcific atherosclerosis. RETROPERITONEUM: No mass or lymphadenopathy is apparent. BONES:   No significant bony lesion or fracture. There is stable mild levoscoliosis to the lumbar spine with moderate multilevel spondylosis. ABDOMINAL WALL: No mass or hernia is perceived. OTHER: No free air or fluid is seen in the abdomen or pelvis. CONCLUSION:  1. Moderate splenomegaly, relatively stable compared to the more recent abdominal CT studies but again worse compared to more remote CT studies. 2. Otherwise no acute intra-abdominal process. No intra-abdominal lymphadenopathy 4 breast of osseous lesions. 3. Development of mild pancreatic ductal dilatation without suspicious pancreatic mass or distal pancreatic atrophy. This finding is nonspecific but may relate to age-related ductal ectasia. 4. Stable postoperative changes from a partial sigmoid colonic resection with rectosigmoid anastomosis. 5. Mild central intrahepatic biliary ectasia is unchanged over several years and probably relates to the patient's post cholecystectomy state. 6. Lesser incidental findings as above.            Dictated by (CST): Endy Prasad MD on 9/29/2022 at 10:01 AM    ====================    12/14/2022:  MRI MRCP (W+WO) ([id number])     LOCATION:  [facility]       COMPARISON:  None. INDICATIONS:  Upper abdominal pain. TECHNIQUE:    Multiplanar single shot fast spin echo, chemical shift imaging, breath hold T2 weighted images and 2-D fiesta sequences were acquired. No contrast material was administered. Fluid sensitive imaging with MRCP. Reconstruction was also performed including 3D multi-projection imaging. Both projection and source MRCP images are evaluated. 3-D RENDERING:  Three dimensional image processing was completed using a separate workstation under concurrent supervision. Images were archived. PATIENT STATED HISTORY:(As transcribed by Technologist)  Prior abnormal CT scan, polyp of colon, gastroesophageal reflux disease, nausea and constipation. CONTRAST USED:  12mL of Dotarem     FINDINGS:  Patient is status post cholecystectomy. No biliary ductal dilatation noted. Distal CBD measures 4 mm in diameter. No filling defects along the biliary tree. Normal appearance of the pancreas and pancreatic duct. No focal hepatic lesions or abnormal enhancement of the liver noted. There is splenomegaly measuring 17.7 cm in craniocaudal dimension. No focal splenic lesions noted. The pancreas, pancreatic duct, adrenal glands and kidneys reveal no significant disease. There is an incidental simple cyst along the mid to inferior pole the right kidney measuring 8 mm. The visualized aspects of the gastrointestinal tract appear within normal limits. No evidence of ascites or inflammatory changes of the peritoneal cavity. CONCLUSION:    1. No evidence of biliary ductal dilatation or filling defects along the biliary tree. 2. Normal appearance of the pancreas and pancreatic duct. 3. Splenomegaly measuring 17.7 cm in craniocaudal dimension. No focal splenic lesions. 4. No acute process or significant disease identified. Please see further, less significant details above. Dictated by (CST): Bozena Ralph DO on 12/14/2022 at 3:53 PM     ====================    FRANTZ ZAPATA     DATE:  7/13/2014     PREOPERATIVE DIAGNOSIS:  Large bowel obstruction. POSTOPERATIVE DIAGNOSIS  1. Large bowel obstruction. 2.   Stercoral ulcer of the rectosigmoid junction with perforation   and feculent abscess formation. OPERATIVE PROCEDURE:  1. Rigid proctosigmoidoscopy. 2.   Exploratory laparotomy. 3.   Colorectal resection/Symone's procedure. 4.   Extensive debridement and drainage of pelvic abscess (with   evacuation of feculent contents). 5.   Appendectomy. 6.   Intraoperative bowel preparation. SURGEON:  Daron Gillespie MD     ASSISTANT:  Sushma Bowens SA     DESCRIPTION OF PROCEDURE:  Under general endotracheal anesthesia   with the patient in supine position after all the routine precautions for   the performance of a case of this nature were taken, the patient was   positioned in semi-lithotomy. We proceeded initially with a rigid  proctosigmoidoscopy, where the rigid proctosigmoidoscope was   advanced through the rectum up to about 16 cm. We couldn't advance any   further due to the presence of a tumoral pathology present on the proximal   aspect of the rectum toward the junction with the sigmoid. No pathology was  visualized through the proctosigmoidoscope. During the  proctosigmoidoscopy, the distal aspect of the rectum was prepped   in order to have it clean in case the pathology was going to lead us to   proceed with a low anterior resection and possible coloproctostomy. Having completed the proctosigmoidoscopy, the abdomen was   reprepped and draped in the usual surgical sterile fashion. An incision was   carried out from the midepigastrium down to the suprapubic area.   There was a   palpable tumor on the left pelvic area when the patient was asleep and a   rectal exam was done, so we approached this with a generous incision as stated   above from the midepigastrium through the right side of the navel down   to the suprapubic area. The incision was deepened through the skin and   the subcutaneous tissues. The linea alba was incised. Upon incising   the peritoneum, we entered the abdominal cavity. Proper exposure was   obtained with the [facility] retractor and we advanced now toward the pelvis. There was a massively distended colon and there was a tumor palpable   that turned out to be massive amounts of dry hardened stool. This stool had   perforated the rectosigmoid junction in the manner of a stercoral ulcer where   the rectosigmoid junction was perforated, and there was a feculent   abscess contained on the right side of the pelvis in between the right   adnexa and the rectosigmoid and the appendix. The dissection was continued   carefully and all feculent contents were evacuated, irrigated and aspirated   from within the pelvis. Dissection was now carried out around a   significantly edematous proximal rectum, and the contour stapling device was   applied to the rectum and triggered, producing transection of the rectum. Dissection was significantly difficult as stated above due to the edema and   the significant amount of hardened stool within the distal sigmoid and   the proximal rectum. Transection of the rectum allowed us to gain   access to the mesentery of the sigmoid colon, and this was transected by   means of the Harmonic scalpel and utilizing ligatures of 2-0 chromic catgut   when visible vessels were encountered. Transection of the mesentery of the   sigmoid colon continued proximally until we reached an area where the   bowel was more normal and we felt that safely we could transect it here. We   utilized here the HANNAH stapling device with the green load and transected   the colon in this area. Prior to this, we tried to manipulate the hardened   stool into the area of sigmoid colon that was going to be part of the   specimen. With the colon transected, the specimen consisted of the sigmoid   down to the proximal rectum with the perforation and most of the wall of   the stercoral abscess was removed from the operative field. Still   there was a significant amount of distention of the colon, and for this to be  decompressed we proceeded with an appendectomy. At the time of   proceeding with the appendectomy, the mesentery of the appendix was   controlled with 2-0 chromic catgut ligatures. The mesenteric base was transected   through the mesenteric orifice. Upon removing the specimen from the   operative field, we advanced the suction device and aspirated as much fluid   as possible from the most proximal colon. The orifice of the   appendectomy was closed in layers utilizing 2-0 chromic catgut and a sero-serosal   layer of 3-0 silk. The distal colon was now brought as an end-colostomy in the left   lower quadrant of the abdomen, where an area was determined proper for   the positioning of the colostomy between the anterior-superior iliac   spine and the umbilicus. Here with electrocautery we excised a [place] of   skin and dissection was carried out through the subcutaneous tissues down   to the superficial fascia of the external oblique muscle aponeurosis that   was open and the muscle internal oblique and transversus were split   following the directions of their fibers. The orifice was stretched in order to   allow us to bring the colostomy through it. The colostomy was anchored in   position with several stitches of 2-0 chromic catgut at the level of the   skin as well as the peritoneal side. Having anchored the colon in position and applying the colon to   the parietal peritoneum to prevent internal herniations, we irrigated  abundantly the abdominal cavity until the return was clear,   confirming good cleanliness as well as good hemostasis.   Two 10 mm flat   fully-perforated Be-Graves catheters were exited through the right side of the   abdomen almost at the flank, and they were positioned to drain the area   from where the stercoral abscess had been present. With the abdomen clean,   we proceeded now with closure in layers utilizing #1 Vicryl for the   peritoneum in a running fashion. The suture was interrupted in two segments   of the closure of the peritoneum. The fascial layer was closed with   looped 0 PDS that was also interrupted in two segments. Abundant irrigation   was carried out in between layers of closure. The subcutaneous tissues were  approximated  with 2-0 Vicryl and the skin was put together with   staples. In between the staples, we wedged Telfa strips soaked in Betadine. The colostomy appliance was applied around the colon and the area of   the colostomy on the left lower quadrant. Appropriate dressings were   applied. The patient was awakened and returned to the recovery room in   satisfactory condition. Estimated blood loss was less than 200 mL. D:    2014 2:24 A  T:    2014 11:48 A  ATTENDING:  Sesar Bear MD [id number]  DICTATING:    Loree Elaine MD [id number]  MD ID #:      [id number]  cc:   MD Mandy Solorzano MD [address] [facility] [address]    ====================  Operative Report     Patient Name:  Guillermo Simmons  MR:  [id number]  :  1965  DOS:  17     Preop Dx: Incisional hernia  Postop Dx: Incisional hernia, and adhesions    Procedure:  Procedure(s):  incisional hernia repair with mesh, lysis of adhesions, bilateral myocutaneous advancement    Surgeon:  Otoniel Salas MD    Surgical Assistant.: Eduardo Oscar CSA  EBL: 657 ml    Complication:  None     INDICATION:  Pt is a 46year old female who with a symptomatic Incisional hernia who is scheduled for a Procedure(s):  incisional hernia repair with mesh, lysis of adhesions, bilateral myocutaneous advancement.      CONSENT:    An informed discussion was held with the patient regarding the nature of ventral incisional hernias, the treatment options, expected outcomes and potential complications. These risks include but are not limited to bleeding, wound infection, mesh infection, bowel injury, conversion to open procedure, hernia recurrence and chronic abdominal pain. The patient expressed understanding and agreed to proceed with an open ventral hernia repair with mesh. TECHNIQUE:    The patient was taken to the operating room, placed in supine position, and placed under general endotracheal anesthesia. The abdomen was prepped and draped in sterile fashion. An Cheikh Suyapa was used to cover the skin. IV antibiotic was given and SCDs were placed for DVT prophylaxis. The skin and subcutaneous tissue was infiltrated with 0.5% marcaine. An incision was made over the hernia from the epigastric area to the suprapubic area. Two large incisional hernias containing intestine were encountered. We incised the hernia sac and divided the short bridge of fascia between the hernias. There was small bowel that was adherent to the inferior edge of the lower hernia. I performed adhesiolysis using sharp dissection to free the small intestine. There were no injury to the intestine seen. The adhesiolysis extended our operative time by at least 30 minutes. We excised the hernia sacs from the subcutaneous tissue and the fascia. The fascia edges were debrided. The fascia defect measured approximately 5 by 5 cm and 3 by 3 cm. I made the decision to fix the hernia via the retrorectus technique. We incised the posterior rectus fascia just lateral to the midline and dissected the posterior fascia from the rectus muscle. This was accomplished bilaterally to the semilunaris line. This allowed bilateral myocutaneous advancement to the midline that would allow primary re-approximation of the midline. I did not need to perform a release of the tranversus abdominus.   We then closed the posterior rectus fascia using 3-0 PDS in running fashion. A 20 x 10 cm piece of uncovered polypropylene mesh was placed in the retrorectus position and held in place using interrupted 3-0 PDS. The mesh laid flat and was further fixed in place using Evicel. The midline fascia was closed using running 0 looped PDS. The wound was irrigated with saline and found to be hemostatic. The subcutaneous tissue and skin were closed using 3-0 and 4-0 vicryl. Sterile dressing was applied and the patient was awaken from anesthesia. All instruments and sponge counts were correct and I was present for the critical portions of the procedure. Jones Canavan, MD  ====================    ESOPHAGOGASTRODUODENOSCOPY (EGD) & COLONOSCOPY REPORT           Anabel Adkins      1965 Age 63year old   PCP Radha Self DO Endoscopist Hilario Tom MD      Date of procedure: 20     Procedure: EGD w/ cold biopsy & Colonoscopy w/cold snare, cold biopsy and hot snare polypectomy     Pre-operative diagnosis: abdominal pain, nausea, constipation and dyspepsia     Post-operative diagnosis: gastric erythema, polyps, hemorrhoids, surgical anastomosis at 15 cm     Medications: MAC sedation  Withdrawal time: 18 minutes     Complications: none     Procedure: Informed consent was obtained from the patient after the risks of the procedure were discussed, including but not limited to bleeding, perforation, aspiration, infection, or possibility of a missed lesion. We discussed the risks/benefits and alternatives to this procedure, as well as the planned sedation. EGD procedure: The patient was placed in the left lateral decubitus position and begun on continuous blood pressure pulse oximetry and EKG monitoring and this was maintained throughout the procedure. Once an adequate level of sedation was obtained a bite block was placed.  Then the lubricated tip of the [id number] diagnostic video upper endoscope was inserted and advanced using direct visualization into the posterior pharynx and ultimately into the esophagus. Colonoscopy procedure: Once an adequate level of sedation was obtained a digital rectal exam was completed. Then the lubricated tip of the [id number] diagnostic video colonoscope was inserted and advanced without difficulty to the cecum using the CO2 insufflation technique. The cecum was identified by localizing the trifold, the appendix and the ileocecal valve. A routine second examination of the cecum/ascending colon was performed. Withdrawal was begun with thorough washing and careful examination of the colonic walls and folds. Photodocumentation was obtained. The bowel prep was good. Views of the colon were good with washing. I then carefully withdrew the instrument from the patient who tolerated the procedure well. Complications: None     EGD findings:       1. Esophagus: The squamocolumnar junction was noted at 39 cm and appeared regluar. The GE junction was noted at 39 cm from the incisors. No significant hiatal hernia. The esophageal mucosa appeared normal. There was no evidence of esophagitis, stricture or endoscopic evidence of Morales's esophagus. 2. Stomach: The stomach distended normally. Normal rugal folds were seen. The pylorus was patent. The gastric mucosa appeared erythematous so biopsies taken for h pylori. Retroflexion revealed a normal fundus and a normal cardia. 3. Duodenum: The duodenal mucosa appeared normal in the 1st and 2nd portion of the duodenum. Biopsies taken for celiac     Colonoscopy findings:     1. SAKINA: normal rectal tone, no masses palpated. 2. 3 polyp(s) noted as follows:      A. 3-4 mm polyp in the ascending colon; flat morphology; cold snare and cold biopsy polypectomy and retrieved. B. 9 mm polyp in the rectum colon; pedunculated morphology; hot snare polypectomy and retrieved.   3. Terminal ileum: the visualized mucosa appeared normal.  4. The colonic mucosa throughout the colon showed normal vascular pattern, without evidence of angioectasias or inflammation. 5. Sigmoid anastomosis at 15 cm. 6. A retroflexed view of the rectum revealed hemorrhoids. Recommend:  Await pathology. The interval for the next colonoscopy will be determined after reviewing pathology, 3-5 years. If new signs or symptoms develop, colonoscopy may need to be repeated sooner. High fiber diet. Monitor for blood in the stool. If having more than just tinge of blood, call office or go to the ER. PATH:    A. Duodenum; biopsy:  Duodenal mucosa with no significant pathologic change. Preserved villous architecture. B. Gastric biopsy:  Gastric antral mucosa with reactive gastropathy and focal mild chronic inactive gastritis. Negative for intestinal metaplasia or dysplasia. Diff-Quik stain (with appropriate control reactivity), is negative for H. pylori microorganisms. C. Ascending colon polyp:  Colonic mucosa with superficial hyperplastic changes. D. Rectal polyp:  Tubular adenoma fragments.      =======================    [facility] [place]        COLONOSCOPY PROCEDURE REPORT     DATE OF PROCEDURE:  12/22/2022      PCP: Juan Segal DO     PREOPERATIVE DIAGNOSIS: Personal history of colon polyps, rectal bleeding     POSTOPERATIVE DIAGNOSIS:  See impression. SURGEON:  CHARLEY Neal anesthesia provided by the Anesthesia Service. MAC anesthesia requested due to  anticipated intolerance of colonoscopy examination under safe doses conscious sedation medications     COLONOSCOPY PROCEDURE:   After the nature and risks of colonoscopy examination under MAC anesthesia were discussed with the patient and all questions answered, informed consent was obtained. The patient was sedated as above. Digital rectal exam was performed which showed no masses.   The Olympus pediatric video colonoscope was placed in the patient's rectum and advanced under direct visualization through the entire length of the colon up to the cecum. The cecum was confirmed by landmarks including appendiceal orifice, cecal trifold, ileocecal valve. Retroflexion was performed in the rectum. The quality of the prep was poor. Large masses of solid and semisolid stool scattered throughout the entire length of the colon, allowing for only evaluation of a small fraction of the colonic walls. Lesions could have been missed. Estimated blood loss: none/insignificant        COLONOSCOPY FINDINGS:    Within limits of a poor prep exam, no neoplasm, lesions or inflammatory process seen from the cecum through entire length of the colon down to the rectum. Small-medium sized nonbleeding internal hemorrhoids seen forward and retroflexed views anal canal and rectum. RECOMMENDATIONS:  High fiber diet. After questions of mild stable postcholecystectomy biliary dilation and mild 3mm???? pancreatic duct \"mild dilation,\" \"ductal ectasia\" on recent CT scan of 9/29/2022, we have a reassuring MRI MRCP with and without IV contrast 12/14/2022 showing normal biliary tree normal pancreas and pancreatic duct. Repeat colonoscopy examination in one year. Follow-up with Tayo Gongora NP of our group for multiple GI and abdominal symptoms, discussion of reassuring MRI findings above and discussion of a more effective bowel prep for next year's colonoscopy examination. Ms. Bronwyn Blancas does not appear to understand the MRI scan results of last week or the significance of its reassuring findings. Current Outpatient Medications   Medication Sig Dispense Refill    baclofen 10 MG Oral Tab Take 1 tablet (10 mg total) by mouth nightly as needed.  AT BEDTIME 30 tablet 0    pregabalin 50 MG Oral Cap 1 cap PO at bedtime x1 week, then 1 cap PO BID 60 capsule 0    ondansetron 4 MG Oral Tablet Dispersible Place 1 tablet (4 mg total) under the tongue every 8 (eight) hours as needed for Nausea. 60 tablet 3    potassium chloride 20 MEQ Oral Tab CR Take 1 tablet (20 mEq total) by mouth daily. 30 tablet 5    temazepam 15 MG Oral Cap Take 1 capsule (15 mg total) by mouth nightly as needed for Sleep. 90 capsule 1    traMADol 50 MG Oral Tab Take 1 tablet (50 mg total) by mouth every 6 (six) hours as needed for Pain. 90 tablet 3    dicyclomine 20 MG Oral Tab Take 1-2 tablets (20-40 mg total) by mouth 3 (three) times daily as needed. 90 tablet 3    Omeprazole 40 MG Oral Capsule Delayed Release Take 1 capsule (40 mg total) by mouth every morning before breakfast. 30 capsule 11    torsemide 20 MG Oral Tab Take 1 tablet (20 mg total) by mouth daily. Digestive Enzymes (DIGESTIVE ENZYME OR) Take 1 capsule by mouth daily. VITAMIN D, ERGOCALCIFEROL, OR Take 1 capsule by mouth daily. Multiple Vitamins-Minerals (MULTI-VITAMIN/MINERALS) Oral Tab Take 1 tablet by mouth daily. docusate sodium 100 MG Oral Cap Take 1 capsule (100 mg total) by mouth daily. Prucalopride Succinate (MOTEGRITY) 2 MG Oral Tab Take 1 tablet by mouth daily. 30 tablet 0         Allergies:No Known Allergies  Imaging: No results found. PHYSICAL EXAM:   Physical Exam           ASSESSMENT/PLAN:     60year old woman with an extremely complex surgery history including:    Cholecystectomy surgery 2013  Emergency laparotomy July 2014 with sigmoid colon resection, colostomy due to Stercoral ulcer/perforation sigmoid colon  At least 2 lysis of adhesion surgeries  At least 1 incisional hernia surgery including most recent 7/26/2018    Seeing Dr. Jaye Lang of Onc for MGUS. Ms. Tiarra Woodard has been following with myself and Cuca Viveros NP follow-up of her IBS, likely sequelae of above extreme abdominal surgical history. Severe ongoing constipation refractory to all prescription laxatives but Motegrity, still with incomplete relief. Severe, daily worsening nausea.   Ondansetron does relieve the nausea; new prescription sent in 8/30/2023. Suggest:    Irritable bowel syndrome-constipation, extreme  Severe lifelong constipation to the point that she developed stercoral ulcer, colonic perforation and peritonitis July 2014 as above. Vegetarian. Extensive work-up to date has included CT scan abdomen and pelvis 9/29/2022 and 8/1/2020 and more recent CT scans of the chest; MRI MRCP 12/14/2022; and EGD/colonoscopy examination 8/4/2020, colonoscopy examination 12/22/2022  Reassuring MRI lumbar spine 8/3/2023 showing moderate degenerative changes and degenerative disc disease without central canal stenosis or nerve impingement; no compression fractures  Previously inadequate relief recently on Linzess 290 mcg, inadequate response and significant constipation on CT scan above  Previously failed fiber products and MiraLAX, then insurance mandated trials of Trulance and Amitiza/lubiprostone  After failing all common OTC and prescription laxatives, authorized and prescribed Motegrity laxative 11/30/2022  Of all OTC remedies, Dulcolax gives some relief. Continues on the [address] today after initial prescription 11/30/2022. Ms. Ronda Silva describes ongoing, moderate constipation on the Motegrity laxative. Sounds like an occasional dose of Dulcolax/bisacodyl helps. She continues to suffer significant symptoms including abdominal cramping nausea and bloating. Today 8/30/2023 we discussed adding a second agent to the [address]. Could add Dulcolax, MiraLAX, magnesium laxatives. Rifaximin antibiotic previously discussed; prescribed 4/13/2021. I do not see a role for that at this point. Limited relief with occasional Bentyl/dicyclomine. Ms. Ronda Silva again states 8/30/2023 visit that she is eating much less due to the symptoms, only one meal per day by her estimation. Today I discussed and recommended referral for pelvic physical therapy. Ms. Ronda Silva agrees.       2.  GERD symptoms, PPI dependent  Continue omeprazole PPI medication, dosing as needed. See Chema Walker notes  Reassuring EGD examination August 2020  Continues to suffer reflux symptoms    3. Nausea     Much worse past 9 months  Currently requiring ondansetron 3 times daily  Continue Ondansetron as needed; new prescription sent in today 8/30/2023    4. MGUS, enlarged spleen on imaging     Following with Dr. Pavithra Cardona of Oncology here    CT abd/pelvis 9/29/2022:  \"LIVER: No enlargement, atrophy, abnormal density, or significant focal lesion is identified. BILIARY: The gallbladder is again noted to be surgically absent. The common duct measures 8 mm in the sneha hepatis and tapers distally, which is within normal limits for the patient's post cholecystectomy state and is unchanged over several years. Mild central intrahepatic biliary ectasia is also stable and likely relates to the patient's post cholecystectomy state. PANCREAS: The main pancreatic duct is mildly dilated in the pancreatic head measuring 3 mm but tapers distally, new from prior studies. No suspicious pancreatic mass or distal pancreatic atrophy. SPLEEN: The spleen is moderately enlarged measuring 17.6 cm craniocaudad, previously 18.3 cm on 08/01/2020, 16.4 cm on 03/21/2015 and 12.3 cm on 07/06/2011. \"    Similar findings on prior CT Scan July 2014 above  CT chest 8/22/2023 shows unchanged bilateral pulmonary nodules up to 4 mm and 8 mm in size calcified granulomas; 9 mm stable enhancing lesion left hepatic dome liver  Following with Dr Pavithra Cardona    5. Colon cancer screening, history adenomatous colon polyp  9mm pendunculated adenomatous colon polyp resected recent colonoscopy examination August 2020  My recent colonoscopy examination 12/22/2022 with poor prep quality, large masses of solid/semisolid stool scattered throughout showed no additional lesions. Repeat exam recommended in 1 year, December 2023.   Today we discussed and scheduled next colonoscopy examination for early 2024          Prior to this encounter, I spent over 10 minutes preparing for the visit, including reviewing documents from other specialties as well as from PCP and going over test results. During and after the face to face encounter, I spent an additional 75 minutes discussing the above and counseling this patient, reviewing chart notes and data with patient and composing this note. Digital transcription software was utilized to produce this note. The note was proofread for content only. Typographical errors may remain.        Meds This Visit:  Requested Prescriptions      No prescriptions requested or ordered in this encounter       Imaging & Referrals:  None       WG#[id number]

## 2023-09-05 ENCOUNTER — OFFICE VISIT (OUTPATIENT)
Dept: PHYSICAL MEDICINE AND REHAB | Facility: CLINIC | Age: 58
End: 2023-09-05
Payer: COMMERCIAL

## 2023-09-05 VITALS — HEART RATE: 71 BPM | WEIGHT: 140 LBS | BODY MASS INDEX: 21 KG/M2 | OXYGEN SATURATION: 100 %

## 2023-09-05 DIAGNOSIS — M48.062 LUMBAR STENOSIS WITH NEUROGENIC CLAUDICATION: Primary | ICD-10-CM

## 2023-09-05 PROCEDURE — 99214 OFFICE O/P EST MOD 30 MIN: CPT | Performed by: PHYSICAL MEDICINE & REHABILITATION

## 2023-09-05 NOTE — TELEPHONE ENCOUNTER
Requested Prescriptions     Pending Prescriptions Disp Refills    OMEPRAZOLE 40 MG Oral Capsule Delayed Release [Pharmacy Med Name: Omeprazole Dr 40 Mg Cap Nort] 30 capsule 0     Sig: TAKE 1 CAPSULE BY MOUTH EVERY MORNING BEFORE BREAKFAST       LOV 8/30/2022  LR 11/30/2022    em

## 2023-09-07 ENCOUNTER — TELEPHONE (OUTPATIENT)
Dept: PHYSICAL MEDICINE AND REHAB | Facility: CLINIC | Age: 58
End: 2023-09-07

## 2023-09-07 DIAGNOSIS — M48.062 LUMBAR STENOSIS WITH NEUROGENIC CLAUDICATION: Primary | ICD-10-CM

## 2023-09-07 NOTE — TELEPHONE ENCOUNTER
Initiated authorization for L5-S1 interlaminar epidural steroid injection CPT 92376 with Mirna Miller at [facility]  [email or web address].   Status: Approved-authorization is not required per health plan and code is valid and billable

## 2023-09-07 NOTE — TELEPHONE ENCOUNTER
Patient has been scheduled for L5-S1 interlaminar epidural steroid injection  on 9/25/2023 at the [address] [place] [address] with Dr. Chris Beach. -Anesthesia type: IVCS. -Patient was advised that if he/she does receive the covid vaccine it needs to be at least 2 weeks before or after the injection. -Medications and allergies reviewed. -Patient reminded to hold NSAIDs (Ibuprofen, ASA 81, Aleve, Naproxen, Mobic, Diclofenac, Etodolac, Celebrex etc.) for 3 days prior to Lumbar MBB/Facet if BMI is greater than 35. For Cervical injections only hold multivitamins, Vitamin E, Fish Oil, Phentermine/Lomaira for 7 days prior to injection and NSAIDS.  mg to be held for 7 days prior to injections.  -If patient is receiving MAC/IVCS, weight loss oral/injectable medications will need to be held for 7 days prior to injection.  -Patient informed to fast 12 hours prior to procedure with IVCS/MAC.   -If on blood thinner, clearance has been received and approved to hold this medication by provider.   -Patient informed he/she will need a  to and from procedure. Shahla Beltran is located in the [facility] [address] 1st floor,  may park in the yellow/purple parking lot. Patient verbalized understanding and agrees with plan.   Scheduled in Epic: Yes  Scheduled in Surgical Case: Yes  Follow up appointment made: NO

## 2023-09-07 NOTE — TELEPHONE ENCOUNTER
Please review; no protocol  Medication pended for your review and approval.    Requested Prescriptions   Pending Prescriptions Disp Refills    baclofen 10 MG Oral Tab 30 tablet 0     Sig: Take 1 tablet (10 mg total) by mouth nightly as needed.  AT BEDTIME       There is no refill protocol information for this order

## 2023-09-08 RX ORDER — BACLOFEN 10 MG/1
10 TABLET ORAL NIGHTLY PRN
Qty: 30 TABLET | Refills: 0 | Status: SHIPPED | OUTPATIENT
Start: 2023-09-08 | End: 2023-09-27

## 2023-09-09 RX ORDER — OMEPRAZOLE 40 MG/1
40 CAPSULE, DELAYED RELEASE ORAL
Qty: 90 CAPSULE | Refills: 3 | Status: SHIPPED | OUTPATIENT
Start: 2023-09-09

## 2023-09-13 ENCOUNTER — LAB ENCOUNTER (OUTPATIENT)
Dept: LAB | Age: 58
End: 2023-09-13
Attending: INTERNAL MEDICINE
Payer: COMMERCIAL

## 2023-09-13 DIAGNOSIS — D47.2 MGUS (MONOCLONAL GAMMOPATHY OF UNKNOWN SIGNIFICANCE): ICD-10-CM

## 2023-09-13 LAB
ALBUMIN SERPL-MCNC: 4.1 G/DL (ref 3.4–5)
ALBUMIN/GLOB SERPL: 1.3 {RATIO} (ref 1–2)
ALP LIVER SERPL-CCNC: 89 U/L
ALT SERPL-CCNC: 19 U/L
ANION GAP SERPL CALC-SCNC: 3 MMOL/L (ref 0–18)
AST SERPL-CCNC: 17 U/L (ref 15–37)
BASOPHILS # BLD AUTO: 0.02 X10(3) UL (ref 0–0.2)
BASOPHILS NFR BLD AUTO: 0.6 %
BILIRUB SERPL-MCNC: 0.4 MG/DL (ref 0.1–2)
BUN BLD-MCNC: 20 MG/DL (ref 7–18)
CALCIUM BLD-MCNC: 9.5 MG/DL (ref 8.5–10.1)
CHLORIDE SERPL-SCNC: 103 MMOL/L (ref 98–112)
CO2 SERPL-SCNC: 32 MMOL/L (ref 21–32)
CREAT BLD-MCNC: 1.26 MG/DL
EGFRCR SERPLBLD CKD-EPI 2021: 49 ML/MIN/1.73M2 (ref 60–?)
EOSINOPHIL # BLD AUTO: 0.05 X10(3) UL (ref 0–0.7)
EOSINOPHIL NFR BLD AUTO: 1.4 %
ERYTHROCYTE [DISTWIDTH] IN BLOOD BY AUTOMATED COUNT: 12.7 %
FASTING STATUS PATIENT QL REPORTED: YES
GLOBULIN PLAS-MCNC: 3.2 G/DL (ref 2.8–4.4)
GLUCOSE BLD-MCNC: 149 MG/DL (ref 70–99)
HCT VFR BLD AUTO: 37.3 %
HGB BLD-MCNC: 12.9 G/DL
IGA SERPL-MCNC: 34 MG/DL (ref 70–312)
IGM SERPL-MCNC: 827 MG/DL (ref 43–279)
IMM GRANULOCYTES # BLD AUTO: 0.01 X10(3) UL (ref 0–1)
IMM GRANULOCYTES NFR BLD: 0.3 %
IMMUNOGLOBULIN PNL SER-MCNC: 526 MG/DL (ref 791–1643)
LYMPHOCYTES # BLD AUTO: 1.66 X10(3) UL (ref 1–4)
LYMPHOCYTES NFR BLD AUTO: 46.1 %
MCH RBC QN AUTO: 30.7 PG (ref 26–34)
MCHC RBC AUTO-ENTMCNC: 34.6 G/DL (ref 31–37)
MCV RBC AUTO: 88.8 FL
MONOCYTES # BLD AUTO: 0.18 X10(3) UL (ref 0.1–1)
MONOCYTES NFR BLD AUTO: 5 %
NEUTROPHILS # BLD AUTO: 1.68 X10 (3) UL (ref 1.5–7.7)
NEUTROPHILS # BLD AUTO: 1.68 X10(3) UL (ref 1.5–7.7)
NEUTROPHILS NFR BLD AUTO: 46.6 %
OSMOLALITY SERPL CALC.SUM OF ELEC: 291 MOSM/KG (ref 275–295)
PLATELET # BLD AUTO: 172 10(3)UL (ref 150–450)
POTASSIUM SERPL-SCNC: 3.5 MMOL/L (ref 3.5–5.1)
PROT SERPL-MCNC: 7.3 G/DL (ref 6.4–8.2)
PROT UR-MCNC: 24.1 MG/DL
RBC # BLD AUTO: 4.2 X10(6)UL
SODIUM SERPL-SCNC: 138 MMOL/L (ref 136–145)
WBC # BLD AUTO: 3.6 X10(3) UL (ref 4–11)

## 2023-09-13 PROCEDURE — 82784 ASSAY IGA/IGD/IGG/IGM EACH: CPT

## 2023-09-13 PROCEDURE — 86334 IMMUNOFIX E-PHORESIS SERUM: CPT

## 2023-09-13 PROCEDURE — 84166 PROTEIN E-PHORESIS/URINE/CSF: CPT

## 2023-09-13 PROCEDURE — 36415 COLL VENOUS BLD VENIPUNCTURE: CPT

## 2023-09-13 PROCEDURE — 80053 COMPREHEN METABOLIC PANEL: CPT

## 2023-09-13 PROCEDURE — 84156 ASSAY OF PROTEIN URINE: CPT

## 2023-09-13 PROCEDURE — 84165 PROTEIN E-PHORESIS SERUM: CPT

## 2023-09-13 PROCEDURE — 85025 COMPLETE CBC W/AUTO DIFF WBC: CPT

## 2023-09-13 PROCEDURE — 83521 IG LIGHT CHAINS FREE EACH: CPT

## 2023-09-13 PROCEDURE — 86335 IMMUNFIX E-PHORSIS/URINE/CSF: CPT

## 2023-09-14 LAB
ALBUMIN SERPL ELPH-MCNC: 4.43 G/DL (ref 3.75–5.21)
ALBUMIN/GLOB SERPL: 1.66 {RATIO} (ref 1–2)
ALPHA1 GLOB SERPL ELPH-MCNC: 0.33 G/DL (ref 0.19–0.46)
ALPHA2 GLOB SERPL ELPH-MCNC: 0.65 G/DL (ref 0.48–1.05)
B-GLOBULIN SERPL ELPH-MCNC: 0.71 G/DL (ref 0.68–1.23)
GAMMA GLOB SERPL ELPH-MCNC: 0.99 G/DL (ref 0.62–1.7)
KAPPA LC FREE SER-MCNC: 3 MG/DL (ref 0.33–1.94)
KAPPA LC FREE/LAMBDA FREE SER NEPH: 1.09 {RATIO} (ref 0.26–1.65)
LAMBDA LC FREE SERPL-MCNC: 2.76 MG/DL (ref 0.57–2.63)
PROT SERPL-MCNC: 7.1 G/DL (ref 6.4–8.2)

## 2023-09-14 RX ORDER — TRAMADOL HYDROCHLORIDE 50 MG/1
50 TABLET ORAL EVERY 6 HOURS PRN
Qty: 90 TABLET | Refills: 3 | Status: SHIPPED | OUTPATIENT
Start: 2023-09-14

## 2023-09-15 ENCOUNTER — MED REC SCAN ONLY (OUTPATIENT)
Dept: FAMILY MEDICINE CLINIC | Facility: CLINIC | Age: 58
End: 2023-09-15

## 2023-09-25 ENCOUNTER — OFFICE VISIT (OUTPATIENT)
Dept: HEMATOLOGY/ONCOLOGY | Facility: HOSPITAL | Age: 58
End: 2023-09-25
Attending: INTERNAL MEDICINE
Payer: COMMERCIAL

## 2023-09-25 VITALS
OXYGEN SATURATION: 100 % | HEIGHT: 68 IN | RESPIRATION RATE: 16 BRPM | BODY MASS INDEX: 21.4 KG/M2 | WEIGHT: 141.19 LBS | TEMPERATURE: 98 F | SYSTOLIC BLOOD PRESSURE: 129 MMHG | HEART RATE: 66 BPM | DIASTOLIC BLOOD PRESSURE: 72 MMHG

## 2023-09-25 DIAGNOSIS — D47.2 MGUS (MONOCLONAL GAMMOPATHY OF UNKNOWN SIGNIFICANCE): Primary | ICD-10-CM

## 2023-09-25 PROBLEM — M48.062 LUMBAR STENOSIS WITH NEUROGENIC CLAUDICATION: Status: ACTIVE | Noted: 2023-09-25

## 2023-09-25 PROCEDURE — 99214 OFFICE O/P EST MOD 30 MIN: CPT | Performed by: INTERNAL MEDICINE

## 2023-09-27 ENCOUNTER — HOSPITAL ENCOUNTER (OUTPATIENT)
Dept: GENERAL RADIOLOGY | Age: 58
Discharge: HOME | End: 2023-09-27
Attending: INTERNAL MEDICINE
Payer: COMMERCIAL

## 2023-09-27 DIAGNOSIS — D47.2 MGUS (MONOCLONAL GAMMOPATHY OF UNKNOWN SIGNIFICANCE): ICD-10-CM

## 2023-09-27 PROCEDURE — 77075 RADEX OSSEOUS SURVEY COMPL: CPT | Performed by: INTERNAL MEDICINE

## 2023-09-27 NOTE — TELEPHONE ENCOUNTER
Please review; protocol failed. Requested Prescriptions   Pending Prescriptions Disp Refills    baclofen 10 MG Oral Tab 30 tablet 0     Sig: Take 1 tablet (10 mg total) by mouth nightly as needed.  AT BEDTIME       There is no refill protocol information for this order        Recent Outpatient Visits              2 days ago MGUS (monoclonal gammopathy of unknown significance)    [facility] Hematology Oncology Scott Bailey MD    Office Visit    3 weeks ago Lumbar stenosis with neurogenic claudication    [facility], [address], [place], [place]    Office Visit    4 weeks ago Personal history of colonic polyps    Lauren [place] MD    Office Visit    1 month ago Lumbar radiculitis    [facility], [address], [place] Soto Hus[place], DO    Office Visit    2 months ago Spinal stenosis of lumbar region with neurogenic claudication    [facility], [address], Alondra Mejia MD    Office Visit          Future Appointments         Provider Department Appt Notes    In 2 weeks Soto Husbands, DO [address], [address], [place] Back pain    In 4 months Emili Lemon, [address], [address], [place] Colon w MAC @ [address]     In 4 months eKtan Souza MD [address], 59 [facility] follow up    In 6 months Danita Diaz 19 Hematology Oncology 6 m f/u.md

## 2023-09-28 RX ORDER — BACLOFEN 10 MG/1
10 TABLET ORAL NIGHTLY PRN
Qty: 30 TABLET | Refills: 0 | Status: SHIPPED | OUTPATIENT
Start: 2023-09-28 | End: 2023-10-19

## 2023-10-05 ENCOUNTER — OFFICE VISIT (OUTPATIENT)
Dept: FAMILY MEDICINE CLINIC | Facility: CLINIC | Age: 58
End: 2023-10-05

## 2023-10-05 VITALS
WEIGHT: 139 LBS | SYSTOLIC BLOOD PRESSURE: 131 MMHG | HEIGHT: 68 IN | OXYGEN SATURATION: 100 % | DIASTOLIC BLOOD PRESSURE: 72 MMHG | TEMPERATURE: 98 F | BODY MASS INDEX: 21.07 KG/M2 | HEART RATE: 87 BPM

## 2023-10-05 DIAGNOSIS — M48.062 LUMBAR STENOSIS WITH NEUROGENIC CLAUDICATION: Primary | ICD-10-CM

## 2023-10-05 PROCEDURE — 3075F SYST BP GE 130 - 139MM HG: CPT | Performed by: FAMILY MEDICINE

## 2023-10-05 PROCEDURE — 3008F BODY MASS INDEX DOCD: CPT | Performed by: FAMILY MEDICINE

## 2023-10-05 PROCEDURE — 3078F DIAST BP <80 MM HG: CPT | Performed by: FAMILY MEDICINE

## 2023-10-05 PROCEDURE — 99214 OFFICE O/P EST MOD 30 MIN: CPT | Performed by: FAMILY MEDICINE

## 2023-10-05 RX ORDER — TRAMADOL HYDROCHLORIDE 50 MG/1
100 TABLET ORAL EVERY 6 HOURS PRN
Qty: 90 TABLET | Refills: 3 | Status: SHIPPED | OUTPATIENT
Start: 2023-10-05

## 2023-10-12 ENCOUNTER — OFFICE VISIT (OUTPATIENT)
Dept: PHYSICAL MEDICINE AND REHAB | Facility: CLINIC | Age: 58
End: 2023-10-12
Payer: COMMERCIAL

## 2023-10-12 VITALS
WEIGHT: 139 LBS | HEIGHT: 68 IN | DIASTOLIC BLOOD PRESSURE: 72 MMHG | BODY MASS INDEX: 21.07 KG/M2 | SYSTOLIC BLOOD PRESSURE: 112 MMHG

## 2023-10-12 DIAGNOSIS — M54.16 LUMBAR RADICULITIS: Primary | ICD-10-CM

## 2023-10-12 PROCEDURE — 3008F BODY MASS INDEX DOCD: CPT | Performed by: PHYSICAL MEDICINE & REHABILITATION

## 2023-10-12 PROCEDURE — 3074F SYST BP LT 130 MM HG: CPT | Performed by: PHYSICAL MEDICINE & REHABILITATION

## 2023-10-12 PROCEDURE — 99214 OFFICE O/P EST MOD 30 MIN: CPT | Performed by: PHYSICAL MEDICINE & REHABILITATION

## 2023-10-12 PROCEDURE — 3078F DIAST BP <80 MM HG: CPT | Performed by: PHYSICAL MEDICINE & REHABILITATION

## 2023-10-13 ENCOUNTER — TELEPHONE (OUTPATIENT)
Dept: PHYSICAL MEDICINE AND REHAB | Facility: CLINIC | Age: 58
End: 2023-10-13

## 2023-10-13 RX ORDER — NORTRIPTYLINE HYDROCHLORIDE 25 MG/1
25 CAPSULE ORAL NIGHTLY
Qty: 30 CAPSULE | Refills: 0 | Status: SHIPPED | OUTPATIENT
Start: 2023-10-13

## 2023-10-13 RX ORDER — HYDROCODONE BITARTRATE AND ACETAMINOPHEN 5; 325 MG/1; MG/1
1 TABLET ORAL 2 TIMES DAILY PRN
Qty: 20 TABLET | Refills: 0 | Status: SHIPPED | OUTPATIENT
Start: 2023-10-13

## 2023-10-13 NOTE — TELEPHONE ENCOUNTER
Patient calling stating that Dr. Darius Pabon has not yet sent over the medication that was discussed at yesterday's OV. Informed pt that as instructions are not yet completed as of now, this RN will need to check with Dr. Darius Pabon. Verbally confirmed patient's preferred pharmacy at this time. Patient states that a new nerve medication & a new pain medication were discussed.

## 2023-10-13 NOTE — TELEPHONE ENCOUNTER
[facility] called to clarify that patient is on Tramadol and has the order for Nortriptyline. Clarified that patient will stop Tramadol and will be on Norco and Nortriptyline. Informed that patient was educated as well and she was aware. No further actions needed at this time.

## 2023-10-13 NOTE — TELEPHONE ENCOUNTER
Per Dr. Darius Pabon: \" nortripytline and norco 5/325 BID PRN pain sent to pharmacy. No tramadol if she is going to be taking nortriptyline and norco PRN. \"   \"If she took the tramadol already no norco until 6 hours later, and no nortriptyline until at least 12 hours later. \"

## 2023-10-13 NOTE — TELEPHONE ENCOUNTER
S/w patient to relay this information. Pt verbalized understanding and was thankful. Nothing further needed at this time.

## 2023-10-19 ENCOUNTER — OFFICE VISIT (OUTPATIENT)
Dept: FAMILY MEDICINE CLINIC | Facility: CLINIC | Age: 58
End: 2023-10-19
Payer: COMMERCIAL

## 2023-10-19 VITALS
DIASTOLIC BLOOD PRESSURE: 69 MMHG | WEIGHT: 141 LBS | HEIGHT: 68 IN | OXYGEN SATURATION: 99 % | HEART RATE: 65 BPM | SYSTOLIC BLOOD PRESSURE: 115 MMHG | BODY MASS INDEX: 21.37 KG/M2 | TEMPERATURE: 97 F

## 2023-10-19 DIAGNOSIS — M54.16 LUMBAR RADICULITIS: ICD-10-CM

## 2023-10-19 PROCEDURE — 3074F SYST BP LT 130 MM HG: CPT | Performed by: FAMILY MEDICINE

## 2023-10-19 PROCEDURE — 3078F DIAST BP <80 MM HG: CPT | Performed by: FAMILY MEDICINE

## 2023-10-19 PROCEDURE — 99214 OFFICE O/P EST MOD 30 MIN: CPT | Performed by: FAMILY MEDICINE

## 2023-10-19 PROCEDURE — 3008F BODY MASS INDEX DOCD: CPT | Performed by: FAMILY MEDICINE

## 2023-10-19 RX ORDER — HYDROCODONE BITARTRATE AND ACETAMINOPHEN 5; 325 MG/1; MG/1
1 TABLET ORAL EVERY 8 HOURS PRN
Qty: 60 TABLET | Refills: 0 | Status: SHIPPED | OUTPATIENT
Start: 2023-10-19

## 2023-10-20 ENCOUNTER — OFFICE VISIT (OUTPATIENT)
Dept: ORTHOPEDICS CLINIC | Facility: CLINIC | Age: 58
End: 2023-10-20
Payer: COMMERCIAL

## 2023-10-20 VITALS — BODY MASS INDEX: 21.37 KG/M2 | HEIGHT: 68 IN | WEIGHT: 141 LBS

## 2023-10-20 DIAGNOSIS — M48.062 LUMBAR STENOSIS WITH NEUROGENIC CLAUDICATION: Primary | ICD-10-CM

## 2023-10-20 PROCEDURE — 3008F BODY MASS INDEX DOCD: CPT | Performed by: STUDENT IN AN ORGANIZED HEALTH CARE EDUCATION/TRAINING PROGRAM

## 2023-10-20 PROCEDURE — 99214 OFFICE O/P EST MOD 30 MIN: CPT | Performed by: STUDENT IN AN ORGANIZED HEALTH CARE EDUCATION/TRAINING PROGRAM

## 2023-10-20 RX ORDER — METHYLPREDNISOLONE 4 MG/1
TABLET ORAL
Qty: 21 TABLET | Refills: 0 | Status: SHIPPED | OUTPATIENT
Start: 2023-10-20

## 2023-10-20 NOTE — TELEPHONE ENCOUNTER
Please review. Protocol failed / No Protocol. Requested Prescriptions   Pending Prescriptions Disp Refills    baclofen 10 MG Oral Tab 30 tablet 0     Sig: Take 1 tablet (10 mg total) by mouth nightly as needed.  AT BEDTIME       There is no refill protocol information for this order

## 2023-10-21 ENCOUNTER — TELEPHONE (OUTPATIENT)
Dept: PHYSICAL MEDICINE AND REHAB | Facility: CLINIC | Age: 58
End: 2023-10-21

## 2023-10-21 DIAGNOSIS — M48.062 LUMBAR STENOSIS WITH NEUROGENIC CLAUDICATION: Primary | ICD-10-CM

## 2023-10-21 DIAGNOSIS — M54.16 LUMBAR RADICULITIS: ICD-10-CM

## 2023-10-21 RX ORDER — BACLOFEN 10 MG/1
10 TABLET ORAL NIGHTLY PRN
Qty: 30 TABLET | Refills: 0 | Status: SHIPPED | OUTPATIENT
Start: 2023-10-21

## 2023-10-21 NOTE — TELEPHONE ENCOUNTER
Please contact patient, let her know I talked to Dr. Maribel Scott and we decided that she might benefit from a different type of epidural on either just the left side or both sides depending on how painful the right side/right leg pain is. If she wants to discuss further she may make a follow up appointment; if she is ok proceeding and has no further questions we can get her set up for left L5 or bilateral L5 transformainal epidural steroid injection under fluoroscopy, IVCS.      Ashly Blankenship DO  Physical Medicine and [address] [place] [address] Avenue

## 2023-10-23 NOTE — TELEPHONE ENCOUNTER
Please review. Protocol failed / Has no protocol.      Requested Prescriptions   Pending Prescriptions Disp Refills    POTASSIUM CHLORIDE 20 MEQ Oral Tab CR [Pharmacy Med Name: Potassium Chloride Er 20 Meq Tab Amne] 30 tablet 0     Sig: TAKE ONE TABLET BY MOUTH ONE TIME DAILY       There is no refill protocol information for this order        Future Appointments         Provider Department Appt Notes    In 3 days DO Norma Bouchera, [address], [place] Do nerve test during appointment    In 3 months [address], [address], [address], [place] Colon w [address] @ [address] [place] [address]    In 4 months Norma Lizarraga, 59 [facility] follow up    In 5 months Danita Muhammad  Hematology Oncology 6 m f/u.md           Recent Outpatient Visits              3 days ago Lumbar stenosis with neurogenic claudication    [facility], [address], Andrei Palafox MD    Office Visit    4 days ago Lumbar radiculitis    Norma Coronado, [address] [facility] [place], [place]    Office Visit    1 week ago Lumbar radiculitis    [facility], [address], [place], [place]    Office Visit    2 weeks ago Lumbar stenosis with neurogenic claudication    [facility], [address] [facility] [place]    Office Visit    4 weeks ago MGUS (monoclonal gammopathy of unknown significance)    [facility] Hematology Oncology Lauro Dunlap MD    Office Visit

## 2023-10-24 RX ORDER — POTASSIUM CHLORIDE 20 MEQ/1
20 TABLET, EXTENDED RELEASE ORAL DAILY
Qty: 30 TABLET | Refills: 5 | Status: SHIPPED | OUTPATIENT
Start: 2023-10-24

## 2023-10-30 NOTE — TELEPHONE ENCOUNTER
Please review. Protocol failed / Has no protocol. Recent fills each qty 90: 5/8, 8/4    Requested Prescriptions   Pending Prescriptions Disp Refills    temazepam 15 MG Oral Cap 90 capsule 1     Sig: Take 1 capsule (15 mg total) by mouth nightly as needed for Sleep.        There is no refill protocol information for this order        Future Appointments         Provider Department Appt Notes    In 2 weeks Aida Santiago MD [facility], [address], [place] Back pain    In 2 months [address], [address], [address] [facility] [address], [place] Colon donna MAC @ [address] [place] [address]    In 3 months Chase Kuo MD [address] [place],[address], 59 [facility] follow up    In 4 months Pham Huffman, [address] [place] [address] Hematology Oncology 6 m f/u.md           Recent Outpatient Visits              1 week ago Lumbar stenosis with neurogenic claudication    [facility], [address], Irene Lopez MD    Office Visit    1 week ago Lumbar radiculitis    [address] [facility], [facility] [place]    Office Visit    2 weeks ago Lumbar radiculitis    [facility], [address] [facility] [address], [place], [place]    Office Visit    3 weeks ago Lumbar stenosis with neurogenic claudication    [facility], [address] [facility] Carla Tapia     Office Visit    1 month ago MGUS (monoclonal gammopathy of unknown significance)    [facility] Hematology Oncology Pham Huffman MD    Office Visit

## 2023-11-01 RX ORDER — TEMAZEPAM 15 MG/1
15 CAPSULE ORAL NIGHTLY PRN
Qty: 90 CAPSULE | Refills: 1 | Status: SHIPPED | OUTPATIENT
Start: 2023-11-01

## 2023-11-05 DIAGNOSIS — M54.16 LUMBAR RADICULITIS: ICD-10-CM

## 2023-11-06 NOTE — TELEPHONE ENCOUNTER
Please review; protocol failed. Requested Prescriptions   Pending Prescriptions Disp Refills    HYDROcodone-acetaminophen 5-325 MG Oral Tab 60 tablet 0     Sig: Take 1 tablet by mouth every 8 (eight) hours as needed for Pain.        There is no refill protocol information for this order        Recent Outpatient Visits              2 weeks ago Lumbar stenosis with neurogenic claudication    [facility], [address], Alondra Mejia MD    Office Visit    2 weeks ago Lumbar radiculitis    [address] [facility], [place], [place]    Office Visit    3 weeks ago Lumbar radiculitis    [facility], [address], [facility] [place], [place]    Office Visit    1 month ago Lumbar stenosis with neurogenic claudication    [facility], [facility]     Office Visit    1 month ago MGUS (monoclonal gammopathy of unknown significance)    [facility] Hematology Oncology Scott Bailey MD    Office Visit          Future Appointments         Provider Department Appt Notes    In 1 week Kayy Calhoun MD [address], [address], [place] Back pain    In 2 weeks Shun Lynne DO [address], 148 [facility] Back pain    In 2 months [facility], [address], [address], [place] Colon w MAC @ [address] [place] [address]    In 3 months Ketan Souza MD [address], 59 [facility] follow up    In 4 months Danita Diaz 19 Hematology Oncology 6 m f/u.md

## 2023-11-07 RX ORDER — HYDROCODONE BITARTRATE AND ACETAMINOPHEN 5; 325 MG/1; MG/1
1 TABLET ORAL EVERY 8 HOURS PRN
Qty: 60 TABLET | Refills: 0 | Status: SHIPPED | OUTPATIENT
Start: 2023-11-07

## 2023-11-13 ENCOUNTER — TELEPHONE (OUTPATIENT)
Dept: PHYSICAL MEDICINE AND REHAB | Facility: CLINIC | Age: 58
End: 2023-11-13

## 2023-11-13 DIAGNOSIS — M54.16 LUMBAR RADICULITIS: Primary | ICD-10-CM

## 2023-11-13 DIAGNOSIS — M48.062 LUMBAR STENOSIS WITH NEUROGENIC CLAUDICATION: ICD-10-CM

## 2023-11-13 NOTE — TELEPHONE ENCOUNTER
Patient has been scheduled for  Left L5 TFESI  on 11/20/2023 at the [facility] with Julee Quispe. -Anesthesia type: IVCS. -If scheduling [address] covid testing required for all procedures whether patient is vaccinated or not. -Patient informed not to eat or drink anything after midnight the night prior to the procedure, if being sedated. (For afternoon injections: Patient to fast 6-8 hours prior to procedure with IVCS/MAC. )  -Patient was advised that if he/she does receive the covid vaccine it needs to be at least 2 weeks before or after the injection. -Medications and allergies reviewed. -Patient reminded to hold NSAIDs (Ibuprofen, ASA 81, Aleve, Naproxen, Mobic, Diclofenac, Etodolac, Celebrex etc.) for 3 days prior to [facility]  if BMI is greater than 35. For Cervical injections only hold multivitamins, Vitamin E, Fish Oil, Phentermine (Lomaira) for 7 days prior to injection and NSAIDS.  mg to be held for 7 days prior to injections.  -If patient is receiving MAC/IVCS Phentermine Park Day), Adlyxin (Lixisenatide), Bydureon BCise (Exenatide-release), Byetta (Exenatide), Mounjaro (Tirezepatide), Ozempic (Semaglutide), Rybelsus (oral demaglutide), Saxenda (Liraglutide), Trulicity (Dulaglutide), Victoriza (Liraglutide), Wegovy (Semaglutide), Berberine (oral natures ozempic) will need to be held for 7 days prior to injection.  -If patient's BMI is greater than 50. Patient is unable to get IVCS/MAC at [facility]. (Options: Patient can go to [address] [facility] under MAC or ENDO under IVCS-reminder physician's slots at ENDO are limited. OR Patient can have the procedure done under local anesthesia at [facility] with Valium ( per physician's preference.)    -If on blood thinner clearance has been received to hold this medication by provider.   -Patient informed he/she will need a  to and from procedure. -[facility] is located in the [facility] 1st floor.  Patient may park in the yellow or purple parking.   Patient verbalized understanding and agrees with plan.  -----> Scheduled in Epic: Yes  -----> Scheduled in [employer]/Surgical Case Request: Yes

## 2023-11-13 NOTE — TELEPHONE ENCOUNTER
Initiated authorization for Left L5 TFESI CPT 43629 with Basia Kolb at [facility]  Case #[id number].   Status: Approved-authorization is not required per health plan    Per Dr. Shayne Rey w/ IVCS

## 2023-11-15 NOTE — TELEPHONE ENCOUNTER
Please review; protocol failed. Requested Prescriptions   Pending Prescriptions Disp Refills    baclofen 10 MG Oral Tab 30 tablet 0     Sig: Take 1 tablet (10 mg total) by mouth nightly as needed.  AT BEDTIME       There is no refill protocol information for this order        Recent Outpatient Visits              3 weeks ago Lumbar stenosis with neurogenic claudication    [facility], [address], Johanna Chacon MD    Office Visit    3 weeks ago Lumbar radiculitis    [address] [facility], [place], [place]    Office Visit    1 month ago Lumbar radiculitis    [facility], [address], [facility] [place], [place]    Office Visit    1 month ago Lumbar stenosis with neurogenic claudication    [facility], [address] [facility],     Office Visit    1 month ago MGUS (monoclonal gammopathy of unknown significance)    [facility] Hematology Oncology Saritha Victoria MD    Office Visit          Future Appointments         Provider Department Appt Notes    In 2 days Xiomy Amos MD [address], [facility]    In 5 days [facility], [address] [facility] Left L5 TFESI, EOSC, IVCS    In 1 month Renuka Ruby, DO [address], [facility], [place] Back pain    In 1 month Renuka Ruby, DO [address], [facility], [place] Back pain    In 2 months [address], [address], [address], [place] Colon w MAC @ [address]     In 3 months Chilo Felix MD [address], 59 [facility] follow up    In 4 months [place] [address] Hematology Oncology 6 m f/u.md

## 2023-11-16 ENCOUNTER — TELEPHONE (OUTPATIENT)
Dept: ORTHOPEDICS CLINIC | Facility: CLINIC | Age: 58
End: 2023-11-16

## 2023-11-16 ENCOUNTER — HOSPITAL ENCOUNTER (OUTPATIENT)
Dept: GENERAL RADIOLOGY | Age: 58
Discharge: HOME | End: 2023-11-16
Attending: STUDENT IN AN ORGANIZED HEALTH CARE EDUCATION/TRAINING PROGRAM
Payer: COMMERCIAL

## 2023-11-16 DIAGNOSIS — M48.062 SPINAL STENOSIS OF LUMBAR REGION WITH NEUROGENIC CLAUDICATION: ICD-10-CM

## 2023-11-16 DIAGNOSIS — M48.062 LUMBAR STENOSIS WITH NEUROGENIC CLAUDICATION: Primary | ICD-10-CM

## 2023-11-16 DIAGNOSIS — M48.062 LUMBAR STENOSIS WITH NEUROGENIC CLAUDICATION: ICD-10-CM

## 2023-11-16 PROCEDURE — 72100 X-RAY EXAM L-S SPINE 2/3 VWS: CPT | Performed by: STUDENT IN AN ORGANIZED HEALTH CARE EDUCATION/TRAINING PROGRAM

## 2023-11-16 RX ORDER — BACLOFEN 10 MG/1
10 TABLET ORAL NIGHTLY PRN
Qty: 30 TABLET | Refills: 0 | Status: SHIPPED | OUTPATIENT
Start: 2023-11-16

## 2023-11-17 ENCOUNTER — OFFICE VISIT (OUTPATIENT)
Dept: ORTHOPEDICS CLINIC | Facility: CLINIC | Age: 58
End: 2023-11-17
Payer: COMMERCIAL

## 2023-11-17 VITALS — WEIGHT: 141 LBS | HEIGHT: 68 IN | BODY MASS INDEX: 21.37 KG/M2

## 2023-11-17 DIAGNOSIS — M41.50 DEGENERATIVE SCOLIOSIS: Primary | ICD-10-CM

## 2023-11-17 PROCEDURE — 99214 OFFICE O/P EST MOD 30 MIN: CPT | Performed by: STUDENT IN AN ORGANIZED HEALTH CARE EDUCATION/TRAINING PROGRAM

## 2023-11-17 PROCEDURE — 3008F BODY MASS INDEX DOCD: CPT | Performed by: STUDENT IN AN ORGANIZED HEALTH CARE EDUCATION/TRAINING PROGRAM

## 2023-11-17 RX ORDER — OSPEMIFENE 60 MG/1
TABLET, FILM COATED ORAL
COMMUNITY
Start: 2023-11-16

## 2023-11-17 RX ORDER — SPIRONOLACTONE 25 MG/1
25 TABLET ORAL DAILY
COMMUNITY
Start: 2023-09-26

## 2023-11-20 ENCOUNTER — TELEPHONE (OUTPATIENT)
Dept: PHYSICAL MEDICINE AND REHAB | Facility: CLINIC | Age: 58
End: 2023-11-20

## 2023-11-20 DIAGNOSIS — M54.16 LUMBAR RADICULITIS: ICD-10-CM

## 2023-11-20 PROBLEM — M48.061 LUMBAR FORAMINAL STENOSIS: Status: ACTIVE | Noted: 2023-11-20

## 2023-11-20 NOTE — TELEPHONE ENCOUNTER
Will have staff change the consent to left L4 TFESI based on complaints/recent presentation to her spine surgeon.      Gloria Loya DO  Physical Medicine and [address]

## 2023-11-21 ENCOUNTER — HOSPITAL ENCOUNTER (OUTPATIENT)
Dept: BONE DENSITY | Age: 58
Discharge: HOME | End: 2023-11-21
Attending: STUDENT IN AN ORGANIZED HEALTH CARE EDUCATION/TRAINING PROGRAM
Payer: COMMERCIAL

## 2023-11-21 DIAGNOSIS — M41.50 DEGENERATIVE SCOLIOSIS: ICD-10-CM

## 2023-11-21 PROCEDURE — 77080 DXA BONE DENSITY AXIAL: CPT | Performed by: STUDENT IN AN ORGANIZED HEALTH CARE EDUCATION/TRAINING PROGRAM

## 2023-11-21 NOTE — TELEPHONE ENCOUNTER
Please review; protocol failed. Requested Prescriptions   Pending Prescriptions Disp Refills    HYDROcodone-acetaminophen 5-325 MG Oral Tab 60 tablet 0     Sig: Take 1 tablet by mouth every 8 (eight) hours as needed for Pain.        There is no refill protocol information for this order        Recent Outpatient Visits              4 days ago Degenerative scoliosis    Magali Shelton MD    Office Visit    1 month ago Lumbar stenosis with neurogenic claudication    [facility], [address], Magali Alvarado MD    Office Visit    1 month ago Lumbar radiculitis    [facility], [place]    Office Visit    1 month ago Lumbar radiculitis    [facility], [address], [place], [place]    Office Visit    1 month ago Lumbar stenosis with neurogenic claudication    [facility], [address] [facility] [place], [place]    Office Visit          Future Appointments         Provider Department Appt Notes    In 2 weeks Orlazaro Medina, DO [facility], [address] [place] [address] Back pain    In 3 weeks Elaina Perez MD [facility] Back pain and leg    In 2 months [address], [address], [place] Colon w [facility] @ [address]    In 3 months Chris Vega MD [address], 59 [facility] follow up    In 4 months Danita Walker 19 Hematology Oncology 6 m f/u.md

## 2023-11-24 RX ORDER — HYDROCODONE BITARTRATE AND ACETAMINOPHEN 5; 325 MG/1; MG/1
1 TABLET ORAL EVERY 8 HOURS PRN
Qty: 60 TABLET | Refills: 0 | Status: SHIPPED | OUTPATIENT
Start: 2023-11-24

## 2023-11-28 ENCOUNTER — OFFICE VISIT (OUTPATIENT)
Dept: FAMILY MEDICINE CLINIC | Facility: CLINIC | Age: 58
End: 2023-11-28
Payer: COMMERCIAL

## 2023-11-28 VITALS
OXYGEN SATURATION: 99 % | HEIGHT: 68 IN | HEART RATE: 82 BPM | BODY MASS INDEX: 22.13 KG/M2 | DIASTOLIC BLOOD PRESSURE: 78 MMHG | SYSTOLIC BLOOD PRESSURE: 119 MMHG | WEIGHT: 146 LBS

## 2023-11-28 DIAGNOSIS — M81.0 AGE-RELATED OSTEOPOROSIS WITHOUT CURRENT PATHOLOGICAL FRACTURE: Primary | ICD-10-CM

## 2023-11-28 DIAGNOSIS — M48.062 LUMBAR STENOSIS WITH NEUROGENIC CLAUDICATION: ICD-10-CM

## 2023-11-28 PROCEDURE — 3074F SYST BP LT 130 MM HG: CPT | Performed by: FAMILY MEDICINE

## 2023-11-28 PROCEDURE — 3008F BODY MASS INDEX DOCD: CPT | Performed by: FAMILY MEDICINE

## 2023-11-28 PROCEDURE — 99214 OFFICE O/P EST MOD 30 MIN: CPT | Performed by: FAMILY MEDICINE

## 2023-11-28 PROCEDURE — 3078F DIAST BP <80 MM HG: CPT | Performed by: FAMILY MEDICINE

## 2023-11-28 RX ORDER — RISEDRONATE SODIUM 35 MG/1
35 TABLET, FILM COATED ORAL
Qty: 5 TABLET | Refills: 11 | Status: SHIPPED | OUTPATIENT
Start: 2023-11-28

## 2023-11-28 RX ORDER — HYDROCODONE BITARTRATE AND ACETAMINOPHEN 7.5; 325 MG/1; MG/1
1 TABLET ORAL EVERY 8 HOURS PRN
Qty: 60 TABLET | Refills: 0 | Status: SHIPPED | OUTPATIENT
Start: 2023-11-28 | End: 2023-12-28

## 2023-12-05 NOTE — TELEPHONE ENCOUNTER
Please review; protocol failed.    Requested Prescriptions   Pending Prescriptions Disp Refills    baclofen 10 MG Oral Tab 30 tablet 0     Sig: Take 1 tablet (10 mg total) by mouth nightly as needed. AT BEDTIME       There is no refill protocol information for this order        Future Appointments         Provider Department Appt Notes    In 3 days Víctor Madrigal MD [address] Back pain and nerve pain    In 1 month JOHNNIE BELL [facility] Colon w MAC @ [facility]    In 2 months Suman Bell MD [facility] follow up    In 3 months Bryon Lerma MD [facility] Hematology Oncology 6 m f/u.md          Recent Outpatient Visits              1 week ago Age-related osteoporosis without current pathological fracture    [facility] Quintin Harman,     Office Visit    2 weeks ago Degenerative scoliosis    [address] Víctor Madrigal MD    Office Visit    1 month ago Lumbar stenosis with neurogenic claudication    [address] Víctor Madrigal MD    Office Visit    1 month ago Lumbar radiculitis    [facility]    Office Visit    1 month ago Lumbar radiculitis    [facility] Forrest Ferreira DO    Office Visit

## 2023-12-08 ENCOUNTER — OFFICE VISIT (OUTPATIENT)
Dept: ORTHOPEDICS CLINIC | Facility: CLINIC | Age: 58
End: 2023-12-08
Payer: COMMERCIAL

## 2023-12-08 DIAGNOSIS — M41.50 DEGENERATIVE SCOLIOSIS: Primary | ICD-10-CM

## 2023-12-08 PROCEDURE — 99214 OFFICE O/P EST MOD 30 MIN: CPT | Performed by: STUDENT IN AN ORGANIZED HEALTH CARE EDUCATION/TRAINING PROGRAM

## 2023-12-08 RX ORDER — METHYLPREDNISOLONE 4 MG/1
TABLET ORAL
Qty: 21 TABLET | Refills: 0 | Status: SHIPPED | OUTPATIENT
Start: 2023-12-08

## 2023-12-08 RX ORDER — BACLOFEN 10 MG/1
10 TABLET ORAL NIGHTLY PRN
Qty: 30 TABLET | Refills: 0 | Status: SHIPPED | OUTPATIENT
Start: 2023-12-08 | End: 2023-12-27

## 2023-12-11 ENCOUNTER — OFFICE VISIT (OUTPATIENT)
Dept: FAMILY MEDICINE CLINIC | Facility: CLINIC | Age: 58
End: 2023-12-11

## 2023-12-11 VITALS
DIASTOLIC BLOOD PRESSURE: 68 MMHG | BODY MASS INDEX: 22.13 KG/M2 | WEIGHT: 146 LBS | HEIGHT: 68 IN | SYSTOLIC BLOOD PRESSURE: 100 MMHG | HEART RATE: 118 BPM | OXYGEN SATURATION: 98 %

## 2023-12-11 DIAGNOSIS — M47.816 LUMBAR SPONDYLOSIS: ICD-10-CM

## 2023-12-11 DIAGNOSIS — M48.062 LUMBAR STENOSIS WITH NEUROGENIC CLAUDICATION: Primary | ICD-10-CM

## 2023-12-11 PROBLEM — I36.1 NONRHEUMATIC TRICUSPID VALVE REGURGITATION: Status: ACTIVE | Noted: 2023-12-11

## 2023-12-11 PROBLEM — I34.0 NONRHEUMATIC MITRAL VALVE REGURGITATION: Status: ACTIVE | Noted: 2023-12-11

## 2023-12-11 PROCEDURE — 3074F SYST BP LT 130 MM HG: CPT | Performed by: FAMILY MEDICINE

## 2023-12-11 PROCEDURE — 3078F DIAST BP <80 MM HG: CPT | Performed by: FAMILY MEDICINE

## 2023-12-11 PROCEDURE — 99213 OFFICE O/P EST LOW 20 MIN: CPT | Performed by: FAMILY MEDICINE

## 2023-12-11 PROCEDURE — 3008F BODY MASS INDEX DOCD: CPT | Performed by: FAMILY MEDICINE

## 2023-12-11 RX ORDER — HYDROCODONE BITARTRATE AND ACETAMINOPHEN 7.5; 325 MG/1; MG/1
1 TABLET ORAL EVERY 6 HOURS PRN
Qty: 60 TABLET | Refills: 0 | Status: SHIPPED | OUTPATIENT
Start: 2023-12-11 | End: 2024-01-10

## 2023-12-19 RX ORDER — HYDROCODONE BITARTRATE AND ACETAMINOPHEN 7.5; 325 MG/1; MG/1
1 TABLET ORAL EVERY 6 HOURS PRN
Qty: 60 TABLET | Refills: 0 | Status: CANCELLED | OUTPATIENT
Start: 2023-12-19 | End: 2024-01-18

## 2023-12-20 NOTE — TELEPHONE ENCOUNTER
Disp Refills Start End     HYDROcodone-acetaminophen 7.5-325 MG Oral Tab 60 tablet 0 12/11/2023 1/10/2024    Sig - Route: Take 1 tablet by mouth every 6 (six) hours as needed for Pain. - Oral    Sent to pharmacy as: HYDROcodone-Acetaminophen 7.5-325 MG Oral Tablet (Norco)    Earliest Fill Date: 12/11/2023    Notes to Pharmacy: New dosage    E-Prescribing Status: Receipt confirmed by pharmacy (12/11/2023  2:44 PM CST)    No prior authorization was found for this prescription.     Found prior authorization for another prescription for the same medication: Closed - Prior Authorization not required for patient/medication      Pharmacy    [address] [phone], [phone]

## 2023-12-22 RX ORDER — TORSEMIDE 20 MG/1
20 TABLET ORAL DAILY
Qty: 90 TABLET | Refills: 3 | OUTPATIENT
Start: 2023-12-22

## 2023-12-22 RX ORDER — HYDROCODONE BITARTRATE AND ACETAMINOPHEN 7.5; 325 MG/1; MG/1
1 TABLET ORAL EVERY 6 HOURS PRN
Qty: 60 TABLET | Refills: 0 | OUTPATIENT
Start: 2023-12-22

## 2023-12-22 NOTE — TELEPHONE ENCOUNTER
Please review; protocol failed/ has no protocol    Requested Prescriptions   Pending Prescriptions Disp Refills    HYDROCODONE-ACETAMINOPHEN 7.5-325 MG Oral Tab [Pharmacy Med Name: Hydrocodone/Acetaminophen 7.5-325 Mg Tab [facility]] 60 tablet 0     Sig: TAKE ONE TABLET BY MOUTH EVERY SIX HOURS AS NEEDED FOR PAIN       There is no refill protocol information for this order        Recent Outpatient Visits              1 week ago Lumbar stenosis with neurogenic claudication    [facility], [address] [place] [address]    Office Visit    2 weeks ago Degenerative scoliosis    [facility], [address] [place], [place] [address]Iram MD    Office Visit    3 weeks ago Age-related osteoporosis without current pathological fracture    [facility], [address] [place] [address]    Office Visit    1 month ago Degenerative scoliosis    [facility], [address] [place], [place] [address]Iram MD    Office Visit    2 months ago Lumbar stenosis with neurogenic claudication    [facility], [address] [place], [place] [address]Iram MD    Office Visit          Future Appointments         Provider Department Appt Notes    In 1 month [address], [address] [facility] [address], [place] Colon w MAC @ [address] [place] [address]    In 2 months Benito Parker, [address], [address] [facility] follow up    In 2 months Hyman Gitelman, MD [address], [address], [place] Osteoporosis    In 3 months Danita Infante 19 Hematology Oncology 6 m f/u.md

## 2023-12-22 NOTE — TELEPHONE ENCOUNTER
Please review. Protocol failed/ No protocol      Requested Prescriptions   Pending Prescriptions Disp Refills    torsemide 20 MG Oral Tab  0     Sig: Take 1 tablet (20 mg total) by mouth daily.        Hypertensive Medications Protocol Failed - 12/20/2023  9:22 AM        Failed - EGFRCR or GFRNAA > 50     GFR Evaluation  EGFRCR: 49 , resulted on 9/13/2023          Passed - In person appointment in the past 12 or next 3 months     Recent Outpatient Visits              1 week ago Lumbar stenosis with neurogenic claudication    [facility], [address] [place], [place]    Office Visit    1 week ago Degenerative scoliosis    [facility], [address] [place] [address], Treasa Duane, MD    Office Visit    3 weeks ago Age-related osteoporosis without current pathological fracture    [facility] [place], [place]    Office Visit    1 month ago Degenerative scoliosis    [facility], [address] [place] [address], Treasa Duane, MD    Office Visit    2 months ago Lumbar stenosis with neurogenic claudication    [facility], [address] [place] [address], Treasa Duane, MD    Office Visit          Future Appointments         Provider Department Appt Notes    In 1 month Payton Garcia, [address], [address], [place] Colon w MAC @ [address] [place] [address]    In 2 months MD Hector Childers Lara, [address], [place] follow up    In 2 months MD Hector Riversu, [address] [facility] [address], [place] Osteoporosis    In 3 months Danita Romero 19 Hematology Oncology 6 m f/u.md               Passed - Last BP reading less than 140/90     BP Readings from Last 1 Encounters:   12/11/23 100/68               Passed - CMP or BMP in past 6 months     Recent Results (from the past 4392 hour(s))   COMP METABOLIC PANEL [E]    Collection Time: 09/13/23  9:14 AM   Result Value Ref Range    Glucose 149 (H) 70 - 99 mg/dL    Sodium 138 136 - 145 mmol/L    Potassium 3.5 3.5 - 5.1 mmol/L    Chloride 103 98 - 112 mmol/L    CO2 32.0 21.0 - 32.0 mmol/L    Anion Gap 3 0 - 18 mmol/L    BUN 20 (H) 7 - 18 mg/dL    Creatinine 1.26 (H) 0.55 - 1.02 mg/dL    Calcium, Total 9.5 8.5 - 10.1 mg/dL    Calculated Osmolality 291 275 - 295 mOsm/kg    eGFR-Cr 49 (L) >=60 mL/min/1.73m2    AST 17 15 - 37 U/L    ALT 19 13 - 56 U/L    Alkaline Phosphatase 89 46 - 118 U/L    Bilirubin, Total 0.4 0.1 - 2.0 mg/dL    Total Protein 7.3 6.4 - 8.2 g/dL    Albumin 4.1 3.4 - 5.0 g/dL    Globulin  3.2 2.8 - 4.4 g/dL    A/G Ratio 1.3 1.0 - 2.0    Patient Fasting for CMP? Yes      *Note: Due to a large number of results and/or encounters for the requested time period, some results have not been displayed. A complete set of results can be found in Results Review.                Passed - In person appointment or virtual visit in the past 6 months     Recent Outpatient Visits              1 week ago Lumbar stenosis with neurogenic claudication    [facility], [address] [place] [address]    Office Visit    1 week ago Degenerative scoliosis    [address], Jenny Toribio MD    Office Visit    3 weeks ago Age-related osteoporosis without current pathological fracture    [address] [facility]     Office Visit    1 month ago Degenerative scoliosis    [address]    Office Visit    2 months ago Lumbar stenosis with neurogenic claudication    [address], [address] [place] [address], Jenny Toribio MD    Office Visit          Future Appointments         Provider Department Appt Notes    In 1 month Pat Sánchez, [address] [facility],[address] Floor, [facility] MAC @ Alcus Perches    In 2 months Eryn Bateman MD [facility], [address] follow up    In 2 months Kole Rao MD [address], [address], [place] Osteoporosis    In 3 months [facility], Danita Equador 19 Hematology Oncology 6 m f/u.md                    Future Appointments         Provider Department Appt Notes    In 1 month Regina Hawley, PROCEDURE [facility], [address], [place] Colon w MAC @ [address] [place] [address]    In 2 months Kian Caraballo MD [address], [address] follow up    In 2 months Kole Rao MD [address], [address], [place] Osteoporosis    [facility] 3 [facility] [place] [facility] Hematology Oncology 6 m f/u.md             Recent Outpatient Visits              1 week ago Lumbar stenosis with neurogenic claudication    [facility] [address] [facility], [place]    Office Visit    1 week ago Degenerative scoliosis    [facility], [address], Roxanna Garcia MD    Office Visit    3 weeks ago Age-related osteoporosis without current pathological fracture    [facility]    Office Visit    1 month ago Degenerative scoliosis    [facility], [address], Roxanna Garcia MD    Office Visit    2 months ago Lumbar stenosis with neurogenic claudication    [address], [address] [place], Roxanna Garcia MD    Office Visit

## 2023-12-26 NOTE — TELEPHONE ENCOUNTER
Called patient as Torsemide is listed on her chart as external. She advised she is taking this but it's prescribed by Dr. Rock King, Cardiology. I called the [address] office and spoke with Hiram Dawkins who then put me on hold so that she could locate a nurse and then, Juan Carlos Gresham RN came on the call. I advised her that this patient is seeking a refill . She will take care of this.

## 2023-12-26 NOTE — TELEPHONE ENCOUNTER
Patient calling regarding refill. She is following up with specialist in 2 weeks.  Medication pended for your review and approval.

## 2023-12-28 RX ORDER — HYDROCODONE BITARTRATE AND ACETAMINOPHEN 7.5; 325 MG/1; MG/1
1 TABLET ORAL EVERY 6 HOURS PRN
Qty: 60 TABLET | Refills: 0 | Status: SHIPPED | OUTPATIENT
Start: 2023-12-28 | End: 2024-01-27

## 2023-12-28 NOTE — TELEPHONE ENCOUNTER
Please review. Protocol failed / No protocol. Requested Prescriptions   Pending Prescriptions Disp Refills    baclofen 10 MG Oral Tab 30 tablet 0     Sig: Take 1 tablet (10 mg total) by mouth nightly as needed.  AT BEDTIME       There is no refill protocol information for this order           Recent Outpatient Visits              2 weeks ago Lumbar stenosis with neurogenic claudication    [facility], [address] [place] [address] [facility]    Office Visit    2 weeks ago Degenerative scoliosis    [facility], [address] [place] [address], Laura Moss MD    Office Visit    1 month ago Age-related osteoporosis without current pathological fracture    [address] [facility], Hiram Derrick Sanchez DO    Office Visit    1 month ago Degenerative scoliosis    [facility], [address] [place] [address], Laura Moss MD    Office Visit    2 months ago Lumbar stenosis with neurogenic claudication    [facility], [address] [place] [address], Laura Moss MD    Office Visit             Future Appointments         Provider Department Appt Notes    In 1 week Derrick Sanchez DO [facility], [address] [place] [address] Back pain    In 4 weeks Richie Gary, [address], [place] Colon w MAC @ [address]    In 1 month MD Lindsey Tsai, [address] [facility] follow up    In 2 months MD Lindsey Javed, [address], [place] Osteoporosis    In 2 months Danita Levy 19 Hematology Oncology 6 m f/u.md

## 2023-12-29 RX ORDER — BACLOFEN 10 MG/1
10 TABLET ORAL NIGHTLY PRN
Qty: 30 TABLET | Refills: 0 | Status: SHIPPED | OUTPATIENT
Start: 2023-12-29

## 2024-01-03 ENCOUNTER — TELEPHONE (OUTPATIENT)
Facility: CLINIC | Age: 59
End: 2024-01-03

## 2024-01-03 RX ORDER — PRUCALOPRIDE 2 MG/1
1 TABLET, FILM COATED ORAL DAILY
Qty: 90 TABLET | Refills: 3 | Status: SHIPPED | OUTPATIENT
Start: 2024-01-03

## 2024-01-03 NOTE — TELEPHONE ENCOUNTER
Requested Prescriptions     Pending Prescriptions Disp Refills    Prucalopride Succinate (MOTEGRITY) 2 MG Oral Tab 90 tablet 3     Sig: Take 1 tablet by mouth daily.     LOV: 08/30/2023  Last Refill: 08/30/2023    Routed to office on call.

## 2024-01-03 NOTE — TELEPHONE ENCOUNTER
Current Outpatient Medications   Medication Sig Dispense Refill    Prucalopride Succinate (MOTEGRITY) 2 MG Oral Tab Take 1 tablet by mouth daily. 90 tablet 3         Key: [id number]

## 2024-01-04 ENCOUNTER — V-VISIT (OUTPATIENT)
Dept: URGENT CARE | Age: 59
End: 2024-01-04

## 2024-01-04 VITALS
WEIGHT: 140 LBS | TEMPERATURE: 97.9 F | BODY MASS INDEX: 22.5 KG/M2 | SYSTOLIC BLOOD PRESSURE: 113 MMHG | HEART RATE: 87 BPM | RESPIRATION RATE: 18 BRPM | DIASTOLIC BLOOD PRESSURE: 68 MMHG | HEIGHT: 66 IN | OXYGEN SATURATION: 98 %

## 2024-01-04 DIAGNOSIS — J01.00 ACUTE NON-RECURRENT MAXILLARY SINUSITIS: Primary | ICD-10-CM

## 2024-01-04 PROCEDURE — 3078F DIAST BP <80 MM HG: CPT | Performed by: NURSE PRACTITIONER

## 2024-01-04 PROCEDURE — 99213 OFFICE O/P EST LOW 20 MIN: CPT | Performed by: NURSE PRACTITIONER

## 2024-01-04 PROCEDURE — 3074F SYST BP LT 130 MM HG: CPT | Performed by: NURSE PRACTITIONER

## 2024-01-04 RX ORDER — AMOXICILLIN AND CLAVULANATE POTASSIUM 875; 125 MG/1; MG/1
1 TABLET, FILM COATED ORAL EVERY 12 HOURS
Qty: 20 TABLET | Refills: 0 | Status: SHIPPED | OUTPATIENT
Start: 2024-01-04

## 2024-01-04 RX ORDER — HYDROCODONE BITARTRATE AND ACETAMINOPHEN 7.5; 325 MG/1; MG/1
1 TABLET ORAL EVERY 6 HOURS PRN
COMMUNITY
Start: 2023-12-28 | End: 2024-01-27

## 2024-01-04 RX ORDER — SPIRONOLACTONE 25 MG/1
25 TABLET ORAL DAILY
COMMUNITY

## 2024-01-04 ASSESSMENT — ENCOUNTER SYMPTOMS
SINUS PRESSURE: 1
RHINORRHEA: 1
FEVER: 1
PSYCHIATRIC NEGATIVE: 1
COUGH: 1
WHEEZING: 0

## 2024-01-07 DIAGNOSIS — M48.062 LUMBAR STENOSIS WITH NEUROGENIC CLAUDICATION: Primary | ICD-10-CM

## 2024-01-07 NOTE — TELEPHONE ENCOUNTER
Please review. Protocol Failed or has No Protocol.    Requested Prescriptions   Pending Prescriptions Disp Refills    HYDROcodone-acetaminophen 7.5-325 MG Oral Tab 60 tablet 0     Sig: Take 1 tablet by mouth every 6 (six) hours as needed for Pain.       There is no refill protocol information for this order          Recent Outpatient Visits              3 weeks ago Lumbar stenosis with neurogenic claudication    [facility]    Office Visit    1 month ago Degenerative scoliosis    [address]    Office Visit    1 month ago Age-related osteoporosis without current pathological fracture    [facility] Quintin Harman DO    Office Visit    1 month ago Degenerative scoliosis    [address] Víctor Navarro MD    Office Visit    2 months ago Lumbar stenosis with neurogenic claudication    [address] Víctor Navarro MD    Office Visit            Future Appointments         Provider Department Appt Notes    In 2 weeks MARIA ELENA, PROCEDURE [facility] Colon w MAC @ [facility]    In 1 month Víctor Madrigal MD [address] Back pain and leg pain    In 1 month Suman Bell MD [facility] follow up    In 1 month [facility]    In 2 months Bryon Lerma MD [facility] Hematology Oncology 6 m f/u.md

## 2024-01-08 ENCOUNTER — APPOINTMENT (OUTPATIENT)
Dept: URGENT CARE | Age: 59
End: 2024-01-08

## 2024-01-08 RX ORDER — HYDROCODONE BITARTRATE AND ACETAMINOPHEN 7.5; 325 MG/1; MG/1
1 TABLET ORAL EVERY 6 HOURS PRN
Qty: 60 TABLET | Refills: 0 | Status: SHIPPED | OUTPATIENT
Start: 2024-01-08 | End: 2024-02-07

## 2024-01-09 ENCOUNTER — OFFICE VISIT (OUTPATIENT)
Dept: FAMILY MEDICINE CLINIC | Facility: CLINIC | Age: 59
End: 2024-01-09
Payer: COMMERCIAL

## 2024-01-09 VITALS
DIASTOLIC BLOOD PRESSURE: 71 MMHG | SYSTOLIC BLOOD PRESSURE: 114 MMHG | OXYGEN SATURATION: 100 % | TEMPERATURE: 98 F | HEART RATE: 85 BPM | HEIGHT: 66 IN | BODY MASS INDEX: 23.3 KG/M2 | WEIGHT: 145 LBS

## 2024-01-09 DIAGNOSIS — J01.00 ACUTE MAXILLARY SINUSITIS, RECURRENCE NOT SPECIFIED: Primary | ICD-10-CM

## 2024-01-09 PROCEDURE — 3008F BODY MASS INDEX DOCD: CPT | Performed by: FAMILY MEDICINE

## 2024-01-09 PROCEDURE — 99213 OFFICE O/P EST LOW 20 MIN: CPT | Performed by: FAMILY MEDICINE

## 2024-01-09 PROCEDURE — 3074F SYST BP LT 130 MM HG: CPT | Performed by: FAMILY MEDICINE

## 2024-01-09 PROCEDURE — 3078F DIAST BP <80 MM HG: CPT | Performed by: FAMILY MEDICINE

## 2024-01-09 RX ORDER — BENZONATATE 200 MG/1
200 CAPSULE ORAL 3 TIMES DAILY PRN
Qty: 21 CAPSULE | Refills: 1 | Status: SHIPPED | OUTPATIENT
Start: 2024-01-09

## 2024-01-09 RX ORDER — PRUCALOPRIDE 2 MG/1
1 TABLET, FILM COATED ORAL DAILY
Qty: 90 TABLET | Refills: 3 | Status: CANCELLED | OUTPATIENT
Start: 2024-01-09

## 2024-01-09 NOTE — TELEPHONE ENCOUNTER
Requested Prescriptions     Pending Prescriptions Disp Refills    Prucalopride Succinate (MOTEGRITY) 2 MG Oral Tab 90 tablet 3     Sig: Take 1 tablet by mouth daily.       PA pending for medication, please see FAIZA from 1/3/2024.

## 2024-01-09 NOTE — PROGRESS NOTES
Subjective:   Esha Hernadez is a 59 year old female who presents for Cough (Cough for about 4 days. Pt states she went to [facility] in clinic on Thursday and she was provided abx. Pt states she is still coughing and her head feels heavy. Pt states she tested her self 4 times at home for covid and was negative. )       History/Other:    Chief Complaint Reviewed and Verified  Nursing Notes Reviewed and   Verified  Tobacco Reviewed  Allergies Reviewed  Medications Reviewed    Problem List Reviewed  Medical History Reviewed  Surgical History   Reviewed  OB Status Reviewed  Family History Reviewed  Social History   Reviewed         Tobacco:  She smoked tobacco in the past but quit greater than 12 months ago.  Social History    Tobacco Use      Smoking status: Former        Packs/day: 0.50        Years: 4.00        Additional pack years: 0.00        Total pack years: 2.00        Types: Cigarettes        Quit date: 2018        Years since quittin.4      Smokeless tobacco: Never      Tobacco comments: Quit smoking       Current Outpatient Medications   Medication Sig Dispense Refill    benzonatate 200 MG Oral Cap Take 1 capsule (200 mg total) by mouth 3 (three) times daily as needed for cough. 21 capsule 1    HYDROcodone-acetaminophen 7.5-325 MG Oral Tab Take 1 tablet by mouth every 6 (six) hours as needed for Pain. 60 tablet 0    Prucalopride Succinate (MOTEGRITY) 2 MG Oral Tab Take 1 tablet by mouth daily. 90 tablet 3    baclofen 10 MG Oral Tab Take 1 tablet (10 mg total) by mouth nightly as needed. AT BEDTIME 30 tablet 0    spironolactone 25 MG Oral Tab Take 1 tablet (25 mg total) by mouth daily.      temazepam 15 MG Oral Cap Take 1 capsule (15 mg total) by mouth nightly as needed for Sleep. 90 capsule 1    potassium chloride 20 MEQ Oral Tab CR Take 1 tablet (20 mEq total) by mouth daily. 30 tablet 5    Omeprazole 40 MG Oral Capsule Delayed Release Take 1 capsule (40 mg total) by mouth before  breakfast. 90 capsule 3    ondansetron 4 MG Oral Tablet Dispersible Place 1 tablet (4 mg total) under the tongue every 8 (eight) hours as needed for Nausea. 90 tablet 11    dicyclomine 20 MG Oral Tab Take 1-2 tablets (20-40 mg total) by mouth 3 (three) times daily as needed. 90 tablet 3    torsemide 20 MG Oral Tab Take 1 tablet (20 mg total) by mouth daily.      Digestive Enzymes (DIGESTIVE ENZYME OR) Take 1 capsule by mouth daily.      VITAMIN D, ERGOCALCIFEROL, OR Take 1 capsule by mouth daily.      Multiple Vitamins-Minerals (MULTI-VITAMIN/MINERALS) Oral Tab Take 1 tablet by mouth daily.      docusate sodium 100 MG Oral Cap Take 1 capsule (100 mg total) by mouth daily.      methylPREDNISolone (MEDROL) 4 MG Oral Tablet Therapy Pack As directed. (Patient not taking: Reported on 1/9/2024) 21 tablet 0    risedronate (ACTONEL) 35 MG Oral Tab Take 1 tablet (35 mg total) by mouth every 7 days. (Patient not taking: Reported on 1/9/2024) 5 tablet 11    OSPHENA 60 MG Oral Tab  (Patient not taking: Reported on 1/9/2024)           Review of Systems:  Review of Systems   Constitutional: Negative.    HENT:  Positive for congestion.    Respiratory: Negative.           Objective:   /71   Pulse 85   Temp 97.5 °F (36.4 °C)   Ht 5' 6\" (1.676 m)   Wt 145 lb (65.8 kg)   SpO2 100%   BMI 23.40 kg/m²  Estimated body mass index is 23.4 kg/m² as calculated from the following:    Height as of this encounter: 5' 6\" (1.676 m).    Weight as of this encounter: 145 lb (65.8 kg).  Physical Exam  Vitals reviewed.   Constitutional:       Appearance: Normal appearance.   HENT:      Right Ear: A middle ear effusion is present.      Left Ear: A middle ear effusion is present.      Nose:      Right Sinus: Frontal sinus tenderness present.      Left Sinus: Frontal sinus tenderness present.   Pulmonary:      Breath sounds: Normal breath sounds.   Neurological:      Mental Status: She is alert.           Assessment & Plan:   1. Acute  maxillary sinusitis, recurrence not specified (Primary)  Other orders  -     Benzonatate; Take 1 capsule (200 mg total) by mouth 3 (three) times daily as needed for cough.  Dispense: 21 capsule; Refill: 1  Add medication to help with post nasal cough.  Complete abx already taking      No follow-ups on file.    Quintin Harman DO, 1/9/2024, 5:35 PM

## 2024-01-12 NOTE — TELEPHONE ENCOUNTER
FRANTZ ZAPATA (Key: [id number])  PA Case ID #: [id number]  Rx #: [id number]    sent iconSent to Plan today  Drug  Motegrity 2MG tablets  ePA cloud logo  Form  Crista Electronic PA Form (2017 N[employer])

## 2024-01-15 NOTE — TELEPHONE ENCOUNTER
Received PA approval letter for Motegrity from [facility].    PA#[id number]  Approved from 2024-2025    I spoke to  Mel duque), she processed the claim and it went through.    She stated the patient will pay $180 for a 3 month supply.    Called and spoke to the patient, /name verified.    She was made aware of the above.    The patient was grateful.

## 2024-01-20 DIAGNOSIS — M48.062 LUMBAR STENOSIS WITH NEUROGENIC CLAUDICATION: ICD-10-CM

## 2024-01-20 DIAGNOSIS — M47.816 LUMBAR SPONDYLOSIS: Primary | ICD-10-CM

## 2024-01-22 RX ORDER — TEMAZEPAM 15 MG/1
15 CAPSULE ORAL NIGHTLY PRN
Qty: 90 CAPSULE | Refills: 1 | OUTPATIENT
Start: 2024-01-22

## 2024-01-22 NOTE — TELEPHONE ENCOUNTER
Failed Protocol/No Protocol.    Requested Prescriptions   Pending Prescriptions Disp Refills    HYDROcodone-acetaminophen 7.5-325 MG Oral Tab 60 tablet 0     Sig: Take 1 tablet by mouth every 6 (six) hours as needed for Pain.       There is no refill protocol information for this order          Future Appointments         Provider Department Appt Notes    In 3 days MARIA ELENA, PROCEDURE [facility] Colon w MAC @ [facility]    In 3 weeks Víctor Madrigal MD [address] Back pain and leg pain    In 1 month Suman Bell MD [facility] follow up    In 1 month Susie Madison MD [facility] Osteoporosis    In 2 months Bryon Lerma MD [facility] Hematology Oncology 6 m f/u.md          Recent Outpatient Visits              1 week ago Acute maxillary sinusitis, recurrence not specified    [facility] Quintin Harman DO    Office Visit    1 month ago Lumbar stenosis with neurogenic claudication    [facility]    Office Visit    1 month ago Degenerative scoliosis    [address], Víctor Navarro MD    Office Visit    1 month ago Age-related osteoporosis without current pathological fracture    [facility]    Office Visit    2 months ago Degenerative scoliosis    [address], Víctor Navarro MD    Office Visit

## 2024-01-23 RX ORDER — HYDROCODONE BITARTRATE AND ACETAMINOPHEN 7.5; 325 MG/1; MG/1
1 TABLET ORAL EVERY 6 HOURS PRN
Qty: 60 TABLET | Refills: 0 | Status: SHIPPED | OUTPATIENT
Start: 2024-01-23 | End: 2024-02-22

## 2024-01-25 PROBLEM — R94.31 ABNORMAL ELECTROCARDIOGRAM (ECG) (EKG): Status: ACTIVE | Noted: 2021-05-17

## 2024-01-25 PROBLEM — G43.909 SICK HEADACHE: Status: ACTIVE | Noted: 2024-01-25

## 2024-01-25 PROBLEM — I10 ESSENTIAL HYPERTENSION: Status: ACTIVE | Noted: 2021-06-16

## 2024-01-25 PROCEDURE — 88305 TISSUE EXAM BY PATHOLOGIST: CPT | Performed by: INTERNAL MEDICINE

## 2024-02-04 DIAGNOSIS — M48.062 LUMBAR STENOSIS WITH NEUROGENIC CLAUDICATION: ICD-10-CM

## 2024-02-05 ENCOUNTER — LAB ENCOUNTER (OUTPATIENT)
Dept: LAB | Facility: HOSPITAL | Age: 59
End: 2024-02-05
Attending: INTERNAL MEDICINE
Payer: COMMERCIAL

## 2024-02-05 DIAGNOSIS — R60.9 EDEMA: Primary | ICD-10-CM

## 2024-02-05 DIAGNOSIS — I10 HTN (HYPERTENSION): ICD-10-CM

## 2024-02-05 LAB
ANION GAP SERPL CALC-SCNC: 3 MMOL/L (ref 0–18)
BUN BLD-MCNC: 19 MG/DL (ref 9–23)
BUN/CREAT SERPL: 18.3 (ref 10–20)
CALCIUM BLD-MCNC: 9.5 MG/DL (ref 8.7–10.4)
CHLORIDE SERPL-SCNC: 103 MMOL/L (ref 98–112)
CO2 SERPL-SCNC: 32 MMOL/L (ref 21–32)
CREAT BLD-MCNC: 1.04 MG/DL
EGFRCR SERPLBLD CKD-EPI 2021: 62 ML/MIN/1.73M2 (ref 60–?)
FASTING STATUS PATIENT QL REPORTED: YES
GLUCOSE BLD-MCNC: 94 MG/DL (ref 70–99)
OSMOLALITY SERPL CALC.SUM OF ELEC: 288 MOSM/KG (ref 275–295)
POTASSIUM SERPL-SCNC: 4.2 MMOL/L (ref 3.5–5.1)
SODIUM SERPL-SCNC: 138 MMOL/L (ref 136–145)

## 2024-02-05 PROCEDURE — 36415 COLL VENOUS BLD VENIPUNCTURE: CPT

## 2024-02-05 PROCEDURE — 80048 BASIC METABOLIC PNL TOTAL CA: CPT

## 2024-02-06 RX ORDER — BACLOFEN 10 MG/1
10 TABLET ORAL NIGHTLY PRN
Qty: 30 TABLET | Refills: 0 | Status: SHIPPED | OUTPATIENT
Start: 2024-02-06 | End: 2024-02-19

## 2024-02-06 RX ORDER — HYDROCODONE BITARTRATE AND ACETAMINOPHEN 7.5; 325 MG/1; MG/1
1 TABLET ORAL EVERY 6 HOURS PRN
Qty: 60 TABLET | Refills: 0 | Status: SHIPPED | OUTPATIENT
Start: 2024-02-06 | End: 2024-03-07

## 2024-02-06 NOTE — TELEPHONE ENCOUNTER
Please review.  Protocol failed / Has no protocol.     Requested Prescriptions   Pending Prescriptions Disp Refills    baclofen 10 MG Oral Tab 30 tablet 0     Sig: Take 1 tablet (10 mg total) by mouth nightly as needed. AT BEDTIME       There is no refill protocol information for this order        Future Appointments         Provider Department Appt Notes    In 1 week Víctor Madrigal MD [address] Back pain and leg pain    In 1 week [facility]    In 2 weeks Suman Bell MD [facility] follow up    In 3 weeks Susie Madison MD [facility] Osteoporosis    In 1 month Bryon Lerma MD [facility] Hematology Oncology 6 m f/u.md           Recent Outpatient Visits              4 weeks ago Acute maxillary sinusitis, recurrence not specified    [facility]    Office Visit    1 month ago Lumbar stenosis with neurogenic claudication    [facility]    Office Visit    2 months ago Degenerative scoliosis    [address], Víctor Navarro MD    Office Visit    2 months ago Age-related osteoporosis without current pathological fracture    [facility]    Office Visit    2 months ago Degenerative scoliosis    [address], Víctor Navarro MD    Office Visit

## 2024-02-06 NOTE — TELEPHONE ENCOUNTER
Please review; protocol failed/No Protocol    Fill dates: 12/28/2023, 01/11/2024, 01/26/2024  Requested Prescriptions   Pending Prescriptions Disp Refills    HYDROcodone-acetaminophen 7.5-325 MG Oral Tab 60 tablet 0     Sig: Take 1 tablet by mouth every 6 (six) hours as needed for Pain.       There is no refill protocol information for this order        Future Appointments         Provider Department Appt Notes    In 1 week Víctor Madrigal MD [address] Back pain and leg pain    In 1 week [facility]    In 2 weeks Suman Bell MD [facility] follow up    In 3 weeks Susie Madison MD [facility] Osteoporosis    In 1 month Bryon Lerma MD [facility] Hematology Oncology 6 m f/u.md          Recent Outpatient Visits              4 weeks ago Acute maxillary sinusitis, recurrence not specified    [facility]    Office Visit    1 month ago Lumbar stenosis with neurogenic claudication    [facility]    Office Visit    2 months ago Degenerative scoliosis    [address], Víctor Navarro MD    Office Visit    2 months ago Age-related osteoporosis without current pathological fracture    [facility]    Office Visit    2 months ago Degenerative scoliosis    [address], MD    Office Visit

## 2024-02-15 ENCOUNTER — OFFICE VISIT (OUTPATIENT)
Dept: ORTHOPEDICS CLINIC | Facility: CLINIC | Age: 59
End: 2024-02-15
Payer: COMMERCIAL

## 2024-02-15 ENCOUNTER — HOSPITAL ENCOUNTER (OUTPATIENT)
Dept: GENERAL RADIOLOGY | Age: 59
Discharge: HOME | End: 2024-02-15
Attending: STUDENT IN AN ORGANIZED HEALTH CARE EDUCATION/TRAINING PROGRAM
Payer: COMMERCIAL

## 2024-02-15 DIAGNOSIS — M51.36 DEGENERATIVE DISC DISEASE, LUMBAR: Primary | ICD-10-CM

## 2024-02-15 DIAGNOSIS — M51.36 DEGENERATIVE DISC DISEASE, LUMBAR: ICD-10-CM

## 2024-02-15 PROCEDURE — 99215 OFFICE O/P EST HI 40 MIN: CPT | Performed by: STUDENT IN AN ORGANIZED HEALTH CARE EDUCATION/TRAINING PROGRAM

## 2024-02-15 PROCEDURE — 72110 X-RAY EXAM L-2 SPINE 4/>VWS: CPT | Performed by: STUDENT IN AN ORGANIZED HEALTH CARE EDUCATION/TRAINING PROGRAM

## 2024-02-15 NOTE — PROGRESS NOTES
[facility] - ORTHOPEDICS  [address] [place] [address], [address] [address]  [address] [place] [id number]  [phone]     FOLLOW-UP PATIENT VISIT    Name: Esha Hernadez   MRN: [id number]  Date: 2/15/2024     CC: lumbar degenerative disc disease      INTERVAL HISTORY:   Esha Hernadez is a 59 year old female  follow-up patient whom I have been treating conservatively. Patient returns today for reevaluation of lumbar degenerative disc disease.     Patient is well known to me and I've been treating her over a year. She has osteoporosis and is developing degenerative scoliosis. Patient has severe degenerative disc disease at L3-4 and L4-5.    Patient has tried a variety of treatment up to date, including PT. More recently, patient had L4-5 VIGNESH, which did not help very much.     Patient has discuss facet injections and radiofrequency ablation, but did not proceed.  Patient currently relies on Norco every 6 hours for pain.  Functionally patient is limited.    Bowel and bladder symptoms: absent.    We have tried the following interventions thus far: PT, medications, VIGNESH    ROS: No fever/chills or other constitutional issues.    PE:   There were no vitals filed for this visit.  Estimated body mass index is 22.76 kg/m² as calculated from the following:    Height as of 1/16/24: 5' 6\" (1.676 m).    Weight as of 1/16/24: 141 lb (64 kg).    On physical examination, she is awake, alert and oriented x 3 and in no acute distress. Mood, affect and language are normal. She appears well developed and well nourished.  She walks without a nonantalgic, nonmyelopathic, non-Trendelenburg gait. Motor strength testing of the lower extremities shows 5/5 strength in hip flexors, knee extensors, ankle dorsiflexors, toe extensor, and gastroc-soleus complex.   Sensation is intact to light touch L2-S1 distributions bilaterally. Reflexes normal.     Radiographic Examination/Diagnostics:  XR and MRI  personally viewed, independently interpreted and radiology report was reviewed.  X-ray of the lumbar spine demonstrates worsening degenerative disc disease particularly at L3-4 and L4-5     IMPRESSION: Esha Hernadez is a 59 year old female with degenerative disc disease at L3-4 and L4-5 with left foraminal stenosis at L3-4 and L4-5 presenting with significant back and left lower extremity pain refractory to medical management including physical therapy and epidural steroid injections.    PLAN:   The patient has participated in extensive conservative management and has failed gain any significant improvement in her symptoms over the last 12 months.  The symptoms have failed to respond to conservative measures for greater than 3 months now, to include: prescription strength analgesics and active documented physical therapy or therapeutic exercises including stretching and strengthening.  The patient does not have any cognitive or behavioral issues requiring further evaluation or management.      In addition to the above, she has significant functional impairment and is unable to perform activities of daily living.      We discussed the risks, benefits, and alternatives of treatment.  The patient fully understands the nature of her medical condition and fully understands the nature of the proposed surgical treatment.  I have fully explained all possible alternative treatments or procedures and the patient understands the significant risks involved in the proposed treatment, as well as the risks involved and benefits of all alternative treatments and procedures.     Plan: L3-4, L4-5 lateral interbody fusion    Patient can call to schedule    I spent  40  minutes in preparation to see the patient, counseling/education of relevant pathology, discussing imaging results, ordering medication/therapy intervention, and care coordination.        Víctor Madrigal MD  Orthopedic Spine Surgeon  [facility]   [address] [place] [address]   [address] [place] [address] [id number] [place] [address].[facility]  [email or web address]  t: [phone]   f: [phone]        This note was dictated using Dragon software.  While it was briefly proofread prior to completion, some grammatical, spelling, and word choice errors due to dictation may still occur.

## 2024-02-19 ENCOUNTER — OFFICE VISIT (OUTPATIENT)
Dept: FAMILY MEDICINE CLINIC | Facility: CLINIC | Age: 59
End: 2024-02-19
Payer: COMMERCIAL

## 2024-02-19 ENCOUNTER — LAB ENCOUNTER (OUTPATIENT)
Dept: LAB | Age: 59
End: 2024-02-19
Attending: FAMILY MEDICINE
Payer: COMMERCIAL

## 2024-02-19 VITALS
WEIGHT: 145 LBS | DIASTOLIC BLOOD PRESSURE: 80 MMHG | OXYGEN SATURATION: 100 % | HEIGHT: 66 IN | HEART RATE: 77 BPM | SYSTOLIC BLOOD PRESSURE: 136 MMHG | BODY MASS INDEX: 23.3 KG/M2

## 2024-02-19 DIAGNOSIS — Z00.00 ROUTINE GENERAL MEDICAL EXAMINATION AT A HEALTH CARE FACILITY: ICD-10-CM

## 2024-02-19 DIAGNOSIS — M48.062 LUMBAR STENOSIS WITH NEUROGENIC CLAUDICATION: ICD-10-CM

## 2024-02-19 DIAGNOSIS — Z00.00 ROUTINE GENERAL MEDICAL EXAMINATION AT A HEALTH CARE FACILITY: Primary | ICD-10-CM

## 2024-02-19 PROBLEM — G43.909 SICK HEADACHE: Status: RESOLVED | Noted: 2024-01-25 | Resolved: 2024-02-19

## 2024-02-19 LAB
ALBUMIN SERPL-MCNC: 4.3 G/DL (ref 3.2–4.8)
ALBUMIN/GLOB SERPL: 1.5 {RATIO} (ref 1–2)
ALP LIVER SERPL-CCNC: 68 U/L
ALT SERPL-CCNC: 9 U/L
ANION GAP SERPL CALC-SCNC: 5 MMOL/L (ref 0–18)
AST SERPL-CCNC: 18 U/L (ref ?–34)
BASOPHILS # BLD AUTO: 0.01 X10(3) UL (ref 0–0.2)
BASOPHILS NFR BLD AUTO: 0.3 %
BILIRUB SERPL-MCNC: 0.4 MG/DL (ref 0.2–1.1)
BUN BLD-MCNC: 20 MG/DL (ref 9–23)
BUN/CREAT SERPL: 18.9 (ref 10–20)
CALCIUM BLD-MCNC: 9.8 MG/DL (ref 8.7–10.4)
CHLORIDE SERPL-SCNC: 103 MMOL/L (ref 98–112)
CHOLEST SERPL-MCNC: 180 MG/DL (ref ?–200)
CO2 SERPL-SCNC: 32 MMOL/L (ref 21–32)
CREAT BLD-MCNC: 1.06 MG/DL
DEPRECATED RDW RBC AUTO: 40.2 FL (ref 35.1–46.3)
EGFRCR SERPLBLD CKD-EPI 2021: 60 ML/MIN/1.73M2 (ref 60–?)
EOSINOPHIL # BLD AUTO: 0.05 X10(3) UL (ref 0–0.7)
EOSINOPHIL NFR BLD AUTO: 1.4 %
ERYTHROCYTE [DISTWIDTH] IN BLOOD BY AUTOMATED COUNT: 12.4 % (ref 11–15)
FASTING PATIENT LIPID ANSWER: YES
FASTING STATUS PATIENT QL REPORTED: YES
GLOBULIN PLAS-MCNC: 2.8 G/DL (ref 2.8–4.4)
GLUCOSE BLD-MCNC: 69 MG/DL (ref 70–99)
HCT VFR BLD AUTO: 35.8 %
HDLC SERPL-MCNC: 53 MG/DL (ref 40–59)
HGB BLD-MCNC: 12 G/DL
IMM GRANULOCYTES # BLD AUTO: 0 X10(3) UL (ref 0–1)
IMM GRANULOCYTES NFR BLD: 0 %
LDLC SERPL CALC-MCNC: 112 MG/DL (ref ?–100)
LYMPHOCYTES # BLD AUTO: 1.63 X10(3) UL (ref 1–4)
LYMPHOCYTES NFR BLD AUTO: 46.2 %
MCH RBC QN AUTO: 30 PG (ref 26–34)
MCHC RBC AUTO-ENTMCNC: 33.5 G/DL (ref 31–37)
MCV RBC AUTO: 89.5 FL
MONOCYTES # BLD AUTO: 0.21 X10(3) UL (ref 0.1–1)
MONOCYTES NFR BLD AUTO: 5.9 %
NEUTROPHILS # BLD AUTO: 1.63 X10 (3) UL (ref 1.5–7.7)
NEUTROPHILS # BLD AUTO: 1.63 X10(3) UL (ref 1.5–7.7)
NEUTROPHILS NFR BLD AUTO: 46.2 %
NONHDLC SERPL-MCNC: 127 MG/DL (ref ?–130)
OSMOLALITY SERPL CALC.SUM OF ELEC: 291 MOSM/KG (ref 275–295)
PLATELET # BLD AUTO: 189 10(3)UL (ref 150–450)
POTASSIUM SERPL-SCNC: 4.1 MMOL/L (ref 3.5–5.1)
PROT SERPL-MCNC: 7.1 G/DL (ref 5.7–8.2)
RBC # BLD AUTO: 4 X10(6)UL
SODIUM SERPL-SCNC: 140 MMOL/L (ref 136–145)
TRIGL SERPL-MCNC: 80 MG/DL (ref 30–149)
TSI SER-ACNC: 1.78 MIU/ML (ref 0.55–4.78)
VIT D+METAB SERPL-MCNC: 54.4 NG/ML (ref 30–100)
VLDLC SERPL CALC-MCNC: 14 MG/DL (ref 0–30)
WBC # BLD AUTO: 3.5 X10(3) UL (ref 4–11)

## 2024-02-19 PROCEDURE — 82306 VITAMIN D 25 HYDROXY: CPT

## 2024-02-19 PROCEDURE — 3008F BODY MASS INDEX DOCD: CPT | Performed by: FAMILY MEDICINE

## 2024-02-19 PROCEDURE — 99396 PREV VISIT EST AGE 40-64: CPT | Performed by: FAMILY MEDICINE

## 2024-02-19 PROCEDURE — 85025 COMPLETE CBC W/AUTO DIFF WBC: CPT

## 2024-02-19 PROCEDURE — 3079F DIAST BP 80-89 MM HG: CPT | Performed by: FAMILY MEDICINE

## 2024-02-19 PROCEDURE — 84443 ASSAY THYROID STIM HORMONE: CPT

## 2024-02-19 PROCEDURE — 80053 COMPREHEN METABOLIC PANEL: CPT

## 2024-02-19 PROCEDURE — 80061 LIPID PANEL: CPT

## 2024-02-19 PROCEDURE — 3075F SYST BP GE 130 - 139MM HG: CPT | Performed by: FAMILY MEDICINE

## 2024-02-19 PROCEDURE — 36415 COLL VENOUS BLD VENIPUNCTURE: CPT

## 2024-02-19 RX ORDER — HYDROCODONE BITARTRATE AND ACETAMINOPHEN 7.5; 325 MG/1; MG/1
1 TABLET ORAL EVERY 6 HOURS PRN
Qty: 120 TABLET | Refills: 0 | Status: SHIPPED | OUTPATIENT
Start: 2024-02-19 | End: 2024-03-20

## 2024-02-19 RX ORDER — BACLOFEN 10 MG/1
10 TABLET ORAL NIGHTLY PRN
Qty: 30 TABLET | Refills: 5 | Status: SHIPPED | OUTPATIENT
Start: 2024-02-19

## 2024-02-19 NOTE — PROGRESS NOTES
Subjective:   Esha Hernadez is a 59 year old female who presents for Routine Physical       Patient Active Problem List   Diagnosis    Trigeminal neuralgia    Stercoral ulcer of rectum    Status post Symone procedure (HCC)    Anemia    Incisional hernia with obstruction but no gangrene    Varicose veins of both lower extremities with pain    Lipoma of right upper extremity    Irritable bowel syndrome with constipation    Splenomegaly    Lumbar stenosis with neurogenic claudication    Lumbar foraminal stenosis    Left lumbar radiculitis    Nonrheumatic mitral valve regurgitation    Nonrheumatic tricuspid valve regurgitation    Vitamin D deficiency    Plantar fascial fibromatosis    Chronic pain    Migraine    Malaise and fatigue    Insomnia    Herpes zoster    Hereditary and idiopathic peripheral neuropathy    Neuralgia and neuritis    Epilepsy (HCC)    Essential hypertension    Enthesopathy of hip region    Enthesopathy of ankle and tarsus    Other fluid overload    Dyspepsia and disorder of function of stomach    Disorder of teeth and supporting structures    Deviated nasal septum    Convulsions ([facility])    Conjunctivitis    Backache    Low back pain    Calcaneal spur    Cellulitis and abscess of foot    Cholelithiasis    Congenital anomaly of eye    Allergic rhinitis    Chronic rhinitis    Chronic sinusitis    Acute reaction to stress    Alopecia    Anxiety state    Abdominal pain, right upper quadrant    Abdominal pain, right lower quadrant    Abnormal electrocardiogram (ECG) (EKG)    Abnormal weight gain      History/Other:    Chief Complaint Reviewed and Verified  No Further Nursing Notes to   Review  Tobacco Reviewed  Allergies Reviewed  Medications Reviewed    Medical History Reviewed  Surgical History Reviewed  OB Status Reviewed    Family History Reviewed  Social History Reviewed         Tobacco:  She smoked tobacco in the past but quit greater than 12 months ago.  Social History    Tobacco  Use      Smoking status: Former        Packs/day: 0.50        Years: 4.00        Additional pack years: 0.00        Total pack years: 2.00        Types: Cigarettes        Quit date: 2018        Years since quittin.6      Smokeless tobacco: Never      Tobacco comments: Quit smoking       Current Outpatient Medications   Medication Sig Dispense Refill    HYDROcodone-acetaminophen 7.5-325 MG Oral Tab Take 1 tablet by mouth every 6 (six) hours as needed for Pain. 120 tablet 0    baclofen 10 MG Oral Tab Take 1 tablet (10 mg total) by mouth nightly as needed. AT BEDTIME 30 tablet 5    Prucalopride Succinate (MOTEGRITY) 2 MG Oral Tab Take 1 tablet by mouth daily. 90 tablet 3    risedronate (ACTONEL) 35 MG Oral Tab Take 1 tablet (35 mg total) by mouth every 7 days. 5 tablet 11    OSPHENA 60 MG Oral Tab       spironolactone 25 MG Oral Tab Take 1 tablet (25 mg total) by mouth daily.      temazepam 15 MG Oral Cap Take 1 capsule (15 mg total) by mouth nightly as needed for Sleep. 90 capsule 1    potassium chloride 20 MEQ Oral Tab CR Take 1 tablet (20 mEq total) by mouth daily. 30 tablet 5    Omeprazole 40 MG Oral Capsule Delayed Release Take 1 capsule (40 mg total) by mouth before breakfast. 90 capsule 3    ondansetron 4 MG Oral Tablet Dispersible Place 1 tablet (4 mg total) under the tongue every 8 (eight) hours as needed for Nausea. 90 tablet 11    dicyclomine 20 MG Oral Tab Take 1-2 tablets (20-40 mg total) by mouth 3 (three) times daily as needed. 90 tablet 3    torsemide 20 MG Oral Tab Take 1 tablet (20 mg total) by mouth daily.      Digestive Enzymes (DIGESTIVE ENZYME OR) Take 1 capsule by mouth daily.      VITAMIN D, ERGOCALCIFEROL, OR Take 1 capsule by mouth daily.      Multiple Vitamins-Minerals (MULTI-VITAMIN/MINERALS) Oral Tab Take 1 tablet by mouth daily.      docusate sodium 100 MG Oral Cap Take 1 capsule (100 mg total) by mouth daily.           Review of Systems:  Review of Systems   Constitutional:  Negative.    HENT: Negative.     Eyes: Negative.    Respiratory: Negative.     Cardiovascular: Negative.    Gastrointestinal: Negative.    Genitourinary: Negative.    Musculoskeletal:  Positive for back pain.   Skin: Negative.    Neurological: Negative.    Psychiatric/Behavioral: Negative.           Objective:   /80   Pulse 77   Ht 5' 6\" (1.676 m)   Wt 145 lb (65.8 kg)   SpO2 100%   BMI 23.40 kg/m²  Estimated body mass index is 23.4 kg/m² as calculated from the following:    Height as of this encounter: 5' 6\" (1.676 m).    Weight as of this encounter: 145 lb (65.8 kg).  Physical Exam  Vitals reviewed.   Constitutional:       Appearance: Normal appearance. She is well-developed.   HENT:      Head: Normocephalic.      Right Ear: Hearing, tympanic membrane and ear canal normal.      Left Ear: Hearing, tympanic membrane and ear canal normal.      Nose: Nose normal.   Eyes:      Conjunctiva/sclera: Conjunctivae normal.      Pupils: Pupils are equal, round, and reactive to light.      Funduscopic exam:     Right eye: No hemorrhage or AV nicking.         Left eye: No hemorrhage or AV nicking.   Neck:      Thyroid: No thyroid mass or thyromegaly.   Cardiovascular:      Heart sounds: Normal heart sounds.   Pulmonary:      Breath sounds: Normal breath sounds.   Abdominal:      Tenderness: There is no abdominal tenderness.   Musculoskeletal:      Cervical back: Normal range of motion and neck supple.      Lumbar back: Tenderness present. Decreased range of motion.   Lymphadenopathy:      Upper Body:      Right upper body: No supraclavicular adenopathy.      Left upper body: No supraclavicular adenopathy.   Skin:     Comments: No suspicious findings waist up exam   Neurological:      Mental Status: She is alert and oriented to person, place, and time.      Sensory: No sensory deficit.      Deep Tendon Reflexes: Reflexes are normal and symmetric.   Psychiatric:         Mood and Affect: Mood is not anxious or  depressed.           Assessment & Plan:   1. Routine general medical examination at a health care facility (Primary)  -     CBC With Differential With Platelet; Future; Expected date: 02/19/2024  -     Comp Metabolic Panel (14); Future; Expected date: 02/19/2024  -     Lipid Panel; Future; Expected date: 02/19/2024  -     Assay, Thyroid Stim Hormone; Future; Expected date: 02/19/2024  -     Vitamin D; Future; Expected date: 02/19/2024  2. Lumbar stenosis with neurogenic claudication  -     HYDROcodone-Acetaminophen; Take 1 tablet by mouth every 6 (six) hours as needed for Pain.  Dispense: 120 tablet; Refill: 0  Other orders  -     Baclofen; Take 1 tablet (10 mg total) by mouth nightly as needed. AT BEDTIME  Dispense: 30 tablet; Refill: 5  Lab testing ordered.  Refill medication may continue for pain control.  He is currently seeing chiropractor as well as new specialist        No follow-ups on file.    Quintin Harman DO, 2/19/2024, 3:47 PM

## 2024-02-20 ENCOUNTER — TELEPHONE (OUTPATIENT)
Facility: CLINIC | Age: 59
End: 2024-02-20

## 2024-02-20 NOTE — TELEPHONE ENCOUNTER
Suman Bell MD  [facility] Clinical Staff  GI RNs - 1. Please print and mail this letter to patient; 2. Recall for colonoscopy exam in 5 years    Results letter sent to patient via [employer]. Also printed and mailed out to patient.     Patient outreach entered for 5 year colonoscopy recall. Done on 1/25/2024; due on 1/25/2029.     Health maintenance updated.

## 2024-02-21 ENCOUNTER — TELEPHONE (OUTPATIENT)
Facility: CLINIC | Age: 59
End: 2024-02-21

## 2024-02-21 NOTE — TELEPHONE ENCOUNTER
Patient had appt today (02/24/24) with Dr. Bell. Patient had to cancel because she had another appt (could no longer wait). She states this appt was to go over her CLN results. She states she had 5 polyps. I rescheduled patient for Aug (first available). She would like a sooner appt to over her results. Please advise.

## 2024-02-24 RX ORDER — BACLOFEN 10 MG/1
10 TABLET ORAL NIGHTLY PRN
Qty: 30 TABLET | Refills: 5 | OUTPATIENT
Start: 2024-02-24

## 2024-02-24 NOTE — TELEPHONE ENCOUNTER
Rx was already sent:      Outpatient Medication Detail     Disp Refills Start End    baclofen 10 MG Oral Tab 30 tablet 5 2/19/2024 --    Sig - Route: Take 1 tablet (10 mg total) by mouth nightly as needed. AT BEDTIME - Oral    Sent to pharmacy as: Baclofen 10 MG Oral Tablet (Lioresal)    E-Prescribing Status: Receipt confirmed by pharmacy (2/19/2024 12:47 PM CST)

## 2024-02-26 NOTE — TELEPHONE ENCOUNTER
I spoke to the pt and we rescheduled her appt. August appt cancelled    Date time and [facility] location confirmed with the pt    Your Appointments        Monday April 01, 2024  9:30 AM  Follow Up Visit with Suman Bell MD  [facility] ([facility]) [facility] [address] [facility] [address]  [facility] [id number]  [phone]

## 2024-02-28 ENCOUNTER — LAB ENCOUNTER (OUTPATIENT)
Dept: LAB | Facility: HOSPITAL | Age: 59
End: 2024-02-28
Attending: INTERNAL MEDICINE
Payer: COMMERCIAL

## 2024-02-28 ENCOUNTER — OFFICE VISIT (OUTPATIENT)
Dept: RHEUMATOLOGY | Facility: CLINIC | Age: 59
End: 2024-02-28

## 2024-02-28 ENCOUNTER — TELEPHONE (OUTPATIENT)
Dept: RHEUMATOLOGY | Facility: CLINIC | Age: 59
End: 2024-02-28

## 2024-02-28 ENCOUNTER — MED REC SCAN ONLY (OUTPATIENT)
Dept: FAMILY MEDICINE CLINIC | Facility: CLINIC | Age: 59
End: 2024-02-28

## 2024-02-28 VITALS — DIASTOLIC BLOOD PRESSURE: 71 MMHG | HEART RATE: 71 BPM | SYSTOLIC BLOOD PRESSURE: 106 MMHG

## 2024-02-28 DIAGNOSIS — M81.0 AGE-RELATED OSTEOPOROSIS WITHOUT CURRENT PATHOLOGICAL FRACTURE: ICD-10-CM

## 2024-02-28 DIAGNOSIS — M81.0 AGE-RELATED OSTEOPOROSIS WITHOUT CURRENT PATHOLOGICAL FRACTURE: Primary | ICD-10-CM

## 2024-02-28 LAB — PTH-INTACT SERPL-MCNC: 143.8 PG/ML (ref 18.5–88)

## 2024-02-28 PROCEDURE — 83970 ASSAY OF PARATHORMONE: CPT

## 2024-02-28 PROCEDURE — 3074F SYST BP LT 130 MM HG: CPT | Performed by: INTERNAL MEDICINE

## 2024-02-28 PROCEDURE — 3078F DIAST BP <80 MM HG: CPT | Performed by: INTERNAL MEDICINE

## 2024-02-28 PROCEDURE — 36415 COLL VENOUS BLD VENIPUNCTURE: CPT

## 2024-02-28 PROCEDURE — 99244 OFF/OP CNSLTJ NEW/EST MOD 40: CPT | Performed by: INTERNAL MEDICINE

## 2024-02-28 NOTE — PATIENT INSTRUCTIONS
Were seen today for osteoporosis  Stop the Actonel  Plan to get you approved for Evenity which is a monthly injection  Blood work today  We will monitoring your blood work every 2 to 3 months on the Evenity to make sure the calcium is normal  Take calcium 600 mg daily and vitamin D 2000 units daily  Follow-up in 6 months

## 2024-02-28 NOTE — PROGRESS NOTES
Esha Hernadez is a 59 year old female who presents for   Chief Complaint   Patient presents with    Consult     NP refer by PCP Dr Harman and back surgeon Dr. Madrigal for osteoporosis  Dexa scan done 11/23   .   HPI:   CC: Osteoporosis  Consult: referred by PCP Dr. Harman     This is a 60 yo F with hx of GERD, MGUS  (follows with hematology Dr. Lerma), history of sigmoid colon resection in 2014 and refractory constipation presents for evaluation of osteoporosis.  She has chronic lower back pain and following with spine surgeon Dr. Madrigal.  She has done extensive conservative management with physical therapy, epidural injections with minimal relief.  They are recommending a spinal fusion.  She had bone density done showing osteoporosis left femoral T-score -2.6, left total hip -2.4 and lumbar spine -1.6.  She was started on Actonel by her PCP around November 2023.  She has been having some side effects of the medications.  She started menopause at the age of 44.  She was never on hormone replacement therapy.  She fractured her fingers when a car door slammed in them.  She also has fractured her left ankle twice, once in 2013 and then 2018 from falling.  She currently does not exercise due to her chronic back pain.  She is to walk about 13 steps a day but hardly walk 3000 steps a day.  No smoking.  Takes minimal alcohol.  She takes calcium and vitamin D.    Current medications:  Actonel 35 mg every 7 days- started Nov 2023  Norco 7.5/325 mg every 6 hrs  Baclofen  Bone density 11/2023:  LFN  T-score   -2.6  LTH                 -2.4  LS                   -1.6      Wt Readings from Last 2 Encounters:   02/19/24 145 lb (65.8 kg)   01/16/24 141 lb (64 kg)     There is no height or weight on file to calculate BMI.      Current Outpatient Medications   Medication Sig Dispense Refill    HYDROcodone-acetaminophen 7.5-325 MG Oral Tab Take 1 tablet by mouth every 6 (six) hours as needed for Pain. 120 tablet 0    baclofen 10 MG  Oral Tab Take 1 tablet (10 mg total) by mouth nightly as needed. AT BEDTIME 30 tablet 5    Prucalopride Succinate (MOTEGRITY) 2 MG Oral Tab Take 1 tablet by mouth daily. 90 tablet 3    risedronate (ACTONEL) 35 MG Oral Tab Take 1 tablet (35 mg total) by mouth every 7 days. 5 tablet 11    OSPHENA 60 MG Oral Tab       spironolactone 25 MG Oral Tab Take 1 tablet (25 mg total) by mouth daily.      temazepam 15 MG Oral Cap Take 1 capsule (15 mg total) by mouth nightly as needed for Sleep. 90 capsule 1    potassium chloride 20 MEQ Oral Tab CR Take 1 tablet (20 mEq total) by mouth daily. 30 tablet 5    Omeprazole 40 MG Oral Capsule Delayed Release Take 1 capsule (40 mg total) by mouth before breakfast. 90 capsule 3    ondansetron 4 MG Oral Tablet Dispersible Place 1 tablet (4 mg total) under the tongue every 8 (eight) hours as needed for Nausea. 90 tablet 11    dicyclomine 20 MG Oral Tab Take 1-2 tablets (20-40 mg total) by mouth 3 (three) times daily as needed. 90 tablet 3    torsemide 20 MG Oral Tab Take 1 tablet (20 mg total) by mouth 2 (two) times daily.      Digestive Enzymes (DIGESTIVE ENZYME OR) Take 1 capsule by mouth daily.      VITAMIN D, ERGOCALCIFEROL, OR Take 1 capsule by mouth daily.      Multiple Vitamins-Minerals (MULTI-VITAMIN/MINERALS) Oral Tab Take 1 tablet by mouth daily.      docusate sodium 100 MG Oral Cap Take 1 capsule (100 mg total) by mouth daily.        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, left 09/14/2020    Left small finger mass excision     Bowel obstruction (HCC)     Cancer (HCC) 2011    basal cell on right eyebrow    Decorative tattoo 2020    Esophageal reflux     Essential hypertension 6/16/2021    Hemorrhoids     hemoorhoidectomy    History of COVID-19 10/2021    History of MRSA infection     blister on toe MRSA+ approximately 2012    Infected dental carries 2010    dental extractions    Injury of digital nerve of finger 2010    R finger  nerve injury    Metrorrhagia 2010    destruction of lesions, vulva, simple    Migraines     Seizure disorder (HCC) 2009    one isolated seizure after fall and head injury    Sinusitis     \"sinus procedure\"    Trigeminal neuralgia 2005    R caniotomy, lyrica    Varicose veins of both lower extremities     Visual impairment     wears eye glasses      Past Surgical History:   Procedure Laterality Date    ABDOMINAL SURGERY  2015 2016    Colon blockage, hurnias 2    BRAIN SURGERY Right 2005    craniotomy to treat trigeminal neuralgia    CHOLECYSTECTOMY  2013    COLON SURGERY  2014    COLONOSCOPY  10/01/2014        COLONOSCOPY  05/03/2019        COLONOSCOPY N/A 1/25/2024    Procedure: COLONOSCOPY;  Surgeon: Suman Bell MD;  Location: [facility] MAIN OR    CYST REMOVAL  2014    D & C  2013    DESTRUCTION SKIN LESION(S)  2010    destrruction of lesions, vulva, simple; metrorrhagia    DILATION/CURETTAGE,DIAGNOSTIC  2013    EXCIS TUMOR/AVM,DEEP,HAND/FINGR Left 10/02/2020    Left small finger mass excision     EXTRACTION ERUPTED TOOTH/EXR  2010    dental extractions, dental carries    HEMORRHOIDECTOMY      HERNIA SURGERY  07/26/2018    incisional hernia repair with mesh, lysis of adhesions, bilateral myocutaneous advancement    OTHER SURGICAL HISTORY  03/26/2015    Exploratory laparotomy and lysis of adhesions with release of internal herniations and resolution of the bowel obstruction    OTHER SURGICAL HISTORY      having dental work     PART REMOVAL COLON W END COLOSTOMY  07/13/2014    after bowel obstruction    REVISE/REPAIR FINGER/TOE NERVE Right 2010    repair R finger nerve injury    REVISION OF COLOSTOMY,COMPLICATED  10/13/2014    lap reversal of Symone procedure, extensive lysis of adhesions, partial colon resection, excision of accessory spleen, takedown of splenic flexure, insertion of drains, rigid proctosigmoidoscopy for eval of distal anataomy and bowel prep of distal rectum, ureterolysis     SINUS SURGERY        to treat sinusitis    VENTRAL HERNIA REPAIR  2018    incisional hernia repair with mesh, lysis of adhesions, bilateral myocutaneous advancement      Family History   Problem Relation Age of Onset    Diabetes Father     Heart Disorder Father     Arthritis Mother     Seizure Disorder Mother     Hypertension Mother     Cancer Maternal Grandfather         lung; smoker    Hypertension Brother     Bleeding Disorders Neg     Clotting Disorder Neg       Social History:  Social History     Socioeconomic History    Marital status:    Tobacco Use    Smoking status: Former     Packs/day: 0.50     Years: 4.00     Additional pack years: 0.00     Total pack years: 2.00     Types: Cigarettes     Quit date: 2018     Years since quittin.6    Smokeless tobacco: Never    Tobacco comments:     Quit smoking   Vaping Use    Vaping Use: Never used   Substance and Sexual Activity    Alcohol use: No    Drug use: No   Other Topics Concern    Caffeine Concern Yes     Comment: coffee, 2 cups daily    Exercise Yes     Comment: walking dog    Right Handed Yes   Social History Narrative    Esha is  x 35 yrs to Thad. Mother of 2 children. 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 She and her family live in [place], [place].        The patient does not use an assistive device..      The patient does live in a home with stairs.           REVIEW OF SYSTEMS:   Review Of Systems:  Constitutional: No fever, no change in weight or appetitie  Derm: No rashes, no oral ulcers, no alopecia, no photosensitivity, no psoriasis  HEENT: No dry eyes, no dry mouth, no Raynaud's, no nasal ulcers, no parotid swelling, no neck pain, no jaw pain, no temple pain  Eyes: No visual changes,   CVS: No chest pain, no heart disease  RS: No SOB, no Cough, No Pleurtic pain,   GI: No nausea, no vomiiting, no abominal pain, no hx of ulcer, no gastritis, no heartburn, no dyshpagia, no BRBPR or melena  : no dysuria, no hx of miscarriages,  no DVT Hx, no hx of OCP,   Neuro: No numbness or tingling, no headache, no hx of seizures,   Psych: no hx of anxiety or depression  ENDO: no hx of thyroid disease, no hx of DM  Joint/Muscluskeltal: see HPI,   All other ROS are negative.     EXAM:   /71 (BP Location: Left arm, Patient Position: Sitting, Cuff Size: adult)   Pulse 71   GEN: AAOx3, NAD  HEENT: EOMI, PERRLA, no injection or icterus, oral mucosa moist, no oral lesions. No lymphadenopathy. No facial rash  CVS: RRR, no murmurs rubs or gallops. Equal 2+ distal pulses.   LUNGS: CTAB, no increased work of breathing  ABDOMEN:  soft NT/ND, +BS, no HSM  SKIN: No rashes or skin lesions. No nail findings  MSK:  Swelling or synovitis on exam  TTP in lower spine  NEURO: Cranial nerves II-XII intact grossly. 5/5 strength throughout in both upper and lower extremities, sensation intact.  PSYCH: normal mood    LABS:     Component      Latest Ref Rng 2/19/2024   Glucose      70 - 99 mg/dL 69 (L)    Sodium      136 - 145 mmol/L 140    Potassium      3.5 - 5.1 mmol/L 4.1    Chloride      98 - 112 mmol/L 103    Carbon Dioxide, Total      21.0 - 32.0 mmol/L 32.0    ANION GAP      0 - 18 mmol/L 5    BUN      9 - 23 mg/dL 20    CREATININE      0.55 - 1.02 mg/dL 1.06 (H)    BUN/CREATININE RATIO      10.0 - 20.0  18.9    CALCIUM      8.7 - 10.4 mg/dL 9.8    CALCULATED OSMOLALITY      275 - 295 mOsm/kg 291    EGFR      >=60 mL/min/1.73m2 60    ALT (SGPT)      10 - 49 U/L 9 (L)    AST (SGOT)      <=34 U/L 18    ALKALINE PHOSPHATASE      46 - 118 U/L 68    Total Bilirubin      0.2 - 1.1 mg/dL 0.4    PROTEIN, TOTAL      5.7 - 8.2 g/dL 7.1    Albumin      3.2 - 4.8 g/dL 4.3    Globulin      2.8 - 4.4 g/dL 2.8    A/G Ratio      1.0 - 2.0  1.5    Patient Fasting for CMP? Yes    TSH      0.550 - 4.780 mIU/mL 1.782    VITAMIN D, 25-OH, TOTAL      30.0 - 100.0 ng/mL 54.4         IMAGING:     Bone density 11/2023:  LEFT FEMORAL NECK   BMD: 0.555 gm/sq. cm. T SCORE: -2.6 Z SCORE:  -1.4      LEFT TOTAL HIP   BMD: 0.652 gm/sq. cm. T SCORE: -2.4 Z SCORE: -1.4      PA LUMBAR SPINE (L1 - L4)   BMD: 0.871 gm/sq. cm. T SCORE: -1.6 Z SCORE: -0.2     ASSESSMENT AND PLAN:     Osteoporosis  - Bone density done November 2023 showing left femoral neck T-score -2.6, left total hip -2.4 and lumbar spine -1.6  - She was started on Actonel back in November but having side effects  - Discussed Evenity with patient.  It is a monthly injection.  Discussed side effects which include bone pain, hypocalcemia, increased cardiovascular disease.  Plan to get approved for [facility]  - Recent vitamin D and calcium normal.  We will have to monitor calcium level every 2 or 3 months on the Evenity  - Plan to order blood work P[facility] today    Chronic lower back pain  - Following with spine surgeon and has failed conservative treatment with physical therapy, pain medications and epidural injections  - They are recommending a lumbar fusion    MGUS  - Stable, following with hematology    Thank you for allowing me to participate in this patients care. Pt will f/u in 6 mos     Susie Madison MD  2/28/2024  3:07 PM

## 2024-02-28 NOTE — TELEPHONE ENCOUNTER
Current Outpatient Medications   Medication Sig Dispense Refill    dicyclomine 20 MG Oral Tab Take 1-2 tablets (20-40 mg total) by mouth 3 (three) times daily as needed. 90 tablet 3

## 2024-02-29 NOTE — TELEPHONE ENCOUNTER
If we can get patient approved for Evenity monthly for 12 mos for osteoporosis.  She was put on Actonel but had side effects

## 2024-03-01 ENCOUNTER — TELEPHONE (OUTPATIENT)
Dept: RHEUMATOLOGY | Facility: CLINIC | Age: 59
End: 2024-03-01

## 2024-03-01 DIAGNOSIS — R79.89 ELEVATED PARATHYROID HORMONE: ICD-10-CM

## 2024-03-01 DIAGNOSIS — R79.89 ELEVATED PTHRP LEVEL: Primary | ICD-10-CM

## 2024-03-01 RX ORDER — DICYCLOMINE HCL 20 MG
TABLET ORAL 3 TIMES DAILY PRN
Qty: 90 TABLET | Refills: 3 | Status: SHIPPED | OUTPATIENT
Start: 2024-03-01

## 2024-03-04 ENCOUNTER — OFFICE VISIT (OUTPATIENT)
Dept: ENDOCRINOLOGY CLINIC | Facility: CLINIC | Age: 59
End: 2024-03-04
Payer: COMMERCIAL

## 2024-03-04 VITALS
WEIGHT: 144 LBS | HEART RATE: 70 BPM | HEIGHT: 66 IN | DIASTOLIC BLOOD PRESSURE: 70 MMHG | BODY MASS INDEX: 23.14 KG/M2 | SYSTOLIC BLOOD PRESSURE: 105 MMHG

## 2024-03-04 DIAGNOSIS — E55.9 VITAMIN D DEFICIENCY: ICD-10-CM

## 2024-03-04 DIAGNOSIS — M81.0 AGE-RELATED OSTEOPOROSIS WITHOUT CURRENT PATHOLOGICAL FRACTURE: Primary | ICD-10-CM

## 2024-03-04 DIAGNOSIS — Z93.3 STATUS POST HARTMANN PROCEDURE (HCC): ICD-10-CM

## 2024-03-04 DIAGNOSIS — R56.9 CONVULSIONS, UNSPECIFIED CONVULSION TYPE (HCC): ICD-10-CM

## 2024-03-04 DIAGNOSIS — E21.3 HYPERPARATHYROIDISM (HCC): ICD-10-CM

## 2024-03-04 DIAGNOSIS — R79.89 HIGH SERUM PARATHYROID HORMONE (PTH): ICD-10-CM

## 2024-03-04 DIAGNOSIS — K21.9 GASTROESOPHAGEAL REFLUX DISEASE, UNSPECIFIED WHETHER ESOPHAGITIS PRESENT: ICD-10-CM

## 2024-03-04 NOTE — PROGRESS NOTES
New Patient Evaluation - History and Physical    CONSULT - Reason for Visit:  high parathyroid levels, osteoporosis.  Requesting Physician:   ..Quintin Harman DO      CHIEF COMPLAINT:    Chief Complaint   Patient presents with    Consult     Calcium/ osteoporosis   Pt reports no falls, no fractures, no major dental work   Susie Madison MD        HISTORY OF PRESENT ILLNESS:   Esha Hernadez is a 59 year old female who presents with   high parathyroid levels and osteoporosis  She has MGUS and hx of Emergency laparotomy 2014 with sigmoid colon resection, colostomy due to Stercoral ulcer/perforation sigmoid colon; with subsequent elective takedown of colostomy    She was dx with Scoliosis 2023 and with Osteoporosis 2023  She is on SERM, norco and omperazole.  She is on Temazepam at bedtime  Osteoporosis is managed by Rheumatology   Was treated with risedronate for 2-3 mo - reports GI SE from it.   Patient never had fragility fracture ( had fractures in ankle & fingers)  Has height loss: ~ 2 inches  She denied risk factors: steroid use, alcohol abuse, liver disease, kidney disease, hyperthyroid  Family history of osteoporosis or fragility fracture: no    ASSESSMENT AND PLAN:  59 year old female who presents with   high parathyroid levels, osteoporosis  W/u showed normal serum ca, eGFR, vit D and high PTH levels   DXA showed osteoporosis.   Most likely, PTHi is high secondary to low ca intake but still can be primary hyperparathyroidism - normocalcemia hyperparathyroidism  Given her previous surgeries it is possible she is not absorbing enough ca which can cause high PTH levels  Patient is taking Vitamin D and calcium ( ~ 500 mg/day). She is not taking dietary calcium.        Educated the patient to do wt-bearing exercise, but avoid falls.      She is on Temazepam at bedtime (a metabolite of diazepam via CY, was found to increase the risk of fractures  (APBLO Barfield.; Teddy, A.S.; , T.A.; Werner, S.P.;  SARAH Ramos.; Hoenig, H.; BRIDGET Dixon; MELA Campbell.; STEVE Brock; MELA Ontiveros. The association of anticonvulsant use with fractures in spinal cord injury. Am. J. Phys. Med. Rehabil. 2013, 92, 9691-8981. 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, left 09/14/2020    Left small finger mass excision     Bowel obstruction (HCC)     Cancer (HCC) 2011    basal cell on right eyebrow    Decorative tattoo 2020    Esophageal reflux     Essential hypertension 6/16/2021    Hemorrhoids     hemoorhoidectomy    History of COVID-19 10/2021    History of MRSA infection     blister on toe MRSA+ approximately 2012    Infected dental carries 2010    dental extractions    Injury of digital nerve of finger 2010    R finger nerve injury    Metrorrhagia 2010    destruction of lesions, vulva, simple    Migraines     Seizure disorder (HCC) 2009    one isolated seizure after fall and head injury    Sinusitis     \"sinus procedure\"    Trigeminal neuralgia 2005    R caniotomy, lyrica    Varicose veins of both lower extremities     Visual impairment     wears eye glasses       PAST SURGICAL HISTORY:   Past Surgical History:   Procedure Laterality Date    ABDOMINAL SURGERY  2015 2016    Colon blockage, hurnias 2    BRAIN SURGERY Right 2005    craniotomy to treat trigeminal neuralgia    CHOLECYSTECTOMY  2013    COLON SURGERY  2014    COLONOSCOPY  10/01/2014        COLONOSCOPY  05/03/2019        COLONOSCOPY N/A 1/25/2024    Procedure: COLONOSCOPY;  Surgeon: Suman Bell,  MD;  Location: [facility] MAIN OR    CYST REMOVAL  2014    D & C  2013    DESTRUCTION SKIN LESION(S)  2010    destrruction of lesions, vulva, simple; metrorrhagia    DILATION/CURETTAGE,DIAGNOSTIC  2013    EXCIS TUMOR/AVM,DEEP,HAND/FINGR Left 10/02/2020    Left small finger mass excision     EXTRACTION ERUPTED TOOTH/EXR  2010    dental extractions, dental carries    HEMORRHOIDECTOMY      HERNIA SURGERY  07/26/2018    incisional hernia repair with mesh, lysis of adhesions, bilateral myocutaneous advancement    OTHER SURGICAL HISTORY  03/26/2015    Exploratory laparotomy and lysis of adhesions with release of internal herniations and resolution of the bowel obstruction    OTHER SURGICAL HISTORY      having dental work     PART REMOVAL COLON W END COLOSTOMY  07/13/2014    after bowel obstruction    REVISE/REPAIR FINGER/TOE NERVE Right 2010    repair R finger nerve injury    REVISION OF COLOSTOMY,COMPLICATED  10/13/2014    lap reversal of Symone procedure, extensive lysis of adhesions, partial colon resection, excision of accessory spleen, takedown of splenic flexure, insertion of drains, rigid proctosigmoidoscopy for eval of distal anataomy and bowel prep of distal rectum, ureterolysis    SINUS SURGERY        to treat sinusitis    VENTRAL HERNIA REPAIR  07/26/2018    incisional hernia repair with mesh, lysis of adhesions, bilateral myocutaneous advancement       CURRENT MEDICATIONS:     dicyclomine 20 MG Oral Tab Take 1-2 tablets (20-40 mg total) by mouth 3 (three) times daily as needed. 90 tablet 3    HYDROcodone-acetaminophen 7.5-325 MG Oral Tab Take 1 tablet by mouth every 6 (six) hours as needed for Pain. 120 tablet 0    baclofen 10 MG Oral Tab Take 1 tablet (10 mg total) by mouth nightly as needed. AT BEDTIME 30 tablet 5    Prucalopride Succinate (MOTEGRITY) 2 MG Oral Tab Take 1 tablet by mouth daily. 90 tablet 3    risedronate (ACTONEL) 35 MG Oral Tab Take 1 tablet (35 mg total) by mouth every 7 days. 5 tablet 11     OSPHENA 60 MG Oral Tab       spironolactone 25 MG Oral Tab Take 1 tablet (25 mg total) by mouth daily.      temazepam 15 MG Oral Cap Take 1 capsule (15 mg total) by mouth nightly as needed for Sleep. 90 capsule 1    potassium chloride 20 MEQ Oral Tab CR Take 1 tablet (20 mEq total) by mouth daily. 30 tablet 5    Omeprazole 40 MG Oral Capsule Delayed Release Take 1 capsule (40 mg total) by mouth before breakfast. 90 capsule 3    ondansetron 4 MG Oral Tablet Dispersible Place 1 tablet (4 mg total) under the tongue every 8 (eight) hours as needed for Nausea. 90 tablet 11    torsemide 20 MG Oral Tab Take 1 tablet (20 mg total) by mouth 2 (two) times daily.      Digestive Enzymes (DIGESTIVE ENZYME OR) Take 1 capsule by mouth daily.      VITAMIN D, ERGOCALCIFEROL, OR Take 1 capsule by mouth daily.      Multiple Vitamins-Minerals (MULTI-VITAMIN/MINERALS) Oral Tab Take 1 tablet by mouth daily.      docusate sodium 100 MG Oral Cap Take 1 capsule (100 mg total) by mouth daily.           ALLERGIES:  No Known Allergies    SOCIAL HISTORY:    Social History     Socioeconomic History    Marital status:    Tobacco Use    Smoking status: Former     Packs/day: 0.50     Years: 4.00     Additional pack years: 0.00     Total pack years: 2.00     Types: Cigarettes     Quit date: 2018     Years since quittin.6    Smokeless tobacco: Never    Tobacco comments:     Quit smoking   Vaping Use    Vaping Use: Never used   Substance and Sexual Activity    Alcohol use: No    Drug use: No   Other Topics Concern    Caffeine Concern Yes     Comment: coffee, 2 cups daily    Exercise Yes     Comment: walking dog    Right Handed Yes       FAMILY HISTORY:   Family History   Problem Relation Age of Onset    Diabetes Father     Heart Disorder Father     Arthritis Mother     Seizure Disorder Mother     Hypertension Mother     Cancer Maternal Grandfather         lung; smoker    Hypertension Brother     Bleeding Disorders Neg     Clotting  Disorder Neg           REVIEW OF SYSTEMS:  All negative other than HPI      PHYSICAL EXAM:   Height: 5' 6\" (167.6 cm) (03/04 1507)  Weight: 144 lb (65.3 kg) (03/04 1507)  BSA (Calculated - sq m): 1.74 sq meters (03/04 1507)  Pulse: 70 (03/04 1507)  BP: 105/70 (03/04 1507)  Temp: --  Do Not Use - Resp Rate: --  SpO2: --    Body mass index is 23.24 kg/m².  No tremors   No goiter   Not obese       CONSTITUTIONAL:  Awake and alert. Age appropriate, good hygiene not in acute distress. Well nourished and well developed. no acute distress   PSYCH:   Orientated to time, place, person & situation, Normal mood and affect, memory intact, normal insight and judgment, cooperative  Neuro: speech is clear. Awake, alert, no aphasia, no facial asymmetry, no nuchal rigidity  EYES:  No proptosis, no ptosis, conjunctiva normal  ENT:  Normocephalic, atraumatic  Eye: EOMI, normal lids, no discharge, no conjunctival erythema. No exophthalmos/proptosis, Ptosis negative   No rhinorrhea, moist oral mucosa  Neck: full range of motion  Neck/Thyroid: neck inspection: normal, No scar, No goiter   LUNGS:  No acute respiratory distress, non-labored respiration. Speaking full sentences  CARDIOVASCULAR:  regular rate   ABDOMEN:  No abdominal pain.   SKIN:  no bruising or bleeding, no rashes and no lesions, Skin is dry, no obvious rashes or lesions  EXTREMITIES: no gross abnormality   MSK: Moves extremities spontaneously. full range of motion in all major joints      DATA:     Pertinent data reviewed      Latest Reference Range & Units 02/19/24 13:04   CALCIUM 8.7 - 10.4 mg/dL 9.8   BUN/CREATININE RATIO 10.0 - 20.0  18.9   EGFR >=60 mL/min/1.73m2 60      Latest Reference Range & Units 02/19/24 13:04   Albumin 3.2 - 4.8 g/dL 4.3   VITAMIN D, 25-OH, TOTAL 30.0 - 100.0 ng/mL 54.4      Latest Reference Range & Units 02/19/24 13:04 02/28/24 15:46   TSH 0.550 - 4.780 mIU/mL 1.782    PTH INTACT 18.5 - 88.0 pg/mL  143.8 (H)        DXA 12/2023  CONCLUSION:    1. Scans of the lumbar spine and left hip are consistent with osteoporosis, which according to World Health Organization criteria place the patient at a severely increased risk for fracture.      2. Based on left femoral neck bone mineral density, the FRAX 10 year probability of a major osteoporotic fracture is 12% and the 10 year probability of a hip fracture is 3.7%.           No results for input(s): \"TSH\", \"T4F\", \"T3F\", \"THYP\" in the last 72 hours.  No results found.        Orders Placed This Encounter   Procedures    PTH, Intact    Vitamin D    Comp Metabolic Panel (14)    Magnesium    Phosphorus    Calcium 24Hrs Urine with Creatinine    Vitamin D, 1,25 Dihydroxy     Orders Placed This Encounter    PTH, Intact     Standing Status:   Future     Standing Expiration Date:   3/4/2025    Vitamin D     Standing Status:   Future     Standing Expiration Date:   3/4/2025     Order Specific Question:   Please pick the scenario that best fits the purpose for ordering this test     Answer:   General Screening/Vit D deficiency (25-Hydroxy)     Order Specific Question:   Release to patient     Answer:   Immediate    Comp Metabolic Panel (14)     Standing Status:   Future     Standing Expiration Date:   3/4/2025     Order Specific Question:   Release to patient     Answer:   Immediate    Magnesium     Standing Status:   Future     Standing Expiration Date:   3/4/2025     Order Specific Question:   Release to patient     Answer:   Immediate    Phosphorus     Standing Status:   Future     Standing Expiration Date:   3/4/2025     Order Specific Question:   Release to patient     Answer:   Immediate    Calcium 24Hrs Urine with Creatinine     Standing Status:   Future     Standing Expiration Date:   3/4/2025     Order Specific Question:   Release to patient     Answer:   Immediate    Vitamin D, 1,25 Dihydroxy     Standing Status:   Future     Standing Expiration Date:   3/4/2025     Order Specific Question:   Release to patient      Answer:   Immediate          This is a specialized patient consultation in endocrinology and required comprehensive review of prior records, as well as current evaluation, with time required for consideration of complex endocrine issues and consultation. For this visit, I personally interviewed the patient, and family member if accompanied, performed the pertinent parts of the history and physical examination. ROS included screening for appropriate endocrine conditions.   Today's diagnosis and plan were reviewed in detail with the patient who states understanding and agrees with plan. I discussed with the patient possible diagnosis, differential diagnosis, need for work up , treatment options, alternatives and side effects.     Please see note for details about time spent which includes:   · pre-visit preparation  · reviewing records  · face to face time with the patient   · timely documentation of the encounter  · ordering medications/tests  · communication with care team  · care coordination    I appreciate the opportunity to be part of your patient's medical care and will keep you, as the referring and primary physicians, informed about the care of your patient, including possible future surgery and pathology findings. Please feel free to contact me should you have any questions.      Cassidy Fonseca MD

## 2024-03-04 NOTE — TELEPHONE ENCOUNTER
Phoned [facility] regarding PA for Evenity. Then referred to Terri at [phone] to do PA. Called and spoke to Analisa.    Initiated PA over the phone and followed up by sending clinical notes to fax [phone]. Auth # [id number] for Medical Benefits.

## 2024-03-04 NOTE — TELEPHONE ENCOUNTER
PA Approved    Prior authorization for: Evenity    Medication form: injection in office x 12 months    Date received: 3/4/24    Approval #: [id number]    Approved dates: 3/4/2024 thru 3/3/2025    Pharmacy for medication:    QF-TB results:    No

## 2024-03-04 NOTE — PATIENT INSTRUCTIONS
Increase calcium and vit D intake   Take vitamin D3 2000 international unit a day and calcium carbonate 600 mg twice a day or 3 servings of dairy a day     Labs and f/u in 1 mo  Before next visit   Fasting AM labs    24 hr urine collection     Do weight bearing exercise   Follow fall precautions     Osteoporosis is managed by Rheumatology

## 2024-03-05 ENCOUNTER — MED REC SCAN ONLY (OUTPATIENT)
Dept: FAMILY MEDICINE CLINIC | Facility: CLINIC | Age: 59
End: 2024-03-05

## 2024-03-14 NOTE — TELEPHONE ENCOUNTER
Phoned pt; verified name and . Informed pt Evenity approved by insurance and Dr Madison has spoken with endo and they approve also. First dose scheduled for 3/18/24.

## 2024-03-17 DIAGNOSIS — M48.062 LUMBAR STENOSIS WITH NEUROGENIC CLAUDICATION: ICD-10-CM

## 2024-03-18 ENCOUNTER — NURSE ONLY (OUTPATIENT)
Dept: RHEUMATOLOGY | Facility: CLINIC | Age: 59
End: 2024-03-18
Payer: COMMERCIAL

## 2024-03-18 DIAGNOSIS — M81.0 AGE-RELATED OSTEOPOROSIS WITHOUT CURRENT PATHOLOGICAL FRACTURE: Primary | ICD-10-CM

## 2024-03-18 PROCEDURE — 96372 THER/PROPH/DIAG INJ SC/IM: CPT | Performed by: INTERNAL MEDICINE

## 2024-03-18 NOTE — PROGRESS NOTES
Patient came to office for first Evenity injections. Name and  verified. Patient aware she/he is to receive Evenity injections. Injections given in bilateral upper arms SQ, patient tolerated injection well. Patient given directions to return in 28 days for second dose. Patient agreeable with plan and will call office with any questions or concerns. Educational materials provided.     Osteoporosis  - Bone density done 2023 showing left femoral neck T-score -2.6, left total hip -2.4 and lumbar spine -1.6  - She was started on Actonel back in November but having side effects  - Discussed Evenity with patient.  It is a monthly injection.  Discussed side effects which include bone pain, hypocalcemia, increased cardiovascular disease.  Plan to get approved for Evenity  - Recent vitamin D and calcium normal.  We will have to monitor calcium level every 2 or 3 months on the Evenity  - Plan to order blood work PTH today     Chronic lower back pain  - Following with spine surgeon and has failed conservative treatment with physical therapy, pain medications and epidural injections  - They are recommending a lumbar fusion     MGUS  - Stable, following with hematology     Thank you for allowing me to participate in this patients care. Pt will f/u in 6 mos      Susie Madison MD  2024  3:07 PM  
Negative Screen

## 2024-03-19 NOTE — TELEPHONE ENCOUNTER
Protocol Failed/ No Protocol    Requested Prescriptions   Pending Prescriptions Disp Refills    HYDROcodone-acetaminophen 7.5-325 MG Oral Tab 120 tablet 0     Sig: Take 1 tablet by mouth every 6 (six) hours as needed for Pain.       Controlled Substance Medication Failed - 3/17/2024  9:55 AM        Failed - This medication is a controlled substance - forward to provider to refill             Future Appointments         Provider Department Appt Notes    In 6 days Bryon Lerma MD [facility] Hematology Oncology 6 m f/u.md    In 1 week Suman Bell MD [facility] f/u/tp    In 3 weeks Cassidy Fonseca MD [facility] Follow up blood work    In 4 weeks [facility] RN RHEUMATOLOGY [facility] 1st dose Evenity    In 1 month [facility] RN RHEUMATOLOGY [facility] 1st dose Evenity    In 2 months [facility] RN RHEUMATOLOGY [facility] 1st dose Evenity    In 5 months Susie Madison MD [facility] 6 mo f/u          Recent Outpatient Visits              Yesterday Age-related osteoporosis without current pathological fracture    [facility]    Nurse Only    2 weeks ago Age-related osteoporosis without current pathological fracture    [facility] Cassidy Fonseca MD    Office Visit    2 weeks ago Age-related osteoporosis without current pathological fracture    [facility] Susie Madison MD    Office Visit    4 weeks ago Routine general medical examination at a health care facility    [facility] Quintin Harman DO    Office Visit    1 month ago Degenerative disc disease, lumbar    [facility], [address] [place] [address], Víctor Navarro MD    Office Visit

## 2024-03-20 ENCOUNTER — LAB ENCOUNTER (OUTPATIENT)
Dept: LAB | Age: 59
End: 2024-03-20
Attending: INTERNAL MEDICINE
Payer: COMMERCIAL

## 2024-03-20 DIAGNOSIS — D47.2 MGUS (MONOCLONAL GAMMOPATHY OF UNKNOWN SIGNIFICANCE): ICD-10-CM

## 2024-03-20 LAB
ALBUMIN SERPL-MCNC: 4.7 G/DL (ref 3.2–4.8)
ALBUMIN/GLOB SERPL: 1.7 {RATIO} (ref 1–2)
ALP LIVER SERPL-CCNC: 71 U/L
ALT SERPL-CCNC: 7 U/L
ANION GAP SERPL CALC-SCNC: 5 MMOL/L (ref 0–18)
AST SERPL-CCNC: 16 U/L (ref ?–34)
BASOPHILS # BLD AUTO: 0.01 X10(3) UL (ref 0–0.2)
BASOPHILS NFR BLD AUTO: 0.2 %
BILIRUB SERPL-MCNC: 0.6 MG/DL (ref 0.2–1.1)
BUN BLD-MCNC: 21 MG/DL (ref 9–23)
BUN/CREAT SERPL: 15.1 (ref 10–20)
CALCIUM BLD-MCNC: 10.3 MG/DL (ref 8.7–10.4)
CHLORIDE SERPL-SCNC: 101 MMOL/L (ref 98–112)
CO2 SERPL-SCNC: 31 MMOL/L (ref 21–32)
CREAT BLD-MCNC: 1.39 MG/DL
DEPRECATED RDW RBC AUTO: 40.3 FL (ref 35.1–46.3)
EGFRCR SERPLBLD CKD-EPI 2021: 43 ML/MIN/1.73M2 (ref 60–?)
EOSINOPHIL # BLD AUTO: 0.07 X10(3) UL (ref 0–0.7)
EOSINOPHIL NFR BLD AUTO: 1.6 %
ERYTHROCYTE [DISTWIDTH] IN BLOOD BY AUTOMATED COUNT: 12.6 % (ref 11–15)
FASTING STATUS PATIENT QL REPORTED: NO
GLOBULIN PLAS-MCNC: 2.7 G/DL (ref 2.8–4.4)
GLUCOSE BLD-MCNC: 174 MG/DL (ref 70–99)
HCT VFR BLD AUTO: 38.1 %
HGB BLD-MCNC: 13.1 G/DL
IGA SERPL-MCNC: <33 MG/DL (ref 40–350)
IGM SERPL-MCNC: 1022.4 MG/DL (ref 50–300)
IMM GRANULOCYTES # BLD AUTO: 0.01 X10(3) UL (ref 0–1)
IMM GRANULOCYTES NFR BLD: 0.2 %
IMMUNOGLOBULIN PNL SER-MCNC: 522 MG/DL (ref 650–1600)
LYMPHOCYTES # BLD AUTO: 1.62 X10(3) UL (ref 1–4)
LYMPHOCYTES NFR BLD AUTO: 37.8 %
MCH RBC QN AUTO: 30.8 PG (ref 26–34)
MCHC RBC AUTO-ENTMCNC: 34.4 G/DL (ref 31–37)
MCV RBC AUTO: 89.4 FL
MONOCYTES # BLD AUTO: 0.16 X10(3) UL (ref 0.1–1)
MONOCYTES NFR BLD AUTO: 3.7 %
NEUTROPHILS # BLD AUTO: 2.42 X10 (3) UL (ref 1.5–7.7)
NEUTROPHILS # BLD AUTO: 2.42 X10(3) UL (ref 1.5–7.7)
NEUTROPHILS NFR BLD AUTO: 56.5 %
OSMOLALITY SERPL CALC.SUM OF ELEC: 291 MOSM/KG (ref 275–295)
PLATELET # BLD AUTO: 183 10(3)UL (ref 150–450)
POTASSIUM SERPL-SCNC: 3.7 MMOL/L (ref 3.5–5.1)
PROT SERPL-MCNC: 7.4 G/DL (ref 5.7–8.2)
PROT UR-MCNC: 14.8 MG/DL (ref ?–14)
RBC # BLD AUTO: 4.26 X10(6)UL
SODIUM SERPL-SCNC: 137 MMOL/L (ref 136–145)
WBC # BLD AUTO: 4.3 X10(3) UL (ref 4–11)

## 2024-03-20 PROCEDURE — 82784 ASSAY IGA/IGD/IGG/IGM EACH: CPT

## 2024-03-20 PROCEDURE — 84165 PROTEIN E-PHORESIS SERUM: CPT

## 2024-03-20 PROCEDURE — 80053 COMPREHEN METABOLIC PANEL: CPT

## 2024-03-20 PROCEDURE — 86334 IMMUNOFIX E-PHORESIS SERUM: CPT

## 2024-03-20 PROCEDURE — 84156 ASSAY OF PROTEIN URINE: CPT

## 2024-03-20 PROCEDURE — 85025 COMPLETE CBC W/AUTO DIFF WBC: CPT

## 2024-03-20 PROCEDURE — 84166 PROTEIN E-PHORESIS/URINE/CSF: CPT

## 2024-03-20 PROCEDURE — 36415 COLL VENOUS BLD VENIPUNCTURE: CPT

## 2024-03-20 PROCEDURE — 83521 IG LIGHT CHAINS FREE EACH: CPT

## 2024-03-20 PROCEDURE — 86335 IMMUNFIX E-PHORSIS/URINE/CSF: CPT

## 2024-03-20 RX ORDER — HYDROCODONE BITARTRATE AND ACETAMINOPHEN 7.5; 325 MG/1; MG/1
1 TABLET ORAL EVERY 6 HOURS PRN
Qty: 120 TABLET | Refills: 0 | Status: SHIPPED | OUTPATIENT
Start: 2024-03-20 | End: 2024-04-19

## 2024-03-21 LAB
ALBUMIN SERPL ELPH-MCNC: 4.35 G/DL (ref 3.75–5.21)
ALBUMIN/GLOB SERPL: 1.64 {RATIO} (ref 1–2)
ALPHA1 GLOB SERPL ELPH-MCNC: 0.32 G/DL (ref 0.19–0.46)
ALPHA2 GLOB SERPL ELPH-MCNC: 0.69 G/DL (ref 0.48–1.05)
B-GLOBULIN SERPL ELPH-MCNC: 0.73 G/DL (ref 0.68–1.23)
GAMMA GLOB SERPL ELPH-MCNC: 0.92 G/DL (ref 0.62–1.7)
KAPPA LC FREE SER-MCNC: 3.28 MG/DL (ref 0.33–1.94)
KAPPA LC FREE/LAMBDA FREE SER NEPH: 1.22 {RATIO} (ref 0.26–1.65)
LAMBDA LC FREE SERPL-MCNC: 2.68 MG/DL (ref 0.57–2.63)
M PROTEIN 1 SERPL ELPH-MCNC: 0.09 G/DL (ref ?–0)
PROT SERPL-MCNC: 7 G/DL (ref 5.7–8.2)

## 2024-03-25 ENCOUNTER — OFFICE VISIT (OUTPATIENT)
Dept: HEMATOLOGY/ONCOLOGY | Facility: HOSPITAL | Age: 59
End: 2024-03-25
Attending: INTERNAL MEDICINE
Payer: COMMERCIAL

## 2024-03-25 VITALS
BODY MASS INDEX: 21.67 KG/M2 | OXYGEN SATURATION: 99 % | WEIGHT: 143 LBS | TEMPERATURE: 97 F | RESPIRATION RATE: 16 BRPM | SYSTOLIC BLOOD PRESSURE: 112 MMHG | DIASTOLIC BLOOD PRESSURE: 67 MMHG | HEART RATE: 68 BPM | HEIGHT: 68 IN

## 2024-03-25 DIAGNOSIS — D47.2 MGUS (MONOCLONAL GAMMOPATHY OF UNKNOWN SIGNIFICANCE): ICD-10-CM

## 2024-03-25 DIAGNOSIS — R16.1 SPLENOMEGALY: Primary | ICD-10-CM

## 2024-03-25 PROCEDURE — 99214 OFFICE O/P EST MOD 30 MIN: CPT | Performed by: INTERNAL MEDICINE

## 2024-03-25 NOTE — PROGRESS NOTES
Hematology Progress Note    Patient Name: Esha Hernadez   YOB: 1965   Medical Record Number: [id number]   CSN: [id number]   Consulting Physician: Bryon Lerma MD  Referring Physician(s): No ref. provider found  Date of Visit: 3/25/2024    Chief complaint:   IgM lamda MGUS, splenomegaly    History of Present Illness:     Esha Hernadez is a 59 year old female that was seen today in the hematology suite for the above conditions.  Patient has past medical history significant for regular use of Lasix for the last 10 years associated with lower extremity swelling presenting for splenomegaly and likely resulting thrombocytopenia.  Review of prior imaging shows patient has had splenomegaly since 2011 with size ranging from 15 to up to 19 cm, current imaging shows spleen at 18.2 cm. Esha began the development of mild thrombocytopenia of the last 3 years in the setting of normal hemoglobin/hematocrit, WBC and differential. Esha's review of systems is pertinent for fatigue, night sweats over the last year and a half, decreased appetite with some unintentional weight loss over the last 3 weeks.  She also reports nausea occasional abdominal pain symptoms and some low back pain that began last week.  Her review of systems is otherwise negative.    Her prior evaluation in 8/2020 revealed a slight increase in light chain suggestive of possible light chain MGUS.  Otherwise, has a small pulmonary nodule was recommended for follow-up CT chest in 12 months. Her cardiac evaluation shows preserved EF but there were some signs of cardiac calcifications noted on other imaging obtained during this evaluation.    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 Her marrow evaluation shows the presence of about 2% plasma cells involved with MGUS.  No signs of lymphoma, leukemia, metastatic disease identified.  Patient is noted to have  trilineage hematopoiesis.    Interval History:  Patient returns for follow up of MGUS and splenomegaly. She was previously found to have an M-spike of only 0.17 g/dL, and IgM Lamda noted. Her repeat M spike is now lowered to 0.09 g/dL    Current labs do not show hypercalcemia, clinically significant renal insufficiency, or anemia. Her renal impairment is worsened in light of inc torsemide and also on Aldactone.     Patient has some chronic fatigue which is stable. Otherwise, no systemic signs of illness, she has no unintentional weight loss from early satiety. Esha denies bleeding, TIA or stroke symptoms, lymphadenopathy, no hyperviscosity symptoms.     Patient has a history of IBS and follows with Dr. Bell in GI for the chronic nausea, regular abdominal pain with intermittent constipation. Esha has had upset stomach issues since 2015 from a stomach blockage problem that lead to colostomy bag which has been reversed. However, Esha does not really eat a lot due to that problem.       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, left 09/14/2020    Left small finger mass excision     Bowel obstruction (HCC)     Cancer (HCC) 2011    basal cell on right eyebrow    Decorative tattoo 2020    Esophageal reflux     Essential hypertension 6/16/2021    Hemorrhoids     hemoorhoidectomy    History of COVID-19 10/2021    History of MRSA infection     blister on toe MRSA+ approximately 2012    Infected dental carries 2010    dental extractions    Injury of digital nerve of finger 2010    R finger nerve injury    Metrorrhagia 2010    destruction of lesions, vulva, simple    Migraines     Seizure disorder (HCC) 2009    one isolated seizure after fall and head injury    Sinusitis     \"sinus procedure\"    Trigeminal neuralgia 2005    R caniotomy, lyrica    Varicose veins of both lower extremities     Visual impairment     wears eye glasses       Past Surgical  History:  Past Surgical History:   Procedure Laterality Date    ABDOMINAL SURGERY  2015 2016    Colon blockage, hurnias 2    BRAIN SURGERY Right 2005    craniotomy to treat trigeminal neuralgia    CHOLECYSTECTOMY  2013    COLON SURGERY  2014    COLONOSCOPY  10/01/2014        COLONOSCOPY  05/03/2019        COLONOSCOPY N/A 1/25/2024    Procedure: COLONOSCOPY;  Surgeon: Suman Bell MD;  Location: [facility] MAIN OR    CYST REMOVAL  2014    D & C  2013    DESTRUCTION SKIN LESION(S)  2010    destrruction of lesions, vulva, simple; metrorrhagia    DILATION/CURETTAGE,DIAGNOSTIC  2013    EXCIS TUMOR/AVM,DEEP,HAND/FINGR Left 10/02/2020    Left small finger mass excision     EXTRACTION ERUPTED TOOTH/EXR  2010    dental extractions, dental carries    HEMORRHOIDECTOMY      HERNIA SURGERY  07/26/2018    incisional hernia repair with mesh, lysis of adhesions, bilateral myocutaneous advancement    OTHER SURGICAL HISTORY  03/26/2015    Exploratory laparotomy and lysis of adhesions with release of internal herniations and resolution of the bowel obstruction    OTHER SURGICAL HISTORY      having dental work     PART REMOVAL COLON W END COLOSTOMY  07/13/2014    after bowel obstruction    REVISE/REPAIR FINGER/TOE NERVE Right 2010    repair R finger nerve injury    REVISION OF COLOSTOMY,COMPLICATED  10/13/2014    lap reversal of Symone procedure, extensive lysis of adhesions, partial colon resection, excision of accessory spleen, takedown of splenic flexure, insertion of drains, rigid proctosigmoidoscopy for eval of distal anataomy and bowel prep of distal rectum, ureterolysis    SINUS SURGERY        to treat sinusitis    VENTRAL HERNIA REPAIR  07/26/2018    incisional hernia repair with mesh, lysis of adhesions, bilateral myocutaneous advancement       Family Medical History:  Family History   Problem Relation Age of Onset    Diabetes Father     Heart Disorder Father     Arthritis Mother     Seizure Disorder  Mother     Hypertension Mother     Cancer Maternal Grandfather         lung; smoker    Hypertension Brother     Bleeding Disorders Neg     Clotting Disorder Neg        Social History:  Social History     Social History Narrative    Esha is  x 35 yrs to Thad. Mother of 2 children. 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 She and her family live in [place], [place].        The patient does not use an assistive device..      The patient does live in a home with stairs.         Allergies:   No Known Allergies    Current Medications:   HYDROcodone-acetaminophen 7.5-325 MG Oral Tab Take 1 tablet by mouth every 6 (six) hours as needed for Pain. 120 tablet 0    dicyclomine 20 MG Oral Tab Take 1-2 tablets (20-40 mg total) by mouth 3 (three) times daily as needed. 90 tablet 3    baclofen 10 MG Oral Tab Take 1 tablet (10 mg total) by mouth nightly as needed. AT BEDTIME 30 tablet 5    Prucalopride Succinate (MOTEGRITY) 2 MG Oral Tab Take 1 tablet by mouth daily. 90 tablet 3    OSPHENA 60 MG Oral Tab       spironolactone 25 MG Oral Tab Take 1 tablet (25 mg total) by mouth daily.      temazepam 15 MG Oral Cap Take 1 capsule (15 mg total) by mouth nightly as needed for Sleep. 90 capsule 1    potassium chloride 20 MEQ Oral Tab CR Take 1 tablet (20 mEq total) by mouth daily. 30 tablet 5    Omeprazole 40 MG Oral Capsule Delayed Release Take 1 capsule (40 mg total) by mouth before breakfast. 90 capsule 3    ondansetron 4 MG Oral Tablet Dispersible Place 1 tablet (4 mg total) under the tongue every 8 (eight) hours as needed for Nausea. 90 tablet 11    torsemide 20 MG Oral Tab Take 1 tablet (20 mg total) by mouth 2 (two) times daily.      Digestive Enzymes (DIGESTIVE ENZYME OR) Take 1 capsule by mouth daily.      VITAMIN D, ERGOCALCIFEROL, OR Take 1 capsule by mouth daily.      Multiple Vitamins-Minerals (MULTI-VITAMIN/MINERALS) Oral Tab Take 1 tablet by mouth daily.      docusate sodium 100 MG Oral Cap Take 1 capsule (100 mg total) by  mouth daily.        Romosozumab-aqqg SOSY 210 mg  210 mg Subcutaneous Q30 Days Susie Madison MD   210 mg at 03/18/24 0950       Review of Systems:    Constitutional: Negative for chills, fevers. +fatigue, no reports of night sweats, no decreased appetite with increased weight noted over the last  6 months  Eyes: Negative for visual disturbance, irritation and redness.  Respiratory: Negative for cough, hemoptysis, chest pain, or dyspnea on exertion.  Gastrointestinal: Negative for dysphagia, odynophagia, reflux symptoms, nausea, vomiting, change in bowel habits, diarrhea, +nausea with some abdominal pain; consistent with IBS with constipation--chronic  Integument/breast: Negative for rash, skin lesions, and pruritus.  Hematologic/lymphatic: Negative for easy bruising, bleeding, and lymphadenopathy.  Musculoskeletal: +lower back pain; intermittent and working with orthopedic surgery  Neurological: Negative for headaches, dizziness, speech problems, gait problems and weakness.    A comprehensive 14 point review of systems was completed.  Pertinent positives and negatives noted in the the HPI.     Vital Signs:  /67 (BP Location: Left arm, Patient Position: Sitting, Cuff Size: adult)   Pulse 68   Temp 97.4 °F (36.3 °C) (Oral)   Resp 16   Ht 1.727 m (5' 8\")   Wt 64.9 kg (143 lb)   SpO2 99%   BMI 21.74 kg/m²     Physical Examination:    General: Patient is alert and oriented x 3, not in acute distress.  Psych: Friendly, cooperative with appropriate questions and responses  HEENT: EOMs intact. Oropharynx is clear.   Neck:  No palpable lymphadenopathy. Neck is supple.  Lymphatics: There is no palpable lymphadenopathy throughout in the cervical, supraclavicular, axillary, or inguinal regions.  Chest: Clear to auscultation. No wheezes or rales.  Heart: Regular rate and rhythm. S1S2 normal.  Abdomen: Soft, non tender with good bowel sounds.  Extremities: No edema or calf tenderness.  Neurological: Grossly intact.       Labs:    Lab Results   Component Value Date/Time    WBC 4.3 03/20/2024 09:55 AM    RBC 4.26 03/20/2024 09:55 AM    HGB 13.1 03/20/2024 09:55 AM    HCT 38.1 03/20/2024 09:55 AM    MCV 89.4 03/20/2024 09:55 AM    MCH 30.8 03/20/2024 09:55 AM    MCHC 34.4 03/20/2024 09:55 AM    RDW 12.6 03/20/2024 09:55 AM    NEPRELIM 2.42 03/20/2024 09:55 AM    .0 03/20/2024 09:55 AM       Lab Results   Component Value Date/Time     (H) 03/20/2024 09:55 AM    BUN 21 03/20/2024 09:55 AM    CREATSERUM 1.39 (H) 03/20/2024 09:55 AM    GFRNAA 51 (L) 03/05/2022 11:04 AM    CA 10.3 03/20/2024 09:55 AM    ALB 4.7 03/20/2024 09:55 AM    ALB 4.35 03/20/2024 09:55 AM     03/20/2024 09:55 AM    K 3.7 03/20/2024 09:55 AM     03/20/2024 09:55 AM    CO2 31.0 03/20/2024 09:55 AM    ALKPHO 71 03/20/2024 09:55 AM    AST 16 03/20/2024 09:55 AM    ALT 7 (L) 03/20/2024 09:55 AM     Imaging:    Reviewed skeletal survey from 9/23; no osteoblastic or osteolytic lesions, reviewed X-rays from 11/23, and X-ray from 2/24    Impression:    No diagnosis found.      59 year old W with PMH relevant for  regular use of Lasix for the last 10 years associated with lower extremity swelling presenting for splenomegaly and likely resulting thrombocytopenia    Plan:    1.) history of Splenomegaly(present for >10yrs; since 2011)  --I discussed with patient my strong clinical suspicion for this to be either undiagnosed heart failure based on her regular use of Lasix in the last 10 years associated with lower extremity edema; rec for TTE to assess her cardiac function  --her EF was preserved, but still recommended she follows up with a cardiologist    --I originally suspected her splenomegaly to be possibly indolent lymphoma as imaging showed the spleen to be enlarged since 2011 with size ranging from 15 cm up to over 19cm two years ago, now at 18.2cm, without liver enlargement  --In light of her fatigue, night sweats, decreased appetite,  unintentional weight loss recommending CT soft tissue neck and chest to rule out possible lymphoma as she just completed CT abdominal imaging    --In 8/2020, I was pleased to inform her that her imaging shows no concerning lymphadenopathy via CT nect+chest. She is already working with GI for the thickening of distal stomach and proximal duodenum noted on CT abdomen on 8/1/2020 prior to consultation    --her repeat CT chest from 8/20/21 shows 2.5 x 0.4 prevascular lymph node is unchanged in size and configuration since August of last year measuring less than 1 cm in short axis.     --discussed her negative HIV and hepatitis testing results as well as negative screen for autoimmune phenomenon with UBALDO with reflex titer  --she was found to be NEGATIVE for the [id number] mutation and EXON 12 mutation    --In 8/2020, she was found to only have increased kappa light chains. Discussed with her then, that this may just be a light chain MGUS.  Recommending repeat MGUS testing in 6 months    --In late 2/2021, she returned for planned follow up and her repeat testing shows she has an IgM lambda MGUS with M spike of 0.17 g/dL. Pt has no hypercalcemia, some mild renal impairment may be secondary dehydration.  Her labs showed some anemia but not consistent with myeloma, skeletal survey shows no evidence of osteoblastic or osteolytic lesions    --we have discussed evaluating her bone marrow to look for cause for the splenomegaly likely causing her thrombocytopenia. She was noted to have a new mild leukopenia as well. I have discussed a plan of care for her to have a repeat CBC drawnp prior to her procedure to help bone marrow evaluation results. We discussed the possibility of notable risk for bleeding, infection or pain with the procedure    --Patient reports comfortable with undergoing bone marrow evaluation now to assess if there is a malignant hematological disease process present. Favorably, her repeat CBC+differential which  is now within normal limits.     --However, as she has an IgM subclass and inform the patient that she might have another condition known as Waldenstrom's macroglobulinemia, which can be associated with hyperviscosity syndrome and the presence of >10% plasma cells in the bone marrow. The difference in plasma cells present separates this condition from possible IgM MGUS.  Patient denies any stroke symptoms    --Esha underwent a bone marrow evaluation on 3/1/2021 showing presence of about 2% plasma cells involved with MGUS.  No signs of lymphoma, leukemia, metastatic disease identified.  Patient is noted to have trilineage hematopoiesis.     --We have now evaluated her bone marrow shows no signs of aggressive malignant process only MGUS with low percentage involvement with plasma cells    --Therefore, would rec she explores other nonmalignant causes for her splenomegaly    --We reviewed her CT chest from 8/22/203; pulmonary micronodules are stable for 2yrs suggestive of benign process    --reviewed with pt her CT abdomen/pelvis imaging from 9/2022 ordered in light of worsening night sweat concerns. This scan does not suggest a lymphoma or a hematologic malignant process; splenomegaly was noted as stable. She was seen by GI and underwent MRI with MRCP in light of pancreatic and hepatobiliary ductal dilation in 12/2022; spleen had decreased in size down to 17.7; does not seem suggestive of a malignant process as this has decreased without tx for a cancer    --suspect the splenomegaly is from heart failure as she is being managed on torsemide and Aldactone. Pt is no longer endorsing night sweats    --Esha has met with Dr. Yobani Ahn for the splenomegaly in 12/222 who rec observation over splenectomy as she is asymptomatic    --discussed that biopsy of the spleen can come with potential bleeding complications. We reviewed that if she were to have a low grade lymphoma, there would be no indication for tx based on her lack of  systemic signs of illness    --will repeat splenic ultrasound to assess size      2.) IgM Mgus--MGUS--Low Intermediate Risk disease with IgM subclass     --her current MGUS testing results are consistent with low/intermediate risk disease due to the IgM clone; M-spike is very low now at 0.09 g/dL with a normal SFLC ratio    -- repeat skeletal survey in 9/23 was negative for osteoblastic/osteolytic lesions    --No signs of hypercalcemia, renal insufficiency or anemia criteria to suggest progression into more aggressive plasma cell dyscrasia.  Mild MARIA ALEJANDRA is from her diuretic use (now on torsemide; recently inc and on Aldactone) from heart failure    --Repeat testing in 6 months    3.) Hematochezia    --RESOLVED    4.) History of tobacco use    --her repeat CT chest in 8/2023 based on her tobacco use history; prev nodules are likely benign as they have not changed in 2 yrs.   --I have rec the pt continues with low dose SCREENING CT scans with her primary doc or the pulmonary clinic      MDM: Moderate Risk    Bryon Lerma MD  [facility]  [facility]  [address] [place] [id number]

## 2024-04-01 ENCOUNTER — OFFICE VISIT (OUTPATIENT)
Facility: CLINIC | Age: 59
End: 2024-04-01
Payer: COMMERCIAL

## 2024-04-01 VITALS
BODY MASS INDEX: 22.13 KG/M2 | HEART RATE: 68 BPM | HEIGHT: 68 IN | WEIGHT: 146 LBS | DIASTOLIC BLOOD PRESSURE: 63 MMHG | SYSTOLIC BLOOD PRESSURE: 107 MMHG

## 2024-04-01 DIAGNOSIS — K58.1 IRRITABLE BOWEL SYNDROME WITH CONSTIPATION: ICD-10-CM

## 2024-04-01 DIAGNOSIS — K59.03 THERAPEUTIC OPIOID-INDUCED CONSTIPATION (OIC): Primary | ICD-10-CM

## 2024-04-01 DIAGNOSIS — R11.0 NAUSEA: ICD-10-CM

## 2024-04-01 DIAGNOSIS — T40.2X5A THERAPEUTIC OPIOID-INDUCED CONSTIPATION (OIC): Primary | ICD-10-CM

## 2024-04-01 DIAGNOSIS — K21.9 GASTROESOPHAGEAL REFLUX DISEASE, UNSPECIFIED WHETHER ESOPHAGITIS PRESENT: ICD-10-CM

## 2024-04-01 DIAGNOSIS — R16.1 SPLENOMEGALY: ICD-10-CM

## 2024-04-01 PROCEDURE — 3008F BODY MASS INDEX DOCD: CPT | Performed by: INTERNAL MEDICINE

## 2024-04-01 PROCEDURE — 3078F DIAST BP <80 MM HG: CPT | Performed by: INTERNAL MEDICINE

## 2024-04-01 PROCEDURE — 99214 OFFICE O/P EST MOD 30 MIN: CPT | Performed by: INTERNAL MEDICINE

## 2024-04-01 PROCEDURE — 3074F SYST BP LT 130 MM HG: CPT | Performed by: INTERNAL MEDICINE

## 2024-04-01 RX ORDER — NALOXEGOL OXALATE 25 MG/1
25 TABLET, FILM COATED ORAL
Qty: 30 TABLET | Refills: 11 | Status: SHIPPED | OUTPATIENT
Start: 2024-04-01 | End: 2024-05-01

## 2024-04-01 NOTE — PROGRESS NOTES
** SCROLL DOWN FOR HPI **      ASSESSMENT/PLAN:     59 year old woman with an extremely complex surgery history including:    Cholecystectomy surgery 2013  Emergency laparotomy July 2014 with sigmoid colon resection, colostomy due to Stercoral ulcer/perforation sigmoid colon  At least 2 lysis of adhesion surgeries  At least 1 incisional hernia surgery including most recent 7/26/2018    Seeing Dr. Bryon Lerma of Onc for MGUS, splenomegaly on imaging.    Ms. Hernadez has been following with myself and Judith Apple NP follow-up of her IBS, likely sequelae of above extreme abdominal surgical history.    Prescribed Motegrity 11/30/2022, 5/11/2023, 6/21/2023, 8/30/2023.    Severe ongoing constipation refractory to all prescription laxatives but Motegrity, still with incomplete relief.    Severe, daily worsening nausea.  Ondansetron does relieve the nausea; new prescription sent in 8/30/2023.     Reassuring colonoscopy examinations with more adenomatous polyps removed 12/22/2022 and 1/25/2024.    Returns for follow-up 4/1/2024 coping with worsening back pain, possible pending spinal surgery with ongoing constipation partially controlled on Motegrity laxative.  Now requiring daily Norco for chronic back pain.  Ongoing daily nausea.    Suggest:    Irritable bowel syndrome-constipation, extreme  Severe lifelong constipation to the point that she developed stercoral ulcer, colonic perforation and peritonitis July 2014 as above.  Vegetarian.  Extensive work-up to date has included CT scan abdomen and pelvis 9/29/2022 and 8/1/2020 and more recent CT scans of the chest; MRI MRCP 12/14/2022; and EGD/colonoscopy examination 8/4/2020, colonoscopy examination 12/22/2022  Previously inadequate relief recently on Linzess 290 mcg, inadequate response and significant constipation on CT scan above  Previously failed fiber products and MiraLAX, then insurance mandated trials of Trulance and Amitiza/lubiprostone  After failing all  common OTC and prescription laxatives, authorized and prescribed Motegrity laxative 11/30/2022  Of all OTC remedies, Dulcolax gives some relief.  Continues on the Motegrity today after initial prescription 11/30/2022.  Ms. Hernadez describes ongoing, moderate constipation on the Motegrity laxative.  Sounds like an occasional dose of Dulcolax/bisacodyl helps.  She continues to suffer significant symptoms including abdominal cramping nausea and bloating.  Today 4/1/2024 and previous visit 8/30/2023 we discussed adding a second agent to the Motegrity.  Could add Dulcolax, MiraLAX, magnesium laxatives.  Rifaximin antibiotic previously discussed; prescribed 4/13/2021.  I do not see a role for that at this point.  Limited relief with occasional Bentyl/dicyclomine.  Previously I discussed and recommended referral for pelvic physical therapy.    Now requiring Norco therapy QID dosing for severe spinal/back pain on follow-up visit 4/1/2024.  Possibility of a component of opioid-induced constipation on this high-dose narcotic discussed today.  Currently passing 1 bowel movement every 2-3 days on the Motegrity which is not ideal.  Today we discussed adding Movantik or Relistor to the regimen if possible; otherwise could add one of the OTC options as per previous conversation of 8/30/2023 above  Movantik Rx sent in today, will require pre-authorization      2.  Osteoporosis with vertebral and anterior vertebral disc damage  Severe chronic back pain  Osteoporosis; Recent labs show high .8 on 2/28/2024  Recently saw Cassidy Fonseca MD of ENDOCRINOLOGY  She will likely need surgical intervention.  Evolving plan is for aggressive treatment of the osteoporosis followed by possible \"cage\" implants for the disc problems.  Now on monthly infusions Romosozumab-aqqg SOSY 210 mg Dr Madison.    3.  GERD symptoms, PPI dependent  Continue omeprazole PPI medication, dosing as needed.  See Judith Apple's notes  Reassuring EGD  examination August 2020  Continues to suffer reflux symptoms    4.  Nausea     Daily/ongoing  Much worse past 9 months  Currently requiring ondansetron 3 times daily  Continue SL Ondansetron as needed; new prescription sent in previous visit    5.  MGUS, enlarged spleen on imaging     Following with Dr. Bryon Lerma of Oncology here  Follow-up ultrasound of spleen ordered    CT abd/pelvis 9/29/2022:  \"LIVER: No enlargement, atrophy, abnormal density, or significant focal lesion is identified.    BILIARY: The gallbladder is again noted to be surgically absent.  The common duct measures 8 mm in the sneha hepatis and tapers distally, which is within normal limits for the patient's post cholecystectomy state and is unchanged over several years.    Mild central intrahepatic biliary ectasia is also stable and likely relates to the patient's post cholecystectomy state.  PANCREAS: The main pancreatic duct is mildly dilated in the pancreatic head measuring 3 mm but tapers distally, new from prior studies.  No suspicious pancreatic mass or distal pancreatic atrophy.  SPLEEN: The spleen is moderately enlarged measuring 17.6 cm craniocaudad, previously 18.3 cm on 08/01/2020, 16.4 cm on 03/21/2015 and 12.3 cm on 07/06/2011.\"    Similar findings on prior CT Scan July 2014 above  CT chest 8/22/2023 shows unchanged bilateral pulmonary nodules up to 4 mm and 8 mm in size calcified granulomas; 9 mm stable enhancing lesion left hepatic dome liver      6.  Colon cancer screening, history adenomatous colon polyp  9mm pendunculated adenomatous colon polyp resected recent colonoscopy examination August 2020  My recent colonoscopy examination 12/22/2022 with poor prep quality, large masses of solid/semisolid stool scattered throughout showed no additional lesions.  Repeat exam recommended in 1 year, December 2023.  Recent colonoscopy examination 1/25/2024 for follow-up of history adenomatous colon polyps showed excellent prep quality; 5  small polyps 4-5 mm size at least 2 of which adenomas.  Repeat colonoscopy recommended January 2029          Office visit 4/1/2024:  HPI:    Patient ID: Esha Hernadez is a 59 year old woman with an extremely complex refractory constipation and abdominal surgery history including:    Cholecystectomy surgery 2013  Emergency laparotomy July 2014 with sigmoid colon resection, colostomy due to Stercoral ulcer/perforation sigmoid colon; with subsequent elective takedown of colostomy  At least 2 lysis of adhesion surgeries  At least 1 incisional hernia surgery including most recent 7/26/2018    Seeing Dr. Lerma for MGUS.    Follow-up imaging has been ordered to evaluate the previous splenomegaly.    Worsening low back pain radiating toward the right has led to imaging including spinal MRI scan.  Today Ms. Hernadez reports diagnosis of osteoporosis, scoliosis, severe intervertebral disc disease apparently 4 intervertebral discs.  She will likely need surgical intervention.  Evolving plan is for aggressive treatment of the osteoporosis followed by possible \"cage\" implants for the disc problems.  Has undergone epidural steroid injections  Now on monthly infusions Romosozumab-aqqg SOSY 210 mg Dr Madison.  Severe osteoporosis, abnormal PTH level followed up with Endocrinology as per today's plan.    Now requiring high-dose daily Norco, QID dosing for the chronic back pain.    Taking Motegrity for the past year with best yet but incomplete relief.  Even on the Motegrity, constipation remains significant.  At present, Ms. Hernadez describes passing 1 bowel movement every 2-3 days.  Relatively comfortable with that.    Nausea continues just about every day.  Radha tea helps  Takes SL dissolving ondansetron regularly    GERD symptoms controlled with ongoing daily omeprazole.  No dysphagia.    ======================  Previous visit 8/30/2023:     Esha Hernadez is a woman with an extremely complex refractory constipation  and abdominal surgery history including:    Cholecystectomy surgery 2013  Emergency laparotomy July 2014 with sigmoid colon resection, colostomy due to Stercoral ulcer/perforation sigmoid colon; with subsequent elective takedown of colostomy  At least 2 lysis of adhesion surgeries  At least 1 incisional hernia surgery including most recent 7/26/2018    Seeing Dr. Lerma for MGUS.  Takes potassium and a water pill, looks like torsemide.    Ms. Hernadez presents today for follow-up of her IBS, likely sequelae of above extreme abdominal surgical history.    Last saw Judith Apple 11/30/2022 as below.    There was concern for abnormal pancreatic duct on CT scan September 2022.    Reassuring subsequent MRI MRCP 12/14/2022.    At least 2 episodes of rectal bleeding September 2022 prior to visit with Judith.    Scheduled for colonoscopy examination for follow-up of colon polyps.    Prescription therapy for her constipation has been a challenge.    Last time, Ms. Hernadez saw me for issues including the nausea and severe constipation refractory to maximum dose Linzess.  Insurance would not cover Motegrity so per their restrictions we prescribed her the Trulance laxative on 12/13/2021, then lubiprostone 12/29/2021.  These made no difference.    Motegrity (Prucalopride) ultimately was covered after this.  It looks like after ongoing prescriptions for Linzess through most of 2022, first prescription for Motegrity was written 11/30/2022, prescribed multiple times after that including most recently 7/25/2023.    9 months later, none of the 3 prescription laxatives have given complete relief.  Motegrity seems to be the best yet.    On the Motegrity, Ms. Hernadez describes having a bowel movement about every other day, unsatisfying and with copious mucus.    She does take an occasional dose of Dulcolax/bisacodyl which does give relief.    Ongoing/unchanged severe numerous abdominal symptoms including nausea and acidity,  heartburn symptoms pretty much all day.    Ms. Hernadez remains dependent on ondansetron antinausea medication.  She states \"I am always nauseous.\"  Poor appetite.  She takes the sublingual dissolving tablets.  She continues to take the ondansetron 3 times daily for the nausea.    As before, familiar with just about all OTC laxatives.  Dulcolax seems to work better than most.    Also takes OTC digestive enzymes    Has been prescribed dicyclomine in the past.  Takes an occasional dose of dicyclomine for cramping.    Ms. Hernadez denies previous pelvic physical therapy.    ====================  Previous visit Judith Apple 11/30/2022:    Esha Hernadez is a 57 year old year-old female with an extremely complex surgery history including:        She is here today for f/u     Cholecystectomy surgery  Emergency laparotomy July 2014 with sigmoid colon resection, colostomy due to Stercoral ulcer/perforation sigmoid colon; with elective takedown of colostomy  At least 2 lysis of adhesion surgeries  At least 1 incisional hernia surgery including most recent 7/26/2018     Seeing Dr. Lerma for MGUS.     Last visit w/ Dr. Bell 11/2021  Extensive surgical history thought to be contributing to issues  rx sent for zelnorm-not approved by insurance so tried trulance w/o improvement and went back to linzess     Has tried xifaxan in past and she thinks did improve symptoms     Has been having worsening of her constipation w/ episode of brbpr. Worsening of gerd and nausea. Saw Dr. Lerma     Had Ct a/p 9/2022  Development of mild pancreatic ductal dilatation without suspicious pancreatic mass or distal pancreatic atrophy.  This finding is nonspecific but may relate to age-related ductal ectasia.        lfts normal 9/2022     she moves her bowels every 2-3 days.   Had brbpr w/ bm in sept x 2. No melena. No rectal pain     she has acid reflux and seems worse x last 6 mos.  Has nausea w/ reflux. No vomiting.  Feels bloated in  abdomen and improves with bm. she denies dysphagia, odynophagia and/or globus. No abd pain. Has cramping in low belly prior to bm.   Poor appetite 2/2 acid reflux and nausea. Weight is always up and down.     NSAIDS: no  Tobacco: no  Alcohol: no  Marijuana: no  Illicit drugs: no     No FH GI malignancy     No history of adverse reaction to sedation  No RASHID  No anticoagulants  No pacemaker/defibrillator  No pain medications and/or sleep aides        Last colonoscopy: 9mm pendunculated adenomatous colon polyp recent colonoscopy examination August 2020  Last EGD:Reassuring examination August 2020     ...      ASSESSMENT/PLAN:   Esha Hernadez is a 57 year old year-old female with an extremely complex surgery history:     #constipation  She has chronic constipation and decreased response to linzess over time. No improvement w/ trulance. Zelnorm not covered by insurance.  Do not think amitiza is a good choice, because she already has nausea.  Plan to request motegrity.  Add miralax at night to linzess in am in interim.     #colon polyps  #brbpr  Suspect reported brbpr likely hemorrhoidal, however had 3 polyps removed during last cln, largest of which was 9 mm. PATH hyperplastic x 2 in right colon. TA in rectum. 3 year crc screening interval advised. No fh gi malignancy. No anemia 9/2022.  Plan for repeat now to exclude advanced polyp, etc.      #gerd  #nausea  Chronic, however seems worse x last 6 mos. Think constipation may be contributing (discussed above).  Last egd 8/2020 and reassuring.  Currently on omeprazole 40 mg daily and zofran. Recommend increasing omeprazole to bid x 4 weeks and then attempt to reduce dose back to daily.  Continue zofran. Discussed reflux diet modification.  CTM for need for repeat EGD.     #abnormal ct  Ct a/p 9/2022 remarkable for mild pancreatic ductal dilatation without suspicious pancreatic mass or distal pancreatic atrophy.  This finding is nonspecific but may relate to age-related  ductal ectasia.  LFTs normal (9/2022).  Finding new from comparison imaging and will get MRCP for further evaluation. Consider need for EUS.     -mrcp - has claustrophobia and will send anxiolytic prior to imaging, understands will need  with medication  -omeprazole 40 mg twice daily x 4 weeks and then reduce dose to once daily  -continue zofran  -reflux diet modification  -linzess in am   -miralax in pm   -try for motegrity     -consider egd +/- eus     1. Schedule colonoscopy with MAC w/ any MD [Diagnosis: colon polyps, brbpr]     2.  bowel prep from pharmacy (Bantr trilyte)     3. Continue all medications for procedure     4. Read all bowel prep instructions carefully    ====================  Previous visit 11/19/2021:     Esha Hernadez is a woman with an extremely complex surgery history including:    Cholecystectomy surgery 2013  Emergency laparotomy July 2014 with sigmoid colon resection, colostomy due to Stercoral ulcer/perforation sigmoid colon; with elective takedown of colostomy  At least 2 lysis of adhesion surgeries  At least 1 incisional hernia surgery including most recent 7/26/2018    Seeing Dr. Lerma for MGUS.    Ms. Hernadez presents today for follow-up of her IBS, likely sequelae of above extreme abdominal surgical history.    Extensive work-up to date has included several visits with Judith Apple of our group, CT scan abdomen and pelvis and more recent CT scans of the chest; and EGD/colonoscopy examination which were reassuring as below.    Issues discussed today:    1.  Severe IBS-constipation, likely component of adhesions, sequelae of above surgical history    Ms. Hernadez describes ongoing, refractory constipation even on maximum dose Linzess 290 mcg.  On a daily dose of Linzess, Ms. Hernadez states that she is only passing a bowel movement every 2-3 days.  She continues to have significant symptoms including abdominal cramping nausea and bloating.    Ms. Hernadez  states that she is eating much less due to the symptoms, only one meal per day by her estimation.    Previously tried and failed MiraLAX laxative leading up to the Linzess.  She has tried combining MiraLAX with the Linzess without any significant effect.    Does not recall previous trial of Bentyl/dicyclomine.    Vegetarian    2.  Worsening nausea past 9 months    Ms. Hernadez states that the nausea preceded current tramadol pain medication due to back pain.  Zofran does relieve the nausea, asks for a new prescription    Wt Readings from Last 20 Encounters:   04/01/24 146 lb (66.2 kg)   03/25/24 143 lb (64.9 kg)   03/04/24 144 lb (65.3 kg)   02/19/24 145 lb (65.8 kg)   01/16/24 141 lb (64 kg)   01/09/24 145 lb (65.8 kg)   12/11/23 146 lb (66.2 kg)   11/28/23 146 lb (66.2 kg)   11/13/23 141 lb (64 kg)   11/17/23 141 lb (64 kg)   10/20/23 141 lb (64 kg)   10/19/23 141 lb (64 kg)   10/12/23 139 lb (63 kg)   10/05/23 139 lb (63 kg)   09/19/23 140 lb (63.5 kg)   09/25/23 141 lb 3.2 oz (64 kg)   09/05/23 140 lb (63.5 kg)   08/30/23 143 lb (64.9 kg)   08/08/23 143 lb (64.9 kg)   07/21/23 143 lb (64.9 kg)             HPI    Review of Systems    ====================  Follow-up visit Judith Apple, APRN 9/8/2021:      Reason for consult: f/u     Requesting physician or provider: Quintin Harman DO     Patient presents with:  Consult: nausea; acid reflux        HPI:   Esha SYED Hernadez is a 56 year old year-old female with history as detailed below:     she is here today for follow-up     Ct a/p 8/1/20  Question of gastritis or ulcer disease  S/p ccy changes  Constipation  Enlarged spleen-seeing Dr. Lerma     S/p cln/egd w/ Dr. Coleman 8/4/20-no h.pylori, celiac on path  She had 2 polyps removed, some of which were right sided and hperplastic and some of which were the pre-cancerous type.  Recommendation to repeat cln in 3 years     Was still having bloating at last visit and plan to trial fodmap diet  Start linzess      Consider trial of xifaxan     she moves her bowels every other day while on linzess 145 mcg. Bloating and gas have gotten worse since last visit and seem to improve with bm.  she denies brbpr and/or melena.     she endorses daily reflux symptoms while on pantoprazole 40 mg daily. Takes dose in am. she denies dysphagia, odynophagia and/or globus. she has mid line low belly pain when she is constipated. she has on-going nausea.  vomiting x 3 that is bilious.  Eats 1 meal a day 2/2 reflux complaints. Weight stable.      Seeing Dr. Lerma for MGUS     ccy approx 10 years ago  NSAIDS: no  Tobacco: quit years ago  Alcohol: no  Illicit drugs: no     No FH GI malignancy      ASSESSMENT/PLAN:   Esha Hernadez is a 56 year old year-old female with history as detailed below:     #constipation  #bloating  #gassiness  #abd pain  She is here today for f/u.  She has on-going complaints of constipation and think bloating/gas/abd pain likely related and recommend increasing her linzess to 290 mcg daily. Cln/EGD w/ duodenal bx, CT A/P w/o abnormality to explain symptoms.  If no improvement consider trial of xifaxan. Discussed avoidance of high fodmap foods.     #h/o cln polyps  Due for crc screening 2023 unless otherwise indicated     #gerd  #nausea  She has on-going reflux complaints while on pantoprazole 40 mg/daily as well as nausea with occasional bilious vomiting.  EGD 2020 w/ unremarkable duodenal and gastric bx.  Plan to switch to omeprazole 40 mg w/ plan to increase to bid x 4 weeks if no improvement in once daily therapy after 2 week trial. Follow reflux diet modification. Consider referral to tertiary medical center for motility/ph testing if on-going symptoms in spite of bid ppi therapy.     -linzess 290 mcg constipation  -omeprazole 40 mg for reflux  -reflux diet modification  -cln 2023 unless otherwise indicated  -Call Judith [phone] with update in 2-4 weeks  -See MD at next visit      ====================    WILNERBINUFRANTZ   Exam Description:   CT Abdomen Pelvis W Con   Date of Exam:  07/13/2014       This report includes an Addendum and supersedes previous reports for this   exam.PROCEDURE:    CT OF THE ABDOMEN AND PELVIS WITH CONTRAST       COMPARISON: [facility], CT ABD/PELV WO CON               FOR APPY, 7/06/2011, 13:52.       INDICATIONS: Generalized abdominal pain and constipation: r/o obstruction       TECHNIQUE:   CT abdomen and pelvis were obtained with non-ionic intravenous                contrast material.       FINDINGS:   LIVER:              Normal.   SPLEEN:             Splenomegaly with AP diameter of 16.8 cm and previously                       measuring 16.4 cm.   PANCREAS:           Normal.   BILIARY:            No evidence for biliary dilatation.                       Postcholecystectomy.   ADRENALS:           Normal.   KIDNEYS:            Normal.   AORTA/VASCULAR:     Atherosclerotic vascular calcification. Patent major                       abdominal aortic branches. No aneurysm or dissection.                       No portal venous or mesenteric gas.   LYMPHADENOPATHY: None.   GI/MESENTERY:       Large amount of liquid type stool in the colon mixed                       with solid stool with mild to moderate generalize                       colonic distention. More solid stool distending the                       proximal and mid sigmoid colon which has a transverse                       dimension of about 7.1 cm. Collapse of the distal                       sigmoid colon and rectum. Mild hazy infiltration of the                       fat around the mid sigmoid colon posterior wall of the                       sigmoid colon (image 75 series 3). Few sigmoid colon                       diverticula. Normal opacified small bowel.   ABDOMINAL WALL:     Small fat containing umbilical hernia.   URINARY BLADDER:    Nearly collapsed limiting the  evaluation.   ASCITES:                         None.   PELVIC ORGANS:      No suspect pelvic mass.   BONES:              No suspect bone lesion.   LUNG BASES:         Linear atelectasis or scarring in both lower lungs.   OTHER:              Negative.       CONCLUSION: A correction of the first word and at the conclusion appears in   parentheses next the word.   1. Clinical (Liquid) type stool mixed with some solid stool associated with      mild to moderate generalize colonic distention. More solid stool in the      sigmoid colon associated with moderate distention of the sigmoid colon      and hazy infiltration of the perisigmoid fat. Relative decompression of      the distal sigmoid colon and rectum. Coexistent sigmoid colon      diverticula. Small foci of gas along the posterior wall of the redundant      sigmoid colon may be secondary to focal sigmoid pneumatosis from      ischemia or less likely related to inflammatory changes from      diverticulitis. Relative decompression of the distal sigmoid colon and      rectum. No discrete obstructing mass. Suggest a followup sigmoidoscopy      or colonoscopy after resolution of the acute sigmoid colitis to exclude      a small intrinsic colonic lesion associated with narrowing.   2. Splenomegaly unchanged.   3. Postcholecystectomy.   4. Atherosclerotic vascular calcification. No aneurysm.   5. Fat-containing umbilical hernia unchanged.       Dictated by (CST):  LATESHA DANGELO MD on 7/13/2014 at 14:16     Approved by   (CST):  LATESHA DANGELO MD on 7/13/2014 at 14:16       ADDENDUM:       Correction to number one in the conclusion is placed in parentheses above   in the report. The word clinical should be changed to liquid.       Dictated by (CST):  LATESHA DANGELO MD on 7/13/2014 at 20:49     Approved by   (CST):  LATESHA DANGELO MD on 7/13/2014 at 20:49                                                                  Electronically Verified                                              BARRY Andujar MD 1209 07/13/2014                                                                            20:50       D:   07/13/2014 2:16 P   T:   07/13/2014  2:16 P   ATTENDING:  Jose De Jesus South MD [id number]   ORDERING:  Jose De Jesus South       DICTATING:   BARRY Andujar MD 1209   MD ID #:    [id number]   cc:  Jose De Jesus South MD 1741        Darius Brown MD [id number]       ====    8/01/2020    CT ABDOMEN + PELVIS (CONTRAST ONLY) (CPT=74177)       COMPARISON: [facility], CT ABDOMEN + PELVIS (CONTRAST ONLY) (CPT=74177), 7/22/2018, 11:32 AM.       INDICATIONS: R11.0 Nausea K59.00 Constipation, unspecified R10.84 Generalized abdominal pain       TECHNIQUE: CT images of the abdomen and pelvis were obtained with non-ionic intravenous contrast material.        FINDINGS:   LIVER: No enlargement, atrophy, abnormal density, or significant focal lesion.     GALLBLADDER: Surgically absent.   BILIARY: Moderate prominence of the intrahepatic bile ducts similar to prior.  Probably related to post cholecystectomy changes.     SPLEEN: Markedly enlarged, gradient greater in craniocaudal dimension than the liver, measuring 18.2 cm.   STOMACH: Diffuse thickening of the distal stomach, especially at the antrum.  Duodenum also appears diffusely thickened.  This is new from prior.   PANCREAS: No lesion, fluid collection, ductal dilatation, or atrophy.     ADRENALS: No defined mass or abnormal enlargement.     KIDNEYS: Normal.  No mass, obstruction or calcification.     AORTA/VASCULAR:   Moderate atherosclerotic calcifications.   RETROPERITONEUM: No mass or enlarged adenopathy.     BOWEL/MESENTERY: There is a large amount of stool throughout the colon.  It appears that there may have been a prior partial right colectomy.  Cecum not definitively visualized.  There is a surgical anastomosis at the rectosigmoid junction.   ABDOMINAL WALL: There is been interval repair of the previously seen bowel  containing ventral hernias.  No evidence for recurrent hernia.   URINARY BLADDER: Under distended, not well assessed.   PELVIC NODES: No enlarged mass or adenopathy.     PELVIC ORGANS: No visible mass.  Pelvic organs appropriate for patient age.  Surgical clips at both adnexa, possible previous oophorectomies.  No definite ovarian tissue seen.   BONES:   Mild left convex scoliosis of the lumbar spine similar to prior.  Multilevel degenerative disc and facet disease of the lower lumbar spine, similar to prior.  Negative for focal destructive bone lesion in the imaged volume.   LUNG BASES: Mild scarring in the medial right lung base anteriorly.       CONCLUSION:       1. New thickening of the distal stomach and proximal duodenum.  Consider duodenitis/gastritis or ulcer disease.       2. Surgically absent gallbladder with moderate intrahepatic biliary ductal dilatation, similar to prior.  Probably related to post cholecystectomy changes.       3. Interval repair of previously seen bowel containing ventral hernias without evidence for recurrent hernia.       4. Large amount of stool throughout the colon suggests constipation.       5. Lesser findings as above.           Dictated by (CST): Juany Saunders MD on 8/01/2020 at 10:26 PM       ====    8/20/2021    CT CHEST(CONTRAST ONLY) (CPT=71260)       COMPARISON: [facility], CT STN/CHEST W CONTRAST (CPT=70491/87362), 8/13/2020, 2:34 PM.       INDICATIONS: Z87.891 History of tobacco use R91.1 Pulmonary nodule       TECHNIQUE: CT images of the chest were obtained with non-ionic intravenous contrast material.        FINDINGS:   LINES AND TUBES: None.       MEDIASTINUM/VASCULATURE: 2.5 x 0.4 cm prevascular lymph node is unchanged in size and configuration since 8/13/2020 and measures less than 1 cm in short axis.  Otherwise, no axillary, hilar, mediastinal lymphadenopathy. The thoracic aorta is unremarkable    and not dilated. Mediastinal fat planes are  preserved. Cardiac chambers are unremarkable. Pericardium is normal. The main pulmonary artery has a normal diameter and is otherwise unremarkable.       LUNGS AND PLEURA: There is biapical scarring and pleural thickening.  Mild paraseptal emphysema seen within the bilateral upper lobes.  Again visualized is a 0.4 cm subpleural nodule within the right lung apex (series 4, image 14) which is unchanged.     0.5 cm nodule abutting the right minor fissure (series 4, image 59) is unchanged and likely a benign inter fissural lymph node.  Calcified granuloma within the left lower lobe (series 4, image 84) has developed calcification in the interim.  Scarring   with a focal area of bronchiectasis is seen within the medial aspect of the right middle lobe, unchanged. No pneumothorax.  No consolidation.  No pleural effusion. The trachea and central airways are unremarkable.       CHEST WALL/BONES: There is degenerative disease of the thoracic spine. The chest wall and osseous structures are otherwise unremarkable.       LIMITED ABDOMEN: The patient is post cholecystectomy. Mild prominence of the common bile duct and intrahepatic ducts, an expected finding post cholecystectomy. No gross intra-or extrahepatic biliary ductal dilation.  Tortuous splenic artery and splenic   vein and a dilated splenic vein are partially seen.           CONCLUSION:       Mild paraseptal emphysema, unchanged.       Subcentimeter bilateral pulmonary nodules are unchanged likely benign.       Chronic scarring with bronchiectasis within the medial aspect of the right middle lobe, unchanged.       Multiple other incidental findings as described in the body of the report which are unchanged.             Dictated by (CST): Je Gibson MD on 8/20/2021 at 7:49 PM           ====================    9/29/2022: CT ABDOMEN + PELVIS (CONTRAST ONLY) (CPT=74177)     COMPARISON: [facility], CT ABDOMEN + PELVIS (CONTRAST ONLY) (CPT=74177), 7/22/2018,  11:32 AM.  [facility], CT ABDOMEN & PELVIS W CON, 3/21/2015, 1:01 PM.  [facility], CT ABDOMEN & PELVIS W CON, 7/13/2014,  1:16 PM.  [facility], CT ABD/PELV WO CON FOR APPY, 7/06/2011, 1:52 PM.  [facility], CT CHEST(CONTRAST ONLY) (CPT=71260), 8/22/2022, 1:37 PM.  CT CHEST(CONTRAST ONLY) (CPT=71260), 8/20/2021, 4:03  PM.  CT STN/CHEST W CONTRAST (CPT=70491/47601), 8/13/2020, 2:34 PM.  [facility], CT ABDOMEN + PELVIS (CONTRAST ONLY) (CPT=74177), 8/01/2020, 10:01 AM.     INDICATIONS: Night sweats, Splenomegaly.  History of MGUS.     TECHNIQUE: Multidetector CT images of the abdomen and pelvis were obtained with non-ionic intravenous contrast material. Automated exposure control for dose reduction was used.        FINDINGS:  LUNG BASES: The heart is normal in size. There is stable mild scarring in the right middle lobe and lingula.  LIVER: No enlargement, atrophy, abnormal density, or significant focal lesion is identified.    BILIARY: The gallbladder is again noted to be surgically absent.  The common duct measures 8 mm in the sneha hepatis and tapers distally, which is within normal limits for the patient's post cholecystectomy state and is unchanged over several years.    Mild central intrahepatic biliary ectasia is also stable and likely relates to the patient's post cholecystectomy state.  PANCREAS: The main pancreatic duct is mildly dilated in the pancreatic head measuring 3 mm but tapers distally, new from prior studies.  No suspicious pancreatic mass or distal pancreatic atrophy.  SPLEEN: The spleen is moderately enlarged measuring 17.6 cm craniocaudad, previously 18.3 cm on 08/01/2020, 16.4 cm on 03/21/2015 and 12.3 cm on 07/06/2011.  ADRENALS:   No defined mass or abnormal enlargement.    KIDNEYS:   Symmetric enhancement is seen without evidence of hydronephrosis or underlying solid masses.  GI/MESENTERY:  There are stable postoperative changes from a partial sigmoid colonic resection with colocolonic anastomosis at the level of the rectosigmoid colon.  No bowel wall thickening or obstruction.  The appendix is not seen with certainty.  URINARY BLADDER: No visible calculus or focal wall thickening.    PELVIC NODES: Surgical clips are seen in the pelvis bilaterally.  No lymphadenopathy.    PELVIC ORGANS: No visible mass. Pelvic organs appropriate for patient age.    VASCULATURE:   No aneurysm is detected.  There is mild calcific atherosclerosis.  RETROPERITONEUM: No mass or lymphadenopathy is apparent.    BONES:   No significant bony lesion or fracture.  There is stable mild levoscoliosis to the lumbar spine with moderate multilevel spondylosis.  ABDOMINAL WALL: No mass or hernia is perceived.  OTHER: No free air or fluid is seen in the abdomen or pelvis.          CONCLUSION:  1. Moderate splenomegaly, relatively stable compared to the more recent abdominal CT studies but again worse compared to more remote CT studies.  2. Otherwise no acute intra-abdominal process.  No intra-abdominal lymphadenopathy 4 breast of osseous lesions.  3. Development of mild pancreatic ductal dilatation without suspicious pancreatic mass or distal pancreatic atrophy.  This finding is nonspecific but may relate to age-related ductal ectasia.    4. Stable postoperative changes from a partial sigmoid colonic resection with rectosigmoid anastomosis.  5. Mild central intrahepatic biliary ectasia is unchanged over several years and probably relates to the patient's post cholecystectomy state.  6. Lesser incidental findings as above.           Dictated by (CST): Donnell Sutton MD on 9/29/2022 at 10:01 AM    ====================    12/14/2022:  MRI MRCP (W+WO) (CPT=74183)     LOCATION:  [facility]       COMPARISON:  None.     INDICATIONS:  Upper abdominal pain.     TECHNIQUE:    Multiplanar single shot fast spin echo, chemical shift imaging, breath  hold T2 weighted images and 2-D fiesta sequences were acquired. No contrast material was administered. Fluid sensitive imaging with MRCP. Reconstruction was also performed including 3D multi-projection imaging.  Both projection and source MRCP images are evaluated.     3-D RENDERING:  Three dimensional image processing was completed using a separate workstation under concurrent supervision. Images were archived.     PATIENT STATED HISTORY:(As transcribed by Technologist)  Prior abnormal CT scan, polyp of colon, gastroesophageal reflux disease, nausea and constipation.      CONTRAST USED:  12mL of Dotarem     FINDINGS:  Patient is status post cholecystectomy.  No biliary ductal dilatation noted.  Distal CBD measures 4 mm in diameter.  No filling defects along the biliary tree.  Normal appearance of the pancreas and pancreatic duct.     No focal hepatic lesions or abnormal enhancement of the liver noted.  There is splenomegaly measuring 17.7 cm in craniocaudal dimension.  No focal splenic lesions noted.     The pancreas, pancreatic duct, adrenal glands and kidneys reveal no significant disease.  There is an incidental simple cyst along the mid to inferior pole the right kidney measuring 8 mm.     The visualized aspects of the gastrointestinal tract appear within normal limits.  No evidence of ascites or inflammatory changes of the peritoneal cavity.            CONCLUSION:    1. No evidence of biliary ductal dilatation or filling defects along the biliary tree.  2. Normal appearance of the pancreas and pancreatic duct.  3. Splenomegaly measuring 17.7 cm in craniocaudal dimension.  No focal splenic lesions.  4. No acute process or significant disease identified.  Please see further, less significant details above.           Dictated by (CST): Bartolo Johnson DO on 12/14/2022 at 3:53 PM     ====================    [facility]     DATE:  7/13/2014     PREOPERATIVE DIAGNOSIS:  Large bowel obstruction.      POSTOPERATIVE DIAGNOSIS  1.   Large bowel obstruction.  2.   Stercoral ulcer of the rectosigmoid junction with perforation   and feculent abscess formation.     OPERATIVE PROCEDURE:  1.   Rigid proctosigmoidoscopy.  2.   Exploratory laparotomy.  3.   Colorectal resection/Symone's procedure.  4.   Extensive debridement and drainage of pelvic abscess (with   evacuation of feculent contents).  5.   Appendectomy.  6.   Intraoperative bowel preparation.     SURGEON:  CASEY Winters MD     ASSISTANT:  Christiano Estrella SA     DESCRIPTION OF PROCEDURE:  Under general endotracheal anesthesia   with the patient in supine position after all the routine precautions for   the performance of a case of this nature were taken, the patient was   positioned in semi-lithotomy.  We proceeded initially with a rigid  proctosigmoidoscopy, where the rigid proctosigmoidoscope was   advanced through the rectum up to about 16 cm.  We couldn't advance any   further due to the presence of a tumoral pathology present on the proximal   aspect of the rectum toward the junction with the sigmoid.  No pathology was  visualized through the proctosigmoidoscope.  During the  proctosigmoidoscopy, the distal aspect of the rectum was prepped   in order to have it clean in case the pathology was going to lead us to   proceed with a low anterior resection and possible coloproctostomy.     Having completed the proctosigmoidoscopy, the abdomen was   reprepped and draped in the usual surgical sterile fashion.  An incision was   carried out from the midepigastrium down to the suprapubic area.  There was a   palpable tumor on the left pelvic area when the patient was asleep and a   rectal exam was done, so we approached this with a generous incision as stated   above from the midepigastrium through the right side of the navel down   to the suprapubic area.  The incision was deepened through the skin and   the subcutaneous tissues.  The linea alba was  incised.  Upon incising   the peritoneum, we entered the abdominal cavity.  Proper exposure was   obtained with the Jimenez retractor and we advanced now toward the pelvis.    There was a massively distended colon and there was a tumor palpable   that turned out to be massive amounts of dry hardened stool.  This stool had   perforated the rectosigmoid junction in the manner of a stercoral ulcer where   the rectosigmoid junction was perforated, and there was a feculent   abscess contained on the right side of the pelvis in between the right   adnexa and the rectosigmoid and the appendix.  The dissection was continued   carefully and all feculent contents were evacuated, irrigated and aspirated   from within the pelvis.  Dissection was now carried out around a   significantly edematous proximal rectum, and the contour stapling device was   applied to the rectum and triggered, producing transection of the rectum.    Dissection was significantly difficult as stated above due to the edema and   the significant amount of hardened stool within the distal sigmoid and   the proximal rectum.  Transection of the rectum allowed us to gain   access to the mesentery of the sigmoid colon, and this was transected by   means of the Harmonic scalpel and utilizing ligatures of 2-0 chromic catgut   when visible vessels were encountered.  Transection of the mesentery of the   sigmoid colon continued proximally until we reached an area where the   bowel was more normal and we felt that safely we could transect it here.  We   utilized here the HANNAH stapling device with the green load and transected   the colon in this area.  Prior to this, we tried to manipulate the hardened   stool into the area of sigmoid colon that was going to be part of the   specimen.        With the colon transected, the specimen consisted of the sigmoid   down to the proximal rectum with the perforation and most of the wall of   the stercoral abscess was removed  from the operative field.  Still   there was a significant amount of distention of the colon, and for this to be  decompressed we proceeded with an appendectomy.  At the time of   proceeding with the appendectomy, the mesentery of the appendix was   controlled with 2-0 chromic catgut ligatures.  The mesenteric base was transected   through the mesenteric orifice.  Upon removing the specimen from the   operative field, we advanced the suction device and aspirated as much fluid   as possible from the most proximal colon.  The orifice of the   appendectomy was closed in layers utilizing 2-0 chromic catgut and a sero-serosal   layer of 3-0 silk.     The distal colon was now brought as an end-colostomy in the left   lower quadrant of the abdomen, where an area was determined proper for   the positioning of the colostomy between the anterior-superior iliac   spine and the umbilicus.  Here with electrocautery we excised a [place] of   skin and dissection was carried out through the subcutaneous tissues down   to the superficial fascia of the external oblique muscle aponeurosis that   was open and the muscle internal oblique and transversus were split   following the directions of their fibers.  The orifice was stretched in order to   allow us to bring the colostomy through it.  The colostomy was anchored in   position with several stitches of 2-0 chromic catgut at the level of the   skin as well as the peritoneal side.     Having anchored the colon in position and applying the colon to   the parietal peritoneum to prevent internal herniations, we irrigated  abundantly the abdominal cavity until the return was clear,   confirming good cleanliness as well as good hemostasis.  Two 10 mm flat   fully-perforated Be-Graves catheters were exited through the right side of the   abdomen almost at the flank, and they were positioned to drain the area   from where the stercoral abscess had been present.  With the abdomen clean,    we proceeded now with closure in layers utilizing #1 Vicryl for the   peritoneum in a running fashion.  The suture was interrupted in two segments   of the closure of the peritoneum.  The fascial layer was closed with   looped 0 PDS that was also interrupted in two segments.  Abundant irrigation   was carried out in between layers of closure.  The subcutaneous tissues were  approximated  with 2-0 Vicryl and the skin was put together with   staples. In between the staples, we wedged Telfa strips soaked in Betadine.    The colostomy appliance was applied around the colon and the area of   the colostomy on the left lower quadrant.  Appropriate dressings were   applied.        The patient was awakened and returned to the recovery room in   satisfactory condition.  Estimated blood loss was less than 200 mL.                 D:    2014 2:24 A  T:    2014 11:48 A  ATTENDING:  Han Espinoza MD [id number]  DICTATING:    CASEY Winters MD [id number]  MD ID #:      [id number]  cc:   Han Espinoza         Darius Brown MD [id number]        CASEY Winters MD 0423    ====================  Operative Report     Patient Name:  Esha Hernadez  MR:  [id number]  :  1965  DOS:  17     Preop Dx:  Incisional hernia  Postop Dx:  Incisional hernia, and adhesions    Procedure:  Procedure(s):  incisional hernia repair with mesh, lysis of adhesions, bilateral myocutaneous advancement    Surgeon:  Yobani Ahn MD    Surgical Assistant.: Christiano Estrella CSA  EBL: 100 ml    Complication:  None     INDICATION:  Pt is a 53 year old female who with a symptomatic Incisional hernia who is scheduled for a Procedure(s):  incisional hernia repair with mesh, lysis of adhesions, bilateral myocutaneous advancement.     CONSENT:    An informed discussion was held with the patient regarding the nature of ventral incisional hernias, the treatment options, expected outcomes and potential complications.  These risks include but are not limited to  bleeding, wound infection, mesh infection, bowel injury, conversion to open procedure, hernia recurrence and chronic abdominal pain.  The patient expressed understanding and agreed to proceed with an open ventral hernia repair with mesh.     TECHNIQUE:    The patient was taken to the operating room, placed in supine position, and placed under general endotracheal anesthesia.  The abdomen was prepped and draped in sterile fashion.  An Ioban was used to cover the skin.  IV antibiotic was given and SCDs were placed for DVT prophylaxis.     The skin and subcutaneous tissue was infiltrated with 0.5% marcaine.  An incision was made over the hernia from the epigastric area to the suprapubic area.  Two large incisional hernias containing intestine were encountered.  We incised the hernia sac and divided the short bridge of fascia between the hernias.  There was small bowel that was adherent to the inferior edge of the lower hernia.  I performed adhesiolysis using sharp dissection to free the small intestine.  There were no injury to the intestine seen.  The adhesiolysis extended our operative time by at least 30 minutes.  We excised the hernia sacs from the subcutaneous tissue and the fascia.  The fascia edges were debrided.  The fascia defect measured approximately 5 by 5 cm and 3 by 3 cm.  I made the decision to fix the hernia via the retrorectus technique.  We incised the posterior rectus fascia just lateral to the midline and dissected the posterior fascia from the rectus muscle.  This was accomplished bilaterally to the semilunaris line.  This allowed bilateral myocutaneous advancement to the midline that would allow primary re-approximation of the midline.  I did not need to perform a release of the tranversus abdominus.  We then closed the posterior rectus fascia using 3-0 PDS in running fashion.  A 20 x 10 cm piece of uncovered polypropylene mesh was placed in the retrorectus position and held in place using  interrupted 3-0 PDS.  The mesh laid flat and was further fixed in place using Evicel.  The midline fascia was closed using running 0 looped PDS.  The wound was irrigated with saline and found to be hemostatic.  The subcutaneous tissue and skin were closed using 3-0 and 4-0 vicryl.  Sterile dressing was applied and the patient was awaken from anesthesia.  All instruments and sponge counts were correct and I was present for the critical portions of the procedure.        Yobani Ahn MD  ====================    ESOPHAGOGASTRODUODENOSCOPY (EGD) & COLONOSCOPY REPORT           Esha LYNCH Padminiscalco      1965 Age 55 year old   PCP Quintin Harman DO Endoscopist Matt Coleman MD      Date of procedure: 20     Procedure: EGD w/ cold biopsy & Colonoscopy w/cold snare, cold biopsy and hot snare polypectomy     Pre-operative diagnosis: abdominal pain, nausea, constipation and dyspepsia     Post-operative diagnosis: gastric erythema, polyps, hemorrhoids, surgical anastomosis at 15 cm     Medications: MAC sedation  Withdrawal time: 18 minutes     Complications: none     Procedure: Informed consent was obtained from the patient after the risks of the procedure were discussed, including but not limited to bleeding, perforation, aspiration, infection, or possibility of a missed lesion. We discussed the risks/benefits and alternatives to this procedure, as well as the planned sedation. EGD procedure: The patient was placed in the left lateral decubitus position and begun on continuous blood pressure pulse oximetry and EKG monitoring and this was maintained throughout the procedure. Once an adequate level of sedation was obtained a bite block was placed. Then the lubricated tip of the [id number] diagnostic video upper endoscope was inserted and advanced using direct visualization into the posterior pharynx and ultimately into the esophagus. Colonoscopy procedure: Once an adequate level of sedation was obtained a digital  rectal exam was completed. Then the lubricated tip of the [id number] diagnostic video colonoscope was inserted and advanced without difficulty to the cecum using the CO2 insufflation technique. The cecum was identified by localizing the trifold, the appendix and the ileocecal valve. A routine second examination of the cecum/ascending colon was performed. Withdrawal was begun with thorough washing and careful examination of the colonic walls and folds. Photodocumentation was obtained. The bowel prep was good. Views of the colon were good with washing. I then carefully withdrew the instrument from the patient who tolerated the procedure well.      Complications: None     EGD findings:       1. Esophagus: The squamocolumnar junction was noted at 39 cm and appeared regluar. The GE junction was noted at 39 cm from the incisors. No significant hiatal hernia. The esophageal mucosa appeared normal. There was no evidence of esophagitis, stricture or endoscopic evidence of Morales's esophagus.  2. Stomach: The stomach distended normally. Normal rugal folds were seen. The pylorus was patent. The gastric mucosa appeared erythematous so biopsies taken for h pylori. Retroflexion revealed a normal fundus and a normal cardia.   3. Duodenum: The duodenal mucosa appeared normal in the 1st and 2nd portion of the duodenum. Biopsies taken for celiac     Colonoscopy findings:     1. SAKINA: normal rectal tone, no masses palpated.   2. 3 polyp(s) noted as follows:      A. 3-4 mm polyp in the ascending colon; flat morphology; cold snare and cold biopsy polypectomy and retrieved.      B. 9 mm polyp in the rectum colon; pedunculated morphology; hot snare polypectomy and retrieved.  3. Terminal ileum: the visualized mucosa appeared normal.  4. The colonic mucosa throughout the colon showed normal vascular pattern, without evidence of angioectasias or inflammation.   5. Sigmoid anastomosis at 15 cm.  6. A retroflexed view of the rectum  revealed hemorrhoids.        Recommend:  Await pathology. The interval for the next colonoscopy will be determined after reviewing pathology, 3-5 years. If new signs or symptoms develop, colonoscopy may need to be repeated sooner.   High fiber diet.  Monitor for blood in the stool. If having more than just tinge of blood, call office or go to the ER.    PATH:    A. Duodenum; biopsy:  Duodenal mucosa with no significant pathologic change.   Preserved villous architecture.     B. Gastric biopsy:  Gastric antral mucosa with reactive gastropathy and focal mild chronic inactive gastritis.  Negative for intestinal metaplasia or dysplasia.  Diff-Quik stain (with appropriate control reactivity), is negative for H. pylori microorganisms.     C. Ascending colon polyp:  Colonic mucosa with superficial hyperplastic changes.     D. Rectal polyp:  Tubular adenoma fragments.      =======================    [facility] SURGERY [place]        COLONOSCOPY PROCEDURE REPORT     DATE OF PROCEDURE:  12/22/2022      PCP: Quintin Harman DO     PREOPERATIVE DIAGNOSIS: Personal history of colon polyps, rectal bleeding     POSTOPERATIVE DIAGNOSIS:  See impression.     SURGEON:  Suman Bell M.D.     SEDATION:    MAC anesthesia provided by the Anesthesia Service.  MAC anesthesia requested due to  anticipated intolerance of colonoscopy examination under safe doses conscious sedation medications     COLONOSCOPY PROCEDURE:   After the nature and risks of colonoscopy examination under MAC anesthesia were discussed with the patient and all questions answered, informed consent was obtained.  The patient was sedated as above.       Digital rectal exam was performed which showed no masses.  The Olympus pediatric video colonoscope was placed in the patient's rectum and advanced under direct visualization through the entire length of the colon up to the cecum.  The cecum was confirmed by landmarks including appendiceal orifice, cecal  trifold, ileocecal valve.  Retroflexion was performed in the rectum.     The quality of the prep was poor.  Large masses of solid and semisolid stool scattered throughout the entire length of the colon, allowing for only evaluation of a small fraction of the colonic walls.  Lesions could have been missed.     Estimated blood loss: none/insignificant        COLONOSCOPY FINDINGS:    Within limits of a poor prep exam, no neoplasm, lesions or inflammatory process seen from the cecum through entire length of the colon down to the rectum.  Small-medium sized nonbleeding internal hemorrhoids seen forward and retroflexed views anal canal and rectum.       RECOMMENDATIONS:  High fiber diet.  After questions of mild stable postcholecystectomy biliary dilation and mild 3mm???? pancreatic duct \"mild dilation,\" \"ductal ectasia\" on recent CT scan of 9/29/2022, we have a reassuring MRI MRCP with and without IV contrast 12/14/2022 showing normal biliary tree normal pancreas and pancreatic duct.  Repeat colonoscopy examination in one year.  Follow-up with Judith Apple NP of our group for multiple GI and abdominal symptoms, discussion of reassuring MRI findings above and discussion of a more effective bowel prep for next year's colonoscopy examination.  Ms. Hernadez does not appear to understand the MRI scan results of last week or the significance of its reassuring findings.    ======    [facility] SURGERY [place]        COLONOSCOPY PROCEDURE REPORT     DATE OF PROCEDURE:  1/25/2024      PCP: Quintin Harman DO     PREOPERATIVE DIAGNOSIS:  History adenomatous colon polyps; inadequate bowel prep previous colonoscopy examination     POSTOPERATIVE DIAGNOSIS:  See impression.     SURGEON:  Suman Bell M.D.     SEDATION:    MAC anesthesia provided by the Anesthesia Service.  MAC anesthesia requested due to anticipated intolerance of colonoscopy examination under safe doses conscious sedation medications      COLONOSCOPY PROCEDURE:   After the nature and risks of colonoscopy examination under MAC anesthesia were discussed with the patient and all questions answered, informed consent was obtained.  The patient was sedated as above.       Digital rectal exam was performed which showed no masses.  The Olympus pediatric video colonoscope was placed in the patient's rectum and advanced under direct visualization through the entire length of the colon up to the cecum.  The cecum was confirmed by landmarks including appendiceal orifice, cecal trifold, ileocecal valve.  Retroflexion was performed in the rectum.     The quality of the prep was excellent.     Estimated blood loss: none/insignificant        COLONOSCOPY FINDINGS:    Two sessile 4 mm colon polyps found and removed from the base of the cecum by cold snare polypectomy technique, suctioned out.  Two sessile 5 mm colon polyps found and removed from the mid transverse colon by cold snare polypectomy technique, suctioned out.  Sessile 5 mm colon polyp removed from the distal transverse or proximal descending remnant (probably labelled \"sigmoid\") as above.  Melanosis coli throughout.  Healthy end to end colocolonic surgical anastomosis at 15 cm from the anal verge, photographed  Small internal hemorrhoids.     RECOMMENDATIONS:  High fiber diet.  Follow-up above colon polyp pathology results.  Continue current bowel regimen for the extreme constipation.  Repeat colonoscopy examination in 5 years.  No aspirin or NSAID medications for next 5 days to prevent bleeding        PATH:    A. Transverse colon polyp x2; polypectomy:  Tubular adenoma.  Fragments of hyperplastic polyp.     B. Cecal polyp x2; polypectomy:  Fragments of hyperplastic polyp.     C. Sigmoid colon polyp; polypectomy:  Tubular adenoma.       Current Outpatient Medications   Medication Sig Dispense Refill    HYDROcodone-acetaminophen 7.5-325 MG Oral Tab Take 1 tablet by mouth every 6 (six) hours as needed for  Pain. 120 tablet 0    dicyclomine 20 MG Oral Tab Take 1-2 tablets (20-40 mg total) by mouth 3 (three) times daily as needed. 90 tablet 3    baclofen 10 MG Oral Tab Take 1 tablet (10 mg total) by mouth nightly as needed. AT BEDTIME 30 tablet 5    Prucalopride Succinate (MOTEGRITY) 2 MG Oral Tab Take 1 tablet by mouth daily. 90 tablet 3    OSPHENA 60 MG Oral Tab       spironolactone 25 MG Oral Tab Take 1 tablet (25 mg total) by mouth daily.      temazepam 15 MG Oral Cap Take 1 capsule (15 mg total) by mouth nightly as needed for Sleep. 90 capsule 1    potassium chloride 20 MEQ Oral Tab CR Take 1 tablet (20 mEq total) by mouth daily. 30 tablet 5    Omeprazole 40 MG Oral Capsule Delayed Release Take 1 capsule (40 mg total) by mouth before breakfast. 90 capsule 3    ondansetron 4 MG Oral Tablet Dispersible Place 1 tablet (4 mg total) under the tongue every 8 (eight) hours as needed for Nausea. 90 tablet 11    torsemide 20 MG Oral Tab Take 1 tablet (20 mg total) by mouth 2 (two) times daily.      Digestive Enzymes (DIGESTIVE ENZYME OR) Take 1 capsule by mouth daily.      VITAMIN D, ERGOCALCIFEROL, OR Take 1 capsule by mouth daily.      Multiple Vitamins-Minerals (MULTI-VITAMIN/MINERALS) Oral Tab Take 1 tablet by mouth daily.      docusate sodium 100 MG Oral Cap Take 1 capsule (100 mg total) by mouth daily.      risedronate (ACTONEL) 35 MG Oral Tab Take 1 tablet (35 mg total) by mouth every 7 days. (Patient not taking: Reported on 3/25/2024) 5 tablet 11         Allergies:No Known Allergies  Imaging: No results found.       PHYSICAL EXAM:   Physical Exam           Prior to this encounter, I spent over 10 minutes preparing for the visit, including reviewing documents from other specialties as well as from PCP and going over test results. During and after the face to face encounter, I spent an additional 45 minutes discussing the above and counseling this patient, reviewing chart notes and data with patient and composing this  note.       Digital transcription software was utilized to produce this note. The note was proofread for content only. Typographical errors may remain.       Meds This Visit:  Requested Prescriptions      No prescriptions requested or ordered in this encounter       Imaging & Referrals:  None       ID#[id number]

## 2024-04-03 ENCOUNTER — HOSPITAL ENCOUNTER (OUTPATIENT)
Dept: ULTRASOUND IMAGING | Age: 59
Discharge: HOME | End: 2024-04-03
Attending: INTERNAL MEDICINE
Payer: COMMERCIAL

## 2024-04-03 DIAGNOSIS — R16.1 SPLENOMEGALY: ICD-10-CM

## 2024-04-03 PROCEDURE — 76705 ECHO EXAM OF ABDOMEN: CPT | Performed by: INTERNAL MEDICINE

## 2024-04-05 RX ORDER — POTASSIUM CHLORIDE 20 MEQ/1
20 TABLET, EXTENDED RELEASE ORAL DAILY
Qty: 90 TABLET | Refills: 1 | Status: SHIPPED | OUTPATIENT
Start: 2024-04-05

## 2024-04-05 NOTE — TELEPHONE ENCOUNTER
Please review. No protocol.    Requested Prescriptions   Pending Prescriptions Disp Refills    POTASSIUM CHLORIDE 20 MEQ Oral Tab CR [Pharmacy Med Name: Potassium Chloride Er 20 Meq Tab Amne] 30 tablet 0     Sig: TAKE ONE TABLET BY MOUTH ONE TIME DAILY       There is no refill protocol information for this order          Future Appointments         Provider Department Appt Notes    In 1 week Cassidy Fonseca MD [facility] Follow up blood work    In 1 week [facility] RN RHEUMATOLOGY [facility] 1st dose Evenity    In 2 weeks [facility] RN RHEUMATOLOGY [facility] 1st dose Evenity    In 1 month [facility] RN RHEUMATOLOGY [facility] 1st dose Evenity    In 4 months Susie Madison MD [facility] 6 mo f/u    In 5 months Bryon Lerma MD [facility] Hematology Oncology 6 m f/u.md          Recent Outpatient Visits              4 days ago Therapeutic opioid-induced constipation (OIC)    [facility] Suman Bell MD    Office Visit    1 week ago [facility] Hematology Oncology Bryon Lerma MD    Office Visit    2 weeks ago Age-related osteoporosis without current pathological fracture    [facility]    Nurse Only    1 month ago Age-related osteoporosis without current pathological fracture    [facility] Cassidy Fonseca MD    Office Visit    1 month ago Age-related osteoporosis without current pathological fracture    [facility] Susie Madison MD    Office Visit

## 2024-04-10 ENCOUNTER — LAB ENCOUNTER (OUTPATIENT)
Dept: LAB | Age: 59
End: 2024-04-10
Attending: INTERNAL MEDICINE
Payer: COMMERCIAL

## 2024-04-10 DIAGNOSIS — M81.0 AGE-RELATED OSTEOPOROSIS WITHOUT CURRENT PATHOLOGICAL FRACTURE: ICD-10-CM

## 2024-04-10 DIAGNOSIS — E21.3 HYPERPARATHYROIDISM (HCC): ICD-10-CM

## 2024-04-10 DIAGNOSIS — E55.9 VITAMIN D DEFICIENCY: ICD-10-CM

## 2024-04-10 DIAGNOSIS — R79.89 HIGH SERUM PARATHYROID HORMONE (PTH): ICD-10-CM

## 2024-04-10 DIAGNOSIS — Z93.3 STATUS POST HARTMANN PROCEDURE (HCC): ICD-10-CM

## 2024-04-10 DIAGNOSIS — R56.9 CONVULSIONS, UNSPECIFIED CONVULSION TYPE (HCC): ICD-10-CM

## 2024-04-10 DIAGNOSIS — K21.9 GASTROESOPHAGEAL REFLUX DISEASE, UNSPECIFIED WHETHER ESOPHAGITIS PRESENT: ICD-10-CM

## 2024-04-10 LAB
ALBUMIN SERPL-MCNC: 4.5 G/DL (ref 3.2–4.8)
ALBUMIN/GLOB SERPL: 1.7 {RATIO} (ref 1–2)
ALP LIVER SERPL-CCNC: 107 U/L
ALT SERPL-CCNC: 9 U/L
ANION GAP SERPL CALC-SCNC: 3 MMOL/L (ref 0–18)
AST SERPL-CCNC: 15 U/L (ref ?–34)
BILIRUB SERPL-MCNC: 0.5 MG/DL (ref 0.2–1.1)
BUN BLD-MCNC: 18 MG/DL (ref 9–23)
BUN/CREAT SERPL: 17 (ref 10–20)
CALCIUM 24H UR-SRATE: 192 MG/24HR (ref 100–300)
CALCIUM BLD-MCNC: 9.6 MG/DL (ref 8.7–10.4)
CHLORIDE SERPL-SCNC: 106 MMOL/L (ref 98–112)
CO2 SERPL-SCNC: 30 MMOL/L (ref 21–32)
CREAT BLD-MCNC: 1.06 MG/DL
CREAT UR-SCNC: 1.09 G/24 HR (ref 0.6–1.8)
EGFRCR SERPLBLD CKD-EPI 2021: 60 ML/MIN/1.73M2 (ref 60–?)
FASTING STATUS PATIENT QL REPORTED: YES
GLOBULIN PLAS-MCNC: 2.7 G/DL (ref 2.8–4.4)
GLUCOSE BLD-MCNC: 92 MG/DL (ref 70–99)
MAGNESIUM SERPL-MCNC: 2.5 MG/DL (ref 1.6–2.6)
OSMOLALITY SERPL CALC.SUM OF ELEC: 290 MOSM/KG (ref 275–295)
PHOSPHATE SERPL-MCNC: 3.5 MG/DL (ref 2.4–5.1)
POTASSIUM SERPL-SCNC: 3.8 MMOL/L (ref 3.5–5.1)
PROT SERPL-MCNC: 7.2 G/DL (ref 5.7–8.2)
PTH-INTACT SERPL-MCNC: 186.5 PG/ML (ref 18.5–88)
SODIUM SERPL-SCNC: 139 MMOL/L (ref 136–145)
SPECIMEN VOL UR: 2400 ML
SPECIMEN VOL UR: 2400 ML
VIT D+METAB SERPL-MCNC: 60.1 NG/ML (ref 30–100)

## 2024-04-10 PROCEDURE — 82570 ASSAY OF URINE CREATININE: CPT

## 2024-04-10 PROCEDURE — 84100 ASSAY OF PHOSPHORUS: CPT

## 2024-04-10 PROCEDURE — 83970 ASSAY OF PARATHORMONE: CPT

## 2024-04-10 PROCEDURE — 82340 ASSAY OF CALCIUM IN URINE: CPT

## 2024-04-10 PROCEDURE — 80053 COMPREHEN METABOLIC PANEL: CPT

## 2024-04-10 PROCEDURE — 82306 VITAMIN D 25 HYDROXY: CPT

## 2024-04-10 PROCEDURE — 83735 ASSAY OF MAGNESIUM: CPT

## 2024-04-10 PROCEDURE — 36415 COLL VENOUS BLD VENIPUNCTURE: CPT

## 2024-04-10 PROCEDURE — 82652 VIT D 1 25-DIHYDROXY: CPT

## 2024-04-12 ENCOUNTER — OFFICE VISIT (OUTPATIENT)
Facility: CLINIC | Age: 59
End: 2024-04-12
Payer: COMMERCIAL

## 2024-04-12 VITALS
HEIGHT: 67.99 IN | HEART RATE: 68 BPM | SYSTOLIC BLOOD PRESSURE: 118 MMHG | BODY MASS INDEX: 22.13 KG/M2 | WEIGHT: 146 LBS | DIASTOLIC BLOOD PRESSURE: 62 MMHG

## 2024-04-12 DIAGNOSIS — M81.0 AGE-RELATED OSTEOPOROSIS WITHOUT CURRENT PATHOLOGICAL FRACTURE: ICD-10-CM

## 2024-04-12 DIAGNOSIS — E55.9 VITAMIN D DEFICIENCY: ICD-10-CM

## 2024-04-12 DIAGNOSIS — K21.9 GASTROESOPHAGEAL REFLUX DISEASE, UNSPECIFIED WHETHER ESOPHAGITIS PRESENT: ICD-10-CM

## 2024-04-12 DIAGNOSIS — Z93.3 STATUS POST HARTMANN PROCEDURE (HCC): ICD-10-CM

## 2024-04-12 DIAGNOSIS — R79.89 HIGH SERUM PARATHYROID HORMONE (PTH): ICD-10-CM

## 2024-04-12 DIAGNOSIS — E83.52 HYPERCALCEMIA: ICD-10-CM

## 2024-04-12 DIAGNOSIS — R56.9 CONVULSIONS, UNSPECIFIED CONVULSION TYPE (HCC): ICD-10-CM

## 2024-04-12 DIAGNOSIS — E21.3 HYPERPARATHYROIDISM (HCC): Primary | ICD-10-CM

## 2024-04-12 LAB — VIT D 1,25 DI-OH: 87.5 PG/ML

## 2024-04-12 PROCEDURE — 99214 OFFICE O/P EST MOD 30 MIN: CPT | Performed by: INTERNAL MEDICINE

## 2024-04-12 PROCEDURE — 3078F DIAST BP <80 MM HG: CPT | Performed by: INTERNAL MEDICINE

## 2024-04-12 PROCEDURE — 3008F BODY MASS INDEX DOCD: CPT | Performed by: INTERNAL MEDICINE

## 2024-04-12 PROCEDURE — 3074F SYST BP LT 130 MM HG: CPT | Performed by: INTERNAL MEDICINE

## 2024-04-12 NOTE — PROGRESS NOTES
Reason for Visit:  high parathyroid levels, osteoporosis.  Requesting Physician:   Kiesha Harman DO      CHIEF COMPLAINT:    Chief Complaint   Patient presents with    Osteoporosis     Pt is in for a Osteoporosis follow up. No recent falls/trips No recent fractures or broken bones        HISTORY OF PRESENT ILLNESS:   Esha Hernadez is a 59 year old female who presents with   high parathyroid levels and osteoporosis  She has MGUS and hx of Emergency laparotomy July 2014 with sigmoid colon resection, colostomy due to Stercoral ulcer/perforation sigmoid colon; with subsequent elective takedown of colostomy    She was dx with Scoliosis 11/2023 and with Osteoporosis 12/2023  She is on SERM, norco and omperazole.  She is on Temazepam at bedtime  Osteoporosis is managed by Rheumatology. Started Evenity ~ 3/2024  Now on calcium 600 mg bid and vit D 2000 unit/day   Drinking more water   Denied hypercalcemia symptoms   W/u, after increasing ca and vit D, showed   eGFR 60,  PTHi 185, vit D 60, normal ca, albumin, Mg and Phos  24 hr urine ca  192    Was treated with risedronate for 2-3 mo - reports GI SE from it.   Patient never had fragility fracture ( had fractures in ankle & fingers)  Has height loss: ~ 2 inches  She denied risk factors: steroid use, alcohol abuse, liver disease, kidney disease, hyperthyroid  Family history of osteoporosis or fragility fracture: no    ASSESSMENT AND PLAN:  59 year old female who presents with   high parathyroid levels, osteoporosis  W/u showed normal serum ca, eGFR, vit D and high PTH levels   DXA showed osteoporosis.   Most likely, PTHi is high secondary to low ca intake but still can be primary hyperparathyroidism - normocalcemic hyperparathyroidism  Given her previous surgeries it is possible she is not absorbing enough ca which can cause high PTH levels  I d/w pt in length.   Patient is taking Vitamin D and calcium as rec'   We increased her Ca and Vit D last visit. Her labs  showed improvement in serum ca but her PTHi levels are higher now. Still can be normocalcemic hyperparathyroid but most likely it is secondary hyperparathyroidism. Increasing calcium and vit D did not worsen her labs and she does not have hypercalciuria.   Update: after the visit, 1,25 vit D levels came back mildly elevated. 87 which indicates more primary hyperparathyroid.  I will reassess in 6 mo.    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, left    Left small finger mass excision     Bowel obstruction (HCC)    Cancer (HCC)    basal cell on right eyebrow    Decorative tattoo    Esophageal reflux    Essential hypertension    Hemorrhoids    hemoorhoidectomy    History of COVID-19    History of MRSA infection    blister on toe MRSA+ approximately 2012    Infected dental carries    dental extractions    Injury of digital nerve of finger    R finger nerve injury    Metrorrhagia    destruction of lesions, vulva, simple    Migraines    Seizure disorder (HCC)    one isolated seizure after fall and head injury    Sinusitis    \"sinus procedure\"    Trigeminal neuralgia    R caniotomy, lyrica    Varicose veins of both lower extremities    Visual impairment    wears eye glasses       PAST SURGICAL HISTORY:   Past Surgical History:   Procedure Laterality Date    Abdominal surgery  2015 2016    Colon blockage, hurnias 2    Brain surgery Right 2005    craniotomy to treat trigeminal neuralgia    Cholecystectomy  2013    Colon surgery  2014    Colonoscopy  10/01/2014        Colonoscopy  05/03/2019        Colonoscopy N/A 1/25/2024    Procedure:  COLONOSCOPY;  Surgeon: Suman Bell MD;  Location: [facility] MAIN OR    Cyst removal  2014    D & c  2013    Destruction skin lesion(s)  2010    destrruction of lesions, vulva, simple; metrorrhagia    Dilation/curettage,diagnostic  2013    Excis tumor/avm,deep,hand/fingr Left 10/02/2020    Left small finger mass excision     Extraction erupted tooth/exr  2010    dental extractions, dental carries    Hemorrhoidectomy      Hernia surgery  07/26/2018    incisional hernia repair with mesh, lysis of adhesions, bilateral myocutaneous advancement    Other surgical history  03/26/2015    Exploratory laparotomy and lysis of adhesions with release of internal herniations and resolution of the bowel obstruction    Other surgical history      having dental work     Part removal colon w end colostomy  07/13/2014    after bowel obstruction    Revise/repair finger/toe nerve Right 2010    repair R finger nerve injury    Revision of colostomy,complicated  10/13/2014    lap reversal of Symone procedure, extensive lysis of adhesions, partial colon resection, excision of accessory spleen, takedown of splenic flexure, insertion of drains, rigid proctosigmoidoscopy for eval of distal anataomy and bowel prep of distal rectum, ureterolysis    Sinus surgery        to treat sinusitis    Ventral hernia repair  07/26/2018    incisional hernia repair with mesh, lysis of adhesions, bilateral myocutaneous advancement       CURRENT MEDICATIONS:    No outpatient medications have been marked as taking for the 4/12/24 encounter (Office Visit) with Cassidy Fonseca MD.      Romosozumab-aqqg SOSY 210 mg  210 mg Subcutaneous Q30 Days Susie Madison MD   210 mg at 03/18/24 0950         ALLERGIES:  No Known Allergies    SOCIAL HISTORY:    Social History     Socioeconomic History    Marital status:    Tobacco Use    Smoking status: Former     Current packs/day: 0.00     Average packs/day: 0.5 packs/day for 4.0 years (2.0 ttl pk-yrs)      Types: Cigarettes     Start date: 2014     Quit date: 2018     Years since quittin.7    Smokeless tobacco: Never    Tobacco comments:     Quit smoking   Vaping Use    Vaping status: Never Used   Substance and Sexual Activity    Alcohol use: No    Drug use: No   Other Topics Concern    Caffeine Concern Yes     Comment: coffee, 2 cups daily    Exercise Yes     Comment: walking dog    Right Handed Yes       FAMILY HISTORY:   Family History   Problem Relation Age of Onset    Diabetes Father     Heart Disorder Father     Arthritis Mother     Seizure Disorder Mother     Hypertension Mother     Cancer Maternal Grandfather         lung; smoker    Hypertension Brother     Bleeding Disorders Neg     Clotting Disorder Neg            PHYSICAL EXAM:   Height: 5' 7.99\" (172.7 cm) ( 111)  Weight: 146 lb (66.2 kg) ( 111)  BSA (Calculated - sq m): 1.79 sq meters (1119)  Pulse: 68 (1119)  BP: 118/62 (1119)  Temp: --  Do Not Use - Resp Rate: --  SpO2: --    Body mass index is 22.2 kg/m².          CONSTITUTIONAL:  Awake and alert. Age appropriate, good hygiene not in acute distress. Well nourished and well developed. no acute distress   PSYCH:   Orientated to time, place, person & situation, Normal mood and affect, memory intact, normal insight and judgment, cooperative  Neuro: speech is clear. Awake, alert, no aphasia, no facial asymmetry, no nuchal rigidity  EYES:  No proptosis, no ptosis, conjunctiva normal  ENT:  Normocephalic, atraumatic  Eye: EOMI, normal lids, no discharge, no conjunctival erythema. No exophthalmos/proptosis, Ptosis negative   No rhinorrhea, moist oral mucosa       DATA:     Pertinent data reviewed      Latest Reference Range & Units 04/10/24 09:18   CALCIUM 8.7 - 10.4 mg/dL 9.6   BUN/CREATININE RATIO 10.0 - 20.0  17.0   EGFR >=60 mL/min/1.73m2 60      Latest Reference Range & Units 04/10/24 09:18   PTH INTACT 18.5 - 88.0 pg/mL 186.5 (H)     Vit D 1,25 di-OH 87.5 High          Latest Reference Range & Units 04/10/24 09:18   VITAMIN D, 25-OH, TOTAL 30.0 - 100.0 ng/mL 60.1      Latest Reference Range & Units 04/10/24 09:18   ALKALINE PHOSPHATASE 46 - 118 U/L 107      Latest Reference Range & Units 04/10/24 09:18   Magnesium, Serum 1.6 - 2.6 mg/dL 2.5   PHOSPHORUS 2.4 - 5.1 mg/dL 3.5      Latest Reference Range & Units 04/10/24 08:30   CREAT UR CALC 0.60 - 1.80 g/24 hr 1.09   Urine Volume 24Hr mL  mL 2,400  2,400   CALCIUM URINE CALC 100 - 300 mg/24hr 192     DXA 12/2023  CONCLUSION:   1. Scans of the lumbar spine and left hip are consistent with osteoporosis, which according to World Health Organization criteria place the patient at a severely increased risk for fracture.      2. Based on left femoral neck bone mineral density, the FRAX 10 year probability of a major osteoporotic fracture is 12% and the 10 year probability of a hip fracture is 3.7%.           No results for input(s): \"TSH\", \"T4F\", \"T3F\", \"THYP\" in the last 72 hours.  No results found.        Orders Placed This Encounter   Procedures    Creatinine, 24-Hour Urine    Calcium, 24Hr Urine    PTH, Intact    Comp Metabolic Panel (14)    Magnesium    Phosphorus    Vitamin D    Vitamin D, 1,25 Dihydroxy [Q]     Orders Placed This Encounter    Creatinine, 24-Hour Urine     Standing Status:   Future     Standing Expiration Date:   4/12/2025     Order Specific Question:   Release to patient     Answer:   Immediate    Calcium, 24Hr Urine     Standing Status:   Future     Standing Expiration Date:   4/12/2025     Order Specific Question:   Release to patient     Answer:   Immediate    PTH, Intact     Standing Status:   Future     Standing Expiration Date:   4/12/2025    Comp Metabolic Panel (14)     Standing Status:   Future     Standing Expiration Date:   4/12/2025     Order Specific Question:   Release to patient     Answer:   Immediate    Magnesium     Standing Status:   Future     Standing Expiration Date:   4/12/2025      Order Specific Question:   Release to patient     Answer:   Immediate    Phosphorus     Standing Status:   Future     Standing Expiration Date:   4/12/2025     Order Specific Question:   Release to patient     Answer:   Immediate    Vitamin D     Standing Status:   Future     Standing Expiration Date:   4/12/2025     Order Specific Question:   Please pick the scenario that best fits the purpose for ordering this test     Answer:   General Screening/Vit D deficiency (25-Hydroxy)     Order Specific Question:   Release to patient     Answer:   Immediate    Vitamin D, 1,25 Dihydroxy [Q]     Standing Status:   Future     Standing Expiration Date:   4/12/2025     Order Specific Question:   Release to patient     Answer:   Immediate          This is a specialized patient consultation in endocrinology and required comprehensive review of prior records, as well as current evaluation, with time required for consideration of complex endocrine issues and consultation. For this visit, I personally interviewed the patient, and family member if accompanied, performed the pertinent parts of the history and physical examination. ROS included screening for appropriate endocrine conditions.   Today's diagnosis and plan were reviewed in detail with the patient who states understanding and agrees with plan. I discussed with the patient possible diagnosis, differential diagnosis, need for work up , treatment options, alternatives and side effects.     Please see note for details about time spent which includes:   · pre-visit preparation  · reviewing records  · face to face time with the patient   · timely documentation of the encounter  · ordering medications/tests  · communication with care team  · care coordination    I appreciate the opportunity to be part of your patient's medical care and will keep you, as the referring and primary physicians, informed about the care of your patient, including possible future surgery and pathology  findings. Please feel free to contact me should you have any questions.      Cassiyd Fonseca MD

## 2024-04-12 NOTE — PATIENT INSTRUCTIONS
Take vitamin D3 2000 international unit a day and calcium carbonate 600 mg twice a day or 3 servings of dairy a day     Labs and f/u in 6 mo  Before next visit, Fasting AM labs  and 24 hr urine collection   Do weight bearing exercise   Follow fall precautions     Osteoporosis is managed by Rheumatology

## 2024-04-15 DIAGNOSIS — M48.062 LUMBAR STENOSIS WITH NEUROGENIC CLAUDICATION: ICD-10-CM

## 2024-04-16 ENCOUNTER — NURSE ONLY (OUTPATIENT)
Dept: RHEUMATOLOGY | Facility: CLINIC | Age: 59
End: 2024-04-16
Payer: COMMERCIAL

## 2024-04-16 DIAGNOSIS — M81.0 AGE-RELATED OSTEOPOROSIS WITHOUT CURRENT PATHOLOGICAL FRACTURE: Primary | ICD-10-CM

## 2024-04-16 PROCEDURE — 96372 THER/PROPH/DIAG INJ SC/IM: CPT | Performed by: INTERNAL MEDICINE

## 2024-04-16 NOTE — PROGRESS NOTES
Patient came to office for 2nd dose Evenity injections. Name and  verified. Patient aware she/he is to receive Evenity injections. Injections given in upper arms bilaterally SQ, patient tolerated injection well.

## 2024-04-16 NOTE — TELEPHONE ENCOUNTER
Please Review. Protocol Failed; No Protocol   Recent fills: 02/10/63237, 02/25/2024, 03/24/2024 DUE: 04/26/2024  Last Rx written: 03/20/2024  LOV: 02/19/2024      Requested Prescriptions   Pending Prescriptions Disp Refills    HYDROcodone-acetaminophen 7.5-325 MG Oral Tab 120 tablet 0     Sig: Take 1 tablet by mouth every 6 (six) hours as needed for Pain.       Controlled Substance Medication Failed - 4/15/2024 11:42 AM        Failed - This medication is a controlled substance - forward to provider to refill               Future Appointments         Provider Department Appt Notes    Today [facility] RN RHEUMATOLOGY [facility] 1st dose Evenity    In 3 days [facility] RN RHEUMATOLOGY [facility] 1st dose Evenity    In 1 month [facility] RN RHEUMATOLOGY [facility] 1st dose Evenity    In 3 months Susie Madison MD [facility] Find out if my bones are stronger    In 4 months Susie Madison MD [facility] 6 mo f/u    In 5 months Bryon Lerma MD [facility] Hematology Oncology 6 m f/u.md    In 5 months Cassidy Fonseca MD [facility] 6 mon          Recent Outpatient Visits              4 days ago Hyperparathyroidism (HCC)    [facility] Cassidy Fonseca MD    Office Visit    2 weeks ago Therapeutic opioid-induced constipation (OIC)    [facility] Suman Bell MD    Office Visit    3 weeks ago [facility] Hematology Oncology Bryon Lerma MD    Office Visit    4 weeks ago Age-related osteoporosis without current pathological fracture    [facility]    Nurse Only    1 month ago Age-related osteoporosis without current  pathological fracture    [facility] [place] Cassidy Fonseca MD    Office Visit

## 2024-04-19 RX ORDER — HYDROCODONE BITARTRATE AND ACETAMINOPHEN 7.5; 325 MG/1; MG/1
1 TABLET ORAL EVERY 6 HOURS PRN
Qty: 120 TABLET | Refills: 0 | Status: SHIPPED | OUTPATIENT
Start: 2024-04-26 | End: 2024-05-26

## 2024-05-17 ENCOUNTER — OFFICE VISIT (OUTPATIENT)
Dept: FAMILY MEDICINE CLINIC | Facility: CLINIC | Age: 59
End: 2024-05-17

## 2024-05-17 VITALS
HEIGHT: 67 IN | HEART RATE: 71 BPM | BODY MASS INDEX: 23.07 KG/M2 | WEIGHT: 147 LBS | OXYGEN SATURATION: 98 % | SYSTOLIC BLOOD PRESSURE: 124 MMHG | DIASTOLIC BLOOD PRESSURE: 72 MMHG

## 2024-05-17 DIAGNOSIS — M48.062 LUMBAR STENOSIS WITH NEUROGENIC CLAUDICATION: Primary | ICD-10-CM

## 2024-05-17 PROCEDURE — 3078F DIAST BP <80 MM HG: CPT | Performed by: FAMILY MEDICINE

## 2024-05-17 PROCEDURE — 99214 OFFICE O/P EST MOD 30 MIN: CPT | Performed by: FAMILY MEDICINE

## 2024-05-17 PROCEDURE — 3074F SYST BP LT 130 MM HG: CPT | Performed by: FAMILY MEDICINE

## 2024-05-17 PROCEDURE — 3008F BODY MASS INDEX DOCD: CPT | Performed by: FAMILY MEDICINE

## 2024-05-17 RX ORDER — NALOXEGOL OXALATE 25 MG/1
TABLET, FILM COATED ORAL
COMMUNITY
Start: 2024-05-10

## 2024-05-17 RX ORDER — PREGABALIN 50 MG/1
50 CAPSULE ORAL 3 TIMES DAILY
Qty: 90 CAPSULE | Refills: 1 | Status: SHIPPED | OUTPATIENT
Start: 2024-05-17

## 2024-05-17 NOTE — PROGRESS NOTES
Subjective:   Esha Hernadez is a 59 year old female who presents for Back Pain (Nerve pain in back shooting down to legs mostly left leg )       History/Other:    Chief Complaint Reviewed and Verified  Nursing Notes Reviewed and   Verified  Tobacco Reviewed  Allergies Reviewed  Medications Reviewed    Problem List Reviewed  Medical History Reviewed  Surgical History   Reviewed  OB Status Reviewed  Family History Reviewed  Social History   Reviewed         Tobacco:  She smoked tobacco in the past but quit greater than 12 months ago.  Social History     Tobacco Use   Smoking Status Former    Current packs/day: 0.00    Average packs/day: 0.5 packs/day for 4.0 years (2.0 ttl pk-yrs)    Types: Cigarettes    Start date: 2014    Quit date: 2018    Years since quittin.8   Smokeless Tobacco Never   Tobacco Comments    Quit smoking        Current Outpatient Medications   Medication Sig Dispense Refill    MOVANTIK 25 MG Oral Tab       pregabalin (LYRICA) 50 MG Oral Cap Take 1 capsule (50 mg total) by mouth 3 (three) times daily. 90 capsule 1    HYDROcodone-acetaminophen 7.5-325 MG Oral Tab Take 1 tablet by mouth every 6 (six) hours as needed for Pain. 120 tablet 0    temazepam 15 MG Oral Cap Take 1 capsule (15 mg total) by mouth nightly as needed for Sleep. 90 capsule 1    potassium chloride 20 MEQ Oral Tab CR Take 1 tablet (20 mEq total) by mouth daily. 90 tablet 1    dicyclomine 20 MG Oral Tab Take 1-2 tablets (20-40 mg total) by mouth 3 (three) times daily as needed. 90 tablet 3    baclofen 10 MG Oral Tab Take 1 tablet (10 mg total) by mouth nightly as needed. AT BEDTIME 30 tablet 5    Prucalopride Succinate (MOTEGRITY) 2 MG Oral Tab Take 1 tablet by mouth daily. 90 tablet 3    risedronate (ACTONEL) 35 MG Oral Tab Take 1 tablet (35 mg total) by mouth every 7 days. 5 tablet 11    OSPHENA 60 MG Oral Tab       spironolactone 25 MG Oral Tab Take 1 tablet (25 mg total) by mouth daily.      Omeprazole  40 MG Oral Capsule Delayed Release Take 1 capsule (40 mg total) by mouth before breakfast. 90 capsule 3    ondansetron 4 MG Oral Tablet Dispersible Place 1 tablet (4 mg total) under the tongue every 8 (eight) hours as needed for Nausea. 90 tablet 11    torsemide 20 MG Oral Tab Take 1 tablet (20 mg total) by mouth 2 (two) times daily.      Digestive Enzymes (DIGESTIVE ENZYME OR) Take 1 capsule by mouth daily.      VITAMIN D, ERGOCALCIFEROL, OR Take 1 capsule by mouth daily.      Multiple Vitamins-Minerals (MULTI-VITAMIN/MINERALS) Oral Tab Take 1 tablet by mouth daily.      docusate sodium 100 MG Oral Cap Take 1 capsule (100 mg total) by mouth daily.           Review of Systems:  Review of Systems   Constitutional: Negative.    Musculoskeletal:  Positive for back pain.   Neurological:  Positive for numbness.         Objective:   /72 (BP Location: Right arm, Patient Position: Sitting, Cuff Size: adult)   Pulse 71   Ht 5' 7\" (1.702 m)   Wt 147 lb (66.7 kg)   SpO2 98%   BMI 23.02 kg/m²  Estimated body mass index is 23.02 kg/m² as calculated from the following:    Height as of this encounter: 5' 7\" (1.702 m).    Weight as of this encounter: 147 lb (66.7 kg).  Physical Exam  Vitals reviewed.   Musculoskeletal:      Lumbar back: Spasms and tenderness present. Decreased range of motion.   Neurological:      Sensory: Sensation is intact.           Assessment & Plan:   1. Lumbar stenosis with neurogenic claudication (Primary)  -     Pregabalin; Take 1 capsule (50 mg total) by mouth 3 (three) times daily.  Dispense: 90 capsule; Refill: 1    Seeing endocrinologist and new med started for her bone density.  Seeing neuro surgeon and waiting on future surgery soon.  Will add lyrica in an attempt to help her current pain sending shocks of pain down her legs.         No follow-ups on file.    Quintin Harman DO, 5/17/2024, 1:30 PM

## 2024-05-18 DIAGNOSIS — M48.062 LUMBAR STENOSIS WITH NEUROGENIC CLAUDICATION: ICD-10-CM

## 2024-05-20 ENCOUNTER — NURSE ONLY (OUTPATIENT)
Dept: RHEUMATOLOGY | Facility: CLINIC | Age: 59
End: 2024-05-20

## 2024-05-20 DIAGNOSIS — M81.0 AGE-RELATED OSTEOPOROSIS WITHOUT CURRENT PATHOLOGICAL FRACTURE: Primary | ICD-10-CM

## 2024-05-20 PROCEDURE — 96372 THER/PROPH/DIAG INJ SC/IM: CPT | Performed by: INTERNAL MEDICINE

## 2024-05-20 NOTE — PROGRESS NOTES
Name and  verified. Pt aware she was to receive injection of Evenity Injections given in Left and Right Upper Arm, and pt tolerated well. Possible local side effects discussed with pt. Pt aware to follow up in 4 weeks for next injection.

## 2024-05-21 RX ORDER — HYDROCODONE BITARTRATE AND ACETAMINOPHEN 7.5; 325 MG/1; MG/1
1 TABLET ORAL EVERY 6 HOURS PRN
Qty: 120 TABLET | Refills: 0 | Status: SHIPPED | OUTPATIENT
Start: 2024-05-21 | End: 2024-06-20

## 2024-05-21 NOTE — TELEPHONE ENCOUNTER
Please review; protocol failed/No Protocol    Recent Fills: 02/25/2024, 03/24/2024, 04/26/2024    Last Rx Written: 04/19/2024    Last Office Visit: 05/17/2024    Requested Prescriptions   Pending Prescriptions Disp Refills    HYDROcodone-acetaminophen 7.5-325 MG Oral Tab 120 tablet 0     Sig: Take 1 tablet by mouth every 6 (six) hours as needed for Pain.       Controlled Substance Medication Failed - 5/18/2024  9:50 AM        Failed - This medication is a controlled substance - forward to provider to refill           Future Appointments         Provider Department Appt Notes    In 2 days Forrest Ferriera DO [facility] [place] Back pain    In 1 month [facility] RN RHEUMATOLOGY [facility] [place] inj.    In 1 month Susie Madison MD [facility] [place] Find out if my bones are stronger    In 2 months [facility] RN RHEUMATOLOGY [facility] [place] inj.    In 4 months Bryon Lerma MD [facility] Hematology Oncology 6 m f/u.md    In 4 months Cassidy Fonseca MD [facility] 6 mon          Recent Outpatient Visits              Yesterday Age-related osteoporosis without current pathological fracture    [facility] [place]    Nurse Only    4 days ago Lumbar stenosis with neurogenic claudication    [facility] Quintin Harman DO    Office Visit    1 month ago Age-related osteoporosis without current pathological fracture    [facility] [place]    Nurse Only    1 month ago Hyperparathyroidism (HCC)    [facility] Cassidy Fonseca MD    Office Visit    1 month ago Therapeutic opioid-induced constipation (OIC)    [facility] [place] Suman Bell MD     Office Visit

## 2024-06-13 RX ORDER — OSPEMIFENE 60 MG/1
1 TABLET, FILM COATED ORAL DAILY
Qty: 30 TABLET | Refills: 0 | Status: SHIPPED | OUTPATIENT
Start: 2024-06-13

## 2024-06-14 ENCOUNTER — OFFICE VISIT (OUTPATIENT)
Dept: FAMILY MEDICINE CLINIC | Facility: CLINIC | Age: 59
End: 2024-06-14
Payer: COMMERCIAL

## 2024-06-14 VITALS
BODY MASS INDEX: 22.6 KG/M2 | SYSTOLIC BLOOD PRESSURE: 118 MMHG | HEIGHT: 67 IN | OXYGEN SATURATION: 98 % | DIASTOLIC BLOOD PRESSURE: 78 MMHG | HEART RATE: 86 BPM | RESPIRATION RATE: 16 BRPM | WEIGHT: 144 LBS

## 2024-06-14 DIAGNOSIS — M48.062 LUMBAR STENOSIS WITH NEUROGENIC CLAUDICATION: Primary | ICD-10-CM

## 2024-06-14 DIAGNOSIS — M54.16 LEFT LUMBAR RADICULITIS: ICD-10-CM

## 2024-06-14 PROCEDURE — 99214 OFFICE O/P EST MOD 30 MIN: CPT | Performed by: FAMILY MEDICINE

## 2024-06-14 PROCEDURE — 3078F DIAST BP <80 MM HG: CPT | Performed by: FAMILY MEDICINE

## 2024-06-14 PROCEDURE — 3008F BODY MASS INDEX DOCD: CPT | Performed by: FAMILY MEDICINE

## 2024-06-14 PROCEDURE — 3074F SYST BP LT 130 MM HG: CPT | Performed by: FAMILY MEDICINE

## 2024-06-14 RX ORDER — BACLOFEN 10 MG/1
10 TABLET ORAL 2 TIMES DAILY
Qty: 60 TABLET | Refills: 5 | Status: SHIPPED | OUTPATIENT
Start: 2024-06-14

## 2024-06-14 RX ORDER — PREGABALIN 75 MG/1
75 CAPSULE ORAL 3 TIMES DAILY
Qty: 90 CAPSULE | Refills: 1 | Status: SHIPPED | OUTPATIENT
Start: 2024-06-14

## 2024-06-14 RX ORDER — HYDROCODONE BITARTRATE AND ACETAMINOPHEN 10; 325 MG/1; MG/1
1 TABLET ORAL EVERY 6 HOURS PRN
Qty: 120 TABLET | Refills: 0 | Status: SHIPPED | OUTPATIENT
Start: 2024-06-14

## 2024-06-14 NOTE — PROGRESS NOTES
Subjective:   Esha Hernadez is a 59 year old female who presents for Back Pain (Lower Back and nerve pain for a few months (6-9 per pt); pt states pain radiates down left leg)   Follow-up for new medication Lyrica she has been taking for approximately 1 month    History/Other:    Chief Complaint Reviewed and Verified  Nursing Notes Reviewed and   Verified  Tobacco Reviewed  Allergies Reviewed  Medications Reviewed    Problem List Reviewed  Medical History Reviewed  Surgical History   Reviewed  OB Status Reviewed  Family History Reviewed  Social History   Reviewed         Tobacco:  She smoked tobacco in the past but quit greater than 12 months ago.  Social History     Tobacco Use   Smoking Status Former    Current packs/day: 0.00    Average packs/day: 0.5 packs/day for 4.0 years (2.0 ttl pk-yrs)    Types: Cigarettes    Start date: 2014    Quit date: 2018    Years since quittin.9   Smokeless Tobacco Never   Tobacco Comments    Quit smoking        Current Outpatient Medications   Medication Sig Dispense Refill    pregabalin (LYRICA) 75 MG Oral Cap Take 1 capsule (75 mg total) by mouth 3 (three) times daily. 90 capsule 1    baclofen 10 MG Oral Tab Take 1 tablet (10 mg total) by mouth 2 (two) times daily. 60 tablet 5    HYDROcodone-acetaminophen  MG Oral Tab Take 1 tablet by mouth every 6 (six) hours as needed for Pain. 120 tablet 0    OSPHENA 60 MG Oral Tab TAKE ONE TABLET BY MOUTH ONE TIME DAILY 30 tablet 0    MOVANTIK 25 MG Oral Tab       temazepam 15 MG Oral Cap Take 1 capsule (15 mg total) by mouth nightly as needed for Sleep. 90 capsule 1    potassium chloride 20 MEQ Oral Tab CR Take 1 tablet (20 mEq total) by mouth daily. 90 tablet 1    dicyclomine 20 MG Oral Tab Take 1-2 tablets (20-40 mg total) by mouth 3 (three) times daily as needed. 90 tablet 3    Prucalopride Succinate (MOTEGRITY) 2 MG Oral Tab Take 1 tablet by mouth daily. 90 tablet 3    spironolactone 25 MG Oral Tab Take  1 tablet (25 mg total) by mouth daily.      Omeprazole 40 MG Oral Capsule Delayed Release Take 1 capsule (40 mg total) by mouth before breakfast. 90 capsule 3    ondansetron 4 MG Oral Tablet Dispersible Place 1 tablet (4 mg total) under the tongue every 8 (eight) hours as needed for Nausea. 90 tablet 11    torsemide 20 MG Oral Tab Take 1 tablet (20 mg total) by mouth 2 (two) times daily.      Digestive Enzymes (DIGESTIVE ENZYME OR) Take 1 capsule by mouth daily.      VITAMIN D, ERGOCALCIFEROL, OR Take 1 capsule by mouth daily.      Multiple Vitamins-Minerals (MULTI-VITAMIN/MINERALS) Oral Tab Take 1 tablet by mouth daily.      docusate sodium 100 MG Oral Cap Take 1 capsule (100 mg total) by mouth daily.           Review of Systems:  Review of Systems   Constitutional: Negative.    Gastrointestinal: Negative.    Genitourinary: Negative.    Musculoskeletal:  Positive for arthralgias and back pain.   Neurological:  Positive for numbness. Negative for weakness.         Objective:   /78   Pulse 86   Resp 16   Ht 5' 7\" (1.702 m)   Wt 144 lb (65.3 kg)   SpO2 98%   BMI 22.55 kg/m²  Estimated body mass index is 22.55 kg/m² as calculated from the following:    Height as of this encounter: 5' 7\" (1.702 m).    Weight as of this encounter: 144 lb (65.3 kg).  Physical Exam  Vitals reviewed.   Musculoskeletal:      Lumbar back: Spasms and tenderness present. Decreased range of motion. Positive left straight leg raise test. Negative right straight leg raise test.      Comments: Limited range of motion forward flexion of the lumbar spine.  To about 30 degrees until pain.  Lumbar muscular spasm.           Assessment & Plan:   1. Lumbar stenosis with neurogenic claudication (Primary)  -     Pregabalin; Take 1 capsule (75 mg total) by mouth 3 (three) times daily.  Dispense: 90 capsule; Refill: 1  -     HYDROcodone-Acetaminophen; Take 1 tablet by mouth every 6 (six) hours as needed for Pain.  Dispense: 120 tablet; Refill:  0  2. Left lumbar radiculitis  Other orders  -     Baclofen; Take 1 tablet (10 mg total) by mouth 2 (two) times daily.  Dispense: 60 tablet; Refill: 5    She found some evidence for improvement on Lyrica.  Tolerating the medication we will increase this dose to 75 mg and she can go from twice daily to 3 times daily.  Recommend that she take her muscle relaxant in the early evening and then morning.  And recommend going up on the hydrocodone to 10 mg 4 times daily as needed.  She is due to see the neurosurgeon later this month.    Return in about 1 month (around 7/14/2024).    Quintin Harman DO, 6/14/2024, 8:49 AM

## 2024-06-20 ENCOUNTER — NURSE ONLY (OUTPATIENT)
Dept: RHEUMATOLOGY | Facility: CLINIC | Age: 59
End: 2024-06-20

## 2024-06-20 DIAGNOSIS — M81.0 AGE-RELATED OSTEOPOROSIS WITHOUT CURRENT PATHOLOGICAL FRACTURE: Primary | ICD-10-CM

## 2024-06-20 PROCEDURE — 96372 THER/PROPH/DIAG INJ SC/IM: CPT | Performed by: INTERNAL MEDICINE

## 2024-06-20 NOTE — PROGRESS NOTES
Name and  verified. Pt aware she was to receive injection of Evenity. Injections given in Left and Right Upper Arm,and pt tolerated well. Possible local side effects discussed with pt. Pt aware to follow up in one month for next injection.

## 2024-07-02 ENCOUNTER — TELEPHONE (OUTPATIENT)
Dept: PHYSICAL MEDICINE AND REHAB | Facility: CLINIC | Age: 59
End: 2024-07-02

## 2024-07-02 ENCOUNTER — OFFICE VISIT (OUTPATIENT)
Dept: PHYSICAL MEDICINE AND REHAB | Facility: CLINIC | Age: 59
End: 2024-07-02
Payer: COMMERCIAL

## 2024-07-02 VITALS — BODY MASS INDEX: 22.6 KG/M2 | HEIGHT: 67 IN | WEIGHT: 144 LBS

## 2024-07-02 DIAGNOSIS — M47.816 LUMBAR FACET ARTHROPATHY: Primary | ICD-10-CM

## 2024-07-02 PROCEDURE — 99214 OFFICE O/P EST MOD 30 MIN: CPT | Performed by: PHYSICAL MEDICINE & REHABILITATION

## 2024-07-02 PROCEDURE — 3008F BODY MASS INDEX DOCD: CPT | Performed by: PHYSICAL MEDICINE & REHABILITATION

## 2024-07-02 RX ORDER — SPIRONOLACTONE 25 MG/1
25 TABLET ORAL DAILY
Refills: 0 | OUTPATIENT
Start: 2024-07-02

## 2024-07-02 RX ORDER — DIAZEPAM 5 MG/1
TABLET ORAL
Qty: 2 TABLET | Refills: 0 | Status: CANCELLED | OUTPATIENT
Start: 2024-07-02

## 2024-07-02 NOTE — TELEPHONE ENCOUNTER
Please review.     Medication is listed as patient reported. - Last written by Cardiology, Carmine Ye     Requested Prescriptions   Pending Prescriptions Disp Refills    spironolactone 25 MG Oral Tab  0     Sig: Take 1 tablet (25 mg total) by mouth daily.       Hypertension Medications Protocol Passed - 6/28/2024 10:53 AM        Passed - CMP or BMP in past 12 months        Passed - Last BP reading less than 140/90     BP Readings from Last 1 Encounters:   06/14/24 118/78               Passed - In person appointment or virtual visit in the past 12 mos or appointment in next 3 mos     Recent Outpatient Visits              Today Lumbar facet arthropathy    [facility] Forrest Ferreira,     Office Visit    1 week ago Age-related osteoporosis without current pathological fracture    [facility]    Nurse Only    2 weeks ago Lumbar stenosis with neurogenic claudication    [facility] Quintin Harman,     Office Visit    1 month ago Age-related osteoporosis without current pathological fracture    [facility]    Nurse Only    1 month ago Lumbar stenosis with neurogenic claudication    [facility] Quintin Harman,     Office Visit          Future Appointments         Provider Department Appt Notes    In 2 weeks Susie Madison MD [facility] Find out if my bones are stronger    In 1 month [facility] RN RHEUMATOLOGY [facility] inj.    In 2 months Bryon Lerma MD [facility] Hematology Oncology 6 m f/u.md    In 3 months Cassidy Fonseca MD [facility] 6 mon                    Passed - EGFRCR or GFRNAA > 50     GFR Evaluation  EGFRCR: 60 , resulted on 4/10/2024                Future Appointments         Provider Department Appt Notes    In 2 weeks Susie Madison MD [facility] Find out if my bones are stronger    In 1 month [facility] RN RHEUMATOLOGY [facility] inj.    In 2 months Bryon Lerma MD [facility] Hematology Oncology 6 m f/u.md    In 3 months Cassidy Fonseca MD [facility] 6 mon          Recent Outpatient Visits              Today Lumbar facet arthropathy    [facility] Forrest Ferreira, DO    Office Visit    1 week ago Age-related osteoporosis without current pathological fracture    [facility]    Nurse Only    2 weeks ago Lumbar stenosis with neurogenic claudication    [facility] Quintin Harman, DO    Office Visit    1 month ago Age-related osteoporosis without current pathological fracture    [facility]    Nurse Only    1 month ago Lumbar stenosis with neurogenic claudication    [facility] Quintin Harman, DO    Office Visit

## 2024-07-02 NOTE — PATIENT INSTRUCTIONS
Once we have a date you may request valium by mouth to take before the procedure. If we do the injection with local and with oral valium you can and should have your regular meal before the procedure.

## 2024-07-02 NOTE — TELEPHONE ENCOUNTER
Estimated body mass index is 22.55 kg/m² as calculated from the following:    Height as of an earlier encounter on 7/2/24: 67\".    Weight as of an earlier encounter on 7/2/24: 144 lb.  No restrictions w/ injection    Patient has been scheduled for Bilateral L1, L2, L3 medial nerve branch blocks on 7/8/24 at the [facility] with Dr. Ferreira.   Anesthesia type:  Local w/ oral valium (pended)  Please note: The [facility] will call the business day prior to discuss the exact time/arrival and additional instructions for your appointment.  Patient was advised that if he/she does receive the covid vaccine it needs to be at least 2 weeks before or after the injection.  Medications and allergies reviewed.  Patient informed of [facility]'s  policy:  he/she will need a  to and from procedure and must be on site for their entirety of their visit, if their ride is unable to the procedure will be cancelled.   [facility] is located in the [facility] 1st floor [address] [place] [address].   may park in the yellow/purple parking lot.  Patient verbalized understanding and agrees with plan.  Scheduled in Epic: Yes  Scheduled in Surgical Case: Yes  Follow up appointment made: NOV: Visit date not found

## 2024-07-02 NOTE — PROGRESS NOTES
Progress note    C/C:   Chief Complaint   Patient presents with    Follow - Up     LOV 10/12/23 pt is here with complaints of back and nerve pain. XR of lumbar spine was completed 2/15/24. Daily N/T in left leg. Takes norco and baclofen to ease pain. Reports pain to be aching in back and both shooting pain and pins and needles in left leg radiating down to foot.  Pain 7/10      HPI: 59 year old female presents for follow up. Since the last time I saw her she has been treated for osteoporosis by rheumatology and is going to follow up with the rheumatologist soon.  She is very much wanting to proceed with lumbar surgery if she becomes a candidate, but is here seeking further care and what to do in the interim.  She was instructed that she should follow-up for consideration of MNBB and RFA. Currently on norco 10/325 QID PRN severe pain prescribed by her PCP.     Back pain worsens with standing, walking, sitting, bending. Best position is lying. She can tolerate standing for a few minutes before needing to sit. Cannot complete household tasks without needing to take a break.     Also has shooting pain into the left leg to the dorsum of the foot with numbness and tingling. Leg pain is intermittent but present every day. Back pain is worse than leg pain.     When asked back pain is more present at the mid-upper region of the lumbar spine.     Pertinent allergies: No Known Allergies     Physical exam:  Ht 67\"   Wt 144 lb (65.3 kg)   BMI 22.55 kg/m²      Lumbar spine exam:    No skin rash lumbar/upper sacral region  + pain with lumbar flexion. + pain with lumbar extension.  + tenderness to palpation bilateral lumbar paraspinals, diffusely.  5/5 LE strength b/l in HF, KE, ADF, EHL, ankle eversion  SILT b/l LE  2/4 quad, gastrocs reflexes b/l  Facet loading + b/l  Seated slump test negative  SLR negative left    Imaging: XR lumbar spine independently reviewed, as was report. Lumbar scoliosis, lateral listhesis of L2 and L3.  Multilevel facet arthropathy.    Assessment and plan  Lumbar facet arthropathy  Left lumbar radiculitis  Degenerative scoliosis  Osteoporosis  Chronic constipation  Chronic pain syndrome, on opiate therapy prescribed by PCP    No relief with PT in the past; no relief with VIGNESH. Back pain seems to be moreso in the upper lumbar levels. Recommend b/l L1, L2, L3 MNBB under fluoroscopy, local + oral valium. We discussed the risks of procedure, including bleeding, infection, nerve injury, HA; elects to proceed.    F/u for injection.     Forrest Ferreira DO  Physical Medicine and Rehabilitation  [facility]

## 2024-07-02 NOTE — TELEPHONE ENCOUNTER
Initiated authorization for Bilateral L1, L2, L3 medial nerve branch blocks under fluoroscopy CPT 13257-08, 64494 x2 dx:M47.816 with Megan schuler [facility]  Case #[id number].  Status: Approved-authorization is not required per health plan based on medical necessity however is not a guarantee of payment and may be subject to review once claim is submitted

## 2024-07-03 RX ORDER — DIAZEPAM 10 MG/1
TABLET ORAL
Qty: 2 TABLET | Refills: 0 | Status: SHIPPED | OUTPATIENT
Start: 2024-07-03

## 2024-07-08 PROBLEM — M47.816 LUMBAR FACET ARTHROPATHY: Status: ACTIVE | Noted: 2024-07-08

## 2024-07-10 DIAGNOSIS — M48.062 LUMBAR STENOSIS WITH NEUROGENIC CLAUDICATION: ICD-10-CM

## 2024-07-12 RX ORDER — HYDROCODONE BITARTRATE AND ACETAMINOPHEN 10; 325 MG/1; MG/1
1 TABLET ORAL EVERY 6 HOURS PRN
Qty: 120 TABLET | Refills: 0 | OUTPATIENT
Start: 2024-07-12

## 2024-07-15 RX ORDER — OSPEMIFENE 60 MG/1
1 TABLET, FILM COATED ORAL DAILY
Qty: 30 TABLET | Refills: 0 | OUTPATIENT
Start: 2024-07-15

## 2024-07-16 ENCOUNTER — OFFICE VISIT (OUTPATIENT)
Dept: FAMILY MEDICINE CLINIC | Facility: CLINIC | Age: 59
End: 2024-07-16
Payer: COMMERCIAL

## 2024-07-16 ENCOUNTER — OFFICE VISIT (OUTPATIENT)
Dept: RHEUMATOLOGY | Facility: CLINIC | Age: 59
End: 2024-07-16
Payer: COMMERCIAL

## 2024-07-16 VITALS
HEIGHT: 66 IN | BODY MASS INDEX: 23.3 KG/M2 | HEART RATE: 64 BPM | SYSTOLIC BLOOD PRESSURE: 101 MMHG | WEIGHT: 145 LBS | DIASTOLIC BLOOD PRESSURE: 62 MMHG | OXYGEN SATURATION: 100 %

## 2024-07-16 VITALS
SYSTOLIC BLOOD PRESSURE: 112 MMHG | DIASTOLIC BLOOD PRESSURE: 68 MMHG | BODY MASS INDEX: 23.3 KG/M2 | WEIGHT: 145 LBS | HEIGHT: 66 IN | HEART RATE: 73 BPM

## 2024-07-16 DIAGNOSIS — Z51.81 MEDICATION MONITORING ENCOUNTER: ICD-10-CM

## 2024-07-16 DIAGNOSIS — M81.0 AGE-RELATED OSTEOPOROSIS WITHOUT CURRENT PATHOLOGICAL FRACTURE: Primary | ICD-10-CM

## 2024-07-16 DIAGNOSIS — M48.062 LUMBAR STENOSIS WITH NEUROGENIC CLAUDICATION: Primary | ICD-10-CM

## 2024-07-16 DIAGNOSIS — R79.89 ELEVATED PARATHYROID HORMONE: ICD-10-CM

## 2024-07-16 PROCEDURE — 3008F BODY MASS INDEX DOCD: CPT | Performed by: FAMILY MEDICINE

## 2024-07-16 PROCEDURE — 3078F DIAST BP <80 MM HG: CPT | Performed by: FAMILY MEDICINE

## 2024-07-16 PROCEDURE — 99214 OFFICE O/P EST MOD 30 MIN: CPT | Performed by: FAMILY MEDICINE

## 2024-07-16 PROCEDURE — 99214 OFFICE O/P EST MOD 30 MIN: CPT | Performed by: INTERNAL MEDICINE

## 2024-07-16 PROCEDURE — 3074F SYST BP LT 130 MM HG: CPT | Performed by: FAMILY MEDICINE

## 2024-07-16 PROCEDURE — G2211 COMPLEX E/M VISIT ADD ON: HCPCS | Performed by: INTERNAL MEDICINE

## 2024-07-16 PROCEDURE — 3078F DIAST BP <80 MM HG: CPT | Performed by: INTERNAL MEDICINE

## 2024-07-16 PROCEDURE — 3008F BODY MASS INDEX DOCD: CPT | Performed by: INTERNAL MEDICINE

## 2024-07-16 PROCEDURE — 3074F SYST BP LT 130 MM HG: CPT | Performed by: INTERNAL MEDICINE

## 2024-07-16 RX ORDER — CALCIUM CARBONATE 500 MG/1
1 TABLET, CHEWABLE ORAL DAILY
COMMUNITY

## 2024-07-16 RX ORDER — HYDROCODONE BITARTRATE AND ACETAMINOPHEN 10; 325 MG/1; MG/1
1 TABLET ORAL EVERY 6 HOURS PRN
Qty: 120 TABLET | Refills: 0 | Status: SHIPPED | OUTPATIENT
Start: 2024-07-16

## 2024-07-16 NOTE — PROGRESS NOTES
Subjective:   Esha Hernadez is a 59 year old female who presents for Back Pain (Lower back pain )   Recent epidural with some help with pain.      History/Other:    Chief Complaint Reviewed and Verified  Nursing Notes Reviewed and   Verified  Tobacco Reviewed  Allergies Reviewed  Medications Reviewed    Problem List Reviewed  Medical History Reviewed  Surgical History   Reviewed  OB Status Reviewed  Family History Reviewed  Social History   Reviewed         Tobacco:  She smoked tobacco in the past but quit greater than 12 months ago.  Social History     Tobacco Use   Smoking Status Former    Current packs/day: 0.00    Average packs/day: 0.5 packs/day for 4.0 years (2.0 ttl pk-yrs)    Types: Cigarettes    Start date: 2014    Quit date: 2018    Years since quittin.0   Smokeless Tobacco Never   Tobacco Comments    Quit smoking        Current Outpatient Medications   Medication Sig Dispense Refill    calcium carbonate 500 MG Oral Chew Tab Chew 1 tablet (500 mg total) by mouth daily.      HYDROcodone-acetaminophen  MG Oral Tab Take 1 tablet by mouth every 6 (six) hours as needed for Pain. 120 tablet 0    pregabalin (LYRICA) 75 MG Oral Cap Take 1 capsule (75 mg total) by mouth 3 (three) times daily. 90 capsule 1    baclofen 10 MG Oral Tab Take 1 tablet (10 mg total) by mouth 2 (two) times daily. 60 tablet 5    OSPHENA 60 MG Oral Tab TAKE ONE TABLET BY MOUTH ONE TIME DAILY 30 tablet 0    temazepam 15 MG Oral Cap Take 1 capsule (15 mg total) by mouth nightly as needed for Sleep. 90 capsule 1    potassium chloride 20 MEQ Oral Tab CR Take 1 tablet (20 mEq total) by mouth daily. 90 tablet 1    dicyclomine 20 MG Oral Tab Take 1-2 tablets (20-40 mg total) by mouth 3 (three) times daily as needed. 90 tablet 3    Prucalopride Succinate (MOTEGRITY) 2 MG Oral Tab Take 1 tablet by mouth daily. 90 tablet 3    spironolactone 25 MG Oral Tab Take 1 tablet (25 mg total) by mouth daily.      Omeprazole 40  MG Oral Capsule Delayed Release Take 1 capsule (40 mg total) by mouth before breakfast. 90 capsule 3    ondansetron 4 MG Oral Tablet Dispersible Place 1 tablet (4 mg total) under the tongue every 8 (eight) hours as needed for Nausea. 90 tablet 11    torsemide 20 MG Oral Tab Take 1 tablet (20 mg total) by mouth 2 (two) times daily.      Digestive Enzymes (DIGESTIVE ENZYME OR) Take 1 capsule by mouth daily.      Multiple Vitamins-Minerals (MULTI-VITAMIN/MINERALS) Oral Tab Take 1 tablet by mouth daily.      docusate sodium 100 MG Oral Cap Take 1 capsule (100 mg total) by mouth daily.      MOVANTIK 25 MG Oral Tab  (Patient not taking: Reported on 7/16/2024)           Review of Systems:  Review of Systems   Constitutional: Negative.    Musculoskeletal:  Positive for back pain.        Lumbar more left with extension down leg         Objective:   /62   Pulse 64   Ht 5' 6\" (1.676 m)   Wt 145 lb (65.8 kg)   SpO2 100%   BMI 23.40 kg/m²  Estimated body mass index is 23.4 kg/m² as calculated from the following:    Height as of this encounter: 5' 6\" (1.676 m).    Weight as of this encounter: 145 lb (65.8 kg).  Physical Exam  Vitals reviewed.   Neurological:      Motor: No weakness.      Comments: Gross lower leg sensation intact at this time           Assessment & Plan:   1. Lumbar stenosis with neurogenic claudication (Primary)  -     HYDROcodone-Acetaminophen; Take 1 tablet by mouth every 6 (six) hours as needed for Pain.  Dispense: 120 tablet; Refill: 0    Remain on lyrica, use hydrocodone up to four times daily.  Return in three month. Awaiting osteoporosis treatment for improved bone strength to move forward with surgery.        No follow-ups on file.    Quintin Harman DO, 7/16/2024, 4:05 PM

## 2024-07-16 NOTE — PATIENT INSTRUCTIONS
You were seen today for osteoporosis  Continue Evenity monthly  Continue calcium and vitamin D  Plan to repeat your bone density, call insurance to make sure they approve it  See me in March  Come back in 2 days for your Evenity injection

## 2024-07-16 NOTE — PROGRESS NOTES
Esha Hernadez is a 59 year old female.    HPI:     Chief Complaint   Patient presents with    Follow - Up       I had the pleasure of seeing Esha Hernadez on 7/16/2024 for follow up Osteoporosis    Current medications:  Evenity monthly- started 3/2024, received 4 doses   Norco 7.5/325 mg every 6 hrs  Baclofen  Previous medication:  Actonel 35 mg every 7 days- started Nov 2023  Bone density 11/2023:  LFN  T-score   -2.6  LTH                 -2.4  LS                   -1.6    Interval History:  This is a 60 yo F with hx of GERD, MGUS  (follows with hematology Dr. Lerma), history of sigmoid colon resection in 2014 and refractory constipation presents for evaluation of osteoporosis.  She has chronic lower back pain and following with spine surgeon Dr. Madrigal.  She has done extensive conservative management with physical therapy, epidural injections with minimal relief.  They are recommending a spinal fusion.  She had bone density done showing osteoporosis left femoral T-score -2.6, left total hip -2.4 and lumbar spine -1.6.  She was started on Actonel by her PCP around November 2023.  She has been having some side effects of the medications.  She started menopause at the age of 44.  She was never on hormone replacement therapy.  She fractured her fingers when a car door slammed in them.  She also has fractured her left ankle twice, once in 2013 and then 2018 from falling.  She currently does not exercise due to her chronic back pain.  She is to walk about 13 steps a day but hardly walk 3000 steps a day.  No smoking.  Takes minimal alcohol.  She takes calcium and vitamin D.    7/16/2024:  Presents for follow-up of osteoporosis  She is on Evenity every month, started March 2024  She was also seen by endocrinology due to high PTH, they state that is likely from primary hyperparathyroidism.  They advised to increase her calcium intake  She has chronic lower back pain, she will be having lower back surgery, fusion  soon.  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, left    Left small finger mass excision     Bowel obstruction (HCC)    Cancer (HCC)    basal cell on right eyebrow    Decorative tattoo    Esophageal reflux    Essential hypertension    Fibromyalgia    Right side of face    Hemorrhoids    hemoorhoidectomy    History of COVID-19    History of MRSA infection    blister on toe MRSA+ approximately 2012    Infected dental carries    dental extractions    Injury of digital nerve of finger    R finger nerve injury    Metrorrhagia    destruction of lesions, vulva, simple    Migraines    Seizure disorder (HCC)    one isolated seizure after fall and head injury, 18 years ago, no meds    Sinusitis    \"sinus procedure\"    Trigeminal neuralgia    R caniotomy, lyrica    Varicose veins of both lower extremities    Visual impairment    wears eye glasses      Social Hx Reviewed   Family Hx Reviewed     Medications (Active prior to today's visit):  Current Outpatient Medications   Medication Sig Dispense Refill    calcium carbonate 500 MG Oral Chew Tab Chew 1 tablet (500 mg total) by mouth daily.      pregabalin (LYRICA) 75 MG Oral Cap Take 1 capsule (75 mg total) by mouth 3 (three) times daily. 90 capsule 1    baclofen 10 MG Oral Tab Take 1 tablet (10 mg total) by mouth 2 (two) times daily. 60 tablet 5    HYDROcodone-acetaminophen  MG Oral Tab Take 1 tablet by mouth every 6 (six) hours as needed for Pain. 120 tablet 0    OSPHENA 60 MG Oral Tab TAKE ONE TABLET BY MOUTH ONE TIME DAILY 30 tablet 0    temazepam 15 MG Oral Cap Take 1 capsule (15 mg total) by mouth nightly as needed for Sleep. 90 capsule 1    potassium chloride 20 MEQ Oral Tab CR Take 1 tablet (20 mEq total) by mouth daily. 90 tablet 1    dicyclomine 20 MG Oral Tab Take 1-2 tablets (20-40 mg total) by mouth 3 (three) times daily as needed. 90  tablet 3    Prucalopride Succinate (MOTEGRITY) 2 MG Oral Tab Take 1 tablet by mouth daily. 90 tablet 3    spironolactone 25 MG Oral Tab Take 1 tablet (25 mg total) by mouth daily.      Omeprazole 40 MG Oral Capsule Delayed Release Take 1 capsule (40 mg total) by mouth before breakfast. 90 capsule 3    ondansetron 4 MG Oral Tablet Dispersible Place 1 tablet (4 mg total) under the tongue every 8 (eight) hours as needed for Nausea. 90 tablet 11    torsemide 20 MG Oral Tab Take 1 tablet (20 mg total) by mouth 2 (two) times daily.      Digestive Enzymes (DIGESTIVE ENZYME OR) Take 1 capsule by mouth daily.      VITAMIN D, ERGOCALCIFEROL, OR Take 1 capsule by mouth daily.      Multiple Vitamins-Minerals (MULTI-VITAMIN/MINERALS) Oral Tab Take 1 tablet by mouth daily.      docusate sodium 100 MG Oral Cap Take 1 capsule (100 mg total) by mouth daily.      diazePAM 10 MG Oral Tab 1 tab PO 1 hour before procedure; 1 tab PO 30 minutes before procedure PRN situational anxiety. Must have . (Patient not taking: Reported on 7/16/2024) 2 tablet 0    MOVANTIK 25 MG Oral Tab  (Patient not taking: Reported on 7/16/2024)       .cmed  Allergies:  No Known Allergies      ROS:   All other ROS are negative.     PHYSICAL EXAM:   GEN: AAOx3, NAD  HEENT: EOMI, PERRLA, no injection or icterus, oral mucosa moist, no oral lesions. No lymphadenopathy. No facial rash  CVS: RRR, no murmurs rubs or gallops. Equal 2+ distal pulses.   LUNGS: CTAB, no increased work of breathing  ABDOMEN:  soft NT/ND, +BS, no HSM  SKIN: No rashes or skin lesions. No nail findings  MSK:  No swelling or synovitis on exam  TTP in lower spine  NEURO: Cranial nerves II-XII intact grossly. 5/5 strength throughout in both upper and lower extremities, sensation intact.  PSYCH: normal mood       LABS:     Reviewed    Imaging:     Bone density 11/2023:  LEFT FEMORAL NECK   BMD: 0.555 gm/sq. cm. T SCORE: -2.6 Z SCORE: -1.4      LEFT TOTAL HIP   BMD: 0.652 gm/sq. cm. T SCORE:  -2.4 Z SCORE: -1.4      PA LUMBAR SPINE (L1 - L4)   BMD: 0.871 gm/sq. cm. T SCORE: -1.6 Z SCORE: -0.2     ASSESSMENT/PLAN:     Osteoporosis  - Bone density done November 2023 showing left femoral neck T-score -2.6, left total hip -2.4 and lumbar spine -1.6  - She was started on Actonel back in November 2023 but having side effects  - She is now on Evenity monthly, started in March 2024, she will be on it for 12 months  - She is following with orthopedics for her chronic back pain.  They are recommending surgery but want to make sure her bones are strong.  They would like a bone density sooner.  I think it is medically necessary for patient to get a bone density since starting Evenity to make sure her bones are strong enough for her surgery     Primary hyperparathyroidism  - Following with endocrinology.  Now on calcium 1200 mg daily and vitamin D    Chronic lower back pain  - She will be having a lumbar fusion soon     MGUS  - Stable, following with hematology    Pt will f/u in 8 mos    There is a longitudinal care relationship with me, the care plan reflects the ongoing nature of the continuous relationship of care, and the medical record indicates that there is ongoing treatment of a serious/complex medical condition which I am currently managing.  is Applicable.     Susie Madison MD  7/16/2024   9:32 AM

## 2024-07-18 ENCOUNTER — NURSE ONLY (OUTPATIENT)
Dept: RHEUMATOLOGY | Facility: CLINIC | Age: 59
End: 2024-07-18
Payer: COMMERCIAL

## 2024-07-18 DIAGNOSIS — M81.0 AGE-RELATED OSTEOPOROSIS WITHOUT CURRENT PATHOLOGICAL FRACTURE: Primary | ICD-10-CM

## 2024-07-18 PROCEDURE — 96372 THER/PROPH/DIAG INJ SC/IM: CPT | Performed by: INTERNAL MEDICINE

## 2024-07-18 NOTE — PROGRESS NOTES
Patient came to office for her monthly Evenity injection. Name and  verified. Patient aware she is to receive Evenity injection. Injection given on Bilateral upper arms subcutaneous, patient tolerated injection well. Patient given reminder note for monthly injection follow up and  6 month follow with provider.

## 2024-07-24 ENCOUNTER — TELEPHONE (OUTPATIENT)
Dept: FAMILY MEDICINE CLINIC | Facility: CLINIC | Age: 59
End: 2024-07-24

## 2024-07-24 RX ORDER — OSPEMIFENE 60 MG/1
1 TABLET, FILM COATED ORAL DAILY
Qty: 30 TABLET | Refills: 0 | OUTPATIENT
Start: 2024-07-24

## 2024-07-24 NOTE — TELEPHONE ENCOUNTER
Pharmacy called to state patient has been filling Pregabalin 50mg and the 75mg. Pharmacy is asking if they can dispense the 75 mg at this time. No more 50mg tabs on file but it was last dispensed 7/10/24.     Per visit 6/1/24:   She found some evidence for improvement on Lyrica. Tolerating the medication we will increase this dose to 75 mg and she can go from twice daily to 3 times daily. Recommend that she take her muscle relaxant in the early evening and then morning. And recommend going up on the hydrocodone to 10 mg 4 times daily as needed. She is due to see the neurosurgeon later this month.     Per [place]    Pregabalin     Dispensed Written Strength Quantity Refills Days Supply Provider Pharmacy   PREGABALIN 50 MG CAP NOVA 07/10/2024 05/17/2024 50 90 each  30 Quintin Harman DO [id number] [place]   PREGABALIN 75 MG CAP NOVA 06/14/2024 06/14/2024 75 90 each  30 Quintin Harman DO [id number] [place]   PREGABALIN 50 MG CAP NOVA 05/17/2024 05/17/2024 50 90 each  30 Quintin Harman DO [id number] [place]

## 2024-07-31 RX ORDER — OSPEMIFENE 60 MG/1
1 TABLET, FILM COATED ORAL DAILY
Qty: 30 TABLET | Refills: 0 | OUTPATIENT
Start: 2024-07-31

## 2024-08-05 RX ORDER — OSPEMIFENE 60 MG/1
1 TABLET, FILM COATED ORAL DAILY
Qty: 30 TABLET | Refills: 0 | Status: SHIPPED | OUTPATIENT
Start: 2024-08-05

## 2024-08-07 ENCOUNTER — V-VISIT (OUTPATIENT)
Dept: URGENT CARE | Age: 59
End: 2024-08-07

## 2024-08-07 VITALS
OXYGEN SATURATION: 97 % | HEIGHT: 66 IN | TEMPERATURE: 97.8 F | BODY MASS INDEX: 23.14 KG/M2 | RESPIRATION RATE: 19 BRPM | SYSTOLIC BLOOD PRESSURE: 110 MMHG | DIASTOLIC BLOOD PRESSURE: 70 MMHG | WEIGHT: 144 LBS | HEART RATE: 76 BPM

## 2024-08-07 DIAGNOSIS — M48.062 LUMBAR STENOSIS WITH NEUROGENIC CLAUDICATION: ICD-10-CM

## 2024-08-07 DIAGNOSIS — J01.10 ACUTE FRONTAL SINUSITIS, RECURRENCE NOT SPECIFIED: ICD-10-CM

## 2024-08-07 DIAGNOSIS — U07.1 COVID-19 VIRUS INFECTION: ICD-10-CM

## 2024-08-07 DIAGNOSIS — J02.9 SORE THROAT: Primary | ICD-10-CM

## 2024-08-07 LAB
INTERNAL PROCEDURAL CONTROLS ACCEPTABLE: YES
INTERNAL PROCEDURAL CONTROLS ACCEPTABLE: YES
S PYO AG THROAT QL IA.RAPID: NEGATIVE
SARS-COV+SARS-COV-2 AG RESP QL IA.RAPID: DETECTED
TEST LOT EXPIRATION DATE: ABNORMAL
TEST LOT EXPIRATION DATE: NORMAL
TEST LOT NUMBER: ABNORMAL
TEST LOT NUMBER: NORMAL

## 2024-08-07 RX ORDER — AMOXICILLIN AND CLAVULANATE POTASSIUM 875; 125 MG/1; MG/1
1 TABLET, FILM COATED ORAL 2 TIMES DAILY
Qty: 14 TABLET | Refills: 0 | Status: SHIPPED | OUTPATIENT
Start: 2024-08-07 | End: 2024-08-14

## 2024-08-07 ASSESSMENT — ENCOUNTER SYMPTOMS
ENDOCRINE NEGATIVE: 1
GASTROINTESTINAL NEGATIVE: 1
ALLERGIC/IMMUNOLOGIC NEGATIVE: 1
SINUS PAIN: 1
HEMATOLOGIC/LYMPHATIC NEGATIVE: 1
HEADACHES: 1
SINUS PRESSURE: 1
FEVER: 1
PSYCHIATRIC NEGATIVE: 1
EYES NEGATIVE: 1
COUGH: 1

## 2024-08-10 ENCOUNTER — TELEPHONE (OUTPATIENT)
Dept: FAMILY MEDICINE | Age: 59
End: 2024-08-10

## 2024-08-12 RX ORDER — HYDROCODONE BITARTRATE AND ACETAMINOPHEN 10; 325 MG/1; MG/1
1 TABLET ORAL EVERY 6 HOURS PRN
Qty: 120 TABLET | Refills: 0 | Status: SHIPPED | OUTPATIENT
Start: 2024-08-12

## 2024-08-12 NOTE — TELEPHONE ENCOUNTER
Requested Prescriptions     Pending Prescriptions Disp Refills    OMEPRAZOLE 40 MG Oral Capsule Delayed Release [Pharmacy Med Name: Omeprazole Dr 40 Mg Cap Nort] 90 capsule 0     Sig: Take 1 capsule (40 mg total) by mouth before breakfast     Last seen: 4/1/2024  Suggested follow up:  Last refill: 9/9/2023    Refill pended, please review/sign if agreeable.

## 2024-08-12 NOTE — TELEPHONE ENCOUNTER
Please review. Protocol Failed; No Protocol      Recent fills: 5/23/2024, 6/19/2024, 7/16/2024  Last Rx written: 7/16/2024  DUE: 8/16/2024  Last office visit: 7/16/2024      Requested Prescriptions   Pending Prescriptions Disp Refills    HYDROcodone-acetaminophen  MG Oral Tab 120 tablet 0     Sig: Take 1 tablet by mouth every 6 (six) hours as needed for Pain.       Controlled Substance Medication Failed - 8/7/2024  4:49 PM        Failed - This medication is a controlled substance - forward to provider to refill               Future Appointments         Provider Department Appt Notes    In 1 week [facility] RN RHEUMATOLOGY [facility] inj.    In 1 week ADO MARIE RM1; ADO DEXA RM1 [facility] DEXA - Chin Osteoporosis bone strength    In 1 month [facility] RN RHEUMATOLOGY [facility] inj.    In 1 month Bryon Lerma MD [facility] Hematology Oncology 6 m f/u.md    In 2 months Cassidy Fonseca MD [facility] 6 mon    In 2 months [facility] RN RHEUMATOLOGY [facility] inj.    In 3 months [facility] RN RHEUMATOLOGY [facility] inj.    In 7 months Susie Madison MD [facility]          Recent Outpatient Visits              3 weeks ago Age-related osteoporosis without current pathological fracture    [facility]    Nurse Only    3 weeks ago Lumbar stenosis with neurogenic claudication    [facility] [place] Quintin Harman DO    Office Visit    3 weeks ago Age-related osteoporosis without current pathological fracture    [facility] Susie Madison MD    Office Visit    1 month ago Lumbar facet arthropathy    [facility],  Forrest Greene DO    Office Visit    1 month ago Age-related osteoporosis without current pathological fracture    [facility], [place]    Nurse Only

## 2024-08-16 RX ORDER — OMEPRAZOLE 40 MG/1
40 CAPSULE, DELAYED RELEASE ORAL
Qty: 90 CAPSULE | Refills: 3 | Status: SHIPPED | OUTPATIENT
Start: 2024-08-16

## 2024-08-16 NOTE — TELEPHONE ENCOUNTER
Requested Prescriptions     Pending Prescriptions Disp Refills    ONDANSETRON 4 MG Oral Tablet Dispersible [Pharmacy Med Name: Ondansetron Odt 4 Mg Tab Nort] 90 tablet 0     Sig: Place 1 tablet (4 mg total) under the tongue every 8 (eight) hours as needed for Nausea.    Lr: 8/30/2023  Lov: 4/1/2024  Ld

## 2024-08-19 ENCOUNTER — NURSE ONLY (OUTPATIENT)
Dept: RHEUMATOLOGY | Facility: CLINIC | Age: 59
End: 2024-08-19
Payer: COMMERCIAL

## 2024-08-19 DIAGNOSIS — M81.0 AGE-RELATED OSTEOPOROSIS WITHOUT CURRENT PATHOLOGICAL FRACTURE: Primary | ICD-10-CM

## 2024-08-19 RX ORDER — ONDANSETRON 4 MG/1
4 TABLET, ORALLY DISINTEGRATING ORAL EVERY 8 HOURS PRN
Qty: 90 TABLET | Refills: 11 | OUTPATIENT
Start: 2024-08-19

## 2024-08-19 NOTE — TELEPHONE ENCOUNTER
Requested Prescriptions     Pending Prescriptions Disp Refills    ondansetron 4 MG Oral Tablet Dispersible 90 tablet 11     Sig: Place 1 tablet (4 mg total) under the tongue every 8 (eight) hours as needed for Nausea.       Duplicate RX.

## 2024-08-19 NOTE — PROGRESS NOTES
Patient came to office for monthly Evenity injection. Name and  verified. Patient aware she is to receive Evenity injection. Injection given on Bilateral Upper Arm subcutaneous, patient tolerated injection well. Patient given reminder note for monthly injection follow up and  6 month follow with provider.

## 2024-08-22 ENCOUNTER — HOSPITAL ENCOUNTER (OUTPATIENT)
Dept: BONE DENSITY | Age: 59
Discharge: HOME | End: 2024-08-22
Attending: INTERNAL MEDICINE
Payer: COMMERCIAL

## 2024-08-22 DIAGNOSIS — M81.0 AGE-RELATED OSTEOPOROSIS WITHOUT CURRENT PATHOLOGICAL FRACTURE: ICD-10-CM

## 2024-08-22 PROCEDURE — 77080 DXA BONE DENSITY AXIAL: CPT | Performed by: INTERNAL MEDICINE

## 2024-08-25 RX ORDER — ONDANSETRON 4 MG/1
4 TABLET, ORALLY DISINTEGRATING ORAL EVERY 8 HOURS PRN
Qty: 90 TABLET | Refills: 5 | Status: SHIPPED | OUTPATIENT
Start: 2024-08-25

## 2024-08-27 RX ORDER — ONDANSETRON 4 MG/1
4 TABLET, ORALLY DISINTEGRATING ORAL EVERY 8 HOURS PRN
Qty: 90 TABLET | Refills: 11 | OUTPATIENT
Start: 2024-08-27

## 2024-08-27 NOTE — TELEPHONE ENCOUNTER
Patient called and no longer needs the letter she requested.    Requested Prescriptions     Pending Prescriptions Disp Refills    ondansetron 4 MG Oral Tablet Dispersible 90 tablet 11     Sig: Place 1 tablet (4 mg total) under the tongue every 8 (eight) hours as needed for Nausea.           ondansetron 4 MG Oral Tablet Dispersible 90 tablet 5 8/25/2024         DUPLICATE

## 2024-09-02 DIAGNOSIS — M48.062 LUMBAR STENOSIS WITH NEUROGENIC CLAUDICATION: ICD-10-CM

## 2024-09-04 NOTE — TELEPHONE ENCOUNTER
Please review. Protocol Failed; No Protocol      Recent fills: 6/19/2024, 7/16/2024, 8/12/2024  Last Rx written: 8/12/2024  Last office visit: 7/16/2024    Future Appointments  Date Type Provider Dept   09/30/24 Appointment Quintin Harman DO [facility]   Showing future appointments within next 150 days with a meds authorizing provider and meeting all other requirements        Requested Prescriptions   Pending Prescriptions Disp Refills    HYDROcodone-acetaminophen  MG Oral Tab 120 tablet 0     Sig: Take 1 tablet by mouth every 6 (six) hours as needed for Pain.       Controlled Substance Medication Failed - 9/2/2024 11:10 AM        Failed - This medication is a controlled substance - forward to provider to refill               Future Appointments         Provider Department Appt Notes    In 1 week [facility] RN RHEUMATOLOGY [facility] inj.    In 2 weeks Bryon Lerma MD [facility] Hematology Oncology 6 m f/u.md    In 3 weeks Quintin Harman DO [facility] Back pain and neck pain    In 1 month Cassidy Fonseca MD [facility] 6 mon    In 1 month [facility] ROSARIO RHEUMATOLOGY [facility] inj.    In 2 months [facility] ROSARIO RHEUMATOLOGY [facility] inj.    In 6 months Susie Madison MD [facility]          Recent Outpatient Visits              2 weeks ago Age-related osteoporosis without current pathological fracture    [facility]    Nurse Only    1 month ago Age-related osteoporosis without current pathological fracture    [facility]    Nurse Only    1 month ago Lumbar stenosis with neurogenic claudication    [facility] Ghazal  DO Quintin    Office Visit    1 month ago Age-related osteoporosis without current pathological fracture    [facility], Susie Clement MD    Office Visit    2 months ago Lumbar facet arthropathy    [facility], Forrest Greene DO    Office Visit

## 2024-09-05 RX ORDER — HYDROCODONE BITARTRATE AND ACETAMINOPHEN 10; 325 MG/1; MG/1
1 TABLET ORAL EVERY 6 HOURS PRN
Qty: 120 TABLET | Refills: 0 | Status: SHIPPED | OUTPATIENT
Start: 2024-09-05

## 2024-09-05 RX ORDER — OSPEMIFENE 60 MG/1
1 TABLET, FILM COATED ORAL DAILY
Qty: 30 TABLET | Refills: 0 | Status: SHIPPED | OUTPATIENT
Start: 2024-09-05

## 2024-09-12 NOTE — TELEPHONE ENCOUNTER
Requested Prescriptions     Pending Prescriptions Disp Refills    DICYCLOMINE 20 MG Oral Tab [Pharmacy Med Name: Dicyclomine Hydrochloride 20 Mg Tab Lann] 90 tablet 0     Sig: Take 1-2 tablets (20-40 mg total) by mouth 3 (three) times daily as needed.         LOV  4/1/2024      LR    3/1/2024      IA

## 2024-09-13 ENCOUNTER — LAB ENCOUNTER (OUTPATIENT)
Dept: LAB | Age: 59
End: 2024-09-13
Attending: INTERNAL MEDICINE
Payer: COMMERCIAL

## 2024-09-13 ENCOUNTER — OFFICE VISIT (OUTPATIENT)
Dept: FAMILY MEDICINE CLINIC | Facility: CLINIC | Age: 59
End: 2024-09-13
Payer: COMMERCIAL

## 2024-09-13 VITALS
HEART RATE: 71 BPM | HEIGHT: 66 IN | BODY MASS INDEX: 23.14 KG/M2 | OXYGEN SATURATION: 98 % | WEIGHT: 144 LBS | DIASTOLIC BLOOD PRESSURE: 65 MMHG | SYSTOLIC BLOOD PRESSURE: 97 MMHG

## 2024-09-13 DIAGNOSIS — M54.16 LEFT LUMBAR RADICULITIS: ICD-10-CM

## 2024-09-13 DIAGNOSIS — D47.2 MGUS (MONOCLONAL GAMMOPATHY OF UNKNOWN SIGNIFICANCE): ICD-10-CM

## 2024-09-13 DIAGNOSIS — M54.2 CERVICAL PAIN (NECK): Primary | ICD-10-CM

## 2024-09-13 LAB
ALBUMIN SERPL-MCNC: 4.3 G/DL (ref 3.2–4.8)
ALBUMIN/GLOB SERPL: 1.6 {RATIO} (ref 1–2)
ALP LIVER SERPL-CCNC: 99 U/L
ALT SERPL-CCNC: 7 U/L
ANION GAP SERPL CALC-SCNC: 10 MMOL/L (ref 0–18)
AST SERPL-CCNC: 16 U/L (ref ?–34)
BASOPHILS # BLD AUTO: 0.01 X10(3) UL (ref 0–0.2)
BASOPHILS NFR BLD AUTO: 0.3 %
BILIRUB SERPL-MCNC: 0.5 MG/DL (ref 0.2–1.1)
BUN BLD-MCNC: 14 MG/DL (ref 9–23)
BUN/CREAT SERPL: 12.7 (ref 10–20)
CALCIUM BLD-MCNC: 9.5 MG/DL (ref 8.7–10.4)
CHLORIDE SERPL-SCNC: 104 MMOL/L (ref 98–112)
CO2 SERPL-SCNC: 24 MMOL/L (ref 21–32)
CREAT BLD-MCNC: 1.1 MG/DL
DEPRECATED RDW RBC AUTO: 40 FL (ref 35.1–46.3)
EGFRCR SERPLBLD CKD-EPI 2021: 58 ML/MIN/1.73M2 (ref 60–?)
EOSINOPHIL # BLD AUTO: 0.07 X10(3) UL (ref 0–0.7)
EOSINOPHIL NFR BLD AUTO: 1.9 %
ERYTHROCYTE [DISTWIDTH] IN BLOOD BY AUTOMATED COUNT: 12.9 % (ref 11–15)
FASTING STATUS PATIENT QL REPORTED: NO
GLOBULIN PLAS-MCNC: 2.7 G/DL (ref 2–3.5)
GLUCOSE BLD-MCNC: 155 MG/DL (ref 70–99)
HCT VFR BLD AUTO: 34.9 %
HGB BLD-MCNC: 12.5 G/DL
IGA SERPL-MCNC: 27.2 MG/DL (ref 40–350)
IGM SERPL-MCNC: 859 MG/DL (ref 50–300)
IMM GRANULOCYTES # BLD AUTO: 0.01 X10(3) UL (ref 0–1)
IMM GRANULOCYTES NFR BLD: 0.3 %
IMMUNOGLOBULIN PNL SER-MCNC: 444 MG/DL (ref 650–1600)
LYMPHOCYTES # BLD AUTO: 1.49 X10(3) UL (ref 1–4)
LYMPHOCYTES NFR BLD AUTO: 40.2 %
MCH RBC QN AUTO: 31.7 PG (ref 26–34)
MCHC RBC AUTO-ENTMCNC: 35.8 G/DL (ref 31–37)
MCV RBC AUTO: 88.6 FL
MONOCYTES # BLD AUTO: 0.51 X10(3) UL (ref 0.1–1)
MONOCYTES NFR BLD AUTO: 13.7 %
NEUTROPHILS # BLD AUTO: 1.62 X10 (3) UL (ref 1.5–7.7)
NEUTROPHILS # BLD AUTO: 1.62 X10(3) UL (ref 1.5–7.7)
NEUTROPHILS NFR BLD AUTO: 43.6 %
OSMOLALITY SERPL CALC.SUM OF ELEC: 290 MOSM/KG (ref 275–295)
PLATELET # BLD AUTO: 149 10(3)UL (ref 150–450)
POTASSIUM SERPL-SCNC: 4.1 MMOL/L (ref 3.5–5.1)
PROT SERPL-MCNC: 7 G/DL (ref 5.7–8.2)
PROT UR-MCNC: 23.7 MG/DL (ref ?–14)
RBC # BLD AUTO: 3.94 X10(6)UL
SODIUM SERPL-SCNC: 138 MMOL/L (ref 136–145)
WBC # BLD AUTO: 3.7 X10(3) UL (ref 4–11)

## 2024-09-13 PROCEDURE — 84166 PROTEIN E-PHORESIS/URINE/CSF: CPT

## 2024-09-13 PROCEDURE — 86335 IMMUNFIX E-PHORSIS/URINE/CSF: CPT

## 2024-09-13 PROCEDURE — 84156 ASSAY OF PROTEIN URINE: CPT

## 2024-09-13 PROCEDURE — 84165 PROTEIN E-PHORESIS SERUM: CPT

## 2024-09-13 PROCEDURE — 3074F SYST BP LT 130 MM HG: CPT | Performed by: FAMILY MEDICINE

## 2024-09-13 PROCEDURE — 82784 ASSAY IGA/IGD/IGG/IGM EACH: CPT

## 2024-09-13 PROCEDURE — 83521 IG LIGHT CHAINS FREE EACH: CPT

## 2024-09-13 PROCEDURE — 3078F DIAST BP <80 MM HG: CPT | Performed by: FAMILY MEDICINE

## 2024-09-13 PROCEDURE — 86334 IMMUNOFIX E-PHORESIS SERUM: CPT

## 2024-09-13 PROCEDURE — 99214 OFFICE O/P EST MOD 30 MIN: CPT | Performed by: FAMILY MEDICINE

## 2024-09-13 PROCEDURE — 85025 COMPLETE CBC W/AUTO DIFF WBC: CPT

## 2024-09-13 PROCEDURE — 80053 COMPREHEN METABOLIC PANEL: CPT

## 2024-09-13 PROCEDURE — 36415 COLL VENOUS BLD VENIPUNCTURE: CPT

## 2024-09-13 PROCEDURE — 3008F BODY MASS INDEX DOCD: CPT | Performed by: FAMILY MEDICINE

## 2024-09-13 NOTE — PROGRESS NOTES
Subjective:   Esha Hernadez is a 59 year old female who presents for Back Pain (Back pain traveling to the neck area )   New issue painful neck area.  No radiation of pain. No injury.     History/Other:    Chief Complaint Reviewed and Verified  Nursing Notes Reviewed and   Verified  Tobacco Reviewed  Allergies Reviewed  Medications Reviewed    Problem List Reviewed  Medical History Reviewed  Surgical History   Reviewed  Family History Reviewed  Social History Reviewed         Tobacco:  She smoked tobacco in the past but quit greater than 12 months ago.  Social History     Tobacco Use   Smoking Status Former    Current packs/day: 0.00    Average packs/day: 0.5 packs/day for 4.0 years (2.0 ttl pk-yrs)    Types: Cigarettes    Start date: 2014    Quit date: 2018    Years since quittin.1   Smokeless Tobacco Never   Tobacco Comments    Quit smoking        Current Outpatient Medications   Medication Sig Dispense Refill    HYDROcodone-acetaminophen  MG Oral Tab Take 1 tablet by mouth every 6 (six) hours as needed for Pain. 120 tablet 0    OSPHENA 60 MG Oral Tab TAKE ONE TABLET BY MOUTH ONE TIME DAILY 30 tablet 0    ondansetron 4 MG Oral Tablet Dispersible Place 1 tablet (4 mg total) under the tongue every 8 (eight) hours as needed for Nausea. 90 tablet 5    Omeprazole 40 MG Oral Capsule Delayed Release Take 1 capsule (40 mg total) by mouth before breakfast. 90 capsule 3    calcium carbonate 500 MG Oral Chew Tab Chew 1 tablet (500 mg total) by mouth daily.      pregabalin (LYRICA) 75 MG Oral Cap Take 1 capsule (75 mg total) by mouth 3 (three) times daily. 90 capsule 1    baclofen 10 MG Oral Tab Take 1 tablet (10 mg total) by mouth 2 (two) times daily. 60 tablet 5    temazepam 15 MG Oral Cap Take 1 capsule (15 mg total) by mouth nightly as needed for Sleep. 90 capsule 1    potassium chloride 20 MEQ Oral Tab CR Take 1 tablet (20 mEq total) by mouth daily. 90 tablet 1    dicyclomine 20  MG Oral Tab Take 1-2 tablets (20-40 mg total) by mouth 3 (three) times daily as needed. 90 tablet 3    Prucalopride Succinate (MOTEGRITY) 2 MG Oral Tab Take 1 tablet by mouth daily. 90 tablet 3    spironolactone 25 MG Oral Tab Take 1 tablet (25 mg total) by mouth daily.      torsemide 20 MG Oral Tab Take 1 tablet (20 mg total) by mouth 2 (two) times daily.      Digestive Enzymes (DIGESTIVE ENZYME OR) Take 1 capsule by mouth daily.      Multiple Vitamins-Minerals (MULTI-VITAMIN/MINERALS) Oral Tab Take 1 tablet by mouth daily.      docusate sodium 100 MG Oral Cap Take 1 capsule (100 mg total) by mouth daily.      MOVANTIK 25 MG Oral Tab  (Patient not taking: Reported on 7/16/2024)           Review of Systems:  Review of Systems   Constitutional: Negative.    Musculoskeletal:  Positive for neck pain. Negative for neck stiffness.   Neurological:  Negative for weakness and numbness.         Objective:   BP 97/65   Pulse 71   Ht 5' 6\" (1.676 m)   Wt 144 lb (65.3 kg)   SpO2 98%   BMI 23.24 kg/m²  Estimated body mass index is 23.24 kg/m² as calculated from the following:    Height as of this encounter: 5' 6\" (1.676 m).    Weight as of this encounter: 144 lb (65.3 kg).  Physical Exam  Vitals reviewed.   Musculoskeletal:      Cervical back: Tenderness present. No spasms or bony tenderness. Normal range of motion.      Thoracic back: Scoliosis present.      Lumbar back: Tenderness present. Decreased range of motion.           Assessment & Plan:   1. Cervical pain (neck) (Primary)  -     XR CERVICAL SPINE (4VIEWS) (CPT=72050); Future; Expected date: 09/13/2024  2. Left lumbar radiculitis  New neck pain without radicular pattern.  XR ordered. Reviewed recent DEXA with 7-8 percent improvement.  May be eligible now for lumbar surgery.        No follow-ups on file.    Quintin Harman DO, 9/13/2024, 4:56 PM

## 2024-09-14 ENCOUNTER — HOSPITAL ENCOUNTER (OUTPATIENT)
Dept: GENERAL RADIOLOGY | Age: 59
Discharge: HOME | End: 2024-09-14
Attending: FAMILY MEDICINE
Payer: COMMERCIAL

## 2024-09-14 DIAGNOSIS — M54.2 CERVICAL PAIN (NECK): ICD-10-CM

## 2024-09-14 PROCEDURE — 72050 X-RAY EXAM NECK SPINE 4/5VWS: CPT | Performed by: FAMILY MEDICINE

## 2024-09-16 NOTE — TELEPHONE ENCOUNTER
Requested Prescriptions     Pending Prescriptions Disp Refills    dicyclomine 20 MG Oral Tab 90 tablet 3     Sig: Take 1-2 tablets (20-40 mg total) by mouth 3 (three) times daily as needed.       LOV  4/1/2024      LR   3/1/2024      ID

## 2024-09-17 ENCOUNTER — NURSE ONLY (OUTPATIENT)
Dept: RHEUMATOLOGY | Facility: CLINIC | Age: 59
End: 2024-09-17
Payer: COMMERCIAL

## 2024-09-17 DIAGNOSIS — M81.0 AGE-RELATED OSTEOPOROSIS WITHOUT CURRENT PATHOLOGICAL FRACTURE: Primary | ICD-10-CM

## 2024-09-17 LAB
KAPPA LC FREE SER-MCNC: 2.36 MG/DL (ref 0.33–1.94)
KAPPA LC FREE/LAMBDA FREE SER NEPH: 1.06 {RATIO} (ref 0.26–1.65)
LAMBDA LC FREE SERPL-MCNC: 2.23 MG/DL (ref 0.57–2.63)

## 2024-09-17 PROCEDURE — 96372 THER/PROPH/DIAG INJ SC/IM: CPT | Performed by: INTERNAL MEDICINE

## 2024-09-17 NOTE — PROGRESS NOTES
Verified name and . Dosing and frequency of q4 weeks verified with provider. Patient aware receiving Evenity injection. Injections given. Patient tolerated well. Patient aware to follow up in 4 weeks for next injection.     Component      Latest Ref Rng 2024   CALCIUM      8.7 - 10.4 mg/dL 9.5

## 2024-09-18 LAB
ALBUMIN SERPL ELPH-MCNC: 4.22 G/DL (ref 3.75–5.21)
ALBUMIN/GLOB SERPL: 1.7 {RATIO} (ref 1–2)
ALPHA1 GLOB SERPL ELPH-MCNC: 0.31 G/DL (ref 0.19–0.46)
ALPHA2 GLOB SERPL ELPH-MCNC: 0.62 G/DL (ref 0.48–1.05)
B-GLOBULIN SERPL ELPH-MCNC: 0.68 G/DL (ref 0.68–1.23)
GAMMA GLOB SERPL ELPH-MCNC: 0.88 G/DL (ref 0.62–1.7)
PROT SERPL-MCNC: 6.7 G/DL (ref 5.7–8.2)

## 2024-09-18 RX ORDER — DICYCLOMINE HCL 20 MG
TABLET ORAL 3 TIMES DAILY PRN
Qty: 90 TABLET | Refills: 3 | Status: SHIPPED | OUTPATIENT
Start: 2024-09-18

## 2024-09-18 RX ORDER — DICYCLOMINE HCL 20 MG
TABLET ORAL 3 TIMES DAILY PRN
Qty: 90 TABLET | Refills: 3 | OUTPATIENT
Start: 2024-09-18

## 2024-09-30 DIAGNOSIS — M48.062 LUMBAR STENOSIS WITH NEUROGENIC CLAUDICATION: ICD-10-CM

## 2024-10-02 NOTE — TELEPHONE ENCOUNTER
Please review. Protocol Failed; No Protocol      Recent fills: 6/14/2024, 7/10/2024, 8/26/2024  Last Rx written: 6/14/2024  Last office visit: 9/13/2024        Requested Prescriptions   Pending Prescriptions Disp Refills    PREGABALIN 75 MG Oral Cap [Pharmacy Med Name: Pregabalin 75 Mg Cap Nova] 90 capsule 0     Sig: Take 1 capsule (75 mg total) by mouth 3 (three) times daily.       Controlled Substance Medication Failed - 9/30/2024  7:03 AM        Failed - This medication is a controlled substance - forward to provider to refill       Neurology Medications Passed - 9/30/2024  7:03 AM        Passed - In person appointment or virtual visit in the past 6 mos or appointment in next 3 mos     Recent Outpatient Visits              2 weeks ago Age-related osteoporosis without current pathological fracture    [facility]    Nurse Only    2 weeks ago Cervical pain (neck)    [facility] Quintin Harman,     Office Visit    1 month ago Age-related osteoporosis without current pathological fracture    [facility]    Nurse Only    2 months ago Age-related osteoporosis without current pathological fracture    [facility]    Nurse Only    2 months ago Lumbar stenosis with neurogenic claudication    [facility] Quintin Harman,     Office Visit          Future Appointments         Provider Department Appt Notes    In 1 week Cassidy Fonseca MD [facility] 6 mon    In 2 weeks EC [facility] ROSARIO RHEUMATOLOGY [facility] inj.    In 1 month EC [facility]IO RHEUMATOLOGY [facility] inj.    In 2 months Susie Madison MD [facility] Back and new neck issues    In 5 months  Susie Madison MD [facility]                           Future Appointments         Provider Department Appt Notes    In 1 week Cassidy Fonseca MD [facility] 6 mon    In 2 weeks EC [facility] RN RHEUMATOLOGY [facility] inj.    In 1 month EC [facility] RN RHEUMATOLOGY [facility] inj.    In 2 months Susie Madison MD [facility] Back and new neck issues    In 5 months Susie Madison MD [facility]          Recent Outpatient Visits              2 weeks ago Age-related osteoporosis without current pathological fracture    [facility]    Nurse Only    2 weeks ago Cervical pain (neck)    [facility] Quintin Harman,     Office Visit    1 month ago Age-related osteoporosis without current pathological fracture    [facility]    Nurse Only    2 months ago Age-related osteoporosis without current pathological fracture    [facility]    Nurse Only    2 months ago Lumbar stenosis with neurogenic claudication    [facility] Quintin Harman,     Office Visit

## 2024-10-03 NOTE — TELEPHONE ENCOUNTER
Please Review. Protocol Failed; No Protocol     Requested Prescriptions   Pending Prescriptions Disp Refills    HYDROcodone-acetaminophen  MG Oral Tab 120 tablet 0     Sig: Take 1 tablet by mouth every 6 (six) hours as needed for Pain.       Controlled Substance Medication Failed - 9/30/2024  8:33 PM        Failed - This medication is a controlled substance - forward to provider to refill               Future Appointments         Provider Department Appt Notes    In 1 week Cassidy Fonseca MD [facility] 6 mon    In 2 weeks EC [facility] RN RHEUMATOLOGY [facility] inj.    In 1 month EC [facility] RN RHEUMATOLOGY [facility] inj.    In 2 months Susie Madison MD [facility] Back and new neck issues    In 5 months Susie Madison MD [facility] fu          Recent Outpatient Visits              2 weeks ago Age-related osteoporosis without current pathological fracture    [facility]    Nurse Only    2 weeks ago Cervical pain (neck)    [facility] Quintin Harman,     Office Visit    1 month ago Age-related osteoporosis without current pathological fracture    [facility]    Nurse Only    2 months ago Age-related osteoporosis without current pathological fracture    [facility]    Nurse Only    2 months ago Lumbar stenosis with neurogenic claudication    [facility] Quintin Harman,     Office Visit

## 2024-10-04 RX ORDER — HYDROCODONE BITARTRATE AND ACETAMINOPHEN 10; 325 MG/1; MG/1
1 TABLET ORAL EVERY 6 HOURS PRN
Qty: 120 TABLET | Refills: 0 | Status: SHIPPED | OUTPATIENT
Start: 2024-10-04

## 2024-10-04 RX ORDER — PREGABALIN 75 MG/1
75 CAPSULE ORAL 3 TIMES DAILY
Qty: 90 CAPSULE | Refills: 0 | Status: SHIPPED | OUTPATIENT
Start: 2024-10-04

## 2024-10-07 RX ORDER — OSPEMIFENE 60 MG/1
1 TABLET, FILM COATED ORAL DAILY
Qty: 30 TABLET | Refills: 0 | OUTPATIENT
Start: 2024-10-07

## 2024-10-07 RX ORDER — OSPEMIFENE 60 MG/1
1 TABLET, FILM COATED ORAL DAILY
Qty: 30 TABLET | Refills: 0 | Status: SHIPPED | OUTPATIENT
Start: 2024-10-07

## 2024-10-08 ENCOUNTER — LAB ENCOUNTER (OUTPATIENT)
Dept: LAB | Age: 59
End: 2024-10-08
Attending: INTERNAL MEDICINE
Payer: COMMERCIAL

## 2024-10-08 DIAGNOSIS — R79.89 HIGH SERUM PARATHYROID HORMONE (PTH): ICD-10-CM

## 2024-10-08 DIAGNOSIS — E83.52 HYPERCALCEMIA: ICD-10-CM

## 2024-10-08 DIAGNOSIS — K21.9 GASTROESOPHAGEAL REFLUX DISEASE, UNSPECIFIED WHETHER ESOPHAGITIS PRESENT: ICD-10-CM

## 2024-10-08 DIAGNOSIS — R56.9 CONVULSIONS, UNSPECIFIED CONVULSION TYPE (HCC): ICD-10-CM

## 2024-10-08 DIAGNOSIS — Z93.3 STATUS POST HARTMANN PROCEDURE (HCC): ICD-10-CM

## 2024-10-08 DIAGNOSIS — M81.0 AGE-RELATED OSTEOPOROSIS WITHOUT CURRENT PATHOLOGICAL FRACTURE: ICD-10-CM

## 2024-10-08 DIAGNOSIS — E55.9 VITAMIN D DEFICIENCY: ICD-10-CM

## 2024-10-08 DIAGNOSIS — F41.1 ANXIETY STATE: ICD-10-CM

## 2024-10-08 DIAGNOSIS — E21.3 HYPERPARATHYROIDISM (HCC): ICD-10-CM

## 2024-10-08 LAB
ALBUMIN SERPL-MCNC: 4.6 G/DL (ref 3.2–4.8)
ALBUMIN/GLOB SERPL: 1.8 {RATIO} (ref 1–2)
ALP LIVER SERPL-CCNC: 102 U/L
ALT SERPL-CCNC: 8 U/L
ANION GAP SERPL CALC-SCNC: 9 MMOL/L (ref 0–18)
AST SERPL-CCNC: 17 U/L (ref ?–34)
BILIRUB SERPL-MCNC: 0.6 MG/DL (ref 0.2–1.1)
BUN BLD-MCNC: 20 MG/DL (ref 9–23)
BUN/CREAT SERPL: 17.9 (ref 10–20)
CALCIUM BLD-MCNC: 9.5 MG/DL (ref 8.7–10.4)
CHLORIDE SERPL-SCNC: 101 MMOL/L (ref 98–112)
CO2 SERPL-SCNC: 28 MMOL/L (ref 21–32)
CREAT BLD-MCNC: 1.12 MG/DL
EGFRCR SERPLBLD CKD-EPI 2021: 56 ML/MIN/1.73M2 (ref 60–?)
FASTING STATUS PATIENT QL REPORTED: NO
GLOBULIN PLAS-MCNC: 2.5 G/DL (ref 2–3.5)
GLUCOSE BLD-MCNC: 141 MG/DL (ref 70–99)
MAGNESIUM SERPL-MCNC: 2.8 MG/DL (ref 1.6–2.6)
OSMOLALITY SERPL CALC.SUM OF ELEC: 291 MOSM/KG (ref 275–295)
PHOSPHATE SERPL-MCNC: 3 MG/DL (ref 2.4–5.1)
POTASSIUM SERPL-SCNC: 3.6 MMOL/L (ref 3.5–5.1)
PROT SERPL-MCNC: 7.1 G/DL (ref 5.7–8.2)
PTH-INTACT SERPL-MCNC: 113.6 PG/ML (ref 18.5–88)
SODIUM SERPL-SCNC: 138 MMOL/L (ref 136–145)

## 2024-10-08 PROCEDURE — 83735 ASSAY OF MAGNESIUM: CPT

## 2024-10-08 PROCEDURE — 83970 ASSAY OF PARATHORMONE: CPT

## 2024-10-08 PROCEDURE — 80053 COMPREHEN METABOLIC PANEL: CPT

## 2024-10-08 PROCEDURE — 82652 VIT D 1 25-DIHYDROXY: CPT

## 2024-10-08 PROCEDURE — 36415 COLL VENOUS BLD VENIPUNCTURE: CPT

## 2024-10-08 PROCEDURE — 84100 ASSAY OF PHOSPHORUS: CPT

## 2024-10-10 RX ORDER — TEMAZEPAM 15 MG/1
15 CAPSULE ORAL NIGHTLY PRN
Qty: 90 CAPSULE | Refills: 1 | Status: SHIPPED | OUTPATIENT
Start: 2024-10-10

## 2024-10-10 NOTE — TELEPHONE ENCOUNTER
Please Review. Protocol Failed; No Protocol   07/19/2024 04/26/2024  LAST WRITTEN: 04/19/2024  Quantity: 90  Recent Visits  Date Type Provider Dept   09/13/24 Office Visit Quintin Harman DO [facility]       Requested Prescriptions   Pending Prescriptions Disp Refills    temazepam 15 MG Oral Cap 90 capsule 1     Sig: Take 1 capsule (15 mg total) by mouth nightly as needed for Sleep.       Controlled Substance Medication Failed - 10/10/2024 10:33 AM        Failed - This medication is a controlled substance - forward to provider to refill               Future Appointments         Provider Department Appt Notes    In 1 week EC [facility] RN RHEUMATOLOGY [facility] Evenity    In 1 month Csasidy Fonseca MD [facility] 6 mon    In 1 month [facility] RN RHEUMATOLOGY [facility] Evenity    In 2 months Susie Madison MD [facility] Back and new neck issues    In 5 months Susie Madison MD [facility] fu          Recent Outpatient Visits              3 weeks ago Age-related osteoporosis without current pathological fracture    [facility]    Nurse Only    3 weeks ago Cervical pain (neck)    [facility]    Office Visit    1 month ago Age-related osteoporosis without current pathological fracture    [facility]    Nurse Only    2 months ago Age-related osteoporosis without current pathological fracture    [facility]    Nurse Only    2 months ago Lumbar stenosis with neurogenic claudication    [facility] Quintin Harman,     Office Visit

## 2024-10-11 LAB — VIT D 1,25 DI-OH: 83.9 PG/ML

## 2024-10-15 ENCOUNTER — LAB ENCOUNTER (OUTPATIENT)
Dept: LAB | Age: 59
End: 2024-10-15
Attending: INTERNAL MEDICINE
Payer: COMMERCIAL

## 2024-10-17 ENCOUNTER — NURSE ONLY (OUTPATIENT)
Dept: RHEUMATOLOGY | Facility: CLINIC | Age: 59
End: 2024-10-17
Payer: COMMERCIAL

## 2024-10-17 DIAGNOSIS — M81.0 AGE-RELATED OSTEOPOROSIS WITHOUT CURRENT PATHOLOGICAL FRACTURE: Primary | ICD-10-CM

## 2024-10-17 PROCEDURE — 96372 THER/PROPH/DIAG INJ SC/IM: CPT | Performed by: INTERNAL MEDICINE

## 2024-10-17 NOTE — PROGRESS NOTES
Verified name and . Patient aware receiving Evenity injections. Injections given. Patient tolerated well. Patient aware to follow up in 4 weeks for next injection.    This is injection     Future Appointments   Date Time Provider Department [place]   10/17/2024 10:00 AM [facility]   10/18/2024  3:15 PM Cassidy Fonseca MD [facility]   10/25/2024  1:45 PM Quintin Harman DO [facility]   10/31/2024 11:40 AM Bryon Lerma MD [facility] HEM ONC EMO   2024 12:30 PM Cassidy Fonseca MD [facility]   2024 10:00 AM [facility]   2024 10:40 AM [facility]   3/17/2025 10:00 AM [facility]

## 2024-10-18 ENCOUNTER — OFFICE VISIT (OUTPATIENT)
Facility: CLINIC | Age: 59
End: 2024-10-18
Payer: COMMERCIAL

## 2024-10-18 VITALS
SYSTOLIC BLOOD PRESSURE: 106 MMHG | HEART RATE: 72 BPM | DIASTOLIC BLOOD PRESSURE: 64 MMHG | HEIGHT: 66 IN | WEIGHT: 147 LBS | BODY MASS INDEX: 23.63 KG/M2

## 2024-10-18 DIAGNOSIS — Z93.3 STATUS POST HARTMANN PROCEDURE (HCC): ICD-10-CM

## 2024-10-18 DIAGNOSIS — E21.3 HYPERPARATHYROIDISM (HCC): Primary | ICD-10-CM

## 2024-10-18 DIAGNOSIS — K21.9 GASTROESOPHAGEAL REFLUX DISEASE, UNSPECIFIED WHETHER ESOPHAGITIS PRESENT: ICD-10-CM

## 2024-10-18 DIAGNOSIS — M81.0 AGE-RELATED OSTEOPOROSIS WITHOUT CURRENT PATHOLOGICAL FRACTURE: ICD-10-CM

## 2024-10-18 DIAGNOSIS — R56.9 CONVULSIONS, UNSPECIFIED CONVULSION TYPE (HCC): ICD-10-CM

## 2024-10-18 DIAGNOSIS — E55.9 VITAMIN D DEFICIENCY: ICD-10-CM

## 2024-10-18 DIAGNOSIS — R79.89 HIGH SERUM PARATHYROID HORMONE (PTH): ICD-10-CM

## 2024-10-18 PROCEDURE — 99214 OFFICE O/P EST MOD 30 MIN: CPT | Performed by: INTERNAL MEDICINE

## 2024-10-18 PROCEDURE — 3078F DIAST BP <80 MM HG: CPT | Performed by: INTERNAL MEDICINE

## 2024-10-18 PROCEDURE — 3074F SYST BP LT 130 MM HG: CPT | Performed by: INTERNAL MEDICINE

## 2024-10-18 PROCEDURE — 3008F BODY MASS INDEX DOCD: CPT | Performed by: INTERNAL MEDICINE

## 2024-10-18 NOTE — PROGRESS NOTES
Reason for Visit:  high parathyroid levels, osteoporosis.  Requesting Physician:   Kiesha Harman DO      CHIEF COMPLAINT:    Chief Complaint   Patient presents with    Thyroid Problem     F/u        HISTORY OF PRESENT ILLNESS:   Esha Hernadez is a 59 year old female who presents with   high parathyroid levels and osteoporosis  She has MGUS and hx of Emergency laparotomy July 2014 with sigmoid colon resection, colostomy due to Stercoral ulcer/perforation sigmoid colon; with subsequent elective takedown of colostomy    She was dx with Scoliosis 11/2023 and with Osteoporosis 12/2023  She is on SERM, norco and omperazole.  She is on Temazepam at bedtime  Osteoporosis is managed by Rheumatology. Started Evenity ~ 3/2024  She is on  calcium 600 mg bid and vit D 2000 unit/day. She takes dairy in her diet as well.    No hypercalcemia symptoms   W/u, after increasing ca and vit D, showed normal ca levels in urine and serum. PTH levels are lower but still high    She is getting spine surgery soon      Was treated with risedronate for 2-3 mo - reports GI SE from it.   Patient never had fragility fracture ( had fractures in ankle & fingers)  Has height loss: ~ 2 inches  She denied risk factors: steroid use, alcohol abuse, liver disease, kidney disease, hyperthyroid  Family history of osteoporosis or fragility fracture: no    ASSESSMENT AND PLAN:  59 year old  female who presents with   high parathyroid levels, osteoporosis  W/u showed normal serum ca, eGFR, vit D and high PTH levels   DXA showed osteoporosis.   Most likely, PTHi is high secondary to low ca intake    Given her previous surgeries it is possible she is not absorbing enough ca which can cause high PTH levels  I d/w pt in length.   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, left    Left small finger mass excision     Bowel obstruction (HCC)    Cancer (HCC)    basal cell on right eyebrow    Decorative tattoo    Esophageal reflux    Essential hypertension    Fibromyalgia    Right side of face    Hemorrhoids    hemoorhoidectomy    History of COVID-19    History of MRSA infection    blister on toe MRSA+ approximately 2012    Infected dental carries    dental extractions    Injury of digital nerve of finger    R finger nerve injury    Metrorrhagia    destruction of lesions, vulva, simple    Migraines    Seizure disorder (HCC)    one isolated seizure after fall and head injury, 18 years ago, no meds    Sinusitis    \"sinus procedure\"    Trigeminal neuralgia    R caniotomy, lyrica    Varicose veins of both lower extremities    Visual impairment    wears eye glasses       PAST SURGICAL HISTORY:   Past Surgical History:   Procedure Laterality Date    Abdominal surgery  2015 2016    Colon blockage, hurnias 2    Brain surgery Right 2005    craniotomy to treat trigeminal neuralgia    Cholecystectomy  2013    Colon surgery  2014    Colonoscopy  10/01/2014        Colonoscopy  05/03/2019        Colonoscopy N/A 1/25/2024    Procedure: COLONOSCOPY;  Surgeon: Suman Bell MD;  Location: [facility] MAIN OR    Cyst removal  2014    D & c  2013    Destruction skin lesion(s)  2010    destrruction of lesions, vulva, simple; metrorrhagia    Dilation/curettage,diagnostic  2013    Excis tumor/avm,deep,hand/fingr Left 10/02/2020    Left small finger mass excision     Extraction erupted tooth/exr  2010    dental extractions, dental carries    Hemorrhoidectomy      Hernia surgery  07/26/2018    incisional hernia repair with mesh, lysis of adhesions, bilateral myocutaneous  advancement    Other surgical history  2015    Exploratory laparotomy and lysis of adhesions with release of internal herniations and resolution of the bowel obstruction    Other surgical history      having dental work     Part removal colon w end colostomy  2014    after bowel obstruction    Revise/repair finger/toe nerve Right 2010    repair R finger nerve injury    Revision of colostomy,complicated  10/13/2014    lap reversal of Symone procedure, extensive lysis of adhesions, partial colon resection, excision of accessory spleen, takedown of splenic flexure, insertion of drains, rigid proctosigmoidoscopy for eval of distal anataomy and bowel prep of distal rectum, ureterolysis    Sinus surgery        to treat sinusitis    Ventral hernia repair  2018    incisional hernia repair with mesh, lysis of adhesions, bilateral myocutaneous advancement       CURRENT MEDICATIONS:    No outpatient medications have been marked as taking for the 10/18/24 encounter (Office Visit) with Cassidy Fonseca MD.      Romosozumab-aqqg SOSY 210 mg  210 mg Subcutaneous Q30 Days Susie Madison MD   210 mg at 10/17/24 1021         ALLERGIES:  No Known Allergies    SOCIAL HISTORY:    Social History     Socioeconomic History    Marital status:    Tobacco Use    Smoking status: Former     Current packs/day: 0.00     Average packs/day: 0.5 packs/day for 4.0 years (2.0 ttl pk-yrs)     Types: Cigarettes     Start date: 2014     Quit date: 2018     Years since quittin.2    Smokeless tobacco: Never    Tobacco comments:     Quit smoking   Vaping Use    Vaping status: Never Used   Substance and Sexual Activity    Alcohol use: No    Drug use: No   Other Topics Concern    Caffeine Concern Yes     Comment: coffee, 2 cups daily    Exercise Yes     Comment: walking dog    Right Handed Yes       FAMILY HISTORY:   Family History   Problem Relation Age of Onset    Diabetes Father     Heart Disorder Father      Arthritis Mother     Seizure Disorder Mother     Hypertension Mother     Fibromyalgia Mother     Cancer Maternal Grandfather         lung; smoker    Hypertension Brother     Bleeding Disorders Neg     Clotting Disorder Neg            PHYSICAL EXAM:   Height: 5' 6\" (167.6 cm) (10/18 1455)  Weight: 147 lb (66.7 kg) (10/18 1455)  BSA (Calculated - sq m): 1.75 sq meters (10/18 1455)  Pulse: 72 (10/18 1455)  BP: 106/64 (10/18 1455)  Temp: --  Do Not Use - Resp Rate: --  SpO2: --    Body mass index is 23.73 kg/m².          CONSTITUTIONAL:  Awake and alert. Age appropriate, good hygiene not in acute distress. Well nourished and well developed. no acute distress   PSYCH:   Orientated to time, place, person & situation, Normal mood and affect, memory intact, normal insight and judgment, cooperative  Neuro: speech is clear. Awake, alert, no aphasia, no facial asymmetry, no nuchal rigidity  EYES:  No proptosis, no ptosis, conjunctiva normal  ENT:  Normocephalic, atraumatic  Eye: EOMI, normal lids, no discharge, no conjunctival erythema. No exophthalmos/proptosis, Ptosis negative   No rhinorrhea, moist oral mucosa       DATA:     Pertinent data reviewed   Latest Reference Range & Units 10/08/24 09:57   Glucose 70 - 99 mg/dL 141 (H)   Sodium 136 - 145 mmol/L 138   Potassium 3.5 - 5.1 mmol/L 3.6   Chloride 98 - 112 mmol/L 101   Carbon Dioxide, Total 21.0 - 32.0 mmol/L 28.0   BUN 9 - 23 mg/dL 20   CREATININE 0.55 - 1.02 mg/dL 1.12 (H)   CALCIUM 8.7 - 10.4 mg/dL 9.5   BUN/CREATININE RATIO 10.0 - 20.0  17.9   EGFR >=60 mL/min/1.73m2 56 (L)   ANION GAP 0 - 18 mmol/L 9   CALCULATED OSMOLALITY 275 - 295 mOsm/kg 291   ALKALINE PHOSPHATASE 46 - 118 U/L 102   AST (SGOT) <34 U/L 17   ALT (SGPT) 10 - 49 U/L 8 (L)   Total Bilirubin 0.2 - 1.1 mg/dL 0.6   Globulin 2.0 - 3.5 g/dL 2.5   Magnesium, Serum 1.6 - 2.6 mg/dL 2.8 (H)   PHOSPHORUS 2.4 - 5.1 mg/dL 3.0   A/G Ratio 1.0 - 2.0  1.8   PROTEIN, TOTAL 5.7 - 8.2 g/dL 7.1   Albumin 3.2 - 4.8 g/dL  4.6   Patient Fasting for CMP?  No   PTH INTACT 18.5 - 88.0 pg/mL 113.6 (H)   (H): Data is abnormally high  (L): Data is abnormally low   Latest Reference Range & Units 10/15/24 07:30   CREAT UR CALC 0.60 - 1.80 g/24 hr 0.90   Urine Volume 24Hr mL  mL 2,900  2,900   CALCIUM URINE CALC 100 - 300 mg/24hr 203           Latest Reference Range & Units 04/10/24 09:18   CALCIUM 8.7 - 10.4 mg/dL 9.6   BUN/CREATININE RATIO 10.0 - 20.0  17.0   EGFR >=60 mL/min/1.73m2 60      Latest Reference Range & Units 04/10/24 09:18   PTH INTACT 18.5 - 88.0 pg/mL 186.5 (H)     Vit D 1,25 di-OH 87.5 High         Latest Reference Range & Units 04/10/24 09:18   VITAMIN D, 25-OH, TOTAL 30.0 - 100.0 ng/mL 60.1      Latest Reference Range & Units 04/10/24 09:18   ALKALINE PHOSPHATASE 46 - 118 U/L 107      Latest Reference Range & Units 04/10/24 09:18   Magnesium, Serum 1.6 - 2.6 mg/dL 2.5   PHOSPHORUS 2.4 - 5.1 mg/dL 3.5      Latest Reference Range & Units 04/10/24 08:30   CREAT UR CALC 0.60 - 1.80 g/24 hr 1.09   Urine Volume 24Hr mL  mL 2,400  2,400   CALCIUM URINE CALC 100 - 300 mg/24hr 192     DXA 12/2023  CONCLUSION:   1. Scans of the lumbar spine and left hip are consistent with osteoporosis, which according to World Health Organization criteria place the patient at a severely increased risk for fracture.      2. Based on left femoral neck bone mineral density, the FRAX 10 year probability of a major osteoporotic fracture is 12% and the 10 year probability of a hip fracture is 3.7%.           No results for input(s): \"TSH\", \"T4F\", \"T3F\", \"THYP\" in the last 72 hours.  No results found.        Orders Placed This Encounter   Procedures    Basic Metabolic Panel (8)    PTH, Intact     Orders Placed This Encounter    Basic Metabolic Panel (8)     Standing Status:   Future     Standing Expiration Date:   10/18/2025     Order Specific Question:   Release to patient     Answer:   Immediate    PTH, Intact     Standing Status:   Future     Standing  Expiration Date:   10/18/2025          This is a specialized patient consultation in endocrinology and required comprehensive review of prior records, as well as current evaluation, with time required for consideration of complex endocrine issues and consultation. For this visit, I personally interviewed the patient, and family member if accompanied, performed the pertinent parts of the history and physical examination. ROS included screening for appropriate endocrine conditions.   Today's diagnosis and plan were reviewed in detail with the patient who states understanding and agrees with plan. I discussed with the patient possible diagnosis, differential diagnosis, need for work up , treatment options, alternatives and side effects.     Please see note for details about time spent which includes:   · pre-visit preparation  · reviewing records  · face to face time with the patient   · timely documentation of the encounter  · ordering medications/tests  · communication with care team  · care coordination    I appreciate the opportunity to be part of your patient's medical care and will keep you, as the referring and primary physicians, informed about the care of your patient, including possible future surgery and pathology findings. Please feel free to contact me should you have any questions.      Cassidy Fonseca MD

## 2024-10-18 NOTE — PATIENT INSTRUCTIONS
Take vitamin D3 2000 international unit a day and calcium carbonate 600 mg twice a day or 3 servings of dairy a day     Labs and f/u in 6 mo  Before next visit, Fasting AM labs    Do weight bearing exercise   Follow fall precautions

## 2024-10-18 NOTE — TELEPHONE ENCOUNTER
Please review. Protocol Failed; No Protocol    Requested Prescriptions   Pending Prescriptions Disp Refills    potassium chloride 20 MEQ Oral Tab CR 90 tablet 1     Sig: Take 1 tablet (20 mEq total) by mouth daily.       There is no refill protocol information for this order            Future Appointments         Provider Department Appt Notes    Today Cassidy Fonseca MD [facility] Bloodwork results    In 1 week Quintin Harman DO [facility] Back and neck    In 1 week Bryon Lerma MD [facility] Hematology Oncology follow up visit--[place]    In 3 weeks Cassidy Fonseca MD [facility] 6 mon    In 1 month [facility] RN RHEUMATOLOGY [facility] Evenity    In 1 month Susie Madison MD [facility] Back and new neck issues    In 5 months Susie Madison MD [facility] fu          Recent Outpatient Visits              Today Hyperparathyroidism (HCC)    [facility] Cassidy Fonseca MD    Office Visit    Yesterday Age-related osteoporosis without current pathological fracture    [facility]    Nurse Only    1 month ago Age-related osteoporosis without current pathological fracture    [facility]    Nurse Only    1 month ago Cervical pain (neck)    [facility] Quintin Harman DO    Office Visit    2 months ago Age-related osteoporosis without current pathological fracture    [facility]    Nurse Only

## 2024-10-21 RX ORDER — POTASSIUM CHLORIDE 1500 MG/1
20 TABLET, EXTENDED RELEASE ORAL DAILY
Qty: 90 TABLET | Refills: 1 | Status: SHIPPED | OUTPATIENT
Start: 2024-10-21

## 2024-10-25 ENCOUNTER — OFFICE VISIT (OUTPATIENT)
Dept: FAMILY MEDICINE CLINIC | Facility: CLINIC | Age: 59
End: 2024-10-25
Payer: COMMERCIAL

## 2024-10-25 VITALS
OXYGEN SATURATION: 96 % | WEIGHT: 146 LBS | SYSTOLIC BLOOD PRESSURE: 118 MMHG | BODY MASS INDEX: 23.46 KG/M2 | HEART RATE: 85 BPM | DIASTOLIC BLOOD PRESSURE: 72 MMHG | HEIGHT: 66 IN

## 2024-10-25 DIAGNOSIS — M47.816 LUMBAR SPONDYLOSIS: Primary | ICD-10-CM

## 2024-10-25 DIAGNOSIS — M48.02 CERVICAL STENOSIS OF SPINE: ICD-10-CM

## 2024-10-25 DIAGNOSIS — M48.062 LUMBAR STENOSIS WITH NEUROGENIC CLAUDICATION: ICD-10-CM

## 2024-10-25 PROCEDURE — 3078F DIAST BP <80 MM HG: CPT | Performed by: FAMILY MEDICINE

## 2024-10-25 PROCEDURE — 3008F BODY MASS INDEX DOCD: CPT | Performed by: FAMILY MEDICINE

## 2024-10-25 PROCEDURE — 3074F SYST BP LT 130 MM HG: CPT | Performed by: FAMILY MEDICINE

## 2024-10-25 PROCEDURE — 99214 OFFICE O/P EST MOD 30 MIN: CPT | Performed by: FAMILY MEDICINE

## 2024-10-25 RX ORDER — HYDROCODONE BITARTRATE AND ACETAMINOPHEN 10; 325 MG/1; MG/1
1 TABLET ORAL EVERY 4 HOURS PRN
Qty: 180 TABLET | Refills: 0 | Status: SHIPPED | OUTPATIENT
Start: 2024-11-03

## 2024-10-25 NOTE — PROGRESS NOTES
Subjective:   Esha Hernadez is a 59 year old female who presents for Pain       History/Other:    Chief Complaint Reviewed and Verified  No Further Nursing Notes to   Review  Tobacco Reviewed  Allergies Reviewed  Medications Reviewed    Problem List Reviewed  Medical History Reviewed  Surgical History   Reviewed  Family History Reviewed  Social History Reviewed         Tobacco:  She smoked tobacco in the past but quit greater than 12 months ago.  Social History     Tobacco Use   Smoking Status Former    Current packs/day: 0.00    Average packs/day: 0.5 packs/day for 4.0 years (2.0 ttl pk-yrs)    Types: Cigarettes    Start date: 2014    Quit date: 2018    Years since quittin.2   Smokeless Tobacco Never   Tobacco Comments    Quit smoking        Current Outpatient Medications   Medication Sig Dispense Refill    potassium chloride 20 MEQ Oral Tab CR Take 1 tablet (20 mEq total) by mouth daily. 90 tablet 1    temazepam 15 MG Oral Cap Take 1 capsule (15 mg total) by mouth nightly as needed for Sleep. 90 capsule 1    Ospemifene (OSPHENA) 60 MG Oral Tab Take 1 tablet by mouth daily. 30 tablet 0    pregabalin 75 MG Oral Cap Take 1 capsule (75 mg total) by mouth 3 (three) times daily. 90 capsule 0    HYDROcodone-acetaminophen  MG Oral Tab Take 1 tablet by mouth every 6 (six) hours as needed for Pain. 120 tablet 0    ondansetron 4 MG Oral Tablet Dispersible Place 1 tablet (4 mg total) under the tongue every 8 (eight) hours as needed for Nausea. 90 tablet 5    calcium carbonate 500 MG Oral Chew Tab Chew 1 tablet (500 mg total) by mouth daily.      spironolactone 25 MG Oral Tab Take 1 tablet (25 mg total) by mouth daily.      torsemide 20 MG Oral Tab Take 1 tablet (20 mg total) by mouth 2 (two) times daily.      Digestive Enzymes (DIGESTIVE ENZYME OR) Take 1 capsule by mouth daily.      docusate sodium 100 MG Oral Cap Take 1 capsule (100 mg total) by mouth daily.      dicyclomine 20 MG Oral Tab  Take 1-2 tablets (20-40 mg total) by mouth 3 (three) times daily as needed. 90 tablet 3    Omeprazole 40 MG Oral Capsule Delayed Release Take 1 capsule (40 mg total) by mouth before breakfast. 90 capsule 3    baclofen 10 MG Oral Tab Take 1 tablet (10 mg total) by mouth 2 (two) times daily. 60 tablet 5    MOVANTIK 25 MG Oral Tab  (Patient not taking: Reported on 7/16/2024)      Prucalopride Succinate (MOTEGRITY) 2 MG Oral Tab Take 1 tablet by mouth daily. 90 tablet 3    Multiple Vitamins-Minerals (MULTI-VITAMIN/MINERALS) Oral Tab Take 1 tablet by mouth daily.           Review of Systems:  Review of Systems   Constitutional: Negative.    Musculoskeletal:  Positive for back pain.   Neurological:  Positive for numbness.         Objective:   /72   Pulse 85   Ht 5' 6\" (1.676 m)   Wt 146 lb (66.2 kg)   SpO2 96%   BMI 23.57 kg/m²  Estimated body mass index is 23.57 kg/m² as calculated from the following:    Height as of this encounter: 5' 6\" (1.676 m).    Weight as of this encounter: 146 lb (66.2 kg).  Physical Exam  Vitals reviewed.   Musculoskeletal:      Lumbar back: Tenderness present. Decreased range of motion. Negative right straight leg raise test and negative left straight leg raise test.   Neurological:      Sensory: Sensory deficit present.      Motor: No weakness.      Coordination: Coordination is intact.      Comments: Left lower leg numbness           Assessment & Plan:   1. Lumbar spondylosis (Primary)  2. Lumbar stenosis with neurogenic claudication  3. Cervical stenosis of spine  Can change to q 4 hour dosing.  Seeing two surgeons for opinions on next step for surgery.        No follow-ups on file.    Quintin Harman DO, 10/25/2024, 2:24 PM

## 2024-10-31 ENCOUNTER — OFFICE VISIT (OUTPATIENT)
Dept: HEMATOLOGY/ONCOLOGY | Facility: HOSPITAL | Age: 59
End: 2024-10-31
Attending: INTERNAL MEDICINE
Payer: COMMERCIAL

## 2024-10-31 VITALS
TEMPERATURE: 97 F | BODY MASS INDEX: 23.08 KG/M2 | WEIGHT: 145.31 LBS | SYSTOLIC BLOOD PRESSURE: 107 MMHG | DIASTOLIC BLOOD PRESSURE: 70 MMHG | HEIGHT: 66.5 IN | OXYGEN SATURATION: 100 % | RESPIRATION RATE: 16 BRPM | HEART RATE: 68 BPM

## 2024-10-31 DIAGNOSIS — R76.8 ELEVATED SERUM IMMUNOGLOBULIN FREE LIGHT CHAIN LEVEL: ICD-10-CM

## 2024-10-31 DIAGNOSIS — Z87.891 HISTORY OF TOBACCO USE: ICD-10-CM

## 2024-10-31 DIAGNOSIS — R16.1 SPLENOMEGALY: Primary | ICD-10-CM

## 2024-10-31 PROCEDURE — 99214 OFFICE O/P EST MOD 30 MIN: CPT | Performed by: INTERNAL MEDICINE

## 2024-10-31 NOTE — PROGRESS NOTES
Hematology Progress Note    Patient Name: Esha Hernadez   YOB: 1965   Medical Record Number: [id number]   CSN: [id number]   Consulting Physician: Bryon Lerma MD  Referring Physician(s): No ref. provider found  Date of Visit: 10/31/2024    Chief complaint:   [place] chain elevation, splenomegaly    History of Present Illness:     Esha Hernadez is a 59 year old female that was seen today in the hematology suite for the above conditions.  Patient has past medical history significant for regular use of Lasix for the last 10 years associated with lower extremity swelling presenting for splenomegaly and likely resulting thrombocytopenia.  Review of prior imaging shows patient has had splenomegaly since 2011 with size ranging from 15 to up to 19 cm, current imaging shows spleen at 18.2 cm. Esha began the development of mild thrombocytopenia of the last 3 years in the setting of normal hemoglobin/hematocrit, WBC and differential. Esha's review of systems is pertinent for fatigue, night sweats over the last year and a half, decreased appetite with some unintentional weight loss over the last 3 weeks.  She also reports nausea occasional abdominal pain symptoms and some low back pain that began last week.  Her review of systems is otherwise negative.    Her prior evaluation in 8/2020 revealed a slight increase in light chain suggestive of possible light chain MGUS.  Otherwise, has a small pulmonary nodule was recommended for follow-up CT chest in 12 months. Her cardiac evaluation shows preserved EF but there were some signs of cardiac calcifications noted on other imaging obtained during this evaluation.    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 Her marrow evaluation shows the presence of about 2% plasma cells involved with MGUS.  No signs of lymphoma, leukemia, metastatic disease identified.  Patient is  noted to have trilineage hematopoiesis.    Interval History:  Patient returns for follow up of previously classified MGUS and splenomegaly. She some chronic fatigue which is stable. Otherwise, no systemic signs of illness, she has no unintentional weight loss. Esha denies bleeding, TIA or stroke symptoms, lymphadenopathy, no hyperviscosity symptoms.     Patient has a history of IBS and follows with Dr. Bell in GI for the chronic nausea, regular abdominal pain with intermittent constipation. Esha has had upset stomach issues since 2015 from a stomach blockage problem that lead to colostomy bag which has been reversed. However, Esha does not really eat a lot due to that problem.       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, left    Left small finger mass excision     Bowel obstruction (HCC)    Cancer (HCC)    basal cell on right eyebrow    Decorative tattoo    Esophageal reflux    Essential hypertension    Fibromyalgia    Right side of face    Hemorrhoids    hemoorhoidectomy    History of COVID-19    History of MRSA infection    blister on toe MRSA+ approximately 2012    Infected dental carries    dental extractions    Injury of digital nerve of finger    R finger nerve injury    Metrorrhagia    destruction of lesions, vulva, simple    Migraines    Seizure disorder (HCC)    one isolated seizure after fall and head injury, 18 years ago, no meds    Sinusitis    \"sinus procedure\"    Trigeminal neuralgia    R caniotomy, lyrica    Varicose veins of both lower extremities    Visual impairment    wears eye glasses       Past Surgical History:  Past Surgical History:   Procedure Laterality Date    Abdominal surgery  2015 2016    Colon blockage, hurnias 2    Brain surgery Right 2005    craniotomy to treat trigeminal neuralgia    Cholecystectomy  2013    Colon surgery  2014    Colonoscopy  10/01/2014        Colonoscopy  05/03/2019        Colonoscopy N/A  1/25/2024    Procedure: COLONOSCOPY;  Surgeon: Suman Bell MD;  [facility]    Cyst removal  2014    D & c  2013    Destruction skin lesion(s)  2010    destrruction of lesions, vulva, simple; metrorrhagia    Dilation/curettage,diagnostic  2013    Excis tumor/avm,deep,hand/fingr Left 10/02/2020    Left small finger mass excision     Extraction erupted tooth/exr  2010    dental extractions, dental carries    Hemorrhoidectomy      Hernia surgery  07/26/2018    incisional hernia repair with mesh, lysis of adhesions, bilateral myocutaneous advancement    Other surgical history  03/26/2015    Exploratory laparotomy and lysis of adhesions with release of internal herniations and resolution of the bowel obstruction    Other surgical history      having dental work     Part removal colon w end colostomy  07/13/2014    after bowel obstruction    Revise/repair finger/toe nerve Right 2010    repair R finger nerve injury    Revision of colostomy,complicated  10/13/2014    lap reversal of Symone procedure, extensive lysis of adhesions, partial colon resection, excision of accessory spleen, takedown of splenic flexure, insertion of drains, rigid proctosigmoidoscopy for eval of distal anataomy and bowel prep of distal rectum, ureterolysis    Sinus surgery        to treat sinusitis    Ventral hernia repair  07/26/2018    incisional hernia repair with mesh, lysis of adhesions, bilateral myocutaneous advancement       Family Medical History:  Family History   Problem Relation Age of Onset    Diabetes Father     Heart Disorder Father     Arthritis Mother     Seizure Disorder Mother     Hypertension Mother     Fibromyalgia Mother     Cancer Maternal Grandfather         lung; smoker    Hypertension Brother     Bleeding Disorders Neg     Clotting Disorder Neg        Social History:  Social History     Social History Narrative    Esha is  x 35 yrs to Thad. Mother of 2 children. 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 She and her family live in [place], [place].        The patient does not use an assistive device..      The patient does live in a home with stairs.         Allergies:   No Known Allergies    Current Medications:   [START ON 11/3/2024] HYDROcodone-acetaminophen  MG Oral Tab Take 1 tablet by mouth every 4 (four) hours as needed for Pain. 180 tablet 0    potassium chloride 20 MEQ Oral Tab CR Take 1 tablet (20 mEq total) by mouth daily. 90 tablet 1    temazepam 15 MG Oral Cap Take 1 capsule (15 mg total) by mouth nightly as needed for Sleep. 90 capsule 1    Ospemifene (OSPHENA) 60 MG Oral Tab Take 1 tablet by mouth daily. 30 tablet 0    pregabalin 75 MG Oral Cap Take 1 capsule (75 mg total) by mouth 3 (three) times daily. 90 capsule 0    dicyclomine 20 MG Oral Tab Take 1-2 tablets (20-40 mg total) by mouth 3 (three) times daily as needed. 90 tablet 3    ondansetron 4 MG Oral Tablet Dispersible Place 1 tablet (4 mg total) under the tongue every 8 (eight) hours as needed for Nausea. 90 tablet 5    Omeprazole 40 MG Oral Capsule Delayed Release Take 1 capsule (40 mg total) by mouth before breakfast. 90 capsule 3    calcium carbonate 500 MG Oral Chew Tab Chew 1 tablet (500 mg total) by mouth daily.      baclofen 10 MG Oral Tab Take 1 tablet (10 mg total) by mouth 2 (two) times daily. 60 tablet 5    Prucalopride Succinate (MOTEGRITY) 2 MG Oral Tab Take 1 tablet by mouth daily. 90 tablet 3    spironolactone 25 MG Oral Tab Take 1 tablet (25 mg total) by mouth daily.      torsemide 20 MG Oral Tab Take 1 tablet (20 mg total) by mouth 2 (two) times daily.      Digestive Enzymes (DIGESTIVE ENZYME OR) Take 1 capsule by mouth daily.      Multiple Vitamins-Minerals (MULTI-VITAMIN/MINERALS) Oral Tab Take 1 tablet by mouth daily.      docusate sodium 100 MG Oral Cap Take 1 capsule (100 mg total) by mouth daily.        Romosozumab-aqqg SOSY 210 mg  210 mg Subcutaneous Q30 Days Susie Madison MD   210 mg at 10/17/24 1025        Review of Systems:    Constitutional: Negative for chills, fevers. +fatigue, no reports of night sweats, no decreased appetite with increased weight noted over the last  6 months  Eyes: Negative for visual disturbance, irritation and redness.  Respiratory: Negative for cough, hemoptysis, chest pain, or dyspnea on exertion.  Gastrointestinal: Negative for dysphagia, odynophagia, reflux symptoms, nausea, vomiting, change in bowel habits, diarrhea, +nausea with some abdominal pain; consistent with IBS with constipation--chronic  Integument/breast: Negative for rash, skin lesions, and pruritus.  Hematologic/lymphatic: Negative for easy bruising, bleeding, and lymphadenopathy.  Musculoskeletal: +lower back pain; intermittent and working with orthopedic surgery  Neurological: Negative for headaches, dizziness, speech problems, gait problems and weakness.    A comprehensive 14 point review of systems was completed.  Pertinent positives and negatives noted in the the HPI.     Vital Signs:  /70 (BP Location: Left arm, Patient Position: Sitting, Cuff Size: adult)   Pulse 68   Temp 97.4 °F (36.3 °C) (Oral)   Resp 16   Ht 1.689 m (5' 6.5\")   Wt 65.9 kg (145 lb 4.8 oz)   SpO2 100%   BMI 23.10 kg/m²     Physical Examination:    General: Patient is alert and oriented x 3, not in acute distress.  Psych: Friendly, cooperative with appropriate questions and responses  HEENT: EOMs intact. Oropharynx is clear.   Neck:  No palpable lymphadenopathy. Neck is supple.  Lymphatics: There is no palpable lymphadenopathy throughout in the cervical, supraclavicular, axillary, or inguinal regions.  Chest: Clear to auscultation. No wheezes or rales.  Heart: Regular rate and rhythm. S1S2 normal.  Abdomen: Soft, non tender with good bowel sounds.  Extremities: No edema or calf tenderness.  Neurological: Grossly intact.      Labs:    Lab Results   Component Value Date/Time    WBC 3.7 (L) 09/13/2024 10:12 AM    RBC 3.94 09/13/2024  10:12 AM    HGB 12.5 09/13/2024 10:12 AM    HCT 34.9 (L) 09/13/2024 10:12 AM    MCV 88.6 09/13/2024 10:12 AM    MCH 31.7 09/13/2024 10:12 AM    MCHC 35.8 09/13/2024 10:12 AM    RDW 12.9 09/13/2024 10:12 AM    NEPRELIM 1.62 09/13/2024 10:12 AM    .0 (L) 09/13/2024 10:12 AM       Lab Results   Component Value Date/Time     (H) 10/08/2024 09:57 AM    BUN 20 10/08/2024 09:57 AM    CREATSERUM 1.12 (H) 10/08/2024 09:57 AM    GFRNAA 51 (L) 03/05/2022 11:04 AM    CA 9.5 10/08/2024 09:57 AM    ALB 4.6 10/08/2024 09:57 AM     10/08/2024 09:57 AM    K 3.6 10/08/2024 09:57 AM     10/08/2024 09:57 AM    CO2 28.0 10/08/2024 09:57 AM    ALKPHO 102 10/08/2024 09:57 AM    AST 17 10/08/2024 09:57 AM    ALT 8 (L) 10/08/2024 09:57 AM     Imaging:    US spleen imaging from 4/2024 reviewed with pt     Impression:      ICD-10-CM    1. Splenomegaly  R16.1 US SPLEEN (CPT=76705)            59 year old W with PMH relevant for  regular use of Lasix for the last 10 years associated with lower extremity swelling presenting for splenomegaly and likely resulting thrombocytopenia    Plan:    1.) history of Splenomegaly(present for >10yrs; since 2011)  --I discussed with patient my strong clinical suspicion for this to be either undiagnosed heart failure based on her regular use of Lasix in the last 10 years associated with lower extremity edema; rec for TTE to assess her cardiac function  --her EF was preserved, but still recommended she follows up with a cardiologist    --I originally suspected her splenomegaly to be possibly indolent lymphoma as imaging showed the spleen to be enlarged since 2011 with size ranging from 15 cm up to over 19cm two years ago, now at 18.2cm, without liver enlargement  --In light of her fatigue, night sweats, decreased appetite, unintentional weight loss recommending CT soft tissue neck and chest to rule out possible lymphoma as she just completed CT abdominal imaging    --In 8/2020, I was  pleased to inform her that her imaging shows no concerning lymphadenopathy via CT nect+chest. She is already working with GI for the thickening of distal stomach and proximal duodenum noted on CT abdomen on 8/1/2020 prior to consultation    --her repeat CT chest from 8/20/21 shows 2.5 x 0.4 prevascular lymph node is unchanged in size and configuration since August of last year measuring less than 1 cm in short axis.     --discussed her negative HIV and hepatitis testing results as well as negative screen for autoimmune phenomenon with UBALDO with reflex titer  --she was found to be NEGATIVE for the [id number] mutation and EXON 12 mutation    --In 8/2020, she was found to only have increased kappa light chains. Discussed with her then, that this may just be a light chain MGUS.  Recommending repeat MGUS testing in 6 months    --In late 2/2021, she returned for planned follow up and her repeat testing shows she has an IgM lambda MGUS with M spike of 0.17 g/dL. Pt has no hypercalcemia, some mild renal impairment may be secondary dehydration.  Her labs showed some anemia but not consistent with myeloma, skeletal survey shows no evidence of osteoblastic or osteolytic lesions    --we have discussed evaluating her bone marrow to look for cause for the splenomegaly likely causing her thrombocytopenia. She was noted to have a new mild leukopenia as well. I have discussed a plan of care for her to have a repeat CBC drawnp prior to her procedure to help bone marrow evaluation results. We discussed the possibility of notable risk for bleeding, infection or pain with the procedure    --Patient reports comfortable with undergoing bone marrow evaluation now to assess if there is a malignant hematological disease process present. Favorably, her repeat CBC+differential which is now within normal limits.     --However, as she has an IgM subclass and inform the patient that she might have another condition known as Waldenstrom's  macroglobulinemia, which can be associated with hyperviscosity syndrome and the presence of >10% plasma cells in the bone marrow. The difference in plasma cells present separates this condition from possible IgM MGUS.  Patient denies any stroke symptoms    --Esha underwent a bone marrow evaluation on 3/1/2021 showing presence of about 2% plasma cells involved with MGUS.  No signs of lymphoma, leukemia, metastatic disease identified.  Patient is noted to have trilineage hematopoiesis.     --We have now evaluated her bone marrow shows no signs of aggressive malignant process only MGUS with low percentage involvement with plasma cells    --Therefore, would rec she explores other nonmalignant causes for her splenomegaly    --We reviewed her CT chest from 8/22/203; pulmonary micronodules are stable for 2yrs suggestive of benign process    --reviewed with pt her CT abdomen/pelvis imaging from 9/2022 ordered in light of worsening night sweat concerns. This scan does not suggest a lymphoma or a hematologic malignant process; splenomegaly was noted as stable. She was seen by GI and underwent MRI with MRCP in light of pancreatic and hepatobiliary ductal dilation in 12/2022; spleen had decreased in size down to 17.7; does not seem suggestive of a malignant process as this has decreased without tx for a cancer    --suspect the splenomegaly is from heart failure as she is being managed on torsemide and Aldactone. Pt is no longer endorsing night sweats    --Esha has met with Dr. Yobani Ahn for the splenomegaly in 12/222 who rec observation over splenectomy as she is asymptomatic    --discussed that biopsy of the spleen can come with potential bleeding complications. We reviewed that if she were to have a low grade lymphoma, there would be no indication for tx based on her lack of systemic signs of illness    --I repeated splenic ultrasound to assess size in April, 2024; decreased in size to 16.6, which is favorable  --pt will f/u  in 1 yr with a repeat splenic ultrasound      2.) Kappa light chain elevation with hx of IBS    --her current labs do not show a monoclonal protein presence.  Patient has mild kappa light chain elevation with normal serum free light chain ratio; therefore, does not suggest there is an MGUS  --Discussed with patient this is inflammatory likely due to her IBS and not suggestive of a plasma cell dyscrasia, so does not need routine monitoring of this lab at follow up      3.) History of tobacco use    --her repeat CT chest in 8/2023 based on her tobacco use history; prev nodules are likely benign as they have not changed in 2 yrs.   --I have rec the pt continues with low dose SCREENING CT scans with her primary doc or the pulmonary clinic      MDM: Moderate Risk    Bryon Lerma MD  [facility]  [facility]  [address] [place] [phone]

## 2024-11-03 ENCOUNTER — APPOINTMENT (OUTPATIENT)
Dept: URGENT CARE | Age: 59
End: 2024-11-03

## 2024-11-03 VITALS
TEMPERATURE: 97.8 F | RESPIRATION RATE: 16 BRPM | OXYGEN SATURATION: 99 % | BODY MASS INDEX: 22.5 KG/M2 | HEART RATE: 75 BPM | DIASTOLIC BLOOD PRESSURE: 78 MMHG | WEIGHT: 140 LBS | HEIGHT: 66 IN | SYSTOLIC BLOOD PRESSURE: 122 MMHG

## 2024-11-03 DIAGNOSIS — R05.1 ACUTE COUGH: ICD-10-CM

## 2024-11-03 DIAGNOSIS — J02.9 SORE THROAT: ICD-10-CM

## 2024-11-03 DIAGNOSIS — J01.00 ACUTE NON-RECURRENT MAXILLARY SINUSITIS: Primary | ICD-10-CM

## 2024-11-03 DIAGNOSIS — R50.9 FEVER, UNSPECIFIED FEVER CAUSE: ICD-10-CM

## 2024-11-03 LAB
FLUAV AG UPPER RESP QL IA.RAPID: NEGATIVE
FLUBV AG UPPER RESP QL IA.RAPID: NEGATIVE
INTERNAL PROCEDURAL CONTROLS ACCEPTABLE: YES
S PYO AG THROAT QL IA.RAPID: NEGATIVE
SARS-COV+SARS-COV-2 AG RESP QL IA.RAPID: NOT DETECTED
TEST LOT EXPIRATION DATE: NORMAL
TEST LOT EXPIRATION DATE: NORMAL
TEST LOT NUMBER: NORMAL
TEST LOT NUMBER: NORMAL

## 2024-11-03 PROCEDURE — 87880 STREP A ASSAY W/OPTIC: CPT | Performed by: NURSE PRACTITIONER

## 2024-11-03 PROCEDURE — 3078F DIAST BP <80 MM HG: CPT | Performed by: NURSE PRACTITIONER

## 2024-11-03 PROCEDURE — 99213 OFFICE O/P EST LOW 20 MIN: CPT | Performed by: NURSE PRACTITIONER

## 2024-11-03 PROCEDURE — 87428 SARSCOV & INF VIR A&B AG IA: CPT | Performed by: NURSE PRACTITIONER

## 2024-11-03 PROCEDURE — 3074F SYST BP LT 130 MM HG: CPT | Performed by: NURSE PRACTITIONER

## 2024-11-03 RX ORDER — BENZONATATE 200 MG/1
200 CAPSULE ORAL EVERY 8 HOURS PRN
Qty: 20 CAPSULE | Refills: 0 | Status: SHIPPED | OUTPATIENT
Start: 2024-11-03

## 2024-11-03 RX ORDER — HYDROCODONE BITARTRATE AND ACETAMINOPHEN 10; 325 MG/1; MG/1
1 TABLET ORAL
COMMUNITY
Start: 2024-11-03

## 2024-11-03 ASSESSMENT — ENCOUNTER SYMPTOMS
COUGH: 1
CHILLS: 0
SORE THROAT: 1
SINUS PAIN: 1
FEVER: 1
SHORTNESS OF BREATH: 0
SINUS PRESSURE: 1
HEADACHES: 1
TROUBLE SWALLOWING: 0
WHEEZING: 0

## 2024-11-04 RX ORDER — OSPEMIFENE 60 MG/1
1 TABLET, FILM COATED ORAL DAILY
Qty: 30 TABLET | Refills: 0 | OUTPATIENT
Start: 2024-11-04

## 2024-11-05 ENCOUNTER — PATIENT MESSAGE (OUTPATIENT)
Dept: OBGYN CLINIC | Facility: CLINIC | Age: 59
End: 2024-11-05

## 2024-11-05 RX ORDER — OSPEMIFENE 60 MG/1
1 TABLET, FILM COATED ORAL DAILY
Qty: 30 TABLET | Refills: 0 | OUTPATIENT
Start: 2024-11-05

## 2024-11-18 ENCOUNTER — NURSE ONLY (OUTPATIENT)
Dept: RHEUMATOLOGY | Facility: CLINIC | Age: 59
End: 2024-11-18
Payer: COMMERCIAL

## 2024-11-18 DIAGNOSIS — M81.0 AGE-RELATED OSTEOPOROSIS WITHOUT CURRENT PATHOLOGICAL FRACTURE: Primary | ICD-10-CM

## 2024-11-18 NOTE — PROGRESS NOTES
Name and  verified. Patient aware she was to receive injections of Evenity. Injections given and patient tolerated well. Possible local side effects discussed with patient. Patient aware to follow up monthly for next injection. Aware to obtain labs every 3 months.     This in Evenity injection # 9/12    Future Appointments   Date Time Provider Department Center   2024 10:00 AM [facility] RN RHEUMATOLOGY [facility]   2024  1:20 PM [facility]   2024 11:45 AM Quintin Harman DO [facility]   2024 10:40 AM Susie Madison MD ECCMARJORIE [facility]   2024 10:00 AM [facility] RN RHEUMATOLOGY [facility]   2024 10:45 AM Quintin Harman DO [facility]   3/17/2025 10:00 AM [facility]   10/31/2025 11:00 AM Bryon Lerma MD [facility] HEM ONC EMO

## 2024-11-21 ENCOUNTER — OFFICE VISIT (OUTPATIENT)
Dept: OBGYN CLINIC | Facility: CLINIC | Age: 59
End: 2024-11-21
Payer: COMMERCIAL

## 2024-11-21 ENCOUNTER — TELEPHONE (OUTPATIENT)
Dept: OBGYN CLINIC | Facility: CLINIC | Age: 59
End: 2024-11-21

## 2024-11-21 VITALS
BODY MASS INDEX: 23.3 KG/M2 | SYSTOLIC BLOOD PRESSURE: 148 MMHG | HEIGHT: 66 IN | DIASTOLIC BLOOD PRESSURE: 86 MMHG | WEIGHT: 145 LBS

## 2024-11-21 DIAGNOSIS — Z12.31 ENCOUNTER FOR SCREENING MAMMOGRAM FOR BREAST CANCER: ICD-10-CM

## 2024-11-21 DIAGNOSIS — Z01.419 ENCOUNTER FOR WELL WOMAN EXAM WITH ROUTINE GYNECOLOGICAL EXAM: Primary | ICD-10-CM

## 2024-11-21 PROCEDURE — 99386 PREV VISIT NEW AGE 40-64: CPT | Performed by: OBSTETRICS & GYNECOLOGY

## 2024-11-21 PROCEDURE — 3077F SYST BP >= 140 MM HG: CPT | Performed by: OBSTETRICS & GYNECOLOGY

## 2024-11-21 PROCEDURE — 3079F DIAST BP 80-89 MM HG: CPT | Performed by: OBSTETRICS & GYNECOLOGY

## 2024-11-21 PROCEDURE — 3008F BODY MASS INDEX DOCD: CPT | Performed by: OBSTETRICS & GYNECOLOGY

## 2024-11-21 RX ORDER — OSPEMIFENE 60 MG/1
1 TABLET, FILM COATED ORAL DAILY
Qty: 90 TABLET | Refills: 3 | Status: SHIPPED | OUTPATIENT
Start: 2024-11-21

## 2024-11-21 NOTE — PROGRESS NOTES
HPI:    Patient ID: Esha Hernadez is a 59 year old year old female.    HPI  New patient  Well woman visit  59-year-old  3 para 3-0-0-2 menopausal female.  History of postmenopausal vaginal dryness treated with ospemifene.  She requests a refill.  She entered menopause at age 44.  From age 45 she has been using vaginal estrogen creams for symptomatic atrophic vaginitis.  She is still sexually active.  Last mammogram 2023.  No complaints.  Review of Systems   Constitutional: Negative.    Cardiovascular: Negative.    Gastrointestinal: Negative.    Genitourinary: Negative.    Skin: Negative.    Neurological: Negative.    Psychiatric/Behavioral: Negative.     All other systems reviewed and are negative.       Current Outpatient Medications   Medication Sig Dispense Refill    HYDROcodone-acetaminophen  MG Oral Tab Take 1 tablet by mouth every 4 (four) hours as needed for Pain. 180 tablet 0    potassium chloride 20 MEQ Oral Tab CR Take 1 tablet (20 mEq total) by mouth daily. 90 tablet 1    temazepam 15 MG Oral Cap Take 1 capsule (15 mg total) by mouth nightly as needed for Sleep. 90 capsule 1    Ospemifene (OSPHENA) 60 MG Oral Tab Take 1 tablet by mouth daily. 30 tablet 0    pregabalin 75 MG Oral Cap Take 1 capsule (75 mg total) by mouth 3 (three) times daily. 90 capsule 0    dicyclomine 20 MG Oral Tab Take 1-2 tablets (20-40 mg total) by mouth 3 (three) times daily as needed. 90 tablet 3    ondansetron 4 MG Oral Tablet Dispersible Place 1 tablet (4 mg total) under the tongue every 8 (eight) hours as needed for Nausea. 90 tablet 5    Omeprazole 40 MG Oral Capsule Delayed Release Take 1 capsule (40 mg total) by mouth before breakfast. 90 capsule 3    calcium carbonate 500 MG Oral Chew Tab Chew 1 tablet (500 mg total) by mouth daily.      baclofen 10 MG Oral Tab Take 1 tablet (10 mg total) by mouth 2 (two) times daily. 60 tablet 5    MOVANTIK 25 MG Oral Tab  (Patient not taking: Reported on  10/31/2024)      Prucalopride Succinate (MOTEGRITY) 2 MG Oral Tab Take 1 tablet by mouth daily. 90 tablet 3    spironolactone 25 MG Oral Tab Take 1 tablet (25 mg total) by mouth daily.      torsemide 20 MG Oral Tab Take 1 tablet (20 mg total) by mouth 2 (two) times daily.      Digestive Enzymes (DIGESTIVE ENZYME OR) Take 1 capsule by mouth daily.      Multiple Vitamins-Minerals (MULTI-VITAMIN/MINERALS) Oral Tab Take 1 tablet by mouth daily.      docusate sodium 100 MG Oral Cap Take 1 capsule (100 mg total) by mouth daily.         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, left    Left small finger mass excision     Bowel obstruction (HCC)    Cancer (HCC)    basal cell on right eyebrow    Decorative tattoo    Esophageal reflux    Essential hypertension    Fibromyalgia    Right side of face    Hemorrhoids    hemoorhoidectomy    History of COVID-19    History of MRSA infection    blister on toe MRSA+ approximately 2012    Infected dental carries    dental extractions    Injury of digital nerve of finger    R finger nerve injury    Metrorrhagia    destruction of lesions, vulva, simple    Migraines    Seizure disorder (HCC)    one isolated seizure after fall and head injury, 18 years ago, no meds    Sinusitis    \"sinus procedure\"    Trigeminal neuralgia    R caniotomy, lyrica    Varicose veins of both lower extremities    Visual impairment    wears eye glasses       Past Surgical History:   Procedure Laterality Date    Abdominal surgery  2015 2016    Colon blockage, hurnias 2    Brain surgery Right 2005    craniotomy to treat trigeminal neuralgia    Cholecystectomy  2013    Colon surgery  2014    Colonoscopy  10/01/2014        Colonoscopy  05/03/2019        Colonoscopy N/A 1/25/2024    Procedure: COLONOSCOPY;  Surgeon: Suman Bell MD;  Location: [facility] MAIN OR    Cyst removal  2014    D & c  2013    Destruction skin lesion(s)  2010    destrruction of lesions,  vulva, simple; metrorrhagia    Dilation/curettage,diagnostic  2013    Excis tumor/avm,deep,hand/fingr Left 10/02/2020    Left small finger mass excision     Extraction erupted tooth/exr  2010    dental extractions, dental carries    Hemorrhoidectomy      Hernia surgery  07/26/2018    incisional hernia repair with mesh, lysis of adhesions, bilateral myocutaneous advancement    Other surgical history  03/26/2015    Exploratory laparotomy and lysis of adhesions with release of internal herniations and resolution of the bowel obstruction    Other surgical history      having dental work     Part removal colon w end colostomy  07/13/2014    after bowel obstruction    Revise/repair finger/toe nerve Right 2010    repair R finger nerve injury    Revision of colostomy,complicated  10/13/2014    lap reversal of Symone procedure, extensive lysis of adhesions, partial colon resection, excision of accessory spleen, takedown of splenic flexure, insertion of drains, rigid proctosigmoidoscopy for eval of distal anataomy and bowel prep of distal rectum, ureterolysis    Sinus surgery        to treat sinusitis    Ventral hernia repair  07/26/2018    incisional hernia repair with mesh, lysis of adhesions, bilateral myocutaneous advancement       Family History   Problem Relation Age of Onset    Diabetes Father     Heart Disorder Father     Arthritis Mother     Seizure Disorder Mother     Hypertension Mother     Fibromyalgia Mother     Cancer Maternal Grandfather         lung; smoker    Hypertension Brother     Bleeding Disorders Neg     Clotting Disorder Neg        Social History     Socioeconomic History    Marital status:      Spouse name: Not on file    Number of children: Not on file    Years of education: Not on file    Highest education level: Not on file   Occupational History    Not on file   Tobacco Use    Smoking status: Former     Current packs/day: 0.00     Average packs/day: 0.5 packs/day for 4.0 years (2.0 ttl  pk-yrs)     Types: Cigarettes     Start date: 2014     Quit date: 2018     Years since quittin.3     Passive exposure: Never    Smokeless tobacco: Never    Tobacco comments:     Quit smoking   Vaping Use    Vaping status: Never Used   Substance and Sexual Activity    Alcohol use: No    Drug use: No    Sexual activity: Not on file   Other Topics Concern     Service Not Asked    Blood Transfusions Not Asked    Caffeine Concern Yes     Comment: coffee, 2 cups daily    Occupational Exposure Not Asked    Hobby Hazards Not Asked    Sleep Concern Not Asked    Stress Concern Not Asked    Weight Concern Not Asked    Special Diet Not Asked    Back Care Not Asked    Exercise Yes     Comment: walking dog    Bike Helmet Not Asked    Seat Belt Not Asked    Self-Exams Not Asked    Left Handed Not Asked    Right Handed Yes    Currently spends a great deal of time in the sun Not Asked    Past Sunlamp Treatments for Acne Not Asked    History of tanning Not Asked    Hx of Spending Great Deal of Time in Sun Not Asked    Bad sunburns in the past Not Asked    Tanning Salons in the Past Not Asked    Hx of Radiation Treatments Not Asked    Regular use of sun block Not Asked   Social History Narrative    Esha is  x 35 yrs to Thad. Mother of 2 children. 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 She and her family live in [place], [place].        The patient does not use an assistive device..      The patient does live in a home with stairs.     Social Drivers of Health     Financial Resource Strain: Not on file   Food Insecurity: Not on file   Transportation Needs: Not on file   Physical Activity: Not on file   Stress: Not on file   Social Connections: Not on file   Housing Stability: Not on file       Physical Exam     Vitals: There were no vitals taken for this visit.    Constitutional: She appears well-developed and well-nourished.     Musculoskeletal: Normal range of motion of upper and lower extremities.   Neurological:  She is alert and oriented x 3.   Skin: Skin is warm without pallor.  Psychiatric: Her behavior is normal. Judgment normal.  Able to communicate verbally.    HEENT:  EOMI.  TY.  Sclera anicteric.    Head: Normocephalic.  Normal hair distribution.  No lesions.  Neck: Normal range of motion.      Adenopathy:  No supraclavicular or cervical adenopathy.  Thyroid:  Normal size, shape, and position.  No masses, tenderness, or nodules.  Cardiovascular: Normal rate and regular rhythm.    Pulmonary/Chest: Effort normal.   Abdominal: Soft. Multiple healed scars.Normal appearance and bowel sounds are normal. She exhibits no mass. There is no hepatosplenomegaly. There is no tenderness. There is no rebound and no CVA tenderness. No hernia. Hernia negative in the ventral area,  negative in the right inguinal area and negative in the left inguinal area.   Lymphadenopathy:        Right: No inguinal adenopathy present.        Left: No inguinal adenopathy present.     Breasts:    Symmetric bilaterally.  Areolas without lesions.  Skin- normal without growths, lesions, erythema or peau d'orange change.  Nipples- without retraction or discharge.  No masses, lumps, skin changes, erythema, or lesions.  Axilla-  No adenopathy, mass, or tenderness.    Genitourinary:   Pelvic exam was performed with patient supine and chaperone present.  External genitalia- normal.  [place]'s and [place]'s glands normal.  Urethral meatus- without lesions, mass, or discharge.  Urethra- normal without lesion, cyst, mass, or tenderness.  Vulva- normal.  Labia majora and minora without lesions.   Vagina- normal, no lesions or discharge.  Moist and well supported.  Bladder-  nontender.  No masses.  Normal support.  No evidence of cystocele,  abnormal bladder neck mobility or evident urinary incontinence.  Cervix- smooth, normal epithelium without lesions or discharge.  No motion tenderness.   Uterus- normal size, shape, and contour.  Nontender.  No  masses.  Adnexa-  Nontender, no masses.   Perineum- normal without lesions  Anus-  Normal appearing without lesions.    ASSESSMENT/PLAN:      ICD-10-CM    1. Encounter for well woman exam with routine gynecological exam  Z01.419       2.  Atrophic vaginitis- stable.  Refill Osphena    Normal examination.  Maintain healthy lifestyle and habits with balanced diet of fruits, vegetables, nuts, fish, meat, and carbs.  Limit fats and excess carbs.  Exercise regularly; 30 minutes a day at least 5 days a week.  Calcium and vitamin D intake or supplement as needed.  Sunshine 20-30 minutes when able and avoid burning.  Monthly self breast exams.  Annual mammograms.  Dexascans from age 65 or as indicated.  Cervical cytology until age 65 for low risk patient.  Return to clinic in 1-2 years.    Encounter Medications[1]           [1]   Outpatient Encounter Medications as of 11/21/2024   Medication Sig Dispense Refill    HYDROcodone-acetaminophen  MG Oral Tab Take 1 tablet by mouth every 4 (four) hours as needed for Pain. 180 tablet 0    potassium chloride 20 MEQ Oral Tab CR Take 1 tablet (20 mEq total) by mouth daily. 90 tablet 1    temazepam 15 MG Oral Cap Take 1 capsule (15 mg total) by mouth nightly as needed for Sleep. 90 capsule 1    Ospemifene (OSPHENA) 60 MG Oral Tab Take 1 tablet by mouth daily. 30 tablet 0    pregabalin 75 MG Oral Cap Take 1 capsule (75 mg total) by mouth 3 (three) times daily. 90 capsule 0    dicyclomine 20 MG Oral Tab Take 1-2 tablets (20-40 mg total) by mouth 3 (three) times daily as needed. 90 tablet 3    ondansetron 4 MG Oral Tablet Dispersible Place 1 tablet (4 mg total) under the tongue every 8 (eight) hours as needed for Nausea. 90 tablet 5    Omeprazole 40 MG Oral Capsule Delayed Release Take 1 capsule (40 mg total) by mouth before breakfast. 90 capsule 3    calcium carbonate 500 MG Oral Chew Tab Chew 1 tablet (500 mg total) by mouth daily.      baclofen 10 MG Oral Tab Take 1 tablet (10 mg  total) by mouth 2 (two) times daily. 60 tablet 5    MOVANTIK 25 MG Oral Tab  (Patient not taking: Reported on 10/31/2024)      Prucalopride Succinate (MOTEGRITY) 2 MG Oral Tab Take 1 tablet by mouth daily. 90 tablet 3    spironolactone 25 MG Oral Tab Take 1 tablet (25 mg total) by mouth daily.      torsemide 20 MG Oral Tab Take 1 tablet (20 mg total) by mouth 2 (two) times daily.      Digestive Enzymes (DIGESTIVE ENZYME OR) Take 1 capsule by mouth daily.      Multiple Vitamins-Minerals (MULTI-VITAMIN/MINERALS) Oral Tab Take 1 tablet by mouth daily.      docusate sodium 100 MG Oral Cap Take 1 capsule (100 mg total) by mouth daily.       Facility-Administered Encounter Medications as of 11/21/2024   Medication Dose Route Frequency Provider Last Rate Last Admin    Romosozumab-aqqg SOSY 210 mg  210 mg Subcutaneous Q30 Days Susie Madison MD   210 mg at 11/18/24 1001

## 2024-11-21 NOTE — TELEPHONE ENCOUNTER
Patient verified name and     Place called to clarify appointment details. Patient wishes to proceed with annual with Dr. Poole. Appointment changed to reflect.

## 2024-11-21 NOTE — TELEPHONE ENCOUNTER
This is a patient of Elizabeth HUMPHREY who has an appointment with me today for \"Follow Up\".  This appears to be incorrect as I have never seen this patient.  She should be rescheduled with Elizabeth or, if she wishes to keep today's appt, it should be changed to a New Patient Annual Gyne Well Woman Visit.

## 2024-11-22 LAB — HPV E6+E7 MRNA CVX QL NAA+PROBE: NEGATIVE

## 2024-11-25 ENCOUNTER — OFFICE VISIT (OUTPATIENT)
Dept: FAMILY MEDICINE CLINIC | Facility: CLINIC | Age: 59
End: 2024-11-25
Payer: COMMERCIAL

## 2024-11-25 VITALS
DIASTOLIC BLOOD PRESSURE: 63 MMHG | WEIGHT: 145 LBS | HEIGHT: 66 IN | HEART RATE: 60 BPM | SYSTOLIC BLOOD PRESSURE: 116 MMHG | BODY MASS INDEX: 23.3 KG/M2

## 2024-11-25 DIAGNOSIS — M48.062 LUMBAR STENOSIS WITH NEUROGENIC CLAUDICATION: Primary | ICD-10-CM

## 2024-11-25 DIAGNOSIS — M48.062 LUMBAR STENOSIS WITH NEUROGENIC CLAUDICATION: ICD-10-CM

## 2024-11-25 DIAGNOSIS — M81.0 AGE-RELATED OSTEOPOROSIS WITHOUT CURRENT PATHOLOGICAL FRACTURE: ICD-10-CM

## 2024-11-25 PROCEDURE — 3078F DIAST BP <80 MM HG: CPT | Performed by: FAMILY MEDICINE

## 2024-11-25 PROCEDURE — 99214 OFFICE O/P EST MOD 30 MIN: CPT | Performed by: FAMILY MEDICINE

## 2024-11-25 PROCEDURE — 3074F SYST BP LT 130 MM HG: CPT | Performed by: FAMILY MEDICINE

## 2024-11-25 PROCEDURE — 3008F BODY MASS INDEX DOCD: CPT | Performed by: FAMILY MEDICINE

## 2024-11-25 RX ORDER — HYDROCODONE BITARTRATE AND ACETAMINOPHEN 10; 325 MG/1; MG/1
1 TABLET ORAL EVERY 4 HOURS PRN
Qty: 180 TABLET | Refills: 0 | Status: SHIPPED | OUTPATIENT
Start: 2024-12-02

## 2024-11-28 RX ORDER — HYDROCODONE BITARTRATE AND ACETAMINOPHEN 10; 325 MG/1; MG/1
1 TABLET ORAL EVERY 4 HOURS PRN
Qty: 180 TABLET | Refills: 0 | OUTPATIENT
Start: 2024-11-28

## 2024-12-02 RX ORDER — DICYCLOMINE HCL 20 MG
TABLET ORAL 3 TIMES DAILY PRN
Qty: 90 TABLET | Refills: 5 | Status: SHIPPED | OUTPATIENT
Start: 2024-12-02

## 2024-12-02 NOTE — TELEPHONE ENCOUNTER
Last Office Visit: 4/1/2024        Last Refill: 9/18/2024     Procedure:  Colonoscopy 01/25/2024     Requested Prescriptions     Pending Prescriptions Disp Refills    DICYCLOMINE 20 MG Oral Tab [Pharmacy Med Name: Dicyclomine Hydrochloride 20 Mg Tab Lann] 90 tablet 0     Sig: Take 1-2 tablets (20-40 mg total) by mouth 3 (three) times daily as needed.

## 2024-12-06 ENCOUNTER — HOSPITAL ENCOUNTER (OUTPATIENT)
Dept: MAMMOGRAPHY | Age: 59
Discharge: HOME | End: 2024-12-06
Attending: OBSTETRICS & GYNECOLOGY
Payer: COMMERCIAL

## 2024-12-06 DIAGNOSIS — Z12.31 ENCOUNTER FOR SCREENING MAMMOGRAM FOR BREAST CANCER: ICD-10-CM

## 2024-12-06 PROCEDURE — 77063 BREAST TOMOSYNTHESIS BI: CPT | Performed by: OBSTETRICS & GYNECOLOGY

## 2024-12-06 PROCEDURE — 77067 SCR MAMMO BI INCL CAD: CPT | Performed by: OBSTETRICS & GYNECOLOGY

## 2024-12-09 ENCOUNTER — OFFICE VISIT (OUTPATIENT)
Dept: RHEUMATOLOGY | Facility: CLINIC | Age: 59
End: 2024-12-09
Payer: COMMERCIAL

## 2024-12-09 VITALS
WEIGHT: 141.5 LBS | HEART RATE: 69 BPM | SYSTOLIC BLOOD PRESSURE: 111 MMHG | HEIGHT: 66 IN | BODY MASS INDEX: 22.74 KG/M2 | DIASTOLIC BLOOD PRESSURE: 69 MMHG

## 2024-12-09 DIAGNOSIS — R79.89 ELEVATED PARATHYROID HORMONE: ICD-10-CM

## 2024-12-09 DIAGNOSIS — M48.062 LUMBAR STENOSIS WITH NEUROGENIC CLAUDICATION: ICD-10-CM

## 2024-12-09 DIAGNOSIS — Z51.81 MEDICATION MONITORING ENCOUNTER: ICD-10-CM

## 2024-12-09 DIAGNOSIS — M81.0 AGE-RELATED OSTEOPOROSIS WITHOUT CURRENT PATHOLOGICAL FRACTURE: Primary | ICD-10-CM

## 2024-12-09 PROCEDURE — 3078F DIAST BP <80 MM HG: CPT | Performed by: INTERNAL MEDICINE

## 2024-12-09 PROCEDURE — 3074F SYST BP LT 130 MM HG: CPT | Performed by: INTERNAL MEDICINE

## 2024-12-09 PROCEDURE — 99214 OFFICE O/P EST MOD 30 MIN: CPT | Performed by: INTERNAL MEDICINE

## 2024-12-09 PROCEDURE — G2211 COMPLEX E/M VISIT ADD ON: HCPCS | Performed by: INTERNAL MEDICINE

## 2024-12-09 PROCEDURE — 3008F BODY MASS INDEX DOCD: CPT | Performed by: INTERNAL MEDICINE

## 2024-12-09 NOTE — PROGRESS NOTES
Esha Hernadez is a 59 year old female.    HPI:     Chief Complaint   Patient presents with    Back Pain     Lower back     Neck Pain       I had the pleasure of seeing Esha Hernadez on 12/9/2024 for follow up Osteoporosis    Current medications:  Evenity monthly- started 3/2024  Calcium and vitamin D  Norco 7.5/325 mg every 6 hrs  Baclofen  Previous medication:  Actonel 35 mg every 7 days- started Nov 2023- March 2024  Bone density 11/2023-->8/2024  LFN  T-score     -2.6          -2.3  LTH                   -2.4           -2.0  LS                     -1.6           -1.1    Interval History:  This is a 60 yo F with hx of GERD, MGUS  (follows with hematology Dr. Lerma), history of sigmoid colon resection in 2014 and refractory constipation presents for evaluation of osteoporosis.  She has chronic lower back pain and following with spine surgeon Dr. Madrigal.  She has done extensive conservative management with physical therapy, epidural injections with minimal relief.  They are recommending a spinal fusion.  She had bone density done showing osteoporosis left femoral T-score -2.6, left total hip -2.4 and lumbar spine -1.6.  She was started on Actonel by her PCP around November 2023.  She has been having some side effects of the medications.  She started menopause at the age of 44.  She was never on hormone replacement therapy.  She fractured her fingers when a car door slammed in them.  She also has fractured her left ankle twice, once in 2013 and then 2018 from falling.  She currently does not exercise due to her chronic back pain.  She is to walk about 13 steps a day but hardly walk 3000 steps a day.  No smoking.  Takes minimal alcohol.  She takes calcium and vitamin D.    7/16/2024:  Presents for follow-up of osteoporosis  She is on Evenity every month, started March 2024  She was also seen by endocrinology due to high PTH, they state that is likely from primary hyperparathyroidism.  They advised to increase  her calcium intake  She has chronic lower back pain, she will be having lower back surgery, fusion soon.  They want to make sure her bones are strong I would like to repeat bone density    12/9/2024:  Presents for follow-up of osteoporosis  She is on Evenity every month, started March 2024  She was also seen by endocrinology due to high PTH, they state that is likely from primary hyperparathyroidism.  They advised to increase her calcium intake  Recent bone density done August 2024 shows improvement in hip and lower spine  She also has chronic lower back pain and plans on fusion soon. 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, left    Left small finger mass excision     Bowel obstruction (HCC)    Cancer (HCC)    basal cell on right eyebrow    Decorative tattoo    Esophageal reflux    Essential hypertension    Fibromyalgia    Right side of face    Hemorrhoids    hemoorhoidectomy    History of COVID-19    History of MRSA infection    blister on toe MRSA+ approximately 2012    Infected dental carries    dental extractions    Injury of digital nerve of finger    R finger nerve injury    Irritable bowel syndrome    Metrorrhagia    destruction of lesions, vulva, simple    Migraines    Seizure disorder (HCC)    one isolated seizure after fall and head injury, 18 years ago, no meds    Sinusitis    \"sinus procedure\"    Trigeminal neuralgia    R caniotomy, lyrica    Varicose veins of both lower extremities    Visual impairment    wears eye glasses      Social Hx Reviewed   Family Hx Reviewed     Medications (Active prior to today's visit):  Current Outpatient Medications   Medication Sig Dispense Refill    dicyclomine 20 MG Oral Tab Take 1-2 tablets (20-40 mg total) by mouth 3 (three) times daily as needed. 90 tablet 5    HYDROcodone-acetaminophen  MG Oral Tab Take 1 tablet by mouth every 4 (four) hours as  needed for Pain. 180 tablet 0    Ospemifene (OSPHENA) 60 MG Oral Tab Take 1 tablet by mouth daily. 90 tablet 3    potassium chloride 20 MEQ Oral Tab CR Take 1 tablet (20 mEq total) by mouth daily. 90 tablet 1    temazepam 15 MG Oral Cap Take 1 capsule (15 mg total) by mouth nightly as needed for Sleep. 90 capsule 1    pregabalin 75 MG Oral Cap Take 1 capsule (75 mg total) by mouth 3 (three) times daily. 90 capsule 0    ondansetron 4 MG Oral Tablet Dispersible Place 1 tablet (4 mg total) under the tongue every 8 (eight) hours as needed for Nausea. 90 tablet 5    Omeprazole 40 MG Oral Capsule Delayed Release Take 1 capsule (40 mg total) by mouth before breakfast. 90 capsule 3    calcium carbonate 500 MG Oral Chew Tab Chew 1 tablet (500 mg total) by mouth daily.      baclofen 10 MG Oral Tab Take 1 tablet (10 mg total) by mouth 2 (two) times daily. 60 tablet 5    MOVANTIK 25 MG Oral Tab       Prucalopride Succinate (MOTEGRITY) 2 MG Oral Tab Take 1 tablet by mouth daily. 90 tablet 3    spironolactone 25 MG Oral Tab Take 1 tablet (25 mg total) by mouth daily.      torsemide 20 MG Oral Tab Take 1 tablet (20 mg total) by mouth 2 (two) times daily.      Digestive Enzymes (DIGESTIVE ENZYME OR) Take 1 capsule by mouth daily.      Multiple Vitamins-Minerals (MULTI-VITAMIN/MINERALS) Oral Tab Take 1 tablet by mouth daily.      docusate sodium 100 MG Oral Cap Take 1 capsule (100 mg total) by mouth daily.       .cmed  Allergies:  No Known Allergies      ROS:   All other ROS are negative.     PHYSICAL EXAM:   GEN: AAOx3, NAD  HEENT: EOMI, PERRLA, no injection or icterus, oral mucosa moist, no oral lesions. No lymphadenopathy. No facial rash  CVS: RRR, no murmurs rubs or gallops. Equal 2+ distal pulses.   LUNGS: CTAB, no increased work of breathing  ABDOMEN:  soft NT/ND, +BS, no HSM  SKIN: No rashes or skin lesions. No nail findings  MSK:  No swelling or synovitis on exam  TTP in lower spine  NEURO: Cranial nerves II-XII intact  grossly. 5/5 strength throughout in both upper and lower extremities, sensation intact.  PSYCH: normal mood       LABS:     Reviewed    Imaging:     Bone density 11/2023:  LEFT FEMORAL NECK   BMD: 0.555 gm/sq. cm. T SCORE: -2.6 Z SCORE: -1.4      LEFT TOTAL HIP   BMD: 0.652 gm/sq. cm. T SCORE: -2.4 Z SCORE: -1.4      PA LUMBAR SPINE (L1 - L4)   BMD: 0.871 gm/sq. cm. T SCORE: -1.6 Z SCORE: -0.2     ASSESSMENT/PLAN:     Osteoporosis  - Bone density done November 2023 showing left femoral neck T-score -2.6, left total hip -2.4 and lumbar spine -1.6  - She was started on Actonel back in November 2023 but having side effects  - She is now on Evenity monthly, started in March 2024, she will be on it for 12 months  - She is following with orthopedics for her chronic back pain.  They are recommending surgery but want to make sure her bones are strong.  They would like a bone density sooner.  I think it is medically necessary for patient to get a bone density since starting Evenity to make sure her bones are strong enough for her surgery     Primary hyperparathyroidism  - Following with endocrinology.  Now on calcium 1200 mg daily and vitamin D    Chronic lower back pain  - She will be having a lumbar fusion soon     MGUS  - Stable, following with hematology    Pt will f/u in 8 mos    There is a longitudinal care relationship with me, the care plan reflects the ongoing nature of the continuous relationship of care, and the medical record indicates that there is ongoing treatment of a serious/complex medical condition which I am currently managing.  is Applicable.     Susie Madison MD  12/9/2024   10:40 AM

## 2024-12-09 NOTE — PATIENT INSTRUCTIONS
You were seen today for osteoporosis  Bone density in August already showed improvement  Continue Evenity every month, last dose will be February  Afterwards we will do Prolia which will be an injection every 6 months  See if can get your bone density done around August 22, 2025, call the insurance  Follow-up in 3 to 4 months

## 2024-12-12 NOTE — PROGRESS NOTES
Subjective:   Esha Hernadez is a 59 year old female who presents for Follow - Up and Low Back Pain   Taking medication and tolerating. Awaiting surgeon evaluation.   History/Other:    Chief Complaint Reviewed and Verified  No Further Nursing Notes to   Review  Allergies Reviewed  Medications Reviewed  Problem List Reviewed           Tobacco:  She smoked tobacco in the past but quit greater than 12 months ago.  Social History     Tobacco Use   Smoking Status Former    Current packs/day: 0.00    Average packs/day: 0.5 packs/day for 4.0 years (2.0 ttl pk-yrs)    Types: Cigarettes    Start date: 2014    Quit date: 2018    Years since quittin.4    Passive exposure: Never   Smokeless Tobacco Never   Tobacco Comments    Quit smoking        Current Outpatient Medications   Medication Sig Dispense Refill    HYDROcodone-acetaminophen  MG Oral Tab Take 1 tablet by mouth every 4 (four) hours as needed for Pain. 180 tablet 0    Ospemifene (OSPHENA) 60 MG Oral Tab Take 1 tablet by mouth daily. 90 tablet 3    potassium chloride 20 MEQ Oral Tab CR Take 1 tablet (20 mEq total) by mouth daily. 90 tablet 1    temazepam 15 MG Oral Cap Take 1 capsule (15 mg total) by mouth nightly as needed for Sleep. 90 capsule 1    pregabalin 75 MG Oral Cap Take 1 capsule (75 mg total) by mouth 3 (three) times daily. 90 capsule 0    ondansetron 4 MG Oral Tablet Dispersible Place 1 tablet (4 mg total) under the tongue every 8 (eight) hours as needed for Nausea. 90 tablet 5    Omeprazole 40 MG Oral Capsule Delayed Release Take 1 capsule (40 mg total) by mouth before breakfast. 90 capsule 3    calcium carbonate 500 MG Oral Chew Tab Chew 1 tablet (500 mg total) by mouth daily.      baclofen 10 MG Oral Tab Take 1 tablet (10 mg total) by mouth 2 (two) times daily. 60 tablet 5    MOVANTIK 25 MG Oral Tab       Prucalopride Succinate (MOTEGRITY) 2 MG Oral Tab Take 1 tablet by mouth daily. 90 tablet 3    spironolactone 25 MG Oral  Tab Take 1 tablet (25 mg total) by mouth daily.      torsemide 20 MG Oral Tab Take 1 tablet (20 mg total) by mouth 2 (two) times daily.      Digestive Enzymes (DIGESTIVE ENZYME OR) Take 1 capsule by mouth daily.      Multiple Vitamins-Minerals (MULTI-VITAMIN/MINERALS) Oral Tab Take 1 tablet by mouth daily.      docusate sodium 100 MG Oral Cap Take 1 capsule (100 mg total) by mouth daily.      dicyclomine 20 MG Oral Tab Take 1-2 tablets (20-40 mg total) by mouth 3 (three) times daily as needed. 90 tablet 5         Review of Systems:  Review of Systems   Constitutional: Negative.    Musculoskeletal:  Positive for back pain.         Objective:   /63   Pulse 60   Ht 5' 6\" (1.676 m)   Wt 145 lb (65.8 kg)   BMI 23.40 kg/m²  Estimated body mass index is 23.4 kg/m² as calculated from the following:    Height as of this encounter: 5' 6\" (1.676 m).    Weight as of this encounter: 145 lb (65.8 kg).  Physical Exam  Vitals reviewed.   Musculoskeletal:      Lumbar back: Tenderness present. Decreased range of motion. Positive right straight leg raise test.           Assessment & Plan:   1. Lumbar stenosis with neurogenic claudication (Primary)  -     HYDROcodone-Acetaminophen; Take 1 tablet by mouth every 4 (four) hours as needed for Pain.  Dispense: 180 tablet; Refill: 0  2. Age-related osteoporosis without current pathological fracture    Remain on medication. Seeing surgeon soon.  Has been on med to improve bone density for surgery.       No follow-ups on file.    Quintin Harman DO, 12/12/2024, 4:15 PM    English

## 2024-12-13 RX ORDER — PREGABALIN 75 MG/1
75 CAPSULE ORAL 3 TIMES DAILY
Qty: 90 CAPSULE | Refills: 1 | Status: SHIPPED | OUTPATIENT
Start: 2024-12-13

## 2024-12-13 NOTE — TELEPHONE ENCOUNTER
Please review; protocol failed/ has no protocol      Recent fills: 10/04/2024,07/26/2024  Last Rx written: 10/04/2024  Last Office Visit: 11/25/2024    Recent Visits  Date Type Provider Dept   11/25/24 Office Visit Quintin Harman DO [facility]     Future Appointments  Date Type Provider Dept   12/17/24 Appointment Quintin Harman DO [facility]   Showing future appointments within next 150 days with a meds authorizing provider and meeting all other requirements      Requested Prescriptions   Pending Prescriptions Disp Refills    PREGABALIN 75 MG Oral Cap [Pharmacy Med Name: Pregabalin 75 Mg Cap Nova] 90 capsule 0     Sig: TAKE ONE CAPSULE BY MOUTH THREE TIMES DAILY       Controlled Substance Medication Failed - 12/13/2024  8:51 AM        Failed - This medication is a controlled substance - forward to provider to refill       Neurology Medications Passed - 12/13/2024  8:51 AM        Passed - In person appointment or virtual visit in the past 6 mos or appointment in next 3 mos     Recent Outpatient Visits              4 days ago Age-related osteoporosis without current pathological fracture    [facility] Susie Madison MD    Office Visit    2 weeks ago Lumbar stenosis with neurogenic claudication    [facility] Quintin Harman DO    Office Visit    3 weeks ago Encounter for well woman exam with routine gynecological exam    [facility] - OB/GYN Jayden Poole MD    Office Visit    3 weeks ago Age-related osteoporosis without current pathological fracture    [facility]    Nurse Only    1 month ago [facility] Hematology Oncology Bryon Lerma MD    Office Visit          Future Appointments         Provider Department Appt Notes    In 3 days [facility] RN RHEUMATOLOGY [facility],  [place] Evenity    In 4 days Quintin Harman DO [facility] Back follow up    In 1 month [facility], [place] [facility]    In 2 months [facility] RN RHEUMATOLOGY [facility], [place] [facility]    In 3 months Susie Madison MD [facility], [place] fu    In 10 months Bryon Lerma MD [facility] [place] FOLLOW UP VISIT.CL  1Y                       Recent Outpatient Visits              4 days ago Age-related osteoporosis without current pathological fracture    [facility] Susie Madison MD    Office Visit    2 weeks ago Lumbar stenosis with neurogenic claudication    [facility] Quintin Harman DO    Office Visit    3 weeks ago Encounter for well woman exam with routine gynecological exam    [facility] - OB/GYN Jayden Poole MD    Office Visit    3 weeks ago Age-related osteoporosis without current pathological fracture    [facility], [place]    Nurse Only    1 month ago [facility] Hematology Oncology Bryon Lerma MD    Office Visit          Future Appointments         Provider Department Appt Notes    In 3 days [facility] RN RHEUMATOLOGY [facility], [place]    In 4 days Quintin Harman DO [facility], [place] Back follow up    In 1 month [facility] RN RHEUMATOLOGY [facility], [place] [facility]    In 2 months [facility] RN RHEUMATOLOGY [facility], [place]    In 3 months [facility]    In 10 months Bryon Lerma MD  [facility] Hematology Oncology [place] FOLLOW UP VISIT.CL  1Y

## 2024-12-16 ENCOUNTER — NURSE ONLY (OUTPATIENT)
Dept: RHEUMATOLOGY | Facility: CLINIC | Age: 59
End: 2024-12-16
Payer: COMMERCIAL

## 2024-12-16 DIAGNOSIS — M81.0 AGE-RELATED OSTEOPOROSIS WITHOUT CURRENT PATHOLOGICAL FRACTURE: Primary | ICD-10-CM

## 2024-12-16 NOTE — PROGRESS NOTES
Name and  verified. Pt aware she was to receive injection of Evenity. Injections given in Bilateral Upper Arms,and pt tolerated well. Possible local side effects discussed with pt. Pt aware to follow up in 6 months for next injection.

## 2024-12-20 DIAGNOSIS — M48.062 LUMBAR STENOSIS WITH NEUROGENIC CLAUDICATION: ICD-10-CM

## 2024-12-26 NOTE — TELEPHONE ENCOUNTER
Please review. Protocol Failed; No Protocol      Recent fills: 10/6/2024, 11/3/2024, 12/2/2024  Last Rx written: 11/25/2024  DUE: 1/2/2025  Last office visit: 11/25/2024    Future Appointments  Date Type Provider Dept   02/11/25 Appointment Quintin Harman DO [facility]   Showing future appointments within next 150 days with a meds authorizing provider and meeting all other requirements        Requested Prescriptions   Pending Prescriptions Disp Refills    HYDROcodone-acetaminophen  MG Oral Tab 180 tablet 0     Sig: Take 1 tablet by mouth every 4 (four) hours as needed for Pain.       Controlled Substance Medication Failed - 12/26/2024  3:19 PM        Failed - This medication is a controlled substance - forward to provider to refill               Future Appointments         Provider Department Appt Notes    In 3 weeks [facility]    In 1 month Quintin Harman DO [facility] Back procedure, surgery on 03/10/25    In 1 month [facility]    In 2 months Susie Madison MD [facility] brigida    In 10 months [facility] [place] FOLLOW UP VISIT.CL  1Y          Recent Outpatient Visits              1 week ago Age-related osteoporosis without current pathological fracture    [facility] [place]    Nurse Only    2 weeks ago [facility]-related [facility] without current pathological fracture    [facility]    Office Visit    1 month ago Lumbar stenosis with neurogenic claudication    [facility] Quintin Harman DO    Office Visit    1 month ago Encounter for well woman exam with  routine gynecological exam    [facility] - OB/GYN Jayden Poole MD    Office Visit    1 month ago Age-related osteoporosis without current pathological fracture    [facility] [place]    Nurse Only

## 2024-12-27 RX ORDER — HYDROCODONE BITARTRATE AND ACETAMINOPHEN 10; 325 MG/1; MG/1
1 TABLET ORAL EVERY 4 HOURS PRN
Qty: 180 TABLET | Refills: 0 | Status: SHIPPED | OUTPATIENT
Start: 2024-12-27

## 2025-01-17 NOTE — TELEPHONE ENCOUNTER
Problem: At Risk for Falls  Goal: # Patient does not fall  Outcome: Outcome Met, Continue evaluating goal progress toward completion     Problem: At Risk for Falls  Goal: # Verbalizes understanding of fall risk/precautions  Description: Document education using the patient education activity  Outcome: Outcome Met, Continue evaluating goal progress toward completion     Problem: At Risk for Injury Due to Fall  Goal: # Patient does not fall  Outcome: Outcome Met, Continue evaluating goal progress toward completion     Problem: At Risk for Injury Due to Fall  Goal: # Takes action to control condition specific risks  Outcome: Outcome Met, Continue evaluating goal progress toward completion     Problem: Diabetes  Goal: Glycemic balance achieved/maintained  Description: Goal is to maintain blood sugar within range with no episodes of hypoglycemia  Outcome: Outcome Met, Continue evaluating goal progress toward completion     Problem: Diabetes  Goal: Verbalizes/demonstrates understanding of Diabetes  Description: Document on Patient Education Activity  Outcome: Outcome Met, Continue evaluating goal progress toward completion     Problem: Diabetes  Goal: Demonstrates ability to self-administer insulin  Description: Document on Patient Education Activity  Outcome: Outcome Met, Continue evaluating goal progress toward completion     Problem: Pressure Injury, Risk for  Goal: # Skin remains intact  Outcome: Outcome Met, Continue evaluating goal progress toward completion     Problem: Pressure Injury, Risk for  Goal: No new pressure injury (PI) development  Outcome: Outcome Met, Continue evaluating goal progress toward completion     Problem: Pressure Injury, Risk for  Goal: # Verbalizes understanding of PI risk factors and prevention strategies  Description: Document education using the patient education activity.   Outcome: Outcome Met, Continue evaluating goal progress toward completion     Problem: Pressure Injury, Risk  Requested Prescriptions     Pending Prescriptions Disp Refills    ondansetron 4 MG Oral Tablet Dispersible 90 tablet 5     Sig: Place 1 tablet (4 mg total) under the tongue every 8 (eight) hours as needed for Nausea.         LOV  4/1/2024      LR   8/16/2024      MA   for  Goal: Comfort optimized with pressure injury prevention strategies guided by patient/family preference. (Hospice)  Outcome: Outcome Met, Continue evaluating goal progress toward completion     Problem: Non-Pressure Injury Wound  Goal: # No deterioration in wound  Outcome: Outcome Met, Continue evaluating goal progress toward completion     Problem: Non-Pressure Injury Wound  Goal: # Verbalizes understanding of wound and wound care  Description: If abnormality is a skin tear, avoid using tape on skin including transparent dressings. Document education using the patient education activity.  Outcome: Outcome Met, Continue evaluating goal progress toward completion     Problem: Non-Pressure Injury Wound  Goal: Wound care provided to promote comfort needs (Hospice)  Outcome: Outcome Met, Continue evaluating goal progress toward completion

## 2025-01-20 DIAGNOSIS — M48.062 LUMBAR STENOSIS WITH NEUROGENIC CLAUDICATION: ICD-10-CM

## 2025-01-20 NOTE — TELEPHONE ENCOUNTER
Requested Prescriptions     Pending Prescriptions Disp Refills    ONDANSETRON 4 MG Oral Tablet Dispersible [Pharmacy Med Name: Ondansetron Odt 4 Mg Tab Nort] 90 tablet 0     Sig: Place 1 tablet (4 mg total) under the tongue every 8 (eight) hours as needed for Nausea.       LOV  4/01/2024  LR  8/25/2024  \  duplicate request

## 2025-01-21 RX ORDER — ONDANSETRON 4 MG/1
4 TABLET, ORALLY DISINTEGRATING ORAL EVERY 8 HOURS PRN
Qty: 90 TABLET | Refills: 0 | OUTPATIENT
Start: 2025-01-21

## 2025-01-21 RX ORDER — ONDANSETRON 4 MG/1
4 TABLET, ORALLY DISINTEGRATING ORAL EVERY 8 HOURS PRN
Qty: 90 TABLET | Refills: 5 | Status: SHIPPED | OUTPATIENT
Start: 2025-01-21

## 2025-01-21 NOTE — TELEPHONE ENCOUNTER
Requested Prescriptions     Pending Prescriptions Disp Refills    ONDANSETRON 4 MG Oral Tablet Dispersible [Pharmacy Med Name: Ondansetron Odt 4 Mg Tab Nort] 90 tablet 0     Sig: Place 1 tablet (4 mg total) under the tongue every 8 (eight) hours as needed for Nausea.        LOV  4/01/2024    LAST REFILL  08/25/2024

## 2025-01-21 NOTE — TELEPHONE ENCOUNTER
Please Review. Protocol Failed; No Protocol     Requested Prescriptions   Pending Prescriptions Disp Refills    BACLOFEN 10 MG Oral Tab [Pharmacy Med Name: Baclofen 10 Mg Tab Nort] 60 tablet 0     Sig: Take 1 tablet (10 mg total) by mouth 2 (two) times daily.       There is no refill protocol information for this order            Future Appointments         Provider Department Appt Notes    Tomorrow [facility]    In 3 weeks Quintin Harman DO [facility] Back procedure, surgery on 03/10/25    In 4 weeks [facility] RN RHEUMATOLOGY [facility] Evenity    In 1 month Susie Madison MD [facility] Osteoporosis    In 1 month Susie Madison MD [facility] fu    In 9 months Bryon Lerma MD [facility] Hematology Oncology [place] FOLLOW UP VISIT.CL  1Y          Recent Outpatient Visits              1 month ago Age-related osteoporosis without current pathological fracture    [facility]    Nurse Only    1 month ago Age-related osteoporosis without current pathological fracture    [facility] Susie Madison MD    Office Visit    1 month ago Lumbar stenosis with neurogenic claudication    [facility] Quintin Harman DO    Office Visit    2 months ago Encounter for well woman exam with routine gynecological exam    [facility] - OB/GYN Jayden Poole MD    Office Visit    2 months ago Age-related osteoporosis without current pathological fracture    [facility]    Nurse Only

## 2025-01-22 ENCOUNTER — NURSE ONLY (OUTPATIENT)
Dept: RHEUMATOLOGY | Facility: CLINIC | Age: 60
End: 2025-01-22
Payer: COMMERCIAL

## 2025-01-22 DIAGNOSIS — M81.0 AGE-RELATED OSTEOPOROSIS WITHOUT CURRENT PATHOLOGICAL FRACTURE: Primary | ICD-10-CM

## 2025-01-22 PROCEDURE — 96372 THER/PROPH/DIAG INJ SC/IM: CPT | Performed by: INTERNAL MEDICINE

## 2025-01-22 NOTE — PROGRESS NOTES
Patient came to office for monthly Evenity injection. Name and  verified. Patient aware she is to receive Evenity injection. Injection given on Bilateral upper arms subcutaneous, patient tolerated injection well. Patient given reminder note for monthly injection follow up .

## 2025-01-23 RX ORDER — BACLOFEN 10 MG/1
10 TABLET ORAL 2 TIMES DAILY
Qty: 60 TABLET | Refills: 0 | Status: SHIPPED | OUTPATIENT
Start: 2025-01-23

## 2025-01-24 NOTE — TELEPHONE ENCOUNTER
Please review; protocol failed/No Protocol    Recent Fills: 11/03/2024, 12/02/2024, 12/29/2024- Due 01/30/2025    Last Rx Written: 12/27/2024    Last Office Visit: 11/25/2024    Requested Prescriptions   Pending Prescriptions Disp Refills    HYDROcodone-acetaminophen  MG Oral Tab 180 tablet 0     Sig: Take 1 tablet by mouth every 4 (four) hours as needed for Pain.       Controlled Substance Medication Failed - 1/24/2025 12:27 PM        Failed - This medication is a controlled substance - forward to provider to refill        Passed - Medication is active on med list           Future Appointments         Provider Department Appt Notes    In 2 weeks Quintin Harman DO [facility] Back procedure, surgery on 03/10/25    In 3 weeks [facility] RN RHEUMATOLOGY [facility] Evenity    In 1 month Susie Madison MD [facility] Osteoporosis    In 1 month Susie Madison MD [facility] fu    In 9 months [facility] [place] FOLLOW UP VISIT.CL  1Y          Recent Outpatient Visits              2 days ago Age-related osteoporosis without current pathological fracture    [facility]    Nurse Only    1 month ago Age-related osteoporosis without current pathological fracture    [facility]    Nurse Only    1 month ago Age-related osteoporosis without current pathological fracture    [facility] Susie Madison MD    Office Visit    2 months ago Lumbar stenosis with neurogenic claudication    [facility] Quintin Harman DO    Office Visit    2 months ago Encounter for well woman exam with routine gynecological exam    [place]  [facility] - OB/GYN Jayden Poole MD    Office Visit

## 2025-01-25 RX ORDER — HYDROCODONE BITARTRATE AND ACETAMINOPHEN 10; 325 MG/1; MG/1
1 TABLET ORAL EVERY 4 HOURS PRN
Qty: 180 TABLET | Refills: 0 | Status: SHIPPED | OUTPATIENT
Start: 2025-01-25

## 2025-02-11 ENCOUNTER — OFFICE VISIT (OUTPATIENT)
Dept: FAMILY MEDICINE CLINIC | Facility: CLINIC | Age: 60
End: 2025-02-11
Payer: COMMERCIAL

## 2025-02-11 VITALS
SYSTOLIC BLOOD PRESSURE: 118 MMHG | DIASTOLIC BLOOD PRESSURE: 79 MMHG | BODY MASS INDEX: 23.59 KG/M2 | WEIGHT: 146.81 LBS | HEART RATE: 63 BPM | HEIGHT: 66 IN

## 2025-02-11 DIAGNOSIS — M48.062 LUMBAR STENOSIS WITH NEUROGENIC CLAUDICATION: Primary | ICD-10-CM

## 2025-02-11 DIAGNOSIS — Z01.818 PRE-OP EXAMINATION: ICD-10-CM

## 2025-02-11 PROCEDURE — 3008F BODY MASS INDEX DOCD: CPT | Performed by: FAMILY MEDICINE

## 2025-02-11 PROCEDURE — 3074F SYST BP LT 130 MM HG: CPT | Performed by: FAMILY MEDICINE

## 2025-02-11 PROCEDURE — 99214 OFFICE O/P EST MOD 30 MIN: CPT | Performed by: FAMILY MEDICINE

## 2025-02-11 PROCEDURE — 3078F DIAST BP <80 MM HG: CPT | Performed by: FAMILY MEDICINE

## 2025-02-11 NOTE — PROGRESS NOTES
Subjective:   Esha Hernadez is a 60 year old female who presents for Pre-Op Exam (3/10 L2-5 LAMINECTOMY AND TRANSFORAMINAL INTERBODY FUSION w/ Dr Kendrick)       History/Other:    Chief Complaint Reviewed and Verified  Nursing Notes Reviewed and   Verified  Tobacco Reviewed  Allergies Reviewed  Medications Reviewed    Problem List Reviewed         Tobacco:  She smoked tobacco in the past but quit greater than 12 months ago.  Social History     Tobacco Use   Smoking Status Former    Current packs/day: 0.00    Average packs/day: 0.5 packs/day for 4.0 years (2.0 ttl pk-yrs)    Types: Cigarettes    Start date: 2014    Quit date: 2018    Years since quittin.5    Passive exposure: Never   Smokeless Tobacco Never   Tobacco Comments    Quit smoking        Current Outpatient Medications   Medication Sig Dispense Refill    HYDROcodone-acetaminophen  MG Oral Tab Take 1 tablet by mouth every 4 (four) hours as needed for Pain. 180 tablet 0    baclofen 10 MG Oral Tab Take 1 tablet (10 mg total) by mouth 2 (two) times daily. 60 tablet 0    ondansetron 4 MG Oral Tablet Dispersible Place 1 tablet (4 mg total) under the tongue every 8 (eight) hours as needed for Nausea. 90 tablet 5    dicyclomine 20 MG Oral Tab Take 1-2 tablets (20-40 mg total) by mouth 3 (three) times daily as needed. 90 tablet 5    Ospemifene (OSPHENA) 60 MG Oral Tab Take 1 tablet by mouth daily. 90 tablet 3    potassium chloride 20 MEQ Oral Tab CR Take 1 tablet (20 mEq total) by mouth daily. 90 tablet 1    temazepam 15 MG Oral Cap Take 1 capsule (15 mg total) by mouth nightly as needed for Sleep. 90 capsule 1    Omeprazole 40 MG Oral Capsule Delayed Release Take 1 capsule (40 mg total) by mouth before breakfast. 90 capsule 3    calcium carbonate 500 MG Oral Chew Tab Chew 1 tablet (500 mg total) by mouth daily.      MOVANTIK 25 MG Oral Tab       Prucalopride Succinate (MOTEGRITY) 2 MG Oral Tab Take 1 tablet by mouth daily. 90 tablet 3     spironolactone 25 MG Oral Tab Take 1 tablet (25 mg total) by mouth daily.      torsemide 20 MG Oral Tab Take 1 tablet (20 mg total) by mouth 2 (two) times daily.      Digestive Enzymes (DIGESTIVE ENZYME OR) Take 1 capsule by mouth daily.      Multiple Vitamins-Minerals (MULTI-VITAMIN/MINERALS) Oral Tab Take 1 tablet by mouth daily.      docusate sodium 100 MG Oral Cap Take 1 capsule (100 mg total) by mouth daily.      pregabalin 75 MG Oral Cap Take 1 capsule (75 mg total) by mouth 3 (three) times daily. (Patient not taking: Reported on 2/11/2025) 90 capsule 1         Review of Systems:  Review of Systems   Constitutional: Negative.    HENT: Negative.     Eyes: Negative.    Respiratory: Negative.     Cardiovascular: Negative.    Gastrointestinal: Negative.    Genitourinary: Negative.    Musculoskeletal:  Positive for back pain.   Skin: Negative.    Neurological:  Positive for numbness. Negative for weakness.   Psychiatric/Behavioral: Negative.         Objective:   /79   Pulse 63   Ht 5' 6\" (1.676 m)   Wt 146 lb 12.8 oz (66.6 kg)   BMI 23.69 kg/m²  Estimated body mass index is 23.69 kg/m² as calculated from the following:    Height as of this encounter: 5' 6\" (1.676 m).    Weight as of this encounter: 146 lb 12.8 oz (66.6 kg).  Physical Exam  Vitals reviewed.   Constitutional:       Appearance: Normal appearance. She is well-developed.   HENT:      Head: Normocephalic.      Right Ear: Hearing, tympanic membrane and ear canal normal.      Left Ear: Hearing, tympanic membrane and ear canal normal.      Nose: Nose normal.   Eyes:      Conjunctiva/sclera: Conjunctivae normal.      Pupils: Pupils are equal, round, and reactive to light.      Funduscopic exam:     Right eye: No hemorrhage or AV nicking.         Left eye: No hemorrhage or AV nicking.   Neck:      Thyroid: No thyroid mass or thyromegaly.   Cardiovascular:      Heart sounds: Normal heart sounds.   Pulmonary:      Breath sounds: Normal breath sounds.    Abdominal:      Tenderness: There is no abdominal tenderness.   Musculoskeletal:      Cervical back: Normal range of motion and neck supple.      Lumbar back: Tenderness present. Decreased range of motion.   Lymphadenopathy:      Upper Body:      Right upper body: No supraclavicular adenopathy.      Left upper body: No supraclavicular adenopathy.   Skin:     Comments: No suspicious findings waist up exam   Neurological:      Mental Status: She is alert and oriented to person, place, and time.      Sensory: No sensory deficit.      Deep Tendon Reflexes: Reflexes are normal and symmetric.      Comments: Numbness left lower leg moderate.    Psychiatric:         Mood and Affect: Mood is not anxious or depressed.           Assessment & Plan:   1. Lumbar stenosis with neurogenic claudication (Primary)  -     EKG 12 Lead; Future; Expected date: 02/11/2025  -     CBC With Differential With Platelet; Future; Expected date: 02/11/2025  -     Basic Metabolic Panel (8); Future; Expected date: 02/11/2025  -     Prothrombin Time (PT); Future; Expected date: 02/11/2025  -     PTT, Activated; Future; Expected date: 02/11/2025  2. Pre-op examination  -     EKG 12 Lead; Future; Expected date: 02/11/2025  -     CBC With Differential With Platelet; Future; Expected date: 02/11/2025  -     Basic Metabolic Panel (8); Future; Expected date: 02/11/2025  -     Prothrombin Time (PT); Future; Expected date: 02/11/2025  -     PTT, Activated; Future; Expected date: 02/11/2025    Pre op testing and will review results when available.       No follow-ups on file.    Quintin Harman DO, 2/11/2025, 2:21 PM

## 2025-02-14 DIAGNOSIS — M48.062 LUMBAR STENOSIS WITH NEUROGENIC CLAUDICATION: ICD-10-CM

## 2025-02-18 ENCOUNTER — EXTERNAL LAB (OUTPATIENT)
Dept: HEALTH INFORMATION MANAGEMENT | Facility: OTHER | Age: 60
End: 2025-02-18

## 2025-02-18 ENCOUNTER — NURSE ONLY (OUTPATIENT)
Dept: RHEUMATOLOGY | Facility: CLINIC | Age: 60
End: 2025-02-18
Payer: COMMERCIAL

## 2025-02-18 ENCOUNTER — LAB ENCOUNTER (OUTPATIENT)
Dept: LAB | Facility: HOSPITAL | Age: 60
End: 2025-02-18
Attending: FAMILY MEDICINE
Payer: COMMERCIAL

## 2025-02-18 DIAGNOSIS — Z51.81 MEDICATION MONITORING ENCOUNTER: ICD-10-CM

## 2025-02-18 DIAGNOSIS — M48.062 LUMBAR STENOSIS WITH NEUROGENIC CLAUDICATION: ICD-10-CM

## 2025-02-18 DIAGNOSIS — M81.0 AGE-RELATED OSTEOPOROSIS WITHOUT CURRENT PATHOLOGICAL FRACTURE: ICD-10-CM

## 2025-02-18 DIAGNOSIS — Z01.818 PRE-OP EXAMINATION: ICD-10-CM

## 2025-02-18 DIAGNOSIS — M81.0 AGE-RELATED OSTEOPOROSIS WITHOUT CURRENT PATHOLOGICAL FRACTURE: Primary | ICD-10-CM

## 2025-02-18 LAB
ANION GAP SERPL CALC-SCNC: 8 MMOL/L (ref 0–18)
ANION GAP SERPL CALC-SCNC: 8 MMOL/L (ref 0–18)
APTT PPP: 29.6 SECONDS (ref 23–36)
BASOPHILS # BLD AUTO: 0.01 X10(3) UL (ref 0–0.2)
BASOPHILS # BLD: 0.01 X10(3)UL (ref 0–0.2)
BASOPHILS NFR BLD AUTO: 0.3 %
BASOPHILS NFR BLD: 0.3 %
BUN BLD-MCNC: 14 MG/DL (ref 9–23)
BUN SERPL-MCNC: 14 MG/DL (ref 9–23)
BUN/CREAT SERPL: 13.2 (ref 10–20)
BUN/CREAT SERPL: 13.2 (ref 10–20)
CALCIUM BLD-MCNC: 9.4 MG/DL (ref 8.7–10.4)
CALCIUM SERPL-MCNC: 9.4 MG/DL (ref 8.7–10.4)
CALCULATED OSMO: 287 MOSM/KG (ref 275–295)
CHLORIDE SERPL-SCNC: 102 MMOL/L (ref 98–112)
CHLORIDE SERPL-SCNC: 102 MMOL/L (ref 98–112)
CO2 SERPL-SCNC: 29 MMOL/L (ref 21–32)
CO2 SERPL-SCNC: 29 MMOL/L (ref 21–32)
CREAT BLD-MCNC: 1.06 MG/DL
CREAT SERPL-MCNC: 1.06 MG/DL (ref 0.55–1.02)
DEPRECATED RDW RBC AUTO: 40.7 FL (ref 35.1–46.3)
EGFRCR SERPLBLD CKD-EPI 2021: 60 ML/MIN/1.73M2 (ref 60–?)
EOSINOPHIL # BLD AUTO: 0.08 X10(3) UL (ref 0–0.7)
EOSINOPHIL # BLD: 0.08 X10(3)UL (ref 0–0.7)
EOSINOPHIL NFR BLD AUTO: 2.1 %
EOSINOPHIL NFR BLD: 2.1 %
ERYTHROCYTE [DISTWIDTH] IN BLOOD BY AUTOMATED COUNT: 12.4 % (ref 11–15)
ERYTHROCYTE [DISTWIDTH] IN BLOOD BY AUTOMATED COUNT: 40.7 FL (ref 35.1–46.3)
ERYTHROCYTE [DISTWIDTH] IN BLOOD: 12.4 % (ref 11–15)
FASTING STATUS PATIENT QL REPORTED: YES
GFR SERPLBLD SCHWARTZ-ARVRAT: 60 ML/MIN/1.73M2
GLUCOSE BLD-MCNC: 77 MG/DL (ref 70–99)
GLUCOSE SERPL-MCNC: 77 MG/DL (ref 70–99)
HCT VFR BLD AUTO: 36 %
HCT VFR BLD CALC: 36 % (ref 35–48)
HGB BLD-MCNC: 12.4 G/DL
HGB BLD-MCNC: 12.4 G/DL (ref 12–16)
IMM GRANULOCYTES # BLD AUTO: 0 X10(3) UL (ref 0–1)
IMM GRANULOCYTES # BLD: 0 X10(3)UL (ref 0–1)
IMM GRANULOCYTES NFR BLD: 0 %
IMM GRANULOCYTES NFR BLD: 0 %
INR BLD: 0.94 (ref 0.8–1.2)
LAB RESULT: NORMAL
LENGTH OF FAST TIME PATIENT: YES H
LYMPHOCYTES # BLD AUTO: 1.8 X10(3) UL (ref 1–4)
LYMPHOCYTES # BLD: 1.8 X10(3)UL (ref 1–4)
LYMPHOCYTES NFR BLD AUTO: 46.8 %
LYMPHOCYTES NFR BLD: 46.8 %
MCH RBC QN AUTO: 30.9 PG (ref 26–34)
MCH RBC QN AUTO: 30.9 PG (ref 26–34)
MCHC RBC AUTO-ENTMCNC: 34.4 G/DL (ref 31–37)
MCHC RBC AUTO-ENTMCNC: 34.4 G/DL (ref 31–37)
MCV RBC AUTO: 89.8 FL
MCV RBC AUTO: 89.8 FL (ref 80–100)
MONOCYTES # BLD AUTO: 0.16 X10(3) UL (ref 0.1–1)
MONOCYTES # BLD: 0.16 X10(3)UL (ref 0.1–1)
MONOCYTES NFR BLD AUTO: 4.2 %
MONOCYTES NFR BLD: 4.2 %
NEUTROPHILS # BLD AUTO: 1.8 X10 (3) UL (ref 1.5–7.7)
NEUTROPHILS # BLD AUTO: 1.8 X10(3) UL (ref 1.5–7.7)
NEUTROPHILS # BLD: 1.8 X10(3)UL (ref 1.5–7.7)
NEUTROPHILS NFR BLD AUTO: 46.6 %
NEUTROPHILS NFR BLD: 46.6 %
OSMOLALITY SERPL CALC.SUM OF ELEC: 287 MOSM/KG (ref 275–295)
PLATELET # BLD AUTO: 159 10(3)UL (ref 150–450)
PLATELET # BLD: 159 10(3)UL (ref 150–450)
POTASSIUM SERPL-SCNC: 3.9 MMOL/L (ref 3.5–5.1)
POTASSIUM SERPL-SCNC: 3.9 MMOL/L (ref 3.5–5.1)
PROTHROMBIN TIME: 13.1 SECONDS (ref 11.6–14.8)
RBC # BLD AUTO: 4.01 X10(6)UL
RBC # BLD: 4.01 X10(6)UL (ref 3.8–5.3)
SODIUM SERPL-SCNC: 139 MMOL/L (ref 136–145)
SODIUM SERPL-SCNC: 139 MMOL/L (ref 136–145)
VIT D+METAB SERPL-MCNC: 76.8 NG/ML (ref 30–100)
WBC # BLD AUTO: 3.9 X10(3) UL (ref 4–11)
WBC # BLD: 3.9 X10(3)UL (ref 4–11)

## 2025-02-18 PROCEDURE — 80048 BASIC METABOLIC PNL TOTAL CA: CPT

## 2025-02-18 PROCEDURE — 96372 THER/PROPH/DIAG INJ SC/IM: CPT | Performed by: INTERNAL MEDICINE

## 2025-02-18 PROCEDURE — 36415 COLL VENOUS BLD VENIPUNCTURE: CPT

## 2025-02-18 PROCEDURE — 85610 PROTHROMBIN TIME: CPT

## 2025-02-18 PROCEDURE — 85730 THROMBOPLASTIN TIME PARTIAL: CPT

## 2025-02-18 PROCEDURE — 85025 COMPLETE CBC W/AUTO DIFF WBC: CPT

## 2025-02-18 PROCEDURE — 82306 VITAMIN D 25 HYDROXY: CPT

## 2025-02-18 NOTE — PROGRESS NOTES
Name and  verified. Patient aware she was to receive injections of Evenity. Injections given bilateral upper arms and patient tolerated well. Possible local side effects discussed with patient. Patient aware to follow up monthly for next injection. Aware to obtain labs today.     This in Evenity injection # 12 - last injection    Future Appointments   Date Time Provider Department Center   2025 10:00 AM [facility] RN [facility]UM[facility]OLOG[facility]   2025 12:00 PM [facility]   3/17/2025 10:00 AM [facility]   10/31/2025 11:00 AM Bryon Lerma MD [facility]

## 2025-02-19 ENCOUNTER — TELEPHONE (OUTPATIENT)
Dept: FAMILY MEDICINE CLINIC | Facility: CLINIC | Age: 60
End: 2025-02-19

## 2025-02-20 NOTE — TELEPHONE ENCOUNTER
Please review; protocol failed/ has no protocol      Recent fills: 01/25/2025,12/29/2024,12/02/2024  Last Rx written: 01/25/2025  Last Office Visit: 02/11/2025    Recent Visits  Date Type Provider Dept   02/11/25 Office Visit Quintin Harman DO [facility]

## 2025-02-21 ENCOUNTER — EKG ENCOUNTER (OUTPATIENT)
Dept: LAB | Age: 60
End: 2025-02-21
Attending: FAMILY MEDICINE
Payer: COMMERCIAL

## 2025-02-21 DIAGNOSIS — M48.062 LUMBAR STENOSIS WITH NEUROGENIC CLAUDICATION: ICD-10-CM

## 2025-02-21 DIAGNOSIS — Z01.818 PRE-OP EXAMINATION: ICD-10-CM

## 2025-02-21 LAB
ATRIAL RATE: 64 BPM
P AXIS: 70 DEGREES
P-R INTERVAL: 150 MS
Q-T INTERVAL: 368 MS
QRS DURATION: 74 MS
QTC CALCULATION (BEZET): 379 MS
R AXIS: 25 DEGREES
T AXIS: 60 DEGREES
VENTRICULAR RATE: 64 BPM

## 2025-02-21 PROCEDURE — 93010 ELECTROCARDIOGRAM REPORT: CPT | Performed by: INTERNAL MEDICINE

## 2025-02-21 PROCEDURE — 93005 ELECTROCARDIOGRAM TRACING: CPT

## 2025-02-21 RX ORDER — HYDROCODONE BITARTRATE AND ACETAMINOPHEN 10; 325 MG/1; MG/1
1 TABLET ORAL EVERY 4 HOURS PRN
Qty: 180 TABLET | Refills: 0 | Status: SHIPPED | OUTPATIENT
Start: 2025-02-21

## 2025-02-26 ENCOUNTER — OFFICE VISIT (OUTPATIENT)
Dept: RHEUMATOLOGY | Facility: CLINIC | Age: 60
End: 2025-02-26
Payer: COMMERCIAL

## 2025-02-26 ENCOUNTER — TELEPHONE (OUTPATIENT)
Dept: RHEUMATOLOGY | Facility: CLINIC | Age: 60
End: 2025-02-26

## 2025-02-26 VITALS
SYSTOLIC BLOOD PRESSURE: 116 MMHG | BODY MASS INDEX: 23.46 KG/M2 | HEART RATE: 63 BPM | HEIGHT: 66 IN | WEIGHT: 146 LBS | DIASTOLIC BLOOD PRESSURE: 73 MMHG

## 2025-02-26 DIAGNOSIS — Z51.81 MEDICATION MONITORING ENCOUNTER: ICD-10-CM

## 2025-02-26 DIAGNOSIS — M81.0 AGE-RELATED OSTEOPOROSIS WITHOUT CURRENT PATHOLOGICAL FRACTURE: Primary | ICD-10-CM

## 2025-02-26 PROCEDURE — G2211 COMPLEX E/M VISIT ADD ON: HCPCS | Performed by: INTERNAL MEDICINE

## 2025-02-26 PROCEDURE — 99214 OFFICE O/P EST MOD 30 MIN: CPT | Performed by: INTERNAL MEDICINE

## 2025-02-26 PROCEDURE — 3078F DIAST BP <80 MM HG: CPT | Performed by: INTERNAL MEDICINE

## 2025-02-26 PROCEDURE — 3074F SYST BP LT 130 MM HG: CPT | Performed by: INTERNAL MEDICINE

## 2025-02-26 PROCEDURE — 3008F BODY MASS INDEX DOCD: CPT | Performed by: INTERNAL MEDICINE

## 2025-02-26 NOTE — PROGRESS NOTES
Esha Hernadez is a 60 year old female.    HPI:     Chief Complaint   Patient presents with    Osteoporosis       I had the pleasure of seeing Esha Hernadez on 2/26/2025 for follow up Osteoporosis    Current medications:  Evenity monthly- started 3/2024-2/2025, last dose 2/18/2025  Calcium and vitamin D  Norco 7.5/325 mg every 6 hrs  Baclofen  Previous medication:  Actonel 35 mg every 7 days- started Nov 2023- March 2024  Bone density 11/2023-->8/2024  LFN  T-score     -2.6          -2.3  LTH                   -2.4          -2.0  LS                     -1.6           -1.1    Interval History:  This is a 60 yo F with hx of GERD, MGUS  (follows with hematology Dr. Lerma), history of sigmoid colon resection in 2014 and refractory constipation presents for evaluation of osteoporosis.  She has chronic lower back pain and following with spine surgeon Dr. Madrigal.  She has done extensive conservative management with physical therapy, epidural injections with minimal relief.  They are recommending a spinal fusion.  She had bone density done showing osteoporosis left femoral T-score -2.6, left total hip -2.4 and lumbar spine -1.6.  She was started on Actonel by her PCP around November 2023.  She has been having some side effects of the medications.  She started menopause at the age of 44.  She was never on hormone replacement therapy.  She fractured her fingers when a car door slammed in them.  She also has fractured her left ankle twice, once in 2013 and then 2018 from falling.  She currently does not exercise due to her chronic back pain.  She is to walk about 13 steps a day but hardly walk 3000 steps a day.  No smoking.  Takes minimal alcohol.  She takes calcium and vitamin D.    7/16/2024:  Presents for follow-up of osteoporosis  She is on Evenity every month, started March 2024  She was also seen by endocrinology due to high PTH, they state that is likely from primary hyperparathyroidism.  They advised to increase  her calcium intake  She has chronic lower back pain, she will be having lower back surgery, fusion soon.  They want to make sure her bones are strong I would like to repeat bone density    12/9/2024:  Presents for follow-up of osteoporosis  She is on Evenity every month, started March 2024  She was also seen by endocrinology due to high PTH, they state that is likely from primary hyperparathyroidism.  They advised to increase her calcium intake  Recent bone density done August 2024 shows improvement in hip and lower spine  She also has chronic lower back pain and plans on fusion soon. 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, left    Left small finger mass excision     Bowel obstruction (HCC)    Cancer (HCC)    basal cell on right eyebrow    Decorative tattoo    Esophageal reflux    Essential hypertension    Fibromyalgia    Right side of face    Hemorrhoids    hemoorhoidectomy    History of COVID-19    History of MRSA infection    blister on toe MRSA+ approximately 2012    Infected dental carries    dental extractions    Injury of digital nerve of finger    R finger nerve injury    Irritable bowel syndrome    Metrorrhagia    destruction of lesions, vulva, simple    Migraines    Seizure disorder (HCC)    one isolated seizure after fall and head injury, 18 years ago, no meds    Sinusitis    \"sinus procedure\"    Trigeminal neuralgia    R caniotomy, lyrica    Varicose veins of both lower extremities    Visual impairment    wears eye glasses      Social Hx Reviewed   Family Hx Reviewed     Medications (Active prior to today's visit):  Current Outpatient Medications   Medication Sig Dispense  Refill    HYDROcodone-acetaminophen  MG Oral Tab Take 1 tablet by mouth every 4 (four) hours as needed for Pain. 180 tablet 0    baclofen 10 MG Oral Tab Take 1 tablet (10 mg total) by mouth 2 (two) times daily. 60 tablet 0    ondansetron 4 MG Oral Tablet Dispersible Place 1 tablet (4 mg total) under the tongue every 8 (eight) hours as needed for Nausea. 90 tablet 5    pregabalin 75 MG Oral Cap Take 1 capsule (75 mg total) by mouth 3 (three) times daily. 90 capsule 1    dicyclomine 20 MG Oral Tab Take 1-2 tablets (20-40 mg total) by mouth 3 (three) times daily as needed. 90 tablet 5    Ospemifene (OSPHENA) 60 MG Oral Tab Take 1 tablet by mouth daily. 90 tablet 3    potassium chloride 20 MEQ Oral Tab CR Take 1 tablet (20 mEq total) by mouth daily. 90 tablet 1    temazepam 15 MG Oral Cap Take 1 capsule (15 mg total) by mouth nightly as needed for Sleep. 90 capsule 1    Omeprazole 40 MG Oral Capsule Delayed Release Take 1 capsule (40 mg total) by mouth before breakfast. 90 capsule 3    calcium carbonate 500 MG Oral Chew Tab Chew 1 tablet (500 mg total) by mouth daily.      MOVANTIK 25 MG Oral Tab       Prucalopride Succinate (MOTEGRITY) 2 MG Oral Tab Take 1 tablet by mouth daily. 90 tablet 3    spironolactone 25 MG Oral Tab Take 1 tablet (25 mg total) by mouth daily.      torsemide 20 MG Oral Tab Take 1 tablet (20 mg total) by mouth 2 (two) times daily.      Digestive Enzymes (DIGESTIVE ENZYME OR) Take 1 capsule by mouth daily.      Multiple Vitamins-Minerals (MULTI-VITAMIN/MINERALS) Oral Tab Take 1 tablet by mouth daily.      docusate sodium 100 MG Oral Cap Take 1 capsule (100 mg total) by mouth daily.       .cmed  Allergies:  No Known Allergies      ROS:   All other ROS are negative.     PHYSICAL EXAM:   GEN: AAOx3, NAD  HEENT: EOMI, PERRLA, no injection or icterus, oral mucosa moist, no oral lesions. No lymphadenopathy. No facial rash  CVS: RRR, no murmurs rubs or gallops. Equal 2+ distal pulses.   LUNGS: CTAB,  no increased work of breathing  ABDOMEN:  soft NT/ND, +BS, no HSM  SKIN: No rashes or skin lesions. No nail findings  MSK:  No swelling or synovitis on exam  TTP in lower spine  NEURO: Cranial nerves II-XII intact grossly. 5/5 strength throughout in both upper and lower extremities, sensation intact.  PSYCH: normal mood       LABS:     Reviewed    Imaging:     Bone density 11/2023:  LEFT FEMORAL NECK   BMD: 0.555 gm/sq. cm. T SCORE: -2.6 Z SCORE: -1.4      LEFT TOTAL HIP   BMD: 0.652 gm/sq. cm. T SCORE: -2.4 Z SCORE: -1.4      PA LUMBAR SPINE (L1 - L4)   BMD: 0.871 gm/sq. cm. T SCORE: -1.6 Z SCORE: -0.2     ASSESSMENT/PLAN:     Osteoporosis  - Bone density done November 2023 showing left femoral neck T-score -2.6, left total hip -2.4 and lumbar spine -1.6  - She was started on Actonel back in November 2023 but having side effects  - She is now on Evenity monthly, started in March 202- Feb 2025, she completed 12 months  - Plan to get approved for Prolia injections.  Risks/benefits of Prolia d/w patient which include: back pain, pain in the hands and feet, and increased cholesterol. Rare s/e include an increased risk for infections (especially if you’re already taking medications that lower your immune system), low calcium levels (called hypocalcemia), and osteonecrosis of the jaw. Will need to obtain calcium and vitamin D level prior to starting  - She is following with orthopedics for her chronic back pain.  They are recommending surgery but want to make sure her bones are strong.  They would like a bone density sooner.  I think it is medically necessary for patient to get a bone density since starting Evenity to make sure her bones are strong enough for her surgery     Primary hyperparathyroidism  - Following with endocrinology.  Now on calcium 1200 mg daily and vitamin D    Chronic lower back pain  - She will be having a lumbar fusion 3/10/2025     MGUS  - Stable, following with hematology    Pt will f/u in 6  mos    There is a longitudinal care relationship with me, the care plan reflects the ongoing nature of the continuous relationship of care, and the medical record indicates that there is ongoing treatment of a serious/complex medical condition which I am currently managing.  is Applicable.     Susie Madison MD  2/26/2025  12:00 PM

## 2025-02-26 NOTE — PATIENT INSTRUCTIONS
Seen today for osteoporosis  You completed Evenity  Plan to get you approved for Prolia, this is an injection every 6 months  You are going to have to get blood work 1 week prior to each injection  Continue calcium and vitamin D  Try to get the bone density done, if you have any issues please have them contact me

## 2025-02-26 NOTE — TELEPHONE ENCOUNTER
If we can get patient approved for Prolia injections every 6 months for osteoporosis    She was on Evenity for March 2024 up until February 2025

## 2025-03-06 ENCOUNTER — TELEPHONE (OUTPATIENT)
Dept: FAMILY MEDICINE CLINIC | Facility: CLINIC | Age: 60
End: 2025-03-06

## 2025-03-06 RX ORDER — DICYCLOMINE HCL 20 MG
20 TABLET ORAL 2 TIMES DAILY PRN
COMMUNITY

## 2025-03-06 RX ORDER — OSPEMIFENE 60 MG/1
1 TABLET, FILM COATED ORAL DAILY
COMMUNITY
Start: 2024-11-21

## 2025-03-06 RX ORDER — BACLOFEN 10 MG/1
10 TABLET ORAL 2 TIMES DAILY
Qty: 60 TABLET | Refills: 0 | Status: SHIPPED | OUTPATIENT
Start: 2025-03-06

## 2025-03-06 NOTE — TELEPHONE ENCOUNTER
Protocol Failed/ No Protocol    Requested Prescriptions   Pending Prescriptions Disp Refills    BACLOFEN 10 MG Oral Tab [Pharmacy Med Name: Baclofen 10 Mg Tab Nort] 60 tablet 0     Sig: Take 1 tablet (10 mg total) by mouth 2 (two) times daily.       There is no refill protocol information for this order          Future Appointments         Provider Department Appt Notes    In 1 week Cassidy Fonseca MD [facility] Follow up    In 5 months Susie Madison MD [facility] 6 mo f/u    In 8 months Bryon Lerma MD [facility] [place] FOLLOW UP [email or web address]  1Y          Recent Outpatient Visits              1 week ago Age-related osteoporosis without current pathological fracture    [facility] Susie Madison MD    Office Visit    2 weeks ago Age-related osteoporosis without current pathological fracture    [facility]    Nurse Only    3 weeks ago Lumbar stenosis with neurogenic claudication    [facility] Quintin Harman DO    Office Visit    1 month ago Age-related osteoporosis without current pathological fracture    [facility]    Nurse Only    2 months ago Age-related osteoporosis without current pathological fracture    [facility]    Nurse Only

## 2025-03-06 NOTE — TELEPHONE ENCOUNTER
Ysabel from [facility] called and is asking if cardiac clearance is ready and asking for EKG to be faxed over, as  well as the medical clearance     FAX: [phone]

## 2025-03-07 SDOH — SOCIAL STABILITY: SOCIAL INSECURITY: HOW OFTEN DOES ANYONE, INCLUDING FAMILY AND FRIENDS, THREATEN YOU WITH HARM?: NEVER

## 2025-03-07 SDOH — SOCIAL STABILITY: SOCIAL INSECURITY: HOW OFTEN DOES ANYONE, INCLUDING FAMILY AND FRIENDS, PHYSICALLY HURT YOU?: NEVER

## 2025-03-07 SDOH — SOCIAL STABILITY: SOCIAL INSECURITY: HOW OFTEN DOES ANYONE, INCLUDING FAMILY AND FRIENDS, SCREAM OR CURSE AT YOU?: NEVER

## 2025-03-07 SDOH — SOCIAL STABILITY: SOCIAL INSECURITY: HOW OFTEN DOES ANYONE, INCLUDING FAMILY AND FRIENDS, INSULT OR TALK DOWN TO YOU?: NEVER

## 2025-03-07 ASSESSMENT — ACTIVITIES OF DAILY LIVING (ADL)
ADL_SHORT_OF_BREATH: NO
NEEDS_ASSIST: NO
RECENT_DECLINE_ADL: NO
HISTORY OF FALLING IN THE LAST YEAR (PRIOR TO ADMISSION): NO
ADL_SCORE: 12
ADL_BEFORE_ADMISSION: INDEPENDENT

## 2025-03-07 NOTE — TELEPHONE ENCOUNTER
Patient is calling to know the status of her clearance for her surgery on Monday.   Patient was inform we are waiting for  to review.   Onsite staff can you please follow- up on this. Thank you

## 2025-03-07 NOTE — TELEPHONE ENCOUNTER
Successfully faxed pre-op documents and held at [facility] station

## 2025-03-07 NOTE — TELEPHONE ENCOUNTER
Ysabel from [facility] calling to state cardiac clearance was faxed to office, asking for Dr. Harman to addend note to state cleared for surgery, and that cardiac clearance was received. Also asking for abnormal EKG tracing and addended note to [phone]. Surgery is scheduled for Monday.

## 2025-03-07 NOTE — TELEPHONE ENCOUNTER
Per [facility] patient was seen 2/25/25 by Dr Rivera and stress test was advised.  Spoke with patient who states she completed the stress test 3/5/25 and is awaiting a call back from [facility]. Per patient she has called them twice and surgeon's office has also called. I attempted to call [facility] and was on hold for 12 minutes. Will attempt again later.

## 2025-03-07 NOTE — TELEPHONE ENCOUNTER
I sent her to cardiology for pre op clearence due to her abnormality on ECG obtained here.  I have not heard from cardiologist.  Can we check with patient to see if she was seen and cleared by cardiologist?

## 2025-03-08 ENCOUNTER — LAB SERVICES (OUTPATIENT)
Dept: LAB | Age: 60
End: 2025-03-08

## 2025-03-08 DIAGNOSIS — Z01.812 PRE-PROCEDURAL LABORATORY EXAMINATION: Primary | ICD-10-CM

## 2025-03-08 LAB
ABO + RH BLD: NORMAL
BLD GP AB SCN SERPL QL GEL: NEGATIVE
RAINBOW EXTRA TUBES HOLD SPECIMEN: NORMAL
TYPE AND SCREEN EXPIRATION DATE: NORMAL

## 2025-03-08 PROCEDURE — 86850 RBC ANTIBODY SCREEN: CPT | Performed by: INTERNAL MEDICINE

## 2025-03-08 PROCEDURE — 86901 BLOOD TYPING SEROLOGIC RH(D): CPT | Performed by: INTERNAL MEDICINE

## 2025-03-08 PROCEDURE — 86900 BLOOD TYPING SEROLOGIC ABO: CPT | Performed by: INTERNAL MEDICINE

## 2025-03-08 PROCEDURE — 36415 COLL VENOUS BLD VENIPUNCTURE: CPT | Performed by: INTERNAL MEDICINE

## 2025-03-10 ENCOUNTER — APPOINTMENT (OUTPATIENT)
Dept: NEUROLOGY | Age: 60
DRG: 427 | End: 2025-03-10
Attending: ORTHOPAEDIC SURGERY

## 2025-03-10 ENCOUNTER — ANESTHESIA EVENT (OUTPATIENT)
Dept: SURGERY | Age: 60
End: 2025-03-10

## 2025-03-10 ENCOUNTER — HOSPITAL ENCOUNTER (INPATIENT)
Age: 60
LOS: 7 days | Discharge: HOME HEALTH | DRG: 427 | End: 2025-03-19
Attending: ORTHOPAEDIC SURGERY | Admitting: ORTHOPAEDIC SURGERY

## 2025-03-10 ENCOUNTER — ANESTHESIA (OUTPATIENT)
Dept: SURGERY | Age: 60
End: 2025-03-10

## 2025-03-10 ENCOUNTER — APPOINTMENT (OUTPATIENT)
Dept: GENERAL RADIOLOGY | Age: 60
DRG: 427 | End: 2025-03-10
Attending: ORTHOPAEDIC SURGERY

## 2025-03-10 DIAGNOSIS — R51.9 CHRONIC PAIN IN FACE: ICD-10-CM

## 2025-03-10 DIAGNOSIS — M48.061 SPINAL STENOSIS, LUMBAR REGION, WITHOUT NEUROGENIC CLAUDICATION: ICD-10-CM

## 2025-03-10 DIAGNOSIS — Z01.812 PRE-PROCEDURAL LABORATORY EXAMINATION: ICD-10-CM

## 2025-03-10 DIAGNOSIS — G89.18 POST-OPERATIVE PAIN: ICD-10-CM

## 2025-03-10 DIAGNOSIS — G89.29 CHRONIC PAIN IN FACE: ICD-10-CM

## 2025-03-10 DIAGNOSIS — M43.16 SPONDYLOLISTHESIS OF LUMBAR REGION: ICD-10-CM

## 2025-03-10 DIAGNOSIS — R52 INADEQUATE PAIN CONTROL: Primary | ICD-10-CM

## 2025-03-10 DIAGNOSIS — M43.10 SPONDYLOLISTHESIS, UNSPECIFIED SPINAL REGION: ICD-10-CM

## 2025-03-10 LAB
ANION GAP SERPL CALC-SCNC: 11 MMOL/L (ref 7–19)
BASOPHILS # BLD: 0 K/MCL (ref 0–0.3)
BASOPHILS NFR BLD: 0 %
BUN SERPL-MCNC: 14 MG/DL (ref 6–20)
BUN/CREAT SERPL: 17 (ref 7–25)
CALCIUM SERPL-MCNC: 8.6 MG/DL (ref 8.4–10.2)
CHLORIDE SERPL-SCNC: 103 MMOL/L (ref 97–110)
CO2 SERPL-SCNC: 29 MMOL/L (ref 21–32)
CREAT SERPL-MCNC: 0.83 MG/DL (ref 0.51–0.95)
DEPRECATED RDW RBC: 41.4 FL (ref 39–50)
EGFRCR SERPLBLD CKD-EPI 2021: 80 ML/MIN/{1.73_M2}
EOSINOPHIL # BLD: 0 K/MCL (ref 0–0.5)
EOSINOPHIL NFR BLD: 0 %
ERYTHROCYTE [DISTWIDTH] IN BLOOD: 12.8 % (ref 11–15)
FASTING DURATION TIME PATIENT: ABNORMAL H
GLUCOSE SERPL-MCNC: 153 MG/DL (ref 70–99)
HCT VFR BLD CALC: 31 % (ref 36–46.5)
HGB BLD-MCNC: 10.3 G/DL (ref 12–15.5)
IMM GRANULOCYTES # BLD AUTO: 0 K/MCL (ref 0–0.2)
IMM GRANULOCYTES # BLD: 0 %
LYMPHOCYTES # BLD: 1.2 K/MCL (ref 1–4)
LYMPHOCYTES NFR BLD: 18 %
MAGNESIUM SERPL-MCNC: 2.3 MG/DL (ref 1.7–2.4)
MCH RBC QN AUTO: 29.4 PG (ref 26–34)
MCHC RBC AUTO-ENTMCNC: 33.2 G/DL (ref 32–36.5)
MCV RBC AUTO: 88.6 FL (ref 78–100)
MONOCYTES # BLD: 0.2 K/MCL (ref 0.3–0.9)
MONOCYTES NFR BLD: 3 %
NEUTROPHILS # BLD: 5.4 K/MCL (ref 1.8–7.7)
NEUTROPHILS NFR BLD: 79 %
NRBC BLD MANUAL-RTO: 0 /100 WBC
PHOSPHATE SERPL-MCNC: 3.1 MG/DL (ref 2.4–4.7)
PLATELET # BLD AUTO: 171 K/MCL (ref 140–450)
POTASSIUM SERPL-SCNC: 4.1 MMOL/L (ref 3.4–5.1)
RBC # BLD: 3.5 MIL/MCL (ref 4–5.2)
SODIUM SERPL-SCNC: 139 MMOL/L (ref 135–145)
WBC # BLD: 6.9 K/MCL (ref 4.2–11)

## 2025-03-10 PROCEDURE — 13003361 HB INTRAOP MONITORING PER 15 MIN

## 2025-03-10 PROCEDURE — 10002800 HB RX 250 W HCPCS

## 2025-03-10 PROCEDURE — 0SG1071 FUSION OF 2 OR MORE LUMBAR VERTEBRAL JOINTS WITH AUTOLOGOUS TISSUE SUBSTITUTE, POSTERIOR APPROACH, POSTERIOR COLUMN, OPEN APPROACH: ICD-10-PCS | Performed by: ORTHOPAEDIC SURGERY

## 2025-03-10 PROCEDURE — 13000003 HB ANESTHESIA  GENERAL EA ADD MINUTE: Performed by: ORTHOPAEDIC SURGERY

## 2025-03-10 PROCEDURE — 10006023 HB SUPPLY 272: Performed by: ORTHOPAEDIC SURGERY

## 2025-03-10 PROCEDURE — 10002800 HB RX 250 W HCPCS: Performed by: ORTHOPAEDIC SURGERY

## 2025-03-10 PROCEDURE — 01NB0ZZ RELEASE LUMBAR NERVE, OPEN APPROACH: ICD-10-PCS | Performed by: ORTHOPAEDIC SURGERY

## 2025-03-10 PROCEDURE — 10002807 HB RX 258: Performed by: STUDENT IN AN ORGANIZED HEALTH CARE EDUCATION/TRAINING PROGRAM

## 2025-03-10 PROCEDURE — 95861 NEEDLE EMG 2 EXTREMITIES: CPT

## 2025-03-10 PROCEDURE — 13000113 HB NEURO MAJOR COMPLEX CASE EA ADD MINUTE: Performed by: ORTHOPAEDIC SURGERY

## 2025-03-10 PROCEDURE — 95861 NEEDLE EMG 2 EXTREMITIES: CPT | Performed by: PSYCHIATRY & NEUROLOGY

## 2025-03-10 PROCEDURE — 10002801 HB RX 250 W/O HCPCS

## 2025-03-10 PROCEDURE — 13000112 HB NEURO MAJOR COMPLEX CASE S/U + 1ST 15 MIN: Performed by: ORTHOPAEDIC SURGERY

## 2025-03-10 PROCEDURE — 3E0U0GB INTRODUCTION OF RECOMBINANT BONE MORPHOGENETIC PROTEIN INTO JOINTS, OPEN APPROACH: ICD-10-PCS | Performed by: ORTHOPAEDIC SURGERY

## 2025-03-10 PROCEDURE — 0SG10AJ FUSION OF 2 OR MORE LUMBAR VERTEBRAL JOINTS WITH INTERBODY FUSION DEVICE, POSTERIOR APPROACH, ANTERIOR COLUMN, OPEN APPROACH: ICD-10-PCS | Performed by: ORTHOPAEDIC SURGERY

## 2025-03-10 PROCEDURE — 85025 COMPLETE CBC W/AUTO DIFF WBC: CPT

## 2025-03-10 PROCEDURE — 10006027 HB SUPPLY 278: Performed by: ORTHOPAEDIC SURGERY

## 2025-03-10 PROCEDURE — 10002801 HB RX 250 W/O HCPCS: Performed by: STUDENT IN AN ORGANIZED HEALTH CARE EDUCATION/TRAINING PROGRAM

## 2025-03-10 PROCEDURE — 84100 ASSAY OF PHOSPHORUS: CPT

## 2025-03-10 PROCEDURE — G0453 CONT INTRAOP NEURO MONITOR: HCPCS | Performed by: PSYCHIATRY & NEUROLOGY

## 2025-03-10 PROCEDURE — 13003383 HB INTRAOP SSEP UPPER AND LOWER EXTREMITIES

## 2025-03-10 PROCEDURE — 80048 BASIC METABOLIC PNL TOTAL CA: CPT

## 2025-03-10 PROCEDURE — 10002801 HB RX 250 W/O HCPCS: Performed by: ORTHOPAEDIC SURGERY

## 2025-03-10 PROCEDURE — 10005281 FL INTRAOPERATIVE C ARM NO REPORT

## 2025-03-10 PROCEDURE — 0SB20ZZ EXCISION OF LUMBAR VERTEBRAL DISC, OPEN APPROACH: ICD-10-PCS | Performed by: ORTHOPAEDIC SURGERY

## 2025-03-10 PROCEDURE — 10002803 HB RX 637

## 2025-03-10 PROCEDURE — 99291 CRITICAL CARE FIRST HOUR: CPT

## 2025-03-10 PROCEDURE — 36415 COLL VENOUS BLD VENIPUNCTURE: CPT

## 2025-03-10 PROCEDURE — 10002807 HB RX 258

## 2025-03-10 PROCEDURE — 95938 SOMATOSENSORY TESTING: CPT | Performed by: PSYCHIATRY & NEUROLOGY

## 2025-03-10 PROCEDURE — 13000002 HB ANESTHESIA  GENERAL   S/U + 1ST 15 MIN: Performed by: ORTHOPAEDIC SURGERY

## 2025-03-10 PROCEDURE — 10002800 HB RX 250 W HCPCS: Performed by: STUDENT IN AN ORGANIZED HEALTH CARE EDUCATION/TRAINING PROGRAM

## 2025-03-10 PROCEDURE — A22633 ANES ARTHRODESIS POSTER INCL LAMINECT/DISCECT SGL SEG LUMBAR: Performed by: STUDENT IN AN ORGANIZED HEALTH CARE EDUCATION/TRAINING PROGRAM

## 2025-03-10 PROCEDURE — 83735 ASSAY OF MAGNESIUM: CPT

## 2025-03-10 PROCEDURE — C1713 ANCHOR/SCREW BN/BN,TIS/BN: HCPCS | Performed by: ORTHOPAEDIC SURGERY

## 2025-03-10 PROCEDURE — 13000059 HB CELL SAVER

## 2025-03-10 PROCEDURE — 10004651 HB RX, NO CHARGE ITEM

## 2025-03-10 DEVICE — IMPLANTABLE DEVICE: Type: IMPLANTABLE DEVICE | Site: SPINE LUMBAR | Status: FUNCTIONAL

## 2025-03-10 DEVICE — COHERE TLIF-A, 10X9X30MM 8°
Type: IMPLANTABLE DEVICE | Site: SPINE LUMBAR | Status: FUNCTIONAL
Brand: COHERE

## 2025-03-10 DEVICE — SCREW OD5.5 MM SPINE LOCK OPEN TULIP BN RELINE: Type: IMPLANTABLE DEVICE | Site: SPINE LUMBAR | Status: FUNCTIONAL

## 2025-03-10 DEVICE — GRAFT BN CANC H1-4 MM 15 ML ALLOGRAFT FRZ DRY CHIPS: Type: IMPLANTABLE DEVICE | Site: SPINE LUMBAR | Status: FUNCTIONAL

## 2025-03-10 DEVICE — BONE GRAFT KIT 7510800 INFUSE LARGE II
Type: IMPLANTABLE DEVICE | Site: SPINE LUMBAR | Status: FUNCTIONAL
Brand: INFUSE® BONE GRAFT

## 2025-03-10 DEVICE — DBM T43110 10CC GRAFTON PUTTY
Type: IMPLANTABLE DEVICE | Site: SPINE LUMBAR | Status: FUNCTIONAL
Brand: GRAFTON®AND GRAFTON PLUS®DEMINERALIZED BONE MATRIX (DBM)

## 2025-03-10 DEVICE — IMPLANTABLE DEVICE
Type: IMPLANTABLE DEVICE | Site: SPINE LUMBAR | Status: FUNCTIONAL
Brand: SURGIFLO® HEMOSTATIC MATRIX KIT WITH THROMBIN

## 2025-03-10 RX ORDER — DEXTROSE MONOHYDRATE 25 G/50ML
25 INJECTION, SOLUTION INTRAVENOUS PRN
Status: DISCONTINUED | OUTPATIENT
Start: 2025-03-10 | End: 2025-03-10 | Stop reason: HOSPADM

## 2025-03-10 RX ORDER — 0.9 % SODIUM CHLORIDE 0.9 %
10 VIAL (ML) INJECTION PRN
Status: DISCONTINUED | OUTPATIENT
Start: 2025-03-10 | End: 2025-03-19 | Stop reason: HOSPADM

## 2025-03-10 RX ORDER — METOCLOPRAMIDE HYDROCHLORIDE 5 MG/ML
5 INJECTION INTRAMUSCULAR; INTRAVENOUS EVERY 6 HOURS PRN
Status: DISCONTINUED | OUTPATIENT
Start: 2025-03-10 | End: 2025-03-10 | Stop reason: HOSPADM

## 2025-03-10 RX ORDER — SODIUM CHLORIDE 9 MG/ML
INJECTION, SOLUTION INTRAVENOUS CONTINUOUS
Status: DISCONTINUED | OUTPATIENT
Start: 2025-03-10 | End: 2025-03-10

## 2025-03-10 RX ORDER — SODIUM CHLORIDE 9 MG/ML
INJECTION, SOLUTION INTRAVENOUS CONTINUOUS
Status: DISCONTINUED | OUTPATIENT
Start: 2025-03-10 | End: 2025-03-12

## 2025-03-10 RX ORDER — MIDAZOLAM HYDROCHLORIDE 1 MG/ML
INJECTION, SOLUTION INTRAMUSCULAR; INTRAVENOUS PRN
Status: DISCONTINUED | OUTPATIENT
Start: 2025-03-10 | End: 2025-03-10

## 2025-03-10 RX ORDER — SODIUM CHLORIDE, SODIUM LACTATE, POTASSIUM CHLORIDE, CALCIUM CHLORIDE 600; 310; 30; 20 MG/100ML; MG/100ML; MG/100ML; MG/100ML
INJECTION, SOLUTION INTRAVENOUS CONTINUOUS
Status: DISCONTINUED | OUTPATIENT
Start: 2025-03-10 | End: 2025-03-10

## 2025-03-10 RX ORDER — SODIUM CHLORIDE, SODIUM LACTATE, POTASSIUM CHLORIDE, CALCIUM CHLORIDE 600; 310; 30; 20 MG/100ML; MG/100ML; MG/100ML; MG/100ML
INJECTION, SOLUTION INTRAVENOUS CONTINUOUS PRN
Status: DISCONTINUED | OUTPATIENT
Start: 2025-03-10 | End: 2025-03-10

## 2025-03-10 RX ORDER — LIDOCAINE HYDROCHLORIDE 10 MG/ML
5 INJECTION, SOLUTION INFILTRATION; PERINEURAL PRN
Status: DISCONTINUED | OUTPATIENT
Start: 2025-03-10 | End: 2025-03-10 | Stop reason: HOSPADM

## 2025-03-10 RX ORDER — AMOXICILLIN 250 MG
2 CAPSULE ORAL 2 TIMES DAILY PRN
Status: DISCONTINUED | OUTPATIENT
Start: 2025-03-10 | End: 2025-03-10

## 2025-03-10 RX ORDER — ONDANSETRON 2 MG/ML
INJECTION INTRAMUSCULAR; INTRAVENOUS PRN
Status: DISCONTINUED | OUTPATIENT
Start: 2025-03-10 | End: 2025-03-10

## 2025-03-10 RX ORDER — ONDANSETRON 2 MG/ML
4 INJECTION INTRAMUSCULAR; INTRAVENOUS EVERY 12 HOURS PRN
Status: DISCONTINUED | OUTPATIENT
Start: 2025-03-10 | End: 2025-03-19 | Stop reason: HOSPADM

## 2025-03-10 RX ORDER — NALOXONE HCL 0.4 MG/ML
0.2 VIAL (ML) INJECTION EVERY 5 MIN PRN
Status: DISCONTINUED | OUTPATIENT
Start: 2025-03-10 | End: 2025-03-10 | Stop reason: HOSPADM

## 2025-03-10 RX ORDER — BISACODYL 10 MG
10 SUPPOSITORY, RECTAL RECTAL DAILY PRN
Status: DISCONTINUED | OUTPATIENT
Start: 2025-03-10 | End: 2025-03-19 | Stop reason: HOSPADM

## 2025-03-10 RX ORDER — DIAZEPAM 5 MG/1
5 TABLET ORAL EVERY 6 HOURS PRN
Status: DISCONTINUED | OUTPATIENT
Start: 2025-03-10 | End: 2025-03-19 | Stop reason: HOSPADM

## 2025-03-10 RX ORDER — LIDOCAINE HYDROCHLORIDE 10 MG/ML
INJECTION, SOLUTION INFILTRATION; PERINEURAL PRN
Status: DISCONTINUED | OUTPATIENT
Start: 2025-03-10 | End: 2025-03-10

## 2025-03-10 RX ORDER — VANCOMYCIN HYDROCHLORIDE 1 G/20ML
INJECTION, POWDER, LYOPHILIZED, FOR SOLUTION INTRAVENOUS PRN
Status: DISCONTINUED | OUTPATIENT
Start: 2025-03-10 | End: 2025-03-10 | Stop reason: HOSPADM

## 2025-03-10 RX ORDER — BUPIVACAINE HYDROCHLORIDE 2.5 MG/ML
INJECTION, SOLUTION EPIDURAL; INFILTRATION; INTRACAUDAL; PERINEURAL PRN
Status: DISCONTINUED | OUTPATIENT
Start: 2025-03-10 | End: 2025-03-10 | Stop reason: HOSPADM

## 2025-03-10 RX ORDER — NALOXONE HCL 0.4 MG/ML
0.1 VIAL (ML) INJECTION PRN
Status: DISCONTINUED | OUTPATIENT
Start: 2025-03-10 | End: 2025-03-19 | Stop reason: HOSPADM

## 2025-03-10 RX ORDER — ACETAMINOPHEN 325 MG/1
650 TABLET ORAL EVERY 4 HOURS PRN
Status: DISCONTINUED | OUTPATIENT
Start: 2025-03-10 | End: 2025-03-10 | Stop reason: HOSPADM

## 2025-03-10 RX ORDER — AMOXICILLIN 250 MG
2 CAPSULE ORAL 2 TIMES DAILY
Status: DISCONTINUED | OUTPATIENT
Start: 2025-03-10 | End: 2025-03-19 | Stop reason: HOSPADM

## 2025-03-10 RX ORDER — POLYETHYLENE GLYCOL 3350 17 G/17G
17 POWDER, FOR SOLUTION ORAL DAILY
Status: DISCONTINUED | OUTPATIENT
Start: 2025-03-10 | End: 2025-03-19 | Stop reason: HOSPADM

## 2025-03-10 RX ORDER — DEXAMETHASONE SODIUM PHOSPHATE 4 MG/ML
INJECTION, SOLUTION INTRA-ARTICULAR; INTRALESIONAL; INTRAMUSCULAR; INTRAVENOUS; SOFT TISSUE PRN
Status: DISCONTINUED | OUTPATIENT
Start: 2025-03-10 | End: 2025-03-10

## 2025-03-10 RX ORDER — ONDANSETRON 4 MG/1
4 TABLET, ORALLY DISINTEGRATING ORAL EVERY 12 HOURS PRN
Status: DISCONTINUED | OUTPATIENT
Start: 2025-03-10 | End: 2025-03-19 | Stop reason: HOSPADM

## 2025-03-10 RX ORDER — NICOTINE POLACRILEX 4 MG
30 LOZENGE BUCCAL
Status: DISCONTINUED | OUTPATIENT
Start: 2025-03-10 | End: 2025-03-10 | Stop reason: HOSPADM

## 2025-03-10 RX ORDER — HYDRALAZINE HYDROCHLORIDE 20 MG/ML
5 INJECTION INTRAMUSCULAR; INTRAVENOUS EVERY 10 MIN PRN
Status: DISCONTINUED | OUTPATIENT
Start: 2025-03-10 | End: 2025-03-10 | Stop reason: HOSPADM

## 2025-03-10 RX ORDER — HYDROMORPHONE HCL IN 0.9% NACL 0.2 MG/ML
PLASTIC BAG, INJECTION (ML) INTRAVENOUS CONTINUOUS
Status: DISCONTINUED | OUTPATIENT
Start: 2025-03-10 | End: 2025-03-10

## 2025-03-10 RX ORDER — ONDANSETRON 2 MG/ML
4 INJECTION INTRAMUSCULAR; INTRAVENOUS 2 TIMES DAILY PRN
Status: DISCONTINUED | OUTPATIENT
Start: 2025-03-10 | End: 2025-03-10 | Stop reason: HOSPADM

## 2025-03-10 RX ORDER — PROPOFOL 10 MG/ML
INJECTION, EMULSION INTRAVENOUS PRN
Status: DISCONTINUED | OUTPATIENT
Start: 2025-03-10 | End: 2025-03-10

## 2025-03-10 RX ORDER — ACETAMINOPHEN 650 MG/1
650 SUPPOSITORY RECTAL EVERY 4 HOURS PRN
Status: DISCONTINUED | OUTPATIENT
Start: 2025-03-10 | End: 2025-03-10 | Stop reason: HOSPADM

## 2025-03-10 RX ORDER — ACETAMINOPHEN 325 MG/1
650 TABLET ORAL
Status: DISPENSED | OUTPATIENT
Start: 2025-03-10 | End: 2025-03-10

## 2025-03-10 RX ORDER — ACETAMINOPHEN 325 MG/1
650 TABLET ORAL EVERY 8 HOURS PRN
Status: DISCONTINUED | OUTPATIENT
Start: 2025-03-10 | End: 2025-03-11

## 2025-03-10 RX ORDER — 0.9 % SODIUM CHLORIDE 0.9 %
2 VIAL (ML) INJECTION EVERY 12 HOURS SCHEDULED
Status: DISCONTINUED | OUTPATIENT
Start: 2025-03-10 | End: 2025-03-19 | Stop reason: HOSPADM

## 2025-03-10 RX ORDER — ROCURONIUM BROMIDE 10 MG/ML
INJECTION, SOLUTION INTRAVENOUS PRN
Status: DISCONTINUED | OUTPATIENT
Start: 2025-03-10 | End: 2025-03-10

## 2025-03-10 RX ORDER — HYDROCODONE BITARTRATE AND ACETAMINOPHEN 5; 325 MG/1; MG/1
1 TABLET ORAL
Status: DISCONTINUED | OUTPATIENT
Start: 2025-03-10 | End: 2025-03-10 | Stop reason: HOSPADM

## 2025-03-10 RX ORDER — POLYETHYLENE GLYCOL 3350 17 G/17G
17 POWDER, FOR SOLUTION ORAL DAILY PRN
Status: DISCONTINUED | OUTPATIENT
Start: 2025-03-10 | End: 2025-03-14

## 2025-03-10 RX ORDER — DEXAMETHASONE SODIUM PHOSPHATE 4 MG/ML
4 INJECTION, SOLUTION INTRA-ARTICULAR; INTRALESIONAL; INTRAMUSCULAR; INTRAVENOUS; SOFT TISSUE
Status: DISCONTINUED | OUTPATIENT
Start: 2025-03-10 | End: 2025-03-10 | Stop reason: HOSPADM

## 2025-03-10 RX ORDER — NALBUPHINE HYDROCHLORIDE 10 MG/ML
2.5 INJECTION INTRAMUSCULAR; INTRAVENOUS; SUBCUTANEOUS EVERY 8 HOURS PRN
Status: DISCONTINUED | OUTPATIENT
Start: 2025-03-10 | End: 2025-03-19 | Stop reason: HOSPADM

## 2025-03-10 RX ORDER — HYDROMORPHONE HCL IN 0.9% NACL 0.2 MG/ML
PLASTIC BAG, INJECTION (ML) INTRAVENOUS CONTINUOUS
Status: DISCONTINUED | OUTPATIENT
Start: 2025-03-10 | End: 2025-03-11

## 2025-03-10 RX ADMIN — CEFAZOLIN 2000 MG: 2 INJECTION, POWDER, FOR SOLUTION INTRAMUSCULAR; INTRAVENOUS at 14:41

## 2025-03-10 RX ADMIN — HYDROMORPHONE HYDROCHLORIDE 0.6 MG: 1 INJECTION, SOLUTION INTRAMUSCULAR; INTRAVENOUS; SUBCUTANEOUS at 11:04

## 2025-03-10 RX ADMIN — REMIFENTANIL HYDROCHLORIDE 0.05 MCG/KG/MIN: 1 INJECTION, POWDER, LYOPHILIZED, FOR SOLUTION INTRAVENOUS at 08:03

## 2025-03-10 RX ADMIN — PROPOFOL INJECTABLE EMULSION 100 MCG/KG/MIN: 10 INJECTION, EMULSION INTRAVENOUS at 08:03

## 2025-03-10 RX ADMIN — ACETAMINOPHEN 650 MG: 325 TABLET ORAL at 14:38

## 2025-03-10 RX ADMIN — SODIUM CHLORIDE, PRESERVATIVE FREE 2 ML: 5 INJECTION INTRAVENOUS at 12:00

## 2025-03-10 RX ADMIN — SODIUM CHLORIDE, POTASSIUM CHLORIDE, SODIUM LACTATE AND CALCIUM CHLORIDE: 600; 310; 30; 20 INJECTION, SOLUTION INTRAVENOUS at 11:50

## 2025-03-10 RX ADMIN — Medication: at 17:43

## 2025-03-10 RX ADMIN — FENTANYL CITRATE 50 MCG: 50 INJECTION INTRAMUSCULAR; INTRAVENOUS at 08:03

## 2025-03-10 RX ADMIN — FENTANYL CITRATE 50 MCG: 50 INJECTION INTRAMUSCULAR; INTRAVENOUS at 11:20

## 2025-03-10 RX ADMIN — SENNOSIDES AND DOCUSATE SODIUM 2 TABLET: 50; 8.6 TABLET ORAL at 21:02

## 2025-03-10 RX ADMIN — FENTANYL CITRATE 50 MCG: 50 INJECTION INTRAMUSCULAR; INTRAVENOUS at 11:23

## 2025-03-10 RX ADMIN — LIDOCAINE HYDROCHLORIDE 50 MG: 10 INJECTION, SOLUTION INFILTRATION; PERINEURAL at 07:46

## 2025-03-10 RX ADMIN — PHENYLEPHRINE HYDROCHLORIDE 20 MCG/MIN: 10 INJECTION INTRAVENOUS at 08:49

## 2025-03-10 RX ADMIN — Medication: at 11:53

## 2025-03-10 RX ADMIN — DEXAMETHASONE SODIUM PHOSPHATE 8 MG: 4 INJECTION INTRA-ARTICULAR; INTRALESIONAL; INTRAMUSCULAR; INTRAVENOUS; SOFT TISSUE at 07:48

## 2025-03-10 RX ADMIN — Medication: at 18:39

## 2025-03-10 RX ADMIN — POLYETHYLENE GLYCOL 3350 17 G: 17 POWDER, FOR SOLUTION ORAL at 16:55

## 2025-03-10 RX ADMIN — ROCURONIUM BROMIDE 20 MG: 10 INJECTION INTRAVENOUS at 08:20

## 2025-03-10 RX ADMIN — FENTANYL CITRATE 50 MCG: 50 INJECTION INTRAMUSCULAR; INTRAVENOUS at 07:46

## 2025-03-10 RX ADMIN — Medication 80 MG: at 07:46

## 2025-03-10 RX ADMIN — SODIUM CHLORIDE, POTASSIUM CHLORIDE, SODIUM LACTATE AND CALCIUM CHLORIDE: 600; 310; 30; 20 INJECTION, SOLUTION INTRAVENOUS at 07:39

## 2025-03-10 RX ADMIN — WATER 2000 MG: 1 INJECTION INTRAMUSCULAR; INTRAVENOUS; SUBCUTANEOUS at 08:10

## 2025-03-10 RX ADMIN — SODIUM CHLORIDE: 9 INJECTION, SOLUTION INTRAVENOUS at 11:59

## 2025-03-10 RX ADMIN — SODIUM CHLORIDE, PRESERVATIVE FREE 2 ML: 5 INJECTION INTRAVENOUS at 21:02

## 2025-03-10 RX ADMIN — CEFAZOLIN 2000 MG: 2 INJECTION, POWDER, FOR SOLUTION INTRAMUSCULAR; INTRAVENOUS at 21:02

## 2025-03-10 RX ADMIN — HYDROMORPHONE HYDROCHLORIDE 0.4 MG: 1 INJECTION, SOLUTION INTRAMUSCULAR; INTRAVENOUS; SUBCUTANEOUS at 10:45

## 2025-03-10 RX ADMIN — PROPOFOL 130 MG: 10 INJECTION, EMULSION INTRAVENOUS at 07:46

## 2025-03-10 RX ADMIN — ONDANSETRON 4 MG: 2 INJECTION INTRAMUSCULAR; INTRAVENOUS at 10:41

## 2025-03-10 RX ADMIN — Medication: at 22:44

## 2025-03-10 RX ADMIN — MIDAZOLAM HYDROCHLORIDE 2 MG: 1 INJECTION, SOLUTION INTRAMUSCULAR; INTRAVENOUS at 07:36

## 2025-03-10 RX ADMIN — ROCURONIUM BROMIDE 20 MG: 10 INJECTION INTRAVENOUS at 07:48

## 2025-03-10 RX ADMIN — SUGAMMADEX 133 MG: 100 INJECTION, SOLUTION INTRAVENOUS at 10:59

## 2025-03-10 ASSESSMENT — PAIN SCALES - GENERAL
PAINLEVEL_OUTOF10: 0
PAINLEVEL_OUTOF10: 8
PAINLEVEL_OUTOF10: 7
PAINLEVEL_OUTOF10: 8
PAINLEVEL_OUTOF10: 7
PAINLEVEL_OUTOF10: 8
PAINLEVEL_OUTOF10: 8
PAINLEVEL_OUTOF10: 7
PAINLEVEL_OUTOF10: 3
PAINLEVEL_OUTOF10: 8
PAINLEVEL_OUTOF10: 7

## 2025-03-10 ASSESSMENT — ENCOUNTER SYMPTOMS
HEADACHES: 1
EXERCISE TOLERANCE: GOOD (>4 METS)
SEIZURES: 1

## 2025-03-10 ASSESSMENT — LIFESTYLE VARIABLES: SMOKING_STATUS: FORMER SMOKER

## 2025-03-11 PROBLEM — M43.10 SPONDYLOLISTHESIS: Status: ACTIVE | Noted: 2025-03-11

## 2025-03-11 PROBLEM — M48.061 SPINAL STENOSIS, LUMBAR REGION, WITHOUT NEUROGENIC CLAUDICATION: Status: ACTIVE | Noted: 2025-03-11

## 2025-03-11 LAB
ANION GAP SERPL CALC-SCNC: 7 MMOL/L (ref 7–19)
BUN SERPL-MCNC: 14 MG/DL (ref 6–20)
BUN/CREAT SERPL: 18 (ref 7–25)
CALCIUM SERPL-MCNC: 9.2 MG/DL (ref 8.4–10.2)
CHLORIDE SERPL-SCNC: 101 MMOL/L (ref 97–110)
CO2 SERPL-SCNC: 32 MMOL/L (ref 21–32)
CREAT SERPL-MCNC: 0.76 MG/DL (ref 0.51–0.95)
DEPRECATED RDW RBC: 41.9 FL (ref 39–50)
EGFRCR SERPLBLD CKD-EPI 2021: 89 ML/MIN/{1.73_M2}
ERYTHROCYTE [DISTWIDTH] IN BLOOD: 12.9 % (ref 11–15)
FASTING DURATION TIME PATIENT: ABNORMAL H
GLUCOSE SERPL-MCNC: 113 MG/DL (ref 70–99)
HCT VFR BLD CALC: 26.3 % (ref 36–46.5)
HGB BLD-MCNC: 8.8 G/DL (ref 12–15.5)
LYMPHOCYTES # BLD: 2 K/MCL (ref 1–4)
LYMPHOCYTES NFR BLD: 25 %
MAGNESIUM SERPL-MCNC: 2.1 MG/DL (ref 1.7–2.4)
MCH RBC QN AUTO: 29.8 PG (ref 26–34)
MCHC RBC AUTO-ENTMCNC: 33.5 G/DL (ref 32–36.5)
MCV RBC AUTO: 89.2 FL (ref 78–100)
MONOCYTES # BLD: 0.3 K/MCL (ref 0.3–0.9)
MONOCYTES NFR BLD: 5 %
NEUTROPHILS # BLD: 4.3 K/MCL (ref 1.8–7.7)
NEUTS SEG NFR BLD: 65 %
NRBC BLD MANUAL-RTO: 0 /100 WBC
PHOSPHATE SERPL-MCNC: 3.6 MG/DL (ref 2.4–4.7)
PLAT MORPH BLD: NORMAL
PLATELET # BLD AUTO: 161 K/MCL (ref 140–450)
POTASSIUM SERPL-SCNC: 4.2 MMOL/L (ref 3.4–5.1)
RBC # BLD: 2.95 MIL/MCL (ref 4–5.2)
RBC MORPH BLD: NORMAL
SODIUM SERPL-SCNC: 136 MMOL/L (ref 135–145)
VARIANT LYMPHS NFR BLD: 5 % (ref 0–5)
WBC # BLD: 6.6 K/MCL (ref 4.2–11)
WBC MORPH BLD: NORMAL

## 2025-03-11 PROCEDURE — 10002800 HB RX 250 W HCPCS

## 2025-03-11 PROCEDURE — 97161 PT EVAL LOW COMPLEX 20 MIN: CPT

## 2025-03-11 PROCEDURE — 10002803 HB RX 637: Performed by: ORTHOPAEDIC SURGERY

## 2025-03-11 PROCEDURE — 36415 COLL VENOUS BLD VENIPUNCTURE: CPT

## 2025-03-11 PROCEDURE — 99291 CRITICAL CARE FIRST HOUR: CPT

## 2025-03-11 PROCEDURE — 84100 ASSAY OF PHOSPHORUS: CPT

## 2025-03-11 PROCEDURE — 99222 1ST HOSP IP/OBS MODERATE 55: CPT | Performed by: INTERNAL MEDICINE

## 2025-03-11 PROCEDURE — 10002803 HB RX 637: Performed by: INTERNAL MEDICINE

## 2025-03-11 PROCEDURE — 97116 GAIT TRAINING THERAPY: CPT

## 2025-03-11 PROCEDURE — 97165 OT EVAL LOW COMPLEX 30 MIN: CPT

## 2025-03-11 PROCEDURE — 10002803 HB RX 637

## 2025-03-11 PROCEDURE — 83735 ASSAY OF MAGNESIUM: CPT

## 2025-03-11 PROCEDURE — 97535 SELF CARE MNGMENT TRAINING: CPT

## 2025-03-11 PROCEDURE — 10004651 HB RX, NO CHARGE ITEM

## 2025-03-11 PROCEDURE — 80048 BASIC METABOLIC PNL TOTAL CA: CPT

## 2025-03-11 PROCEDURE — 85027 COMPLETE CBC AUTOMATED: CPT

## 2025-03-11 RX ORDER — HYDROMORPHONE HCL IN 0.9% NACL 0.2 MG/ML
PLASTIC BAG, INJECTION (ML) INTRAVENOUS CONTINUOUS
Status: DISCONTINUED | OUTPATIENT
Start: 2025-03-11 | End: 2025-03-12

## 2025-03-11 RX ORDER — ACETAMINOPHEN 500 MG
1000 TABLET ORAL EVERY 6 HOURS
Status: DISCONTINUED | OUTPATIENT
Start: 2025-03-11 | End: 2025-03-12

## 2025-03-11 RX ORDER — GABAPENTIN 300 MG/1
300 CAPSULE ORAL EVERY 8 HOURS SCHEDULED
Status: DISCONTINUED | OUTPATIENT
Start: 2025-03-11 | End: 2025-03-13 | Stop reason: DRUGHIGH

## 2025-03-11 RX ORDER — SPIRONOLACTONE 25 MG/1
25 TABLET ORAL EVERY MORNING
Status: DISCONTINUED | OUTPATIENT
Start: 2025-03-11 | End: 2025-03-19 | Stop reason: HOSPADM

## 2025-03-11 RX ORDER — BACLOFEN 10 MG/1
10 TABLET ORAL 2 TIMES DAILY
Status: DISCONTINUED | OUTPATIENT
Start: 2025-03-11 | End: 2025-03-13 | Stop reason: ALTCHOICE

## 2025-03-11 RX ORDER — TORSEMIDE 10 MG/1
20 TABLET ORAL DAILY
Status: DISCONTINUED | OUTPATIENT
Start: 2025-03-11 | End: 2025-03-19 | Stop reason: HOSPADM

## 2025-03-11 RX ORDER — ACETAMINOPHEN 500 MG
TABLET ORAL
Status: DISPENSED
Start: 2025-03-11 | End: 2025-03-11

## 2025-03-11 RX ADMIN — GABAPENTIN 300 MG: 300 CAPSULE ORAL at 13:45

## 2025-03-11 RX ADMIN — GABAPENTIN 300 MG: 300 CAPSULE ORAL at 20:34

## 2025-03-11 RX ADMIN — Medication: at 09:57

## 2025-03-11 RX ADMIN — TORSEMIDE 20 MG: 10 TABLET ORAL at 16:35

## 2025-03-11 RX ADMIN — DIAZEPAM 5 MG: 5 TABLET ORAL at 04:59

## 2025-03-11 RX ADMIN — SODIUM CHLORIDE, PRESERVATIVE FREE 2 ML: 5 INJECTION INTRAVENOUS at 08:04

## 2025-03-11 RX ADMIN — ACETAMINOPHEN 1000 MG: 500 TABLET ORAL at 18:08

## 2025-03-11 RX ADMIN — SENNOSIDES AND DOCUSATE SODIUM 2 TABLET: 50; 8.6 TABLET ORAL at 08:05

## 2025-03-11 RX ADMIN — SENNOSIDES AND DOCUSATE SODIUM 2 TABLET: 50; 8.6 TABLET ORAL at 20:34

## 2025-03-11 RX ADMIN — BACLOFEN 10 MG: 10 TABLET ORAL at 08:04

## 2025-03-11 RX ADMIN — DIAZEPAM 5 MG: 5 TABLET ORAL at 10:29

## 2025-03-11 RX ADMIN — DIAZEPAM 5 MG: 5 TABLET ORAL at 16:36

## 2025-03-11 RX ADMIN — Medication: at 06:43

## 2025-03-11 RX ADMIN — Medication: at 19:06

## 2025-03-11 RX ADMIN — BACLOFEN 10 MG: 10 TABLET ORAL at 20:34

## 2025-03-11 RX ADMIN — Medication: at 12:07

## 2025-03-11 RX ADMIN — ACETAMINOPHEN 1000 MG: 500 TABLET ORAL at 12:09

## 2025-03-11 SDOH — ECONOMIC STABILITY: HOUSING INSECURITY: WHAT IS YOUR LIVING SITUATION TODAY?: I HAVE A STEADY PLACE TO LIVE

## 2025-03-11 SDOH — SOCIAL STABILITY: SOCIAL NETWORK
HOW OFTEN DO YOU SEE OR TALK TO PEOPLE THAT YOU CARE ABOUT AND FEEL CLOSE TO? (FOR EXAMPLE: TALKING TO FRIENDS ON THE PHONE, VISITING FRIENDS OR FAMILY, GOING TO CHURCH OR CLUB MEETINGS): 5 OR MORE TIMES A WEEK

## 2025-03-11 SDOH — ECONOMIC STABILITY: GENERAL

## 2025-03-11 SDOH — HEALTH STABILITY: PHYSICAL HEALTH: DO YOU HAVE SERIOUS DIFFICULTY WALKING OR CLIMBING STAIRS?: NO

## 2025-03-11 SDOH — ECONOMIC STABILITY: TRANSPORTATION INSECURITY
IN THE PAST 12 MONTHS, HAS LACK OF RELIABLE TRANSPORTATION KEPT YOU FROM MEDICAL APPOINTMENTS, MEETINGS, WORK OR FROM GETTING THINGS NEEDED FOR DAILY LIVING?: NO

## 2025-03-11 SDOH — ECONOMIC STABILITY: HOUSING INSECURITY: WHAT IS YOUR LIVING SITUATION TODAY?: HOUSE

## 2025-03-11 SDOH — HEALTH STABILITY: PHYSICAL HEALTH: DO YOU HAVE DIFFICULTY DRESSING OR BATHING?: NO

## 2025-03-11 SDOH — ECONOMIC STABILITY: FOOD INSECURITY: WITHIN THE PAST 12 MONTHS, THE FOOD YOU BOUGHT JUST DIDN'T LAST AND YOU DIDN'T HAVE MONEY TO GET MORE.: NEVER TRUE

## 2025-03-11 SDOH — HEALTH STABILITY: GENERAL
BECAUSE OF A PHYSICAL, MENTAL, OR EMOTIONAL CONDITION, DO YOU HAVE SERIOUS DIFFICULTY CONCENTRATING, REMEMBERING OR MAKING DECISIONS?: NO

## 2025-03-11 SDOH — ECONOMIC STABILITY: INCOME INSECURITY: IN THE PAST 12 MONTHS, HAS THE ELECTRIC, GAS, OIL, OR WATER COMPANY THREATENED TO SHUT OFF SERVICE IN YOUR HOME?: NO

## 2025-03-11 SDOH — HEALTH STABILITY: GENERAL: BECAUSE OF A PHYSICAL, MENTAL, OR EMOTIONAL CONDITION, DO YOU HAVE DIFFICULTY DOING ERRANDS ALONE?: NO

## 2025-03-11 SDOH — ECONOMIC STABILITY: HOUSING INSECURITY: DO YOU HAVE PROBLEMS WITH ANY OF THE FOLLOWING?: NONE OF THE ABOVE

## 2025-03-11 SDOH — ECONOMIC STABILITY: GENERAL: WOULD YOU LIKE HELP WITH ANY OF THE FOLLOWING NEEDS?: I DON'T WANT HELP WITH ANY OF THESE

## 2025-03-11 ASSESSMENT — PATIENT HEALTH QUESTIONNAIRE - PHQ9
1. LITTLE INTEREST OR PLEASURE IN DOING THINGS: NOT AT ALL
SUM OF ALL RESPONSES TO PHQ9 QUESTIONS 1 AND 2: 0
SUM OF ALL RESPONSES TO PHQ9 QUESTIONS 1 AND 2: 0
CLINICAL INTERPRETATION OF PHQ2 SCORE: NO FURTHER SCREENING NEEDED
2. FEELING DOWN, DEPRESSED OR HOPELESS: NOT AT ALL
IS PATIENT ABLE TO COMPLETE PHQ2 OR PHQ9: YES

## 2025-03-11 ASSESSMENT — LIFESTYLE VARIABLES
HOW MANY STANDARD DRINKS CONTAINING ALCOHOL DO YOU HAVE ON A TYPICAL DAY: 0,1 OR 2
AUDIT-C TOTAL SCORE: 0
ALCOHOL_USE_STATUS: NO OR LOW RISK WITH VALIDATED TOOL
HOW OFTEN DO YOU HAVE A DRINK CONTAINING ALCOHOL: NEVER
HOW OFTEN DO YOU HAVE 6 OR MORE DRINKS ON ONE OCCASION: NEVER

## 2025-03-11 ASSESSMENT — ACTIVITIES OF DAILY LIVING (ADL)
ADL_BEFORE_ADMISSION: INDEPENDENT
PRIOR_ADL: INDEPENDENT
HOME_MANAGEMENT_TIME_ENTRY: 24
RECENT_DECLINE_ADL: NO
ADL_SCORE: 12
ADL_SHORT_OF_BREATH: NO

## 2025-03-11 ASSESSMENT — ENCOUNTER SYMPTOMS
PAIN SEVERITY NOW: 8
PAIN SEVERITY NOW: 6

## 2025-03-11 ASSESSMENT — COGNITIVE AND FUNCTIONAL STATUS - GENERAL
HELP NEEDED FOR BATHING: A LOT
HELP NEEDED FOR TOILETING: A LOT
DAILY_ACTIVITY_RAW_SCORE: 17
DAILY_ACTIVITY_CONVERTED_SCORE: 37.26
BASIC_MOBILITY_RAW_SCORE: 15
HELP NEEDED DRESSING REGULAR LOWER BODY CLOTHING: A LOT
HELP NEEDED DRESSING REGULAR UPPER BODY CLOTHING: A LITTLE
BASIC_MOBILITY_CONVERTED_SCORE: 36.97

## 2025-03-11 ASSESSMENT — PAIN SCALES - GENERAL
PAINLEVEL_OUTOF10: 7
PAINLEVEL_OUTOF10: 7
PAINLEVEL_OUTOF10: 6
PAINLEVEL_OUTOF10: 7

## 2025-03-11 ASSESSMENT — COLUMBIA-SUICIDE SEVERITY RATING SCALE - C-SSRS
IS THE PATIENT ABLE TO COMPLETE C-SSRS: YES
6. HAVE YOU EVER DONE ANYTHING, STARTED TO DO ANYTHING, OR PREPARED TO DO ANYTHING TO END YOUR LIFE?: NO
2. HAVE YOU ACTUALLY HAD ANY THOUGHTS OF KILLING YOURSELF?: NO
1. WITHIN THE PAST MONTH, HAVE YOU WISHED YOU WERE DEAD OR WISHED YOU COULD GO TO SLEEP AND NOT WAKE UP?: NO

## 2025-03-12 LAB
ANION GAP SERPL CALC-SCNC: 9 MMOL/L (ref 7–19)
BASOPHILS # BLD: 0 K/MCL (ref 0–0.3)
BASOPHILS NFR BLD: 0 %
BUN SERPL-MCNC: 16 MG/DL (ref 6–20)
BUN/CREAT SERPL: 20 (ref 7–25)
CALCIUM SERPL-MCNC: 8.6 MG/DL (ref 8.4–10.2)
CHLORIDE SERPL-SCNC: 99 MMOL/L (ref 97–110)
CO2 SERPL-SCNC: 34 MMOL/L (ref 21–32)
CREAT SERPL-MCNC: 0.82 MG/DL (ref 0.51–0.95)
DEPRECATED RDW RBC: 42 FL (ref 39–50)
EGFRCR SERPLBLD CKD-EPI 2021: 81 ML/MIN/{1.73_M2}
EOSINOPHIL # BLD: 0 K/MCL (ref 0–0.5)
EOSINOPHIL NFR BLD: 0 %
ERYTHROCYTE [DISTWIDTH] IN BLOOD: 13 % (ref 11–15)
FASTING DURATION TIME PATIENT: ABNORMAL H
GLUCOSE SERPL-MCNC: 134 MG/DL (ref 70–99)
HCT VFR BLD CALC: 22.8 % (ref 36–46.5)
HGB BLD-MCNC: 7.8 G/DL (ref 12–15.5)
IMM GRANULOCYTES # BLD AUTO: 0 K/MCL (ref 0–0.2)
IMM GRANULOCYTES # BLD: 0 %
LYMPHOCYTES # BLD: 2.2 K/MCL (ref 1–4)
LYMPHOCYTES NFR BLD: 37 %
MAGNESIUM SERPL-MCNC: 2.2 MG/DL (ref 1.7–2.4)
MCH RBC QN AUTO: 30.6 PG (ref 26–34)
MCHC RBC AUTO-ENTMCNC: 34.2 G/DL (ref 32–36.5)
MCV RBC AUTO: 89.4 FL (ref 78–100)
MONOCYTES # BLD: 0.6 K/MCL (ref 0.3–0.9)
MONOCYTES NFR BLD: 10 %
NEUTROPHILS # BLD: 3.2 K/MCL (ref 1.8–7.7)
NEUTROPHILS NFR BLD: 53 %
NRBC BLD MANUAL-RTO: 0 /100 WBC
PHOSPHATE SERPL-MCNC: 2.9 MG/DL (ref 2.4–4.7)
PLATELET # BLD AUTO: 168 K/MCL (ref 140–450)
POTASSIUM SERPL-SCNC: 4.2 MMOL/L (ref 3.4–5.1)
RBC # BLD: 2.55 MIL/MCL (ref 4–5.2)
SODIUM SERPL-SCNC: 138 MMOL/L (ref 135–145)
WBC # BLD: 6.1 K/MCL (ref 4.2–11)

## 2025-03-12 PROCEDURE — 85025 COMPLETE CBC W/AUTO DIFF WBC: CPT

## 2025-03-12 PROCEDURE — 10002800 HB RX 250 W HCPCS

## 2025-03-12 PROCEDURE — 80048 BASIC METABOLIC PNL TOTAL CA: CPT

## 2025-03-12 PROCEDURE — 10004651 HB RX, NO CHARGE ITEM

## 2025-03-12 PROCEDURE — 97535 SELF CARE MNGMENT TRAINING: CPT

## 2025-03-12 PROCEDURE — 99233 SBSQ HOSP IP/OBS HIGH 50: CPT | Performed by: HOSPITALIST

## 2025-03-12 PROCEDURE — 10002803 HB RX 637

## 2025-03-12 PROCEDURE — 10000002 HB ROOM CHARGE MED SURG

## 2025-03-12 PROCEDURE — 10002803 HB RX 637: Performed by: INTERNAL MEDICINE

## 2025-03-12 PROCEDURE — 36415 COLL VENOUS BLD VENIPUNCTURE: CPT

## 2025-03-12 PROCEDURE — 97116 GAIT TRAINING THERAPY: CPT

## 2025-03-12 PROCEDURE — 84100 ASSAY OF PHOSPHORUS: CPT

## 2025-03-12 PROCEDURE — 83735 ASSAY OF MAGNESIUM: CPT

## 2025-03-12 PROCEDURE — 10002807 HB RX 258

## 2025-03-12 PROCEDURE — 10002803 HB RX 637: Performed by: ORTHOPAEDIC SURGERY

## 2025-03-12 RX ORDER — HYDROCODONE BITARTRATE AND ACETAMINOPHEN 10; 325 MG/1; MG/1
2 TABLET ORAL EVERY 6 HOURS PRN
Status: DISCONTINUED | OUTPATIENT
Start: 2025-03-12 | End: 2025-03-13 | Stop reason: ALTCHOICE

## 2025-03-12 RX ORDER — HYDROCODONE BITARTRATE AND ACETAMINOPHEN 10; 325 MG/1; MG/1
1 TABLET ORAL EVERY 6 HOURS PRN
Status: DISCONTINUED | OUTPATIENT
Start: 2025-03-12 | End: 2025-03-13 | Stop reason: ALTCHOICE

## 2025-03-12 RX ORDER — ACETAMINOPHEN 325 MG/1
650 TABLET ORAL EVERY 6 HOURS PRN
Status: DISCONTINUED | OUTPATIENT
Start: 2025-03-12 | End: 2025-03-14

## 2025-03-12 RX ADMIN — ACETAMINOPHEN 1000 MG: 500 TABLET ORAL at 05:17

## 2025-03-12 RX ADMIN — ACETAMINOPHEN 325 MG: 325 TABLET ORAL at 22:03

## 2025-03-12 RX ADMIN — Medication: at 06:10

## 2025-03-12 RX ADMIN — TORSEMIDE 20 MG: 10 TABLET ORAL at 08:32

## 2025-03-12 RX ADMIN — GABAPENTIN 300 MG: 300 CAPSULE ORAL at 13:56

## 2025-03-12 RX ADMIN — SENNOSIDES AND DOCUSATE SODIUM 2 TABLET: 50; 8.6 TABLET ORAL at 21:11

## 2025-03-12 RX ADMIN — DIAZEPAM 5 MG: 5 TABLET ORAL at 20:03

## 2025-03-12 RX ADMIN — HYDROCODONE BITARTRATE AND ACETAMINOPHEN 2 TABLET: 10; 325 TABLET ORAL at 18:26

## 2025-03-12 RX ADMIN — SODIUM CHLORIDE: 9 INJECTION, SOLUTION INTRAVENOUS at 00:31

## 2025-03-12 RX ADMIN — SODIUM CHLORIDE, PRESERVATIVE FREE 2 ML: 5 INJECTION INTRAVENOUS at 22:05

## 2025-03-12 RX ADMIN — GABAPENTIN 300 MG: 300 CAPSULE ORAL at 05:16

## 2025-03-12 RX ADMIN — GABAPENTIN 300 MG: 300 CAPSULE ORAL at 22:03

## 2025-03-12 RX ADMIN — MORPHINE SULFATE 4 MG: 4 INJECTION INTRAVENOUS at 19:19

## 2025-03-12 RX ADMIN — ACETAMINOPHEN 1000 MG: 500 TABLET ORAL at 00:22

## 2025-03-12 RX ADMIN — ACETAMINOPHEN 1000 MG: 500 TABLET ORAL at 12:11

## 2025-03-12 RX ADMIN — BACLOFEN 10 MG: 10 TABLET ORAL at 08:32

## 2025-03-12 RX ADMIN — SENNOSIDES AND DOCUSATE SODIUM 2 TABLET: 50; 8.6 TABLET ORAL at 08:32

## 2025-03-12 RX ADMIN — MORPHINE SULFATE 4 MG: 4 INJECTION INTRAVENOUS at 23:32

## 2025-03-12 RX ADMIN — DIAZEPAM 5 MG: 5 TABLET ORAL at 13:56

## 2025-03-12 RX ADMIN — MORPHINE SULFATE 4 MG: 4 INJECTION INTRAVENOUS at 12:35

## 2025-03-12 RX ADMIN — BACLOFEN 10 MG: 10 TABLET ORAL at 21:11

## 2025-03-12 ASSESSMENT — PAIN SCALES - GENERAL
PAINLEVEL_OUTOF10: 10
PAINLEVEL_OUTOF10: 9
PAINLEVEL_OUTOF10: 8
PAINLEVEL_OUTOF10: 5
PAINLEVEL_OUTOF10: 4
PAINLEVEL_OUTOF10: 10
PAINLEVEL_OUTOF10: 6
PAINLEVEL_OUTOF10: 8
PAINLEVEL_OUTOF10: 9

## 2025-03-12 ASSESSMENT — COGNITIVE AND FUNCTIONAL STATUS - GENERAL
DAILY_ACTIVITY_CONVERTED_SCORE: 38.66
BASIC_MOBILITY_RAW_SCORE: 15
DAILY_ACTIVITY_RAW_SCORE: 18
HELP NEEDED FOR BATHING: A LITTLE
HELP NEEDED DRESSING REGULAR LOWER BODY CLOTHING: A LOT
HELP NEEDED DRESSING REGULAR UPPER BODY CLOTHING: A LITTLE
BASIC_MOBILITY_CONVERTED_SCORE: 36.97
HELP NEEDED FOR TOILETING: A LOT

## 2025-03-12 ASSESSMENT — ACTIVITIES OF DAILY LIVING (ADL): HOME_MANAGEMENT_TIME_ENTRY: 25

## 2025-03-12 ASSESSMENT — ENCOUNTER SYMPTOMS: PAIN SEVERITY NOW: 8

## 2025-03-13 LAB
ANION GAP SERPL CALC-SCNC: 5 MMOL/L (ref 7–19)
BUN SERPL-MCNC: 13 MG/DL (ref 6–20)
BUN/CREAT SERPL: 16 (ref 7–25)
CALCIUM SERPL-MCNC: 8.8 MG/DL (ref 8.4–10.2)
CHLORIDE SERPL-SCNC: 99 MMOL/L (ref 97–110)
CO2 SERPL-SCNC: 36 MMOL/L (ref 21–32)
CREAT SERPL-MCNC: 0.83 MG/DL (ref 0.51–0.95)
DEPRECATED RDW RBC: 42.1 FL (ref 39–50)
EGFRCR SERPLBLD CKD-EPI 2021: 80 ML/MIN/{1.73_M2}
EOSINOPHIL # BLD: 0.2 K/MCL (ref 0–0.5)
EOSINOPHIL NFR BLD: 3 %
ERYTHROCYTE [DISTWIDTH] IN BLOOD: 12.8 % (ref 11–15)
FASTING DURATION TIME PATIENT: ABNORMAL H
GLUCOSE BLDC GLUCOMTR-MCNC: 126 MG/DL (ref 70–99)
GLUCOSE SERPL-MCNC: 130 MG/DL (ref 70–99)
HCT VFR BLD CALC: 26.5 % (ref 36–46.5)
HGB BLD-MCNC: 8.7 G/DL (ref 12–15.5)
LYMPHOCYTES # BLD: 2.2 K/MCL (ref 1–4)
LYMPHOCYTES NFR BLD: 29 %
MCH RBC QN AUTO: 29.6 PG (ref 26–34)
MCHC RBC AUTO-ENTMCNC: 32.8 G/DL (ref 32–36.5)
MCV RBC AUTO: 90.1 FL (ref 78–100)
MONOCYTES # BLD: 0.3 K/MCL (ref 0.3–0.9)
MONOCYTES NFR BLD: 5 %
NEUTROPHILS # BLD: 3.4 K/MCL (ref 1.8–7.7)
NEUTS SEG NFR BLD: 56 %
NRBC BLD MANUAL-RTO: 0 /100 WBC
OVALOCYTES BLD QL SMEAR: ABNORMAL
PLAT MORPH BLD: NORMAL
PLATELET # BLD AUTO: 193 K/MCL (ref 140–450)
POLYCHROMASIA BLD QL SMEAR: ABNORMAL
POTASSIUM SERPL-SCNC: 3.9 MMOL/L (ref 3.4–5.1)
RBC # BLD: 2.94 MIL/MCL (ref 4–5.2)
SODIUM SERPL-SCNC: 136 MMOL/L (ref 135–145)
VARIANT LYMPHS NFR BLD: 7 % (ref 0–5)
WBC # BLD: 6 K/MCL (ref 4.2–11)
WBC MORPH BLD: ABNORMAL

## 2025-03-13 PROCEDURE — 10004651 HB RX, NO CHARGE ITEM

## 2025-03-13 PROCEDURE — 10002803 HB RX 637: Performed by: ORTHOPAEDIC SURGERY

## 2025-03-13 PROCEDURE — 85025 COMPLETE CBC W/AUTO DIFF WBC: CPT

## 2025-03-13 PROCEDURE — 80048 BASIC METABOLIC PNL TOTAL CA: CPT | Performed by: HOSPITALIST

## 2025-03-13 PROCEDURE — 10002807 HB RX 258: Performed by: NURSE PRACTITIONER

## 2025-03-13 PROCEDURE — 10002807 HB RX 258: Performed by: INTERNAL MEDICINE

## 2025-03-13 PROCEDURE — 99222 1ST HOSP IP/OBS MODERATE 55: CPT | Performed by: ANESTHESIOLOGY

## 2025-03-13 PROCEDURE — 10002803 HB RX 637

## 2025-03-13 PROCEDURE — 10002803 HB RX 637: Performed by: ANESTHESIOLOGY

## 2025-03-13 PROCEDURE — 10002800 HB RX 250 W HCPCS

## 2025-03-13 PROCEDURE — 36415 COLL VENOUS BLD VENIPUNCTURE: CPT | Performed by: HOSPITALIST

## 2025-03-13 PROCEDURE — 97535 SELF CARE MNGMENT TRAINING: CPT

## 2025-03-13 PROCEDURE — 10002803 HB RX 637: Performed by: INTERNAL MEDICINE

## 2025-03-13 PROCEDURE — 97530 THERAPEUTIC ACTIVITIES: CPT

## 2025-03-13 PROCEDURE — 10000002 HB ROOM CHARGE MED SURG

## 2025-03-13 PROCEDURE — 10002807 HB RX 258: Performed by: ORTHOPAEDIC SURGERY

## 2025-03-13 PROCEDURE — 99233 SBSQ HOSP IP/OBS HIGH 50: CPT | Performed by: HOSPITALIST

## 2025-03-13 PROCEDURE — 85027 COMPLETE CBC AUTOMATED: CPT

## 2025-03-13 RX ORDER — OXYCODONE HYDROCHLORIDE 5 MG/1
10 TABLET ORAL EVERY 6 HOURS PRN
Status: DISCONTINUED | OUTPATIENT
Start: 2025-03-13 | End: 2025-03-14

## 2025-03-13 RX ORDER — GABAPENTIN 300 MG/1
600 CAPSULE ORAL EVERY 8 HOURS SCHEDULED
Status: DISCONTINUED | OUTPATIENT
Start: 2025-03-13 | End: 2025-03-17

## 2025-03-13 RX ADMIN — HYDROCODONE BITARTRATE AND ACETAMINOPHEN 2 TABLET: 10; 325 TABLET ORAL at 05:50

## 2025-03-13 RX ADMIN — SODIUM CHLORIDE, PRESERVATIVE FREE 2 ML: 5 INJECTION INTRAVENOUS at 08:17

## 2025-03-13 RX ADMIN — GABAPENTIN 600 MG: 300 CAPSULE ORAL at 20:44

## 2025-03-13 RX ADMIN — MORPHINE SULFATE 4 MG: 4 INJECTION INTRAVENOUS at 04:24

## 2025-03-13 RX ADMIN — SODIUM CHLORIDE, PRESERVATIVE FREE 2 ML: 5 INJECTION INTRAVENOUS at 20:46

## 2025-03-13 RX ADMIN — SENNOSIDES AND DOCUSATE SODIUM 2 TABLET: 50; 8.6 TABLET ORAL at 08:14

## 2025-03-13 RX ADMIN — BACLOFEN 10 MG: 10 TABLET ORAL at 08:14

## 2025-03-13 RX ADMIN — POLYETHYLENE GLYCOL 3350 17 G: 17 POWDER, FOR SOLUTION ORAL at 08:15

## 2025-03-13 RX ADMIN — DIAZEPAM 5 MG: 5 TABLET ORAL at 16:22

## 2025-03-13 RX ADMIN — SODIUM CHLORIDE 500 ML: 9 INJECTION, SOLUTION INTRAVENOUS at 23:31

## 2025-03-13 RX ADMIN — MORPHINE SULFATE 4 MG: 4 INJECTION INTRAVENOUS at 18:00

## 2025-03-13 RX ADMIN — MORPHINE SULFATE 4 MG: 4 INJECTION INTRAVENOUS at 13:28

## 2025-03-13 RX ADMIN — HYDROCODONE BITARTRATE AND ACETAMINOPHEN 2 TABLET: 10; 325 TABLET ORAL at 00:43

## 2025-03-13 RX ADMIN — GABAPENTIN 300 MG: 300 CAPSULE ORAL at 05:59

## 2025-03-13 RX ADMIN — OXYCODONE HYDROCHLORIDE 10 MG: 5 TABLET ORAL at 16:47

## 2025-03-13 RX ADMIN — SENNOSIDES AND DOCUSATE SODIUM 2 TABLET: 50; 8.6 TABLET ORAL at 20:44

## 2025-03-13 RX ADMIN — GABAPENTIN 300 MG: 300 CAPSULE ORAL at 13:28

## 2025-03-13 RX ADMIN — MORPHINE SULFATE 4 MG: 4 INJECTION INTRAVENOUS at 08:23

## 2025-03-13 RX ADMIN — TIZANIDINE 4 MG: 4 TABLET ORAL at 20:44

## 2025-03-13 RX ADMIN — ACETAMINOPHEN 650 MG: 325 TABLET ORAL at 08:14

## 2025-03-13 RX ADMIN — TORSEMIDE 20 MG: 10 TABLET ORAL at 08:13

## 2025-03-13 RX ADMIN — DIAZEPAM 5 MG: 5 TABLET ORAL at 02:02

## 2025-03-13 RX ADMIN — SODIUM CHLORIDE 500 ML: 9 INJECTION, SOLUTION INTRAVENOUS at 23:00

## 2025-03-13 RX ADMIN — DIAZEPAM 5 MG: 5 TABLET ORAL at 08:14

## 2025-03-13 RX ADMIN — MORPHINE SULFATE 4 MG: 4 INJECTION INTRAVENOUS at 22:00

## 2025-03-13 RX ADMIN — HYDROCODONE BITARTRATE AND ACETAMINOPHEN 2 TABLET: 10; 325 TABLET ORAL at 12:34

## 2025-03-13 RX ADMIN — SODIUM CHLORIDE, POTASSIUM CHLORIDE, SODIUM LACTATE AND CALCIUM CHLORIDE 500 ML: 600; 310; 30; 20 INJECTION, SOLUTION INTRAVENOUS at 10:27

## 2025-03-13 ASSESSMENT — COGNITIVE AND FUNCTIONAL STATUS - GENERAL
BASIC_MOBILITY_RAW_SCORE: 15
HELP NEEDED FOR BATHING: A LITTLE
DAILY_ACTIVITY_RAW_SCORE: 21
BASIC_MOBILITY_CONVERTED_SCORE: 36.97
HELP NEEDED DRESSING REGULAR UPPER BODY CLOTHING: A LITTLE
DAILY_ACTIVITY_CONVERTED_SCORE: 44.27
HELP NEEDED DRESSING REGULAR LOWER BODY CLOTHING: A LITTLE

## 2025-03-13 ASSESSMENT — PAIN SCALES - GENERAL
PAINLEVEL_OUTOF10: 10
PAINLEVEL_OUTOF10: 5
PAINLEVEL_OUTOF10: 8
PAINLEVEL_OUTOF10: 9
PAINLEVEL_OUTOF10: 9
PAINLEVEL_OUTOF10: 10
PAINLEVEL_OUTOF10: 8
PAINLEVEL_OUTOF10: 10
PAINLEVEL_OUTOF10: 10
PAINLEVEL_OUTOF10: 7

## 2025-03-13 ASSESSMENT — ACTIVITIES OF DAILY LIVING (ADL): HOME_MANAGEMENT_TIME_ENTRY: 28

## 2025-03-13 ASSESSMENT — ENCOUNTER SYMPTOMS
PAIN SEVERITY NOW: 5
PAIN SEVERITY NOW: 8

## 2025-03-14 ENCOUNTER — APPOINTMENT (OUTPATIENT)
Dept: CT IMAGING | Age: 60
DRG: 427 | End: 2025-03-14
Attending: INTERNAL MEDICINE

## 2025-03-14 LAB
BASOPHILS # BLD: 0 K/MCL (ref 0–0.3)
BASOPHILS NFR BLD: 0 %
BLOOD EXPIRATION DATE: NORMAL
CROSSMATCH RESULT: NORMAL
DEPRECATED RDW RBC: 41.6 FL (ref 39–50)
DEPRECATED RDW RBC: 43.4 FL (ref 39–50)
DISPENSE STATUS: NORMAL
EOSINOPHIL # BLD: 0.1 K/MCL (ref 0–0.5)
EOSINOPHIL NFR BLD: 2 %
ERYTHROCYTE [DISTWIDTH] IN BLOOD: 12.7 % (ref 11–15)
ERYTHROCYTE [DISTWIDTH] IN BLOOD: 13.1 % (ref 11–15)
HCT VFR BLD CALC: 18.2 % (ref 36–46.5)
HCT VFR BLD CALC: 22.4 % (ref 36–46.5)
HCT VFR BLD CALC: 24.6 % (ref 36–46.5)
HGB BLD-MCNC: 6.3 G/DL (ref 12–15.5)
HGB BLD-MCNC: 7.4 G/DL (ref 12–15.5)
HGB BLD-MCNC: 8 G/DL (ref 12–15.5)
IMM GRANULOCYTES # BLD AUTO: 0 K/MCL (ref 0–0.2)
IMM GRANULOCYTES # BLD: 0 %
ISBT BLOOD TYPE: 6200
ISSUE DATE/TIME: NORMAL
LYMPHOCYTES # BLD: 1.4 K/MCL (ref 1–4)
LYMPHOCYTES # BLD: 1.7 K/MCL (ref 1–4)
LYMPHOCYTES NFR BLD: 32 %
LYMPHOCYTES NFR BLD: 36 %
MCH RBC QN AUTO: 29.7 PG (ref 26–34)
MCH RBC QN AUTO: 31.3 PG (ref 26–34)
MCHC RBC AUTO-ENTMCNC: 33 G/DL (ref 32–36.5)
MCHC RBC AUTO-ENTMCNC: 34.6 G/DL (ref 32–36.5)
MCV RBC AUTO: 90 FL (ref 78–100)
MCV RBC AUTO: 90.5 FL (ref 78–100)
MONOCYTES # BLD: 0.1 K/MCL (ref 0.3–0.9)
MONOCYTES # BLD: 0.5 K/MCL (ref 0.3–0.9)
MONOCYTES NFR BLD: 10 %
MONOCYTES NFR BLD: 2 %
NEUTROPHILS # BLD: 2.3 K/MCL (ref 1.8–7.7)
NEUTROPHILS # BLD: 2.4 K/MCL (ref 1.8–7.7)
NEUTROPHILS NFR BLD: 52 %
NEUTS SEG NFR BLD: 61 %
NRBC BLD MANUAL-RTO: 0 /100 WBC
NRBC BLD MANUAL-RTO: 0 /100 WBC
PLAT MORPH BLD: NORMAL
PLATELET # BLD AUTO: 139 K/MCL (ref 140–450)
PLATELET # BLD AUTO: 152 K/MCL (ref 140–450)
POLYCHROMASIA BLD QL SMEAR: ABNORMAL
PRODUCT CODE: NORMAL
PRODUCT DESCRIPTION: NORMAL
PRODUCT ID: NORMAL
RAINBOW EXTRA TUBES HOLD SPECIMEN: NORMAL
RBC # BLD: 2.01 MIL/MCL (ref 4–5.2)
RBC # BLD: 2.49 MIL/MCL (ref 4–5.2)
UNIT BLOOD TYPE: NORMAL
UNIT NUMBER: NORMAL
VARIANT LYMPHS NFR BLD: 5 % (ref 0–5)
WBC # BLD: 3.7 K/MCL (ref 4.2–11)
WBC # BLD: 4.6 K/MCL (ref 4.2–11)
WBC MORPH BLD: NORMAL

## 2025-03-14 PROCEDURE — 10002803 HB RX 637

## 2025-03-14 PROCEDURE — 10002803 HB RX 637: Performed by: CLINICAL NURSE SPECIALIST

## 2025-03-14 PROCEDURE — 97116 GAIT TRAINING THERAPY: CPT

## 2025-03-14 PROCEDURE — 10002800 HB RX 250 W HCPCS

## 2025-03-14 PROCEDURE — 10000002 HB ROOM CHARGE MED SURG

## 2025-03-14 PROCEDURE — 36415 COLL VENOUS BLD VENIPUNCTURE: CPT | Performed by: ORTHOPAEDIC SURGERY

## 2025-03-14 PROCEDURE — 10002803 HB RX 637: Performed by: INTERNAL MEDICINE

## 2025-03-14 PROCEDURE — 85027 COMPLETE CBC AUTOMATED: CPT | Performed by: INTERNAL MEDICINE

## 2025-03-14 PROCEDURE — 10004651 HB RX, NO CHARGE ITEM

## 2025-03-14 PROCEDURE — 99233 SBSQ HOSP IP/OBS HIGH 50: CPT | Performed by: HOSPITALIST

## 2025-03-14 PROCEDURE — 10002803 HB RX 637: Performed by: ANESTHESIOLOGY

## 2025-03-14 PROCEDURE — 99233 SBSQ HOSP IP/OBS HIGH 50: CPT | Performed by: ANESTHESIOLOGY

## 2025-03-14 PROCEDURE — 74174 CTA ABD&PLVS W/CONTRAST: CPT

## 2025-03-14 PROCEDURE — 10002805 HB CONTRAST AGENT: Performed by: INTERNAL MEDICINE

## 2025-03-14 PROCEDURE — 10002800 HB RX 250 W HCPCS: Performed by: CLINICAL NURSE SPECIALIST

## 2025-03-14 PROCEDURE — 85014 HEMATOCRIT: CPT | Performed by: HOSPITALIST

## 2025-03-14 PROCEDURE — 86923 COMPATIBILITY TEST ELECTRIC: CPT

## 2025-03-14 RX ORDER — OXYCODONE HYDROCHLORIDE 5 MG/1
15 TABLET ORAL EVERY 4 HOURS PRN
Status: DISCONTINUED | OUTPATIENT
Start: 2025-03-14 | End: 2025-03-14

## 2025-03-14 RX ORDER — SODIUM CHLORIDE 9 MG/ML
INJECTION, SOLUTION INTRAVENOUS CONTINUOUS PRN
Status: ACTIVE | OUTPATIENT
Start: 2025-03-14 | End: 2025-03-14

## 2025-03-14 RX ORDER — OXYCODONE HYDROCHLORIDE 5 MG/1
15 TABLET ORAL EVERY 4 HOURS PRN
Status: DISCONTINUED | OUTPATIENT
Start: 2025-03-14 | End: 2025-03-19 | Stop reason: HOSPADM

## 2025-03-14 RX ORDER — OXYCODONE HYDROCHLORIDE 5 MG/1
10 TABLET ORAL EVERY 6 HOURS PRN
Status: DISCONTINUED | OUTPATIENT
Start: 2025-03-14 | End: 2025-03-14

## 2025-03-14 RX ORDER — ACETAMINOPHEN 325 MG/1
650 TABLET ORAL 4 TIMES DAILY
Status: DISCONTINUED | OUTPATIENT
Start: 2025-03-14 | End: 2025-03-19 | Stop reason: HOSPADM

## 2025-03-14 RX ORDER — BACLOFEN 10 MG/1
10 TABLET ORAL EVERY 12 HOURS SCHEDULED
Status: DISCONTINUED | OUTPATIENT
Start: 2025-03-14 | End: 2025-03-19 | Stop reason: HOSPADM

## 2025-03-14 RX ORDER — OXYCODONE HYDROCHLORIDE 5 MG/1
10 TABLET ORAL EVERY 4 HOURS PRN
Status: DISCONTINUED | OUTPATIENT
Start: 2025-03-14 | End: 2025-03-19 | Stop reason: HOSPADM

## 2025-03-14 RX ADMIN — MORPHINE SULFATE 4 MG: 4 INJECTION INTRAVENOUS at 09:26

## 2025-03-14 RX ADMIN — MORPHINE SULFATE 4 MG: 4 INJECTION INTRAVENOUS at 04:05

## 2025-03-14 RX ADMIN — OXYCODONE HYDROCHLORIDE 10 MG: 5 TABLET ORAL at 01:36

## 2025-03-14 RX ADMIN — SODIUM CHLORIDE, PRESERVATIVE FREE 2 ML: 5 INJECTION INTRAVENOUS at 08:37

## 2025-03-14 RX ADMIN — DIAZEPAM 5 MG: 5 TABLET ORAL at 18:59

## 2025-03-14 RX ADMIN — OXYCODONE HYDROCHLORIDE 15 MG: 5 TABLET ORAL at 15:59

## 2025-03-14 RX ADMIN — BACLOFEN 10 MG: 10 TABLET ORAL at 20:09

## 2025-03-14 RX ADMIN — MORPHINE SULFATE 2 MG: 2 INJECTION, SOLUTION INTRAMUSCULAR; INTRAVENOUS at 17:50

## 2025-03-14 RX ADMIN — GABAPENTIN 600 MG: 300 CAPSULE ORAL at 21:14

## 2025-03-14 RX ADMIN — SENNOSIDES AND DOCUSATE SODIUM 2 TABLET: 50; 8.6 TABLET ORAL at 20:08

## 2025-03-14 RX ADMIN — IOHEXOL 80 ML: 350 INJECTION, SOLUTION INTRAVENOUS at 01:24

## 2025-03-14 RX ADMIN — DIAZEPAM 5 MG: 5 TABLET ORAL at 06:32

## 2025-03-14 RX ADMIN — GABAPENTIN 600 MG: 300 CAPSULE ORAL at 13:47

## 2025-03-14 RX ADMIN — OXYCODONE HYDROCHLORIDE 10 MG: 5 TABLET ORAL at 07:38

## 2025-03-14 RX ADMIN — OXYCODONE HYDROCHLORIDE 15 MG: 5 TABLET ORAL at 12:03

## 2025-03-14 RX ADMIN — OXYCODONE HYDROCHLORIDE 10 MG: 5 TABLET ORAL at 20:08

## 2025-03-14 RX ADMIN — SODIUM CHLORIDE, PRESERVATIVE FREE 2 ML: 5 INJECTION INTRAVENOUS at 20:10

## 2025-03-14 RX ADMIN — GABAPENTIN 600 MG: 300 CAPSULE ORAL at 05:18

## 2025-03-14 RX ADMIN — BACLOFEN 10 MG: 10 TABLET ORAL at 07:38

## 2025-03-14 RX ADMIN — MORPHINE SULFATE 2 MG: 2 INJECTION, SOLUTION INTRAMUSCULAR; INTRAVENOUS at 22:06

## 2025-03-14 ASSESSMENT — PAIN SCALES - GENERAL
PAINLEVEL_OUTOF10: 9
PAINLEVEL_OUTOF10: 10
PAINLEVEL_OUTOF10: 7
PAINLEVEL_OUTOF10: 8
PAINLEVEL_OUTOF10: 9
PAINLEVEL_OUTOF10: 10
PAINLEVEL_OUTOF10: 9
PAINLEVEL_OUTOF10: 8
PAINLEVEL_OUTOF10: 10
PAINLEVEL_OUTOF10: 7
PAINLEVEL_OUTOF10: 10

## 2025-03-14 ASSESSMENT — ENCOUNTER SYMPTOMS: PAIN SEVERITY NOW: 9

## 2025-03-14 ASSESSMENT — COGNITIVE AND FUNCTIONAL STATUS - GENERAL
BASIC_MOBILITY_CONVERTED_SCORE: 38.32
BASIC_MOBILITY_RAW_SCORE: 16

## 2025-03-15 LAB
ANION GAP SERPL CALC-SCNC: 2 MMOL/L (ref 7–19)
BASOPHILS # BLD: 0 K/MCL (ref 0–0.3)
BASOPHILS NFR BLD: 0 %
BUN SERPL-MCNC: 10 MG/DL (ref 6–20)
BUN/CREAT SERPL: 13 (ref 7–25)
CALCIUM SERPL-MCNC: 8 MG/DL (ref 8.4–10.2)
CHLORIDE SERPL-SCNC: 103 MMOL/L (ref 97–110)
CO2 SERPL-SCNC: 36 MMOL/L (ref 21–32)
CREAT SERPL-MCNC: 0.75 MG/DL (ref 0.51–0.95)
DEPRECATED RDW RBC: 43.8 FL (ref 39–50)
EGFRCR SERPLBLD CKD-EPI 2021: >90 ML/MIN/{1.73_M2}
EOSINOPHIL # BLD: 0.1 K/MCL (ref 0–0.5)
EOSINOPHIL NFR BLD: 2 %
ERYTHROCYTE [DISTWIDTH] IN BLOOD: 13.3 % (ref 11–15)
FASTING DURATION TIME PATIENT: ABNORMAL H
GLUCOSE SERPL-MCNC: 102 MG/DL (ref 70–99)
HCT VFR BLD CALC: 24.6 % (ref 36–46.5)
HGB BLD-MCNC: 8.1 G/DL (ref 12–15.5)
IMM GRANULOCYTES # BLD AUTO: 0 K/MCL (ref 0–0.2)
IMM GRANULOCYTES # BLD: 0 %
LYMPHOCYTES # BLD: 1.4 K/MCL (ref 1–4)
LYMPHOCYTES NFR BLD: 43 %
MCH RBC QN AUTO: 29.8 PG (ref 26–34)
MCHC RBC AUTO-ENTMCNC: 32.9 G/DL (ref 32–36.5)
MCV RBC AUTO: 90.4 FL (ref 78–100)
MONOCYTES # BLD: 0.4 K/MCL (ref 0.3–0.9)
MONOCYTES NFR BLD: 11 %
NEUTROPHILS # BLD: 1.4 K/MCL (ref 1.8–7.7)
NEUTROPHILS NFR BLD: 44 %
NRBC BLD MANUAL-RTO: 0 /100 WBC
PLATELET # BLD AUTO: 167 K/MCL (ref 140–450)
POTASSIUM SERPL-SCNC: 3.3 MMOL/L (ref 3.4–5.1)
RBC # BLD: 2.72 MIL/MCL (ref 4–5.2)
SODIUM SERPL-SCNC: 138 MMOL/L (ref 135–145)
WBC # BLD: 3.3 K/MCL (ref 4.2–11)

## 2025-03-15 PROCEDURE — 10004651 HB RX, NO CHARGE ITEM

## 2025-03-15 PROCEDURE — 97116 GAIT TRAINING THERAPY: CPT

## 2025-03-15 PROCEDURE — 10004651 HB RX, NO CHARGE ITEM: Performed by: SPECIALIST/TECHNOLOGIST

## 2025-03-15 PROCEDURE — 10002803 HB RX 637: Performed by: INTERNAL MEDICINE

## 2025-03-15 PROCEDURE — 10002803 HB RX 637: Performed by: ANESTHESIOLOGY

## 2025-03-15 PROCEDURE — 36415 COLL VENOUS BLD VENIPUNCTURE: CPT

## 2025-03-15 PROCEDURE — 99233 SBSQ HOSP IP/OBS HIGH 50: CPT | Performed by: HOSPITALIST

## 2025-03-15 PROCEDURE — 10000002 HB ROOM CHARGE MED SURG

## 2025-03-15 PROCEDURE — 10002803 HB RX 637

## 2025-03-15 PROCEDURE — 10002800 HB RX 250 W HCPCS: Performed by: CLINICAL NURSE SPECIALIST

## 2025-03-15 PROCEDURE — 97530 THERAPEUTIC ACTIVITIES: CPT

## 2025-03-15 PROCEDURE — 10002803 HB RX 637: Performed by: HOSPITALIST

## 2025-03-15 PROCEDURE — 80048 BASIC METABOLIC PNL TOTAL CA: CPT | Performed by: HOSPITALIST

## 2025-03-15 PROCEDURE — 85025 COMPLETE CBC W/AUTO DIFF WBC: CPT

## 2025-03-15 PROCEDURE — 10002803 HB RX 637: Performed by: CLINICAL NURSE SPECIALIST

## 2025-03-15 RX ORDER — POTASSIUM CHLORIDE 1500 MG/1
40 TABLET, EXTENDED RELEASE ORAL ONCE
Status: COMPLETED | OUTPATIENT
Start: 2025-03-15 | End: 2025-03-15

## 2025-03-15 RX ADMIN — OXYCODONE HYDROCHLORIDE 15 MG: 5 TABLET ORAL at 04:48

## 2025-03-15 RX ADMIN — ACETAMINOPHEN 650 MG: 325 TABLET ORAL at 09:05

## 2025-03-15 RX ADMIN — POLYETHYLENE GLYCOL 3350 17 G: 17 POWDER, FOR SOLUTION ORAL at 09:04

## 2025-03-15 RX ADMIN — OXYCODONE HYDROCHLORIDE 10 MG: 5 TABLET ORAL at 18:08

## 2025-03-15 RX ADMIN — SODIUM CHLORIDE, PRESERVATIVE FREE 2 ML: 5 INJECTION INTRAVENOUS at 09:05

## 2025-03-15 RX ADMIN — BACLOFEN 10 MG: 10 TABLET ORAL at 09:04

## 2025-03-15 RX ADMIN — MORPHINE SULFATE 2 MG: 2 INJECTION, SOLUTION INTRAMUSCULAR; INTRAVENOUS at 16:31

## 2025-03-15 RX ADMIN — GABAPENTIN 600 MG: 300 CAPSULE ORAL at 14:10

## 2025-03-15 RX ADMIN — SODIUM CHLORIDE, PRESERVATIVE FREE 2 ML: 5 INJECTION INTRAVENOUS at 20:43

## 2025-03-15 RX ADMIN — GABAPENTIN 600 MG: 300 CAPSULE ORAL at 22:11

## 2025-03-15 RX ADMIN — OXYCODONE HYDROCHLORIDE 10 MG: 5 TABLET ORAL at 14:13

## 2025-03-15 RX ADMIN — DIAZEPAM 5 MG: 5 TABLET ORAL at 10:42

## 2025-03-15 RX ADMIN — GABAPENTIN 600 MG: 300 CAPSULE ORAL at 05:13

## 2025-03-15 RX ADMIN — ACETAMINOPHEN 650 MG: 325 TABLET ORAL at 16:31

## 2025-03-15 RX ADMIN — SENNOSIDES AND DOCUSATE SODIUM 2 TABLET: 50; 8.6 TABLET ORAL at 09:04

## 2025-03-15 RX ADMIN — MORPHINE SULFATE 2 MG: 2 INJECTION, SOLUTION INTRAMUSCULAR; INTRAVENOUS at 07:38

## 2025-03-15 RX ADMIN — POTASSIUM CHLORIDE 40 MEQ: 1500 TABLET, EXTENDED RELEASE ORAL at 10:42

## 2025-03-15 RX ADMIN — MORPHINE SULFATE 2 MG: 2 INJECTION, SOLUTION INTRAMUSCULAR; INTRAVENOUS at 12:23

## 2025-03-15 RX ADMIN — BACLOFEN 10 MG: 10 TABLET ORAL at 20:40

## 2025-03-15 RX ADMIN — OXYCODONE HYDROCHLORIDE 15 MG: 5 TABLET ORAL at 00:14

## 2025-03-15 RX ADMIN — OXYCODONE HYDROCHLORIDE 10 MG: 5 TABLET ORAL at 09:04

## 2025-03-15 RX ADMIN — OXYCODONE HYDROCHLORIDE 15 MG: 5 TABLET ORAL at 22:10

## 2025-03-15 RX ADMIN — DIAZEPAM 5 MG: 5 TABLET ORAL at 20:39

## 2025-03-15 ASSESSMENT — PAIN SCALES - GENERAL
PAINLEVEL_OUTOF10: 8
PAINLEVEL_OUTOF10: 3
PAINLEVEL_OUTOF10: 6
PAINLEVEL_OUTOF10: 9
PAINLEVEL_OUTOF10: 8
PAINLEVEL_OUTOF10: 8
PAINLEVEL_OUTOF10: 7
PAINLEVEL_OUTOF10: 3
PAINLEVEL_OUTOF10: 3
PAINLEVEL_OUTOF10: 7
PAINLEVEL_OUTOF10: 3
PAINLEVEL_OUTOF10: 9
PAINLEVEL_OUTOF10: 8
PAINLEVEL_OUTOF10: 3

## 2025-03-15 ASSESSMENT — ENCOUNTER SYMPTOMS: PAIN SEVERITY NOW: 5

## 2025-03-15 ASSESSMENT — COGNITIVE AND FUNCTIONAL STATUS - GENERAL
BASIC_MOBILITY_RAW_SCORE: 16
BASIC_MOBILITY_CONVERTED_SCORE: 38.32

## 2025-03-15 ASSESSMENT — PAIN SCALES - WONG BAKER: WONGBAKER_NUMERICALRESPONSE: 3

## 2025-03-16 LAB
ALBUMIN SERPL-MCNC: 2.3 G/DL (ref 3.4–5)
ALBUMIN/GLOB SERPL: 0.8 {RATIO} (ref 1–2.4)
ALP SERPL-CCNC: 65 UNITS/L (ref 45–117)
ALT SERPL-CCNC: 24 UNITS/L
ANION GAP SERPL CALC-SCNC: 11 MMOL/L (ref 7–19)
AST SERPL-CCNC: 30 UNITS/L
BILIRUB SERPL-MCNC: 0.5 MG/DL (ref 0.2–1)
BUN SERPL-MCNC: 9 MG/DL (ref 6–20)
BUN/CREAT SERPL: 12 (ref 7–25)
CALCIUM SERPL-MCNC: 8.1 MG/DL (ref 8.4–10.2)
CHLORIDE SERPL-SCNC: 104 MMOL/L (ref 97–110)
CO2 SERPL-SCNC: 28 MMOL/L (ref 21–32)
CREAT SERPL-MCNC: 0.73 MG/DL (ref 0.51–0.95)
DEPRECATED RDW RBC: 43.8 FL (ref 39–50)
EGFRCR SERPLBLD CKD-EPI 2021: >90 ML/MIN/{1.73_M2}
EOSINOPHIL # BLD: 0.2 K/MCL (ref 0–0.5)
EOSINOPHIL NFR BLD: 6 %
ERYTHROCYTE [DISTWIDTH] IN BLOOD: 13.2 % (ref 11–15)
FASTING DURATION TIME PATIENT: ABNORMAL H
GLOBULIN SER-MCNC: 2.8 G/DL (ref 2–4)
GLUCOSE SERPL-MCNC: 98 MG/DL (ref 70–99)
HCT VFR BLD CALC: 24 % (ref 36–46.5)
HGB BLD-MCNC: 7.8 G/DL (ref 12–15.5)
LYMPHOCYTES # BLD: 1.2 K/MCL (ref 1–4)
LYMPHOCYTES NFR BLD: 30 %
MCH RBC QN AUTO: 29.7 PG (ref 26–34)
MCHC RBC AUTO-ENTMCNC: 32.5 G/DL (ref 32–36.5)
MCV RBC AUTO: 91.3 FL (ref 78–100)
MONOCYTES # BLD: 0.2 K/MCL (ref 0.3–0.9)
MONOCYTES NFR BLD: 5 %
NEUTROPHILS # BLD: 1.4 K/MCL (ref 1.8–7.7)
NEUTS SEG NFR BLD: 48 %
NRBC BLD MANUAL-RTO: 0 /100 WBC
PLAT MORPH BLD: NORMAL
PLATELET # BLD AUTO: 173 K/MCL (ref 140–450)
POTASSIUM SERPL-SCNC: 3.9 MMOL/L (ref 3.4–5.1)
PROT SERPL-MCNC: 5.1 G/DL (ref 6.4–8.2)
RBC # BLD: 2.63 MIL/MCL (ref 4–5.2)
RBC MORPH BLD: NORMAL
SODIUM SERPL-SCNC: 139 MMOL/L (ref 135–145)
VARIANT LYMPHS NFR BLD: 11 % (ref 0–5)
WBC # BLD: 3 K/MCL (ref 4.2–11)
WBC MORPH BLD: ABNORMAL

## 2025-03-16 PROCEDURE — 10002800 HB RX 250 W HCPCS: Performed by: CLINICAL NURSE SPECIALIST

## 2025-03-16 PROCEDURE — 10002803 HB RX 637: Performed by: CLINICAL NURSE SPECIALIST

## 2025-03-16 PROCEDURE — 10004651 HB RX, NO CHARGE ITEM

## 2025-03-16 PROCEDURE — 10002807 HB RX 258: Performed by: HOSPITALIST

## 2025-03-16 PROCEDURE — 97116 GAIT TRAINING THERAPY: CPT

## 2025-03-16 PROCEDURE — 36415 COLL VENOUS BLD VENIPUNCTURE: CPT

## 2025-03-16 PROCEDURE — 10000002 HB ROOM CHARGE MED SURG

## 2025-03-16 PROCEDURE — 10002803 HB RX 637: Performed by: INTERNAL MEDICINE

## 2025-03-16 PROCEDURE — 80053 COMPREHEN METABOLIC PANEL: CPT | Performed by: HOSPITALIST

## 2025-03-16 PROCEDURE — 10002803 HB RX 637: Performed by: ANESTHESIOLOGY

## 2025-03-16 PROCEDURE — 85027 COMPLETE CBC AUTOMATED: CPT

## 2025-03-16 PROCEDURE — 10004651 HB RX, NO CHARGE ITEM: Performed by: SPECIALIST/TECHNOLOGIST

## 2025-03-16 PROCEDURE — 97530 THERAPEUTIC ACTIVITIES: CPT

## 2025-03-16 PROCEDURE — 99233 SBSQ HOSP IP/OBS HIGH 50: CPT | Performed by: HOSPITALIST

## 2025-03-16 RX ORDER — SODIUM CHLORIDE, SODIUM LACTATE, POTASSIUM CHLORIDE, CALCIUM CHLORIDE 600; 310; 30; 20 MG/100ML; MG/100ML; MG/100ML; MG/100ML
INJECTION, SOLUTION INTRAVENOUS CONTINUOUS
Status: DISCONTINUED | OUTPATIENT
Start: 2025-03-16 | End: 2025-03-16

## 2025-03-16 RX ADMIN — SODIUM CHLORIDE, PRESERVATIVE FREE 2 ML: 5 INJECTION INTRAVENOUS at 21:16

## 2025-03-16 RX ADMIN — ACETAMINOPHEN 650 MG: 325 TABLET ORAL at 18:03

## 2025-03-16 RX ADMIN — GABAPENTIN 600 MG: 300 CAPSULE ORAL at 21:13

## 2025-03-16 RX ADMIN — MORPHINE SULFATE 2 MG: 2 INJECTION, SOLUTION INTRAMUSCULAR; INTRAVENOUS at 10:58

## 2025-03-16 RX ADMIN — OXYCODONE HYDROCHLORIDE 10 MG: 5 TABLET ORAL at 18:03

## 2025-03-16 RX ADMIN — GABAPENTIN 600 MG: 300 CAPSULE ORAL at 06:04

## 2025-03-16 RX ADMIN — OXYCODONE HYDROCHLORIDE 10 MG: 5 TABLET ORAL at 12:35

## 2025-03-16 RX ADMIN — BACLOFEN 10 MG: 10 TABLET ORAL at 21:13

## 2025-03-16 RX ADMIN — OXYCODONE HYDROCHLORIDE 15 MG: 5 TABLET ORAL at 21:13

## 2025-03-16 RX ADMIN — MORPHINE SULFATE 2 MG: 2 INJECTION, SOLUTION INTRAMUSCULAR; INTRAVENOUS at 06:02

## 2025-03-16 RX ADMIN — SODIUM CHLORIDE, POTASSIUM CHLORIDE, SODIUM LACTATE AND CALCIUM CHLORIDE 500 ML: 600; 310; 30; 20 INJECTION, SOLUTION INTRAVENOUS at 15:56

## 2025-03-16 RX ADMIN — OXYCODONE HYDROCHLORIDE 10 MG: 5 TABLET ORAL at 08:19

## 2025-03-16 RX ADMIN — SODIUM CHLORIDE, PRESERVATIVE FREE 2 ML: 5 INJECTION INTRAVENOUS at 08:32

## 2025-03-16 RX ADMIN — OXYCODONE HYDROCHLORIDE 15 MG: 5 TABLET ORAL at 03:20

## 2025-03-16 RX ADMIN — BACLOFEN 10 MG: 10 TABLET ORAL at 08:19

## 2025-03-16 RX ADMIN — ACETAMINOPHEN 650 MG: 325 TABLET ORAL at 08:19

## 2025-03-16 RX ADMIN — ACETAMINOPHEN 650 MG: 325 TABLET ORAL at 14:27

## 2025-03-16 RX ADMIN — SODIUM CHLORIDE, POTASSIUM CHLORIDE, SODIUM LACTATE AND CALCIUM CHLORIDE: 600; 310; 30; 20 INJECTION, SOLUTION INTRAVENOUS at 18:34

## 2025-03-16 ASSESSMENT — PAIN SCALES - GENERAL
PAINLEVEL_OUTOF10: 6
PAINLEVEL_OUTOF10: 8
PAINLEVEL_OUTOF10: 9
PAINLEVEL_OUTOF10: 10
PAINLEVEL_OUTOF10: 5
PAINLEVEL_OUTOF10: 8

## 2025-03-16 ASSESSMENT — ENCOUNTER SYMPTOMS: PAIN SEVERITY NOW: 5

## 2025-03-16 ASSESSMENT — COGNITIVE AND FUNCTIONAL STATUS - GENERAL
BASIC_MOBILITY_CONVERTED_SCORE: 39.67
BASIC_MOBILITY_RAW_SCORE: 17

## 2025-03-17 LAB
ABO + RH BLD: NORMAL
ANION GAP SERPL CALC-SCNC: 11 MMOL/L (ref 7–19)
BASOPHILS # BLD: 0 K/MCL (ref 0–0.3)
BASOPHILS NFR BLD: 0 %
BLD GP AB SCN SERPL QL GEL: NEGATIVE
BLOOD EXPIRATION DATE: NORMAL
BUN SERPL-MCNC: 9 MG/DL (ref 6–20)
BUN/CREAT SERPL: 13 (ref 7–25)
CALCIUM SERPL-MCNC: 7.9 MG/DL (ref 8.4–10.2)
CHLORIDE SERPL-SCNC: 106 MMOL/L (ref 97–110)
CO2 SERPL-SCNC: 28 MMOL/L (ref 21–32)
CREAT SERPL-MCNC: 0.67 MG/DL (ref 0.51–0.95)
CROSSMATCH RESULT: NORMAL
DEPRECATED RDW RBC: 43.4 FL (ref 39–50)
DISPENSE STATUS: NORMAL
EGFRCR SERPLBLD CKD-EPI 2021: >90 ML/MIN/{1.73_M2}
EOSINOPHIL # BLD: 0.1 K/MCL (ref 0–0.5)
EOSINOPHIL NFR BLD: 4 %
ERYTHROCYTE [DISTWIDTH] IN BLOOD: 13.1 % (ref 11–15)
FASTING DURATION TIME PATIENT: ABNORMAL H
GLUCOSE SERPL-MCNC: 92 MG/DL (ref 70–99)
HCT VFR BLD CALC: 22.1 % (ref 36–46.5)
HGB BLD-MCNC: 7.2 G/DL (ref 12–15.5)
IMM GRANULOCYTES # BLD AUTO: 0 K/MCL (ref 0–0.2)
IMM GRANULOCYTES # BLD: 0 %
ISBT BLOOD TYPE: 600
ISSUE DATE/TIME: NORMAL
LYMPHOCYTES # BLD: 1.1 K/MCL (ref 1–4)
LYMPHOCYTES NFR BLD: 38 %
MCH RBC QN AUTO: 30 PG (ref 26–34)
MCHC RBC AUTO-ENTMCNC: 32.6 G/DL (ref 32–36.5)
MCV RBC AUTO: 92.1 FL (ref 78–100)
MONOCYTES # BLD: 0.2 K/MCL (ref 0.3–0.9)
MONOCYTES NFR BLD: 8 %
NEUTROPHILS # BLD: 1.4 K/MCL (ref 1.8–7.7)
NEUTROPHILS NFR BLD: 50 %
NRBC BLD MANUAL-RTO: 0 /100 WBC
PLATELET # BLD AUTO: 177 K/MCL (ref 140–450)
POTASSIUM SERPL-SCNC: 3.8 MMOL/L (ref 3.4–5.1)
PRODUCT CODE: NORMAL
PRODUCT DESCRIPTION: NORMAL
PRODUCT ID: NORMAL
RBC # BLD: 2.4 MIL/MCL (ref 4–5.2)
SODIUM SERPL-SCNC: 141 MMOL/L (ref 135–145)
TYPE AND SCREEN EXPIRATION DATE: NORMAL
UNIT BLOOD TYPE: NORMAL
UNIT NUMBER: NORMAL
WBC # BLD: 2.9 K/MCL (ref 4.2–11)

## 2025-03-17 PROCEDURE — 85025 COMPLETE CBC W/AUTO DIFF WBC: CPT

## 2025-03-17 PROCEDURE — 10002803 HB RX 637

## 2025-03-17 PROCEDURE — 80048 BASIC METABOLIC PNL TOTAL CA: CPT | Performed by: HOSPITALIST

## 2025-03-17 PROCEDURE — 10002803 HB RX 637: Performed by: CLINICAL NURSE SPECIALIST

## 2025-03-17 PROCEDURE — 10002803 HB RX 637: Performed by: ANESTHESIOLOGY

## 2025-03-17 PROCEDURE — 97116 GAIT TRAINING THERAPY: CPT

## 2025-03-17 PROCEDURE — 99233 SBSQ HOSP IP/OBS HIGH 50: CPT | Performed by: ANESTHESIOLOGY

## 2025-03-17 PROCEDURE — 10000002 HB ROOM CHARGE MED SURG

## 2025-03-17 PROCEDURE — 10002803 HB RX 637: Performed by: INTERNAL MEDICINE

## 2025-03-17 PROCEDURE — 36415 COLL VENOUS BLD VENIPUNCTURE: CPT

## 2025-03-17 PROCEDURE — P9016 RBC LEUKOCYTES REDUCED: HCPCS

## 2025-03-17 PROCEDURE — 10004651 HB RX, NO CHARGE ITEM: Performed by: SPECIALIST/TECHNOLOGIST

## 2025-03-17 PROCEDURE — 10004651 HB RX, NO CHARGE ITEM

## 2025-03-17 PROCEDURE — 10002803 HB RX 637: Performed by: HOSPITALIST

## 2025-03-17 PROCEDURE — 86850 RBC ANTIBODY SCREEN: CPT | Performed by: HOSPITALIST

## 2025-03-17 PROCEDURE — 97535 SELF CARE MNGMENT TRAINING: CPT

## 2025-03-17 PROCEDURE — 99233 SBSQ HOSP IP/OBS HIGH 50: CPT | Performed by: HOSPITALIST

## 2025-03-17 RX ORDER — GABAPENTIN 400 MG/1
800 CAPSULE ORAL EVERY 8 HOURS SCHEDULED
Status: DISCONTINUED | OUTPATIENT
Start: 2025-03-17 | End: 2025-03-19 | Stop reason: HOSPADM

## 2025-03-17 RX ORDER — TRAMADOL HYDROCHLORIDE 50 MG/1
50 TABLET ORAL EVERY 8 HOURS SCHEDULED
Status: DISCONTINUED | OUTPATIENT
Start: 2025-03-17 | End: 2025-03-19 | Stop reason: HOSPADM

## 2025-03-17 RX ORDER — SODIUM CHLORIDE 9 MG/ML
INJECTION, SOLUTION INTRAVENOUS CONTINUOUS PRN
Status: ACTIVE | OUTPATIENT
Start: 2025-03-17 | End: 2025-03-18

## 2025-03-17 RX ADMIN — GABAPENTIN 800 MG: 400 CAPSULE ORAL at 22:35

## 2025-03-17 RX ADMIN — BACLOFEN 10 MG: 10 TABLET ORAL at 20:29

## 2025-03-17 RX ADMIN — ACETAMINOPHEN 650 MG: 325 TABLET ORAL at 13:48

## 2025-03-17 RX ADMIN — ACETAMINOPHEN 650 MG: 325 TABLET ORAL at 20:30

## 2025-03-17 RX ADMIN — GABAPENTIN 600 MG: 300 CAPSULE ORAL at 06:19

## 2025-03-17 RX ADMIN — DIAZEPAM 5 MG: 5 TABLET ORAL at 06:19

## 2025-03-17 RX ADMIN — GABAPENTIN 600 MG: 300 CAPSULE ORAL at 13:48

## 2025-03-17 RX ADMIN — SODIUM CHLORIDE, PRESERVATIVE FREE 2 ML: 5 INJECTION INTRAVENOUS at 20:30

## 2025-03-17 RX ADMIN — SODIUM CHLORIDE, PRESERVATIVE FREE 2 ML: 5 INJECTION INTRAVENOUS at 08:29

## 2025-03-17 RX ADMIN — DIAZEPAM 5 MG: 5 TABLET ORAL at 13:48

## 2025-03-17 RX ADMIN — OXYCODONE HYDROCHLORIDE 10 MG: 5 TABLET ORAL at 16:15

## 2025-03-17 RX ADMIN — TRAMADOL HYDROCHLORIDE 50 MG: 50 TABLET, COATED ORAL at 22:35

## 2025-03-17 RX ADMIN — TRAMADOL HYDROCHLORIDE 50 MG: 50 TABLET, COATED ORAL at 14:57

## 2025-03-17 RX ADMIN — ACETAMINOPHEN 650 MG: 325 TABLET ORAL at 08:29

## 2025-03-17 RX ADMIN — BACLOFEN 10 MG: 10 TABLET ORAL at 08:29

## 2025-03-17 RX ADMIN — NICOTINE POLACRILEX 2 MG: 2 GUM, CHEWING BUCCAL at 16:11

## 2025-03-17 RX ADMIN — OXYCODONE HYDROCHLORIDE 15 MG: 5 TABLET ORAL at 03:53

## 2025-03-17 RX ADMIN — ACETAMINOPHEN 650 MG: 325 TABLET ORAL at 17:20

## 2025-03-17 RX ADMIN — OXYCODONE HYDROCHLORIDE 10 MG: 5 TABLET ORAL at 20:30

## 2025-03-17 RX ADMIN — OXYCODONE HYDROCHLORIDE 15 MG: 5 TABLET ORAL at 12:00

## 2025-03-17 RX ADMIN — SENNOSIDES AND DOCUSATE SODIUM 2 TABLET: 50; 8.6 TABLET ORAL at 20:29

## 2025-03-17 RX ADMIN — OXYCODONE HYDROCHLORIDE 10 MG: 5 TABLET ORAL at 08:29

## 2025-03-17 ASSESSMENT — COGNITIVE AND FUNCTIONAL STATUS - GENERAL
DAILY_ACTIVITY_CONVERTED_SCORE: 44.27
HELP NEEDED DRESSING REGULAR UPPER BODY CLOTHING: A LITTLE
HELP NEEDED FOR BATHING: A LITTLE
BASIC_MOBILITY_CONVERTED_SCORE: 41.05
HELP NEEDED FOR TOILETING: A LITTLE
BASIC_MOBILITY_RAW_SCORE: 18
DAILY_ACTIVITY_RAW_SCORE: 21

## 2025-03-17 ASSESSMENT — ENCOUNTER SYMPTOMS: PAIN SEVERITY NOW: 6

## 2025-03-17 ASSESSMENT — PAIN SCALES - GENERAL
PAINLEVEL_OUTOF10: 8
PAINLEVEL_OUTOF10: 9
PAINLEVEL_OUTOF10: 5
PAINLEVEL_OUTOF10: 8
PAINLEVEL_OUTOF10: 7
PAINLEVEL_OUTOF10: 5
PAINLEVEL_OUTOF10: 5
PAINLEVEL_OUTOF10: 9

## 2025-03-17 ASSESSMENT — ACTIVITIES OF DAILY LIVING (ADL): HOME_MANAGEMENT_TIME_ENTRY: 24

## 2025-03-18 LAB
ALBUMIN SERPL-MCNC: 2.3 G/DL (ref 3.4–5)
ALBUMIN/GLOB SERPL: 0.8 {RATIO} (ref 1–2.4)
ALP SERPL-CCNC: 76 UNITS/L (ref 45–117)
ALT SERPL-CCNC: 16 UNITS/L
ANION GAP SERPL CALC-SCNC: 14 MMOL/L (ref 7–19)
AST SERPL-CCNC: 23 UNITS/L
BILIRUB SERPL-MCNC: 0.7 MG/DL (ref 0.2–1)
BUN SERPL-MCNC: 9 MG/DL (ref 6–20)
BUN/CREAT SERPL: 13 (ref 7–25)
CALCIUM SERPL-MCNC: 8.3 MG/DL (ref 8.4–10.2)
CHLORIDE SERPL-SCNC: 109 MMOL/L (ref 97–110)
CO2 SERPL-SCNC: 23 MMOL/L (ref 21–32)
CREAT SERPL-MCNC: 0.67 MG/DL (ref 0.51–0.95)
DEPRECATED RDW RBC: 46 FL (ref 39–50)
EGFRCR SERPLBLD CKD-EPI 2021: >90 ML/MIN/{1.73_M2}
EOSINOPHIL # BLD: 0.1 K/MCL (ref 0–0.5)
EOSINOPHIL NFR BLD: 3 %
ERYTHROCYTE [DISTWIDTH] IN BLOOD: 13.5 % (ref 11–15)
FASTING DURATION TIME PATIENT: ABNORMAL H
GLOBULIN SER-MCNC: 2.8 G/DL (ref 2–4)
GLUCOSE SERPL-MCNC: 91 MG/DL (ref 70–99)
HCT VFR BLD CALC: 26.7 % (ref 36–46.5)
HGB BLD-MCNC: 8.3 G/DL (ref 12–15.5)
LYMPHOCYTES # BLD: 1.5 K/MCL (ref 1–4)
LYMPHOCYTES NFR BLD: 42 %
MAGNESIUM SERPL-MCNC: 2.1 MG/DL (ref 1.7–2.4)
MCH RBC QN AUTO: 29 PG (ref 26–34)
MCHC RBC AUTO-ENTMCNC: 31.1 G/DL (ref 32–36.5)
MCV RBC AUTO: 93.4 FL (ref 78–100)
MONOCYTES # BLD: 0.2 K/MCL (ref 0.3–0.9)
MONOCYTES NFR BLD: 6 %
NEUTROPHILS # BLD: 1.4 K/MCL (ref 1.8–7.7)
NEUTS SEG NFR BLD: 45 %
NRBC BLD MANUAL-RTO: 0 /100 WBC
OVALOCYTES BLD QL SMEAR: ABNORMAL
PLAT MORPH BLD: NORMAL
PLATELET # BLD AUTO: 203 K/MCL (ref 140–450)
POTASSIUM SERPL-SCNC: 4.5 MMOL/L (ref 3.4–5.1)
PROT SERPL-MCNC: 5.1 G/DL (ref 6.4–8.2)
RBC # BLD: 2.86 MIL/MCL (ref 4–5.2)
SODIUM SERPL-SCNC: 141 MMOL/L (ref 135–145)
VARIANT LYMPHS NFR BLD: 4 % (ref 0–5)
WBC # BLD: 3.2 K/MCL (ref 4.2–11)
WBC MORPH BLD: NORMAL

## 2025-03-18 PROCEDURE — 99233 SBSQ HOSP IP/OBS HIGH 50: CPT | Performed by: ANESTHESIOLOGY

## 2025-03-18 PROCEDURE — 80053 COMPREHEN METABOLIC PANEL: CPT | Performed by: HOSPITALIST

## 2025-03-18 PROCEDURE — 10004651 HB RX, NO CHARGE ITEM

## 2025-03-18 PROCEDURE — 10002803 HB RX 637: Performed by: HOSPITALIST

## 2025-03-18 PROCEDURE — 10002803 HB RX 637

## 2025-03-18 PROCEDURE — 83735 ASSAY OF MAGNESIUM: CPT | Performed by: HOSPITALIST

## 2025-03-18 PROCEDURE — 99233 SBSQ HOSP IP/OBS HIGH 50: CPT | Performed by: INTERNAL MEDICINE

## 2025-03-18 PROCEDURE — 10002803 HB RX 637: Performed by: CLINICAL NURSE SPECIALIST

## 2025-03-18 PROCEDURE — 10002803 HB RX 637: Performed by: INTERNAL MEDICINE

## 2025-03-18 PROCEDURE — 10000002 HB ROOM CHARGE MED SURG

## 2025-03-18 PROCEDURE — 97535 SELF CARE MNGMENT TRAINING: CPT

## 2025-03-18 PROCEDURE — 10004651 HB RX, NO CHARGE ITEM: Performed by: SPECIALIST/TECHNOLOGIST

## 2025-03-18 PROCEDURE — 85027 COMPLETE CBC AUTOMATED: CPT

## 2025-03-18 PROCEDURE — 36415 COLL VENOUS BLD VENIPUNCTURE: CPT

## 2025-03-18 RX ADMIN — ACETAMINOPHEN 650 MG: 325 TABLET ORAL at 20:33

## 2025-03-18 RX ADMIN — OXYCODONE HYDROCHLORIDE 15 MG: 5 TABLET ORAL at 10:04

## 2025-03-18 RX ADMIN — OXYCODONE HYDROCHLORIDE 15 MG: 5 TABLET ORAL at 14:10

## 2025-03-18 RX ADMIN — TRAMADOL HYDROCHLORIDE 50 MG: 50 TABLET, COATED ORAL at 15:59

## 2025-03-18 RX ADMIN — GABAPENTIN 800 MG: 400 CAPSULE ORAL at 06:02

## 2025-03-18 RX ADMIN — OXYCODONE HYDROCHLORIDE 15 MG: 5 TABLET ORAL at 02:00

## 2025-03-18 RX ADMIN — NICOTINE POLACRILEX 2 MG: 2 GUM, CHEWING BUCCAL at 20:39

## 2025-03-18 RX ADMIN — SENNOSIDES AND DOCUSATE SODIUM 2 TABLET: 50; 8.6 TABLET ORAL at 08:10

## 2025-03-18 RX ADMIN — SENNOSIDES AND DOCUSATE SODIUM 2 TABLET: 50; 8.6 TABLET ORAL at 20:33

## 2025-03-18 RX ADMIN — ACETAMINOPHEN 650 MG: 325 TABLET ORAL at 15:59

## 2025-03-18 RX ADMIN — OXYCODONE HYDROCHLORIDE 15 MG: 5 TABLET ORAL at 18:14

## 2025-03-18 RX ADMIN — SODIUM CHLORIDE, PRESERVATIVE FREE 2 ML: 5 INJECTION INTRAVENOUS at 08:10

## 2025-03-18 RX ADMIN — SODIUM CHLORIDE, PRESERVATIVE FREE 2 ML: 5 INJECTION INTRAVENOUS at 20:33

## 2025-03-18 RX ADMIN — GABAPENTIN 800 MG: 400 CAPSULE ORAL at 21:11

## 2025-03-18 RX ADMIN — OXYCODONE HYDROCHLORIDE 15 MG: 5 TABLET ORAL at 06:02

## 2025-03-18 RX ADMIN — DIAZEPAM 5 MG: 5 TABLET ORAL at 20:36

## 2025-03-18 RX ADMIN — BACLOFEN 10 MG: 10 TABLET ORAL at 08:10

## 2025-03-18 RX ADMIN — TRAMADOL HYDROCHLORIDE 50 MG: 50 TABLET, COATED ORAL at 06:58

## 2025-03-18 RX ADMIN — GABAPENTIN 800 MG: 400 CAPSULE ORAL at 15:59

## 2025-03-18 RX ADMIN — TRAMADOL HYDROCHLORIDE 50 MG: 50 TABLET, COATED ORAL at 21:11

## 2025-03-18 RX ADMIN — ACETAMINOPHEN 650 MG: 325 TABLET ORAL at 08:09

## 2025-03-18 RX ADMIN — BACLOFEN 10 MG: 10 TABLET ORAL at 20:33

## 2025-03-18 RX ADMIN — OXYCODONE HYDROCHLORIDE 15 MG: 5 TABLET ORAL at 22:31

## 2025-03-18 ASSESSMENT — PAIN SCALES - GENERAL
PAINLEVEL_OUTOF10: 8
PAINLEVEL_OUTOF10: 8
PAINLEVEL_OUTOF10: 9
PAINLEVEL_OUTOF10: 8
PAINLEVEL_OUTOF10: 6
PAINLEVEL_OUTOF10: 6
PAINLEVEL_OUTOF10: 4
PAINLEVEL_OUTOF10: 8
PAINLEVEL_OUTOF10: 9
PAINLEVEL_OUTOF10: 8
PAINLEVEL_OUTOF10: 9

## 2025-03-18 ASSESSMENT — COGNITIVE AND FUNCTIONAL STATUS - GENERAL
DAILY_ACTIVITY_CONVERTED_SCORE: 57.54
DAILY_ACTIVITY_RAW_SCORE: 24

## 2025-03-18 ASSESSMENT — ACTIVITIES OF DAILY LIVING (ADL): HOME_MANAGEMENT_TIME_ENTRY: 23

## 2025-03-19 VITALS
SYSTOLIC BLOOD PRESSURE: 120 MMHG | TEMPERATURE: 97.5 F | DIASTOLIC BLOOD PRESSURE: 71 MMHG | HEART RATE: 74 BPM | HEIGHT: 66 IN | WEIGHT: 146.61 LBS | RESPIRATION RATE: 18 BRPM | OXYGEN SATURATION: 92 % | BODY MASS INDEX: 23.56 KG/M2

## 2025-03-19 LAB
BASOPHILS # BLD: 0 K/MCL (ref 0–0.3)
BASOPHILS NFR BLD: 0 %
DEPRECATED RDW RBC: 45.1 FL (ref 39–50)
EOSINOPHIL # BLD: 0.2 K/MCL (ref 0–0.5)
EOSINOPHIL NFR BLD: 4 %
ERYTHROCYTE [DISTWIDTH] IN BLOOD: 13.6 % (ref 11–15)
HCT VFR BLD CALC: 27 % (ref 36–46.5)
HGB BLD-MCNC: 8.6 G/DL (ref 12–15.5)
IMM GRANULOCYTES # BLD AUTO: 0 K/MCL (ref 0–0.2)
IMM GRANULOCYTES # BLD: 0 %
LYMPHOCYTES # BLD: 1.7 K/MCL (ref 1–4)
LYMPHOCYTES NFR BLD: 40 %
MCH RBC QN AUTO: 29.4 PG (ref 26–34)
MCHC RBC AUTO-ENTMCNC: 31.9 G/DL (ref 32–36.5)
MCV RBC AUTO: 92.2 FL (ref 78–100)
MONOCYTES # BLD: 0.4 K/MCL (ref 0.3–0.9)
MONOCYTES NFR BLD: 10 %
NEUTROPHILS # BLD: 2 K/MCL (ref 1.8–7.7)
NEUTROPHILS NFR BLD: 46 %
NRBC BLD MANUAL-RTO: 0 /100 WBC
PLATELET # BLD AUTO: 221 K/MCL (ref 140–450)
RBC # BLD: 2.93 MIL/MCL (ref 4–5.2)
WBC # BLD: 4.2 K/MCL (ref 4.2–11)

## 2025-03-19 PROCEDURE — 10002803 HB RX 637

## 2025-03-19 PROCEDURE — 36415 COLL VENOUS BLD VENIPUNCTURE: CPT

## 2025-03-19 PROCEDURE — 10002803 HB RX 637: Performed by: CLINICAL NURSE SPECIALIST

## 2025-03-19 PROCEDURE — 10004651 HB RX, NO CHARGE ITEM

## 2025-03-19 PROCEDURE — 99233 SBSQ HOSP IP/OBS HIGH 50: CPT | Performed by: INTERNAL MEDICINE

## 2025-03-19 PROCEDURE — 85025 COMPLETE CBC W/AUTO DIFF WBC: CPT

## 2025-03-19 PROCEDURE — 10002803 HB RX 637: Performed by: INTERNAL MEDICINE

## 2025-03-19 PROCEDURE — 10004651 HB RX, NO CHARGE ITEM: Performed by: SPECIALIST/TECHNOLOGIST

## 2025-03-19 RX ORDER — BACLOFEN 10 MG/1
10 TABLET ORAL EVERY 12 HOURS SCHEDULED
Qty: 10 TABLET | Refills: 0 | Status: ACTIVE | OUTPATIENT
Start: 2025-03-19

## 2025-03-19 RX ORDER — DOCUSATE SODIUM 100 MG/1
100 CAPSULE, LIQUID FILLED ORAL 2 TIMES DAILY
Qty: 20 CAPSULE | Refills: 0 | Status: ACTIVE | OUTPATIENT
Start: 2025-03-19

## 2025-03-19 RX ORDER — GABAPENTIN 400 MG/1
800 CAPSULE ORAL 3 TIMES DAILY
Qty: 42 CAPSULE | Refills: 0 | Status: ACTIVE | OUTPATIENT
Start: 2025-03-19 | End: 2025-03-26

## 2025-03-19 RX ORDER — TRAMADOL HYDROCHLORIDE 50 MG/1
50 TABLET ORAL EVERY 8 HOURS SCHEDULED
Qty: 21 TABLET | Refills: 0 | Status: ACTIVE | OUTPATIENT
Start: 2025-03-19 | End: 2025-03-26

## 2025-03-19 RX ORDER — OXYCODONE HYDROCHLORIDE 10 MG/1
10 TABLET ORAL EVERY 4 HOURS PRN
Qty: 30 TABLET | Refills: 0 | Status: ACTIVE | OUTPATIENT
Start: 2025-03-19

## 2025-03-19 RX ORDER — ACETAMINOPHEN 325 MG/1
650 TABLET ORAL 4 TIMES DAILY
Status: SHIPPED | COMMUNITY
Start: 2025-03-19

## 2025-03-19 RX ADMIN — ACETAMINOPHEN 650 MG: 325 TABLET ORAL at 08:00

## 2025-03-19 RX ADMIN — TRAMADOL HYDROCHLORIDE 50 MG: 50 TABLET, COATED ORAL at 05:38

## 2025-03-19 RX ADMIN — BACLOFEN 10 MG: 10 TABLET ORAL at 08:01

## 2025-03-19 RX ADMIN — OXYCODONE HYDROCHLORIDE 15 MG: 5 TABLET ORAL at 02:42

## 2025-03-19 RX ADMIN — OXYCODONE HYDROCHLORIDE 15 MG: 5 TABLET ORAL at 11:58

## 2025-03-19 RX ADMIN — OXYCODONE HYDROCHLORIDE 15 MG: 5 TABLET ORAL at 08:00

## 2025-03-19 RX ADMIN — GABAPENTIN 800 MG: 400 CAPSULE ORAL at 05:38

## 2025-03-19 RX ADMIN — SENNOSIDES AND DOCUSATE SODIUM 2 TABLET: 50; 8.6 TABLET ORAL at 08:01

## 2025-03-19 RX ADMIN — SODIUM CHLORIDE, PRESERVATIVE FREE 2 ML: 5 INJECTION INTRAVENOUS at 08:01

## 2025-03-19 ASSESSMENT — PAIN SCALES - GENERAL
PAINLEVEL_OUTOF10: 8
PAINLEVEL_OUTOF10: 9
PAINLEVEL_OUTOF10: 6
PAINLEVEL_OUTOF10: 8
PAINLEVEL_OUTOF10: 4
PAINLEVEL_OUTOF10: 8

## 2025-03-21 ENCOUNTER — TELEPHONE (OUTPATIENT)
Dept: CARE COORDINATION | Age: 60
End: 2025-03-21

## 2025-03-22 ENCOUNTER — TELEPHONE (OUTPATIENT)
Dept: CARE COORDINATION | Age: 60
End: 2025-03-22

## 2025-03-25 ENCOUNTER — OFFICE VISIT (OUTPATIENT)
Dept: FAMILY MEDICINE CLINIC | Facility: CLINIC | Age: 60
End: 2025-03-25
Payer: COMMERCIAL

## 2025-03-25 ENCOUNTER — LAB ENCOUNTER (OUTPATIENT)
Dept: LAB | Age: 60
End: 2025-03-25
Attending: FAMILY MEDICINE
Payer: COMMERCIAL

## 2025-03-25 VITALS
SYSTOLIC BLOOD PRESSURE: 106 MMHG | WEIGHT: 151 LBS | HEART RATE: 95 BPM | DIASTOLIC BLOOD PRESSURE: 67 MMHG | OXYGEN SATURATION: 98 % | RESPIRATION RATE: 18 BRPM | BODY MASS INDEX: 24.27 KG/M2 | HEIGHT: 66 IN | TEMPERATURE: 98 F

## 2025-03-25 DIAGNOSIS — M48.062 LUMBAR STENOSIS WITH NEUROGENIC CLAUDICATION: Primary | ICD-10-CM

## 2025-03-25 DIAGNOSIS — E86.1 HYPOTENSION DUE TO HYPOVOLEMIA: ICD-10-CM

## 2025-03-25 DIAGNOSIS — D50.0 IRON DEFICIENCY ANEMIA DUE TO CHRONIC BLOOD LOSS: ICD-10-CM

## 2025-03-25 PROBLEM — M43.10 SPONDYLOLISTHESIS: Status: ACTIVE | Noted: 2025-03-11

## 2025-03-25 LAB
HCT VFR BLD AUTO: 33.1 %
HGB BLD-MCNC: 11.1 G/DL
IRON SATN MFR SERPL: 18 %
IRON SERPL-MCNC: 57 UG/DL
TOTAL IRON BINDING CAPACITY: 324 UG/DL (ref 250–425)
TRANSFERRIN SERPL-MCNC: 267 MG/DL (ref 250–380)

## 2025-03-25 PROCEDURE — 84466 ASSAY OF TRANSFERRIN: CPT

## 2025-03-25 PROCEDURE — 3074F SYST BP LT 130 MM HG: CPT | Performed by: FAMILY MEDICINE

## 2025-03-25 PROCEDURE — 85014 HEMATOCRIT: CPT | Performed by: FAMILY MEDICINE

## 2025-03-25 PROCEDURE — 83540 ASSAY OF IRON: CPT

## 2025-03-25 PROCEDURE — 85018 HEMOGLOBIN: CPT | Performed by: FAMILY MEDICINE

## 2025-03-25 PROCEDURE — 3078F DIAST BP <80 MM HG: CPT | Performed by: FAMILY MEDICINE

## 2025-03-25 PROCEDURE — 99214 OFFICE O/P EST MOD 30 MIN: CPT | Performed by: FAMILY MEDICINE

## 2025-03-25 PROCEDURE — 36415 COLL VENOUS BLD VENIPUNCTURE: CPT

## 2025-03-25 PROCEDURE — 3008F BODY MASS INDEX DOCD: CPT | Performed by: FAMILY MEDICINE

## 2025-03-25 RX ORDER — TRAMADOL HYDROCHLORIDE 50 MG/1
50 TABLET ORAL EVERY 8 HOURS
COMMUNITY
Start: 2025-03-19 | End: 2025-03-25

## 2025-03-25 RX ORDER — TRAMADOL HYDROCHLORIDE 50 MG/1
50 TABLET ORAL EVERY 6 HOURS PRN
Qty: 30 TABLET | Refills: 0 | Status: SHIPPED | OUTPATIENT
Start: 2025-03-25

## 2025-03-25 RX ORDER — ACETAMINOPHEN 325 MG/1
650 TABLET ORAL 4 TIMES DAILY
COMMUNITY
Start: 2025-03-19

## 2025-03-25 RX ORDER — GABAPENTIN 400 MG/1
CAPSULE ORAL
COMMUNITY
Start: 2025-03-19 | End: 2025-03-25

## 2025-03-25 RX ORDER — GABAPENTIN 400 MG/1
800 CAPSULE ORAL 3 TIMES DAILY
Qty: 180 CAPSULE | Refills: 0 | Status: SHIPPED | OUTPATIENT
Start: 2025-03-25

## 2025-03-25 RX ORDER — OXYCODONE HYDROCHLORIDE 10 MG/1
10 TABLET ORAL EVERY 4 HOURS PRN
Qty: 90 TABLET | Refills: 0 | Status: SHIPPED | OUTPATIENT
Start: 2025-03-25

## 2025-03-25 RX ORDER — OXYCODONE HYDROCHLORIDE 10 MG/1
10 TABLET ORAL EVERY 4 HOURS PRN
COMMUNITY
Start: 2025-03-19 | End: 2025-03-25

## 2025-03-25 NOTE — ASSESSMENT & PLAN NOTE
Orders:    oxyCODONE 10 MG Oral Tab; Take 1 tablet (10 mg total) by mouth every 4 (four) hours as needed for Pain.    traMADol 50 MG Oral Tab; Take 1 tablet (50 mg total) by mouth every 6 (six) hours as needed for Pain. For breakthrough pain    gabapentin 400 MG Oral Cap; Take 2 capsules (800 mg total) by mouth 3 (three) times daily.

## 2025-03-25 NOTE — PROGRESS NOTES
Subjective:   Esha Hernadez is a 60 year old female who presents for Follow - Up (Surgery follow up and patient would like to discuss low blood pressure)   [facility].  Six day stay post op.      History/Other:   History of Present Illness  Esha Hernadez is a 60 year old female who presents with post-operative complications following low back surgery.    Two weeks post-operative from low back surgery, she initially did well for the first three days but then experienced complications that extended her hospital stay to twelve days. During her stay, she had a significant drop in blood pressure, leading to alarms and a rapid response team being called. A scan was performed to rule out bleeding and a clot, and it was suspected she might be retaining urine. It was later determined that a new muscle relaxant contributed to her low blood pressure. She received two blood transfusions due to low hemoglobin levels, attributed to post-surgical anemia.    Post-surgery, she was initially on morphine for pain management for three days and was later transitioned to oxycodone, which she has now run out of. She was also prescribed tramadol for breakthrough pain and gabapentin for nerve pain, which she takes two capsules three times a day. She reports a burning sensation described as 'like a fire running through my legs and through my back and then in my arm,' attributed to nerve pain. She is also on baclofen, taken once in the morning and once at night, for muscle relaxation. She reports that the pain medication provides relief for about three and a half hours, after which she experiences a sensation 'like somebody electrocuted me.' She is currently out of oxycodone and tramadol, with only a few doses of tramadol remaining.    During her hospital stay, she experienced low blood pressure, initially thought to be due to a water pill she was taking. The water pill was discontinued for eight days, leading to fluid retention and  bloating. She was unable to consult her cardiologist during this time as she was at [facility] for the surgery.    Her current medications include gabapentin, baclofen, and tramadol, with a recent history of oxycodone use.   Chief Complaint Reviewed and Verified  Nursing Notes Reviewed and   Verified  Tobacco Reviewed  Allergies Reviewed  Problem List Reviewed    Medical History Reviewed  Surgical History Reviewed  OB Status Reviewed    Family History Reviewed  Social History Reviewed       Tobacco:  She smoked tobacco in the past but quit greater than 12 months ago.  Social History     Tobacco Use   Smoking Status Former    Current packs/day: 0.00    Average packs/day: 0.5 packs/day for 4.0 years (2.0 ttl pk-yrs)    Types: Cigarettes    Start date: 2014    Quit date: 2018    Years since quittin.6    Passive exposure: Never   Smokeless Tobacco Never   Tobacco Comments    Quit smoking        Current Outpatient Medications   Medication Sig Dispense Refill    acetaminophen 325 MG Oral Tab Take 2 tablets (650 mg total) by mouth 4 (four) times daily.      oxyCODONE 10 MG Oral Tab Take 1 tablet (10 mg total) by mouth every 4 (four) hours as needed for Pain. 90 tablet 0    traMADol 50 MG Oral Tab Take 1 tablet (50 mg total) by mouth every 6 (six) hours as needed for Pain. For breakthrough pain 30 tablet 0    gabapentin 400 MG Oral Cap Take 2 capsules (800 mg total) by mouth 3 (three) times daily. 180 capsule 0    baclofen 10 MG Oral Tab Take 1 tablet (10 mg total) by mouth 2 (two) times daily. 60 tablet 0    ondansetron 4 MG Oral Tablet Dispersible Place 1 tablet (4 mg total) under the tongue every 8 (eight) hours as needed for Nausea. 90 tablet 5    dicyclomine 20 MG Oral Tab Take 1-2 tablets (20-40 mg total) by mouth 3 (three) times daily as needed. 90 tablet 5    Ospemifene (OSPHENA) 60 MG Oral Tab Take 1 tablet by mouth daily. 90 tablet 3    potassium chloride 20 MEQ Oral Tab CR Take 1  tablet (20 mEq total) by mouth daily. 90 tablet 1    temazepam 15 MG Oral Cap Take 1 capsule (15 mg total) by mouth nightly as needed for Sleep. 90 capsule 1    Omeprazole 40 MG Oral Capsule Delayed Release Take 1 capsule (40 mg total) by mouth before breakfast. 90 capsule 3    calcium carbonate 500 MG Oral Chew Tab Chew 1 tablet (500 mg total) by mouth daily.      spironolactone 25 MG Oral Tab Take 1 tablet (25 mg total) by mouth daily.      torsemide 20 MG Oral Tab Take 1 tablet (20 mg total) by mouth 2 (two) times daily.      Multiple Vitamins-Minerals (MULTI-VITAMIN/MINERALS) Oral Tab Take 1 tablet by mouth daily.      docusate sodium 100 MG Oral Cap Take 1 capsule (100 mg total) by mouth daily.      MOVANTIK 25 MG Oral Tab  (Patient not taking: Reported on 3/25/2025)      Prucalopride Succinate (MOTEGRITY) 2 MG Oral Tab Take 1 tablet by mouth daily. 90 tablet 3    Digestive Enzymes (DIGESTIVE ENZYME OR) Take 1 capsule by mouth daily.                Review of Systems:  Review of Systems   Constitutional: Negative.    Musculoskeletal:  Positive for arthralgias and back pain.   Neurological:  Negative for weakness and numbness.          Objective:   /67   Pulse 95   Temp 97.5 °F (36.4 °C) (Temporal)   Resp 18   Ht 5' 6\" (1.676 m)   Wt 151 lb (68.5 kg)   SpO2 98%   BMI 24.37 kg/m²  Estimated body mass index is 24.37 kg/m² as calculated from the following:    Height as of this encounter: 5' 6\" (1.676 m).    Weight as of this encounter: 151 lb (68.5 kg).  Physical Exam   Results  LABS  Hemoglobin: 8.3 g/dL (03/19/2025)  Magnesium: Normal (03/19/2025)  Electrolytes: Normal (03/19/2025)  Kidney function: Normal (03/19/2025)    RADIOLOGY  Lumbar spine CT: No bleeding detected (03/13/2025)     Physical Exam  CHEST: Lungs clear to auscultation bilaterally.  CARDIOVASCULAR: Heart sounds normal.  EXTREMITIES: No swelling in extremities.  MUSCULOSKELETAL: Positive straight leg raise test on the left and right.  Back muscles with spasm.  NEUROLOGICAL: Sensation intact.  SKIN: Mild skin irritation with redness.      Assessment & Plan:   Assessment & Plan  Lumbar stenosis with neurogenic claudication    Orders:    oxyCODONE 10 MG Oral Tab; Take 1 tablet (10 mg total) by mouth every 4 (four) hours as needed for Pain.    traMADol 50 MG Oral Tab; Take 1 tablet (50 mg total) by mouth every 6 (six) hours as needed for Pain. For breakthrough pain    gabapentin 400 MG Oral Cap; Take 2 capsules (800 mg total) by mouth 3 (three) times daily.    Iron deficiency anemia due to chronic blood loss    Orders:    Hemoglobin & Hematocrit    Iron And Tibc; Future    Hypotension due to hypovolemia            Assessment & Plan  Postoperative pain management  Two weeks post-op from low back surgery with significant postoperative pain, likely neuropathic and muscle spasms. Pain management includes oxycodone, tramadol, and gabapentin. Goal: effective pain control, reduce opioid reliance. Prefers primary care management.  - Prescribe oxycodone for 2-3 weeks, plan to taper.  - Prescribe gabapentin, two capsules TID.  - Prescribe tramadol for breakthrough pain.  - Wean off pain medications gradually over next month.  - Schedule follow-up in one month to reassess.    Anemia  Post-surgical anemia likely due to intraoperative blood loss. Hemoglobin at 8.6. Received two transfusions.  - Order follow-up labs for hemoglobin and iron levels.  - Consider iron supplementation if hemoglobin remains low.    Hypotension  Post-surgery hypotension likely from medication effects and possible dehydration. Managed with IV fluids.  - Monitor blood pressure, adjust medications as needed.    Care coordination  Difficulty coordinating care post-surgery due to lack of primary care provider and communication issues with specialists.  - Encourage establishing a consistent primary care provider.        Return in about 1 month (around 4/25/2025).        Quintin Harman DO,  3/25/2025, 9:47 AM

## 2025-03-28 ENCOUNTER — TELEPHONE (OUTPATIENT)
Dept: CARE COORDINATION | Age: 60
End: 2025-03-28

## 2025-03-29 ENCOUNTER — TELEPHONE (OUTPATIENT)
Dept: CARE COORDINATION | Age: 60
End: 2025-03-29

## 2025-04-03 DIAGNOSIS — M48.062 LUMBAR STENOSIS WITH NEUROGENIC CLAUDICATION: ICD-10-CM

## 2025-04-04 ENCOUNTER — TELEPHONE (OUTPATIENT)
Dept: CARE COORDINATION | Age: 60
End: 2025-04-04

## 2025-04-04 DIAGNOSIS — F41.1 ANXIETY STATE: ICD-10-CM

## 2025-04-07 NOTE — TELEPHONE ENCOUNTER
Please review.  Protocol failed/has no protocol.    Recent fills each # 90 : 10/14/2024  Last prescription written: 10/10/2024  Last office visit:  3/25/2025    Future Appointments   Date Time Provider Department Center   4/17/2025  9:45 AM [facility]

## 2025-04-07 NOTE — TELEPHONE ENCOUNTER
Please review.  Protocol failed/has no protocol.    Recent fills each # 90 : 03/25/2025  #30 :03/19/2025  Last prescription written: 03/25/2025  Last office visit:  3/25/2025    Future Appointments   Date Time Provider Department Center   4/17/2025  9:45 AM [facility]     Please see patient's mychart message :  Having a lot of nerve and back pain from back surgery

## 2025-04-08 RX ORDER — OXYCODONE HYDROCHLORIDE 10 MG/1
10 TABLET ORAL EVERY 4 HOURS PRN
Qty: 90 TABLET | Refills: 0 | Status: SHIPPED | OUTPATIENT
Start: 2025-04-08

## 2025-04-08 RX ORDER — TEMAZEPAM 15 MG/1
15 CAPSULE ORAL NIGHTLY PRN
Qty: 90 CAPSULE | Refills: 1 | Status: SHIPPED | OUTPATIENT
Start: 2025-04-08

## 2025-04-10 DIAGNOSIS — M48.062 LUMBAR STENOSIS WITH NEUROGENIC CLAUDICATION: ICD-10-CM

## 2025-04-11 ENCOUNTER — TELEPHONE (OUTPATIENT)
Dept: CARE COORDINATION | Age: 60
End: 2025-04-11

## 2025-04-14 NOTE — TELEPHONE ENCOUNTER
Please review; protocol failed/No Protocol      Recent Fills: 03/19/2025 #7, 03/25/2025 #7    Last Rx Written: 03/25/2025    Last Office Visit: 03/25/2025  Future Appointments   Date Time Provider Department Center   4/17/2025  9:45 AM [facility]

## 2025-04-15 RX ORDER — TRAMADOL HYDROCHLORIDE 50 MG/1
50 TABLET ORAL EVERY 6 HOURS PRN
Qty: 30 TABLET | Refills: 5 | Status: SHIPPED | OUTPATIENT
Start: 2025-04-15

## 2025-04-16 NOTE — TELEPHONE ENCOUNTER
Insurance contacted at [phone]- [facility]. Spoke with Alexis per pt's policy, no PA required for / Call Ref#[id number]/16/2025.     Okay to schedule patient? Calcium was 8.3 on 3/18/2025

## 2025-04-17 ENCOUNTER — OFFICE VISIT (OUTPATIENT)
Dept: FAMILY MEDICINE CLINIC | Facility: CLINIC | Age: 60
End: 2025-04-17
Payer: COMMERCIAL

## 2025-04-17 ENCOUNTER — TELEPHONE (OUTPATIENT)
Dept: RHEUMATOLOGY | Facility: CLINIC | Age: 60
End: 2025-04-17

## 2025-04-17 VITALS
RESPIRATION RATE: 18 BRPM | OXYGEN SATURATION: 96 % | WEIGHT: 141 LBS | HEIGHT: 66 IN | TEMPERATURE: 98 F | HEART RATE: 69 BPM | SYSTOLIC BLOOD PRESSURE: 115 MMHG | DIASTOLIC BLOOD PRESSURE: 76 MMHG | BODY MASS INDEX: 22.66 KG/M2

## 2025-04-17 DIAGNOSIS — M48.062 LUMBAR STENOSIS WITH NEUROGENIC CLAUDICATION: ICD-10-CM

## 2025-04-17 DIAGNOSIS — M79.2 NEURALGIA AND NEURITIS: Primary | ICD-10-CM

## 2025-04-17 PROCEDURE — 99214 OFFICE O/P EST MOD 30 MIN: CPT | Performed by: FAMILY MEDICINE

## 2025-04-17 PROCEDURE — 3074F SYST BP LT 130 MM HG: CPT | Performed by: FAMILY MEDICINE

## 2025-04-17 PROCEDURE — 3008F BODY MASS INDEX DOCD: CPT | Performed by: FAMILY MEDICINE

## 2025-04-17 PROCEDURE — 3078F DIAST BP <80 MM HG: CPT | Performed by: FAMILY MEDICINE

## 2025-04-17 RX ORDER — BACLOFEN 10 MG/1
10 TABLET ORAL 2 TIMES DAILY
Qty: 60 TABLET | Refills: 0 | Status: SHIPPED | OUTPATIENT
Start: 2025-04-17

## 2025-04-17 RX ORDER — OXYCODONE HYDROCHLORIDE 10 MG/1
10 TABLET ORAL EVERY 4 HOURS PRN
Qty: 180 TABLET | Refills: 0 | Status: SHIPPED | OUTPATIENT
Start: 2025-04-17

## 2025-04-17 RX ORDER — OXYCODONE HYDROCHLORIDE 10 MG/1
10 TABLET ORAL EVERY 4 HOURS PRN
Qty: 90 TABLET | Refills: 0 | Status: CANCELLED | OUTPATIENT
Start: 2025-04-17

## 2025-04-17 NOTE — TELEPHONE ENCOUNTER
Name and  verified. Pt will call office back to schedule Prolia injection with the nurse. Please transfer to call to [facility]  to schedule appointment.

## 2025-04-17 NOTE — PROGRESS NOTES
Subjective:   Esha Hernadez is a 60 year old female who presents for Pain (Follow up on lumbar stenosis neurogenic claudication )          The following individual(s) verbally consented to be recorded using ambient AI listening technology and understand that they can each withdraw their consent to this listening technology at any point by asking the clinician to turn off or pause the recording:    Patient name: Esha Hernadez  Additional names:     History/Other:   History of Present Illness  Esha Hernadez is a 60 year old female who presents with severe nerve pain radiating from her legs to her back.    She experiences severe nerve pain that shoots up both legs and radiates to her back, describing it as 'electricity shooting through my legs to my body,' particularly at night, which she finds 'horrible.' This pain is different from previous episodes, which were limited to her left leg and were more intermittent.    An MRI is scheduled to investigate potential causes such as fluid backup, nerve damage, or scar tissue. She has previously been on a steroid Z-Jaspreet, which did not alleviate her symptoms. She is using ice and heat therapy but reports minimal movement due to the pain.    She is currently taking temazepam at night and gabapentin, 800 mg three times a day, although she feels 'tired and icky' from the gabapentin. She is also using tramadol for breakthrough pain and oxycodone, one every four hours, totaling six per day. Baclofen is used occasionally when muscle spasms become severe.    During the review of symptoms, she confirms that the pain radiates from her legs to her back, with the left side previously being more affected. She experiences spasms and numbness, particularly on the left side.   Chief Complaint Reviewed and Verified  Nursing Notes Reviewed and   Verified  Allergies Reviewed  Medications Reviewed  Problem List   Reviewed  OB Status Reviewed         Tobacco:  She smoked tobacco  in the past but quit greater than 12 months ago.  Tobacco Use[1]     Current Medications[2]           Review of Systems:  Review of Systems       Objective:   /76   Pulse 69   Temp 97.9 °F (36.6 °C) (Temporal)   Resp 18   Ht 5' 6\" (1.676 m)   Wt 141 lb (64 kg)   SpO2 96%   BMI 22.76 kg/m²  Estimated body mass index is 22.76 kg/m² as calculated from the following:    Height as of this encounter: 5' 6\" (1.676 m).    Weight as of this encounter: 141 lb (64 kg).  Physical Exam  Vitals reviewed.   Musculoskeletal:      Lumbar back: Spasms present. Decreased range of motion.   Neurological:      Motor: Motor function is intact.        Results         Physical Exam  MUSCULOSKELETAL: Spasm present in the back.      Assessment & Plan:   Assessment & Plan  Neuralgia and neuritis         Lumbar stenosis with neurogenic claudication    Orders:    oxyCODONE 10 MG Oral Tab; Take 1 tablet (10 mg total) by mouth every 4 (four) hours as needed for Pain.       Assessment & Plan  Postoperative nerve pain  Severe nerve pain radiating from legs to back, described as electric shocks. Persistent, affecting sleep and mobility. Differential includes fluid backup, nerve damage, or scar tissue. MRI scheduled. Current medications: oxycodone, gabapentin, temazepam. Gabapentin causes tiredness and malaise. Baclofen recommended for muscle spasms.  - Continue oxycodone 10 mg oral q4h prn.  - Reduce gabapentin to 400 mg oral tid, monitor side effects and pain.  - Resume baclofen 10 mg oral bid.  - Apply heat to affected area.  - Follow-up after MRI to adjust treatment.    Follow-up for MRI  MRI scheduled at [facility] to assess nerve pain cause. Ensured timely results due to hospital delays.  - Ensure MRI completion on Monday at 6:30 PM at [facility].  - Plan follow-up appointment, potentially virtual, to discuss MRI results and adjust pain management.        Return in about 1 month (around  2025).        Quintin DO Ghazal, 2025, 5:56 PM           [1]   Social History  Tobacco Use   Smoking Status Former    Current packs/day: 0.00    Average packs/day: 0.5 packs/day for 4.0 years (2.0 ttl pk-yrs)    Types: Cigarettes    Start date: 2014    Quit date: 2018    Years since quittin.7    Passive exposure: Never   Smokeless Tobacco Never   Tobacco Comments    Quit smoking   [2]   Current Outpatient Medications   Medication Sig Dispense Refill    baclofen 10 MG Oral Tab Take 1 tablet (10 mg total) by mouth 2 (two) times daily. 60 tablet 0    oxyCODONE 10 MG Oral Tab Take 1 tablet (10 mg total) by mouth every 4 (four) hours as needed for Pain. 180 tablet 0    traMADol 50 MG Oral Tab Take 1 tablet (50 mg total) by mouth every 6 (six) hours as needed for Pain. For breakthrough pain 30 tablet 5    temazepam 15 MG Oral Cap Take 1 capsule (15 mg total) by mouth nightly as needed for Sleep. 90 capsule 1    acetaminophen 325 MG Oral Tab Take 2 tablets (650 mg total) by mouth 4 (four) times daily.      gabapentin 400 MG Oral Cap Take 2 capsules (800 mg total) by mouth 3 (three) times daily. 180 capsule 0    ondansetron 4 MG Oral Tablet Dispersible Place 1 tablet (4 mg total) under the tongue every 8 (eight) hours as needed for Nausea. 90 tablet 5    dicyclomine 20 MG Oral Tab Take 1-2 tablets (20-40 mg total) by mouth 3 (three) times daily as needed. 90 tablet 5    Ospemifene (OSPHENA) 60 MG Oral Tab Take 1 tablet by mouth daily. 90 tablet 3    potassium chloride 20 MEQ Oral Tab CR Take 1 tablet (20 mEq total) by mouth daily. 90 tablet 1    Omeprazole 40 MG Oral Capsule Delayed Release Take 1 capsule (40 mg total) by mouth before breakfast. 90 capsule 3    calcium carbonate 500 MG Oral Chew Tab Chew 1 tablet (500 mg total) by mouth daily.      MOVANTIK 25 MG Oral Tab  (Patient not taking: Reported on 3/25/2025)      Prucalopride Succinate (MOTEGRITY) 2 MG Oral Tab Take 1 tablet by mouth daily.  90 tablet 3    spironolactone 25 MG Oral Tab Take 1 tablet (25 mg total) by mouth daily.      torsemide 20 MG Oral Tab Take 1 tablet (20 mg total) by mouth 2 (two) times daily.      Digestive Enzymes (DIGESTIVE ENZYME OR) Take 1 capsule by mouth daily.      Multiple Vitamins-Minerals (MULTI-VITAMIN/MINERALS) Oral Tab Take 1 tablet by mouth daily.      docusate sodium 100 MG Oral Cap Take 1 capsule (100 mg total) by mouth daily.

## 2025-04-17 NOTE — TELEPHONE ENCOUNTER
Pt stated she is schedule for dexa scan but she keeps getting message: MCM SENT 4/2/25 939 TC/LVM X1 DUE AFTER 8/22/2025//SCHEDULED TOO SOON 4/1/25 1514 TC/Scoliosis and bone strength     Pt asked to make you aware. Please advise.

## 2025-04-17 NOTE — TELEPHONE ENCOUNTER
Outpatient Medication Detail     Disp Refills Start End    oxyCODONE 10 MG Oral Tab 180 tablet 0 4/17/2025 --    Sig - Route: Take 1 tablet (10 mg total) by mouth every 4 (four) hours as needed for Pain. - Oral    Sent to pharmacy as: oxyCODONE HCl 10 MG Oral Tablet    Earliest Fill Date: 4/17/2025    E-Prescribing Status: Receipt confirmed by pharmacy (4/17/2025 10:25 AM CDT)      Associated Diagnoses    Lumbar stenosis with neurogenic claudication        Pharmacy    [id number] - [place], [place] - [address] [place] [address] [phone], [phone]

## 2025-04-17 NOTE — ASSESSMENT & PLAN NOTE
Orders:    oxyCODONE 10 MG Oral Tab; Take 1 tablet (10 mg total) by mouth every 4 (four) hours as needed for Pain.

## 2025-04-18 ENCOUNTER — TELEPHONE (OUTPATIENT)
Dept: CARE COORDINATION | Age: 60
End: 2025-04-18

## 2025-04-18 NOTE — TELEPHONE ENCOUNTER
Plan was for her to get it after her Evenity but since her last bone density was done December 2024 if her insurance allows it she can get a repeat one in December 2025 to see if there is any improvement

## 2025-04-19 DIAGNOSIS — M48.062 LUMBAR STENOSIS WITH NEUROGENIC CLAUDICATION: ICD-10-CM

## 2025-04-21 RX ORDER — GABAPENTIN 400 MG/1
800 CAPSULE ORAL 3 TIMES DAILY
Qty: 540 CAPSULE | Refills: 3 | Status: SHIPPED | OUTPATIENT
Start: 2025-04-21

## 2025-04-23 RX ORDER — GABAPENTIN 400 MG/1
800 CAPSULE ORAL 3 TIMES DAILY
Qty: 180 CAPSULE | Refills: 0 | OUTPATIENT
Start: 2025-04-23

## 2025-04-29 ENCOUNTER — HOSPITAL ENCOUNTER (OUTPATIENT)
Dept: BONE DENSITY | Facility: HOSPITAL | Age: 60
Discharge: HOME | End: 2025-04-29
Attending: INTERNAL MEDICINE
Payer: COMMERCIAL

## 2025-04-29 DIAGNOSIS — M81.0 AGE-RELATED OSTEOPOROSIS WITHOUT CURRENT PATHOLOGICAL FRACTURE: ICD-10-CM

## 2025-04-29 PROCEDURE — 77080 DXA BONE DENSITY AXIAL: CPT | Performed by: INTERNAL MEDICINE

## 2025-05-03 DIAGNOSIS — K59.03 THERAPEUTIC OPIOID-INDUCED CONSTIPATION (OIC): Primary | ICD-10-CM

## 2025-05-03 DIAGNOSIS — T40.2X5A THERAPEUTIC OPIOID-INDUCED CONSTIPATION (OIC): Primary | ICD-10-CM

## 2025-05-03 DIAGNOSIS — K58.1 IRRITABLE BOWEL SYNDROME WITH CONSTIPATION: ICD-10-CM

## 2025-05-05 ENCOUNTER — TELEPHONE (OUTPATIENT)
Age: 60
End: 2025-05-05

## 2025-05-05 DIAGNOSIS — M81.0 AGE-RELATED OSTEOPOROSIS WITHOUT CURRENT PATHOLOGICAL FRACTURE: Primary | ICD-10-CM

## 2025-05-05 NOTE — TELEPHONE ENCOUNTER
Requested Prescriptions     Pending Prescriptions Disp Refills    Prucalopride Succinate (MOTEGRITY) 2 MG Oral Tab 90 tablet 3     Sig: Take 1 tablet by mouth daily.         LOV   4/1/2024      LR    1/3/2024      IN

## 2025-05-05 NOTE — TELEPHONE ENCOUNTER
Patient calling for an appointment for prolio injection  (her first) and she needs to know if she needs labwork prior to the appointment.     Please call her to schedule and inform of the labwork; phone . [phone]. Please send [facility] or  if she does not .

## 2025-05-07 NOTE — TELEPHONE ENCOUNTER
Placed order for blood work.  She does need to get it done before her Prolia injection.  She is due for Prolia now, if she can be scheduled for an appointment

## 2025-05-07 NOTE — TELEPHONE ENCOUNTER
Name and  verified. Pt will get labs completed. Appointment scheduled.      Future Appointments   Date Time Provider Department Center   2025 11:00 AM  [facility] RN RHEUMATOLOGY YANET [facility]   2025 11:15 AM [facility]   2025  3:15 PM Cassidy Fonseca MD ECWMOENDO [facility]   2025  9:40 AM Susie Madison MD [facility]ORHEUM [facility]   10/31/2025 11:00 AM [facility]

## 2025-05-09 ENCOUNTER — LAB ENCOUNTER (OUTPATIENT)
Dept: LAB | Age: 60
End: 2025-05-09
Attending: INTERNAL MEDICINE
Payer: COMMERCIAL

## 2025-05-09 DIAGNOSIS — K21.9 GASTROESOPHAGEAL REFLUX DISEASE, UNSPECIFIED WHETHER ESOPHAGITIS PRESENT: ICD-10-CM

## 2025-05-09 DIAGNOSIS — M81.0 AGE-RELATED OSTEOPOROSIS WITHOUT CURRENT PATHOLOGICAL FRACTURE: ICD-10-CM

## 2025-05-09 DIAGNOSIS — R79.89 HIGH SERUM PARATHYROID HORMONE (PTH): ICD-10-CM

## 2025-05-09 DIAGNOSIS — R56.9 CONVULSIONS, UNSPECIFIED CONVULSION TYPE (HCC): ICD-10-CM

## 2025-05-09 DIAGNOSIS — Z93.3 STATUS POST HARTMANN PROCEDURE (HCC): ICD-10-CM

## 2025-05-09 DIAGNOSIS — E55.9 VITAMIN D DEFICIENCY: ICD-10-CM

## 2025-05-09 DIAGNOSIS — E21.3 HYPERPARATHYROIDISM (HCC): ICD-10-CM

## 2025-05-09 DIAGNOSIS — Z51.81 MEDICATION MONITORING ENCOUNTER: ICD-10-CM

## 2025-05-09 LAB
ANION GAP SERPL CALC-SCNC: 6 MMOL/L (ref 0–18)
BUN BLD-MCNC: 16 MG/DL (ref 9–23)
BUN/CREAT SERPL: 13.9 (ref 10–20)
CALCIUM BLD-MCNC: 9.8 MG/DL (ref 8.7–10.4)
CHLORIDE SERPL-SCNC: 97 MMOL/L (ref 98–112)
CO2 SERPL-SCNC: 33 MMOL/L (ref 21–32)
CREAT BLD-MCNC: 1.15 MG/DL (ref 0.55–1.02)
EGFRCR SERPLBLD CKD-EPI 2021: 54 ML/MIN/1.73M2 (ref 60–?)
FASTING STATUS PATIENT QL REPORTED: YES
GLUCOSE BLD-MCNC: 111 MG/DL (ref 70–99)
OSMOLALITY SERPL CALC.SUM OF ELEC: 284 MOSM/KG (ref 275–295)
POTASSIUM SERPL-SCNC: 3.6 MMOL/L (ref 3.5–5.1)
PTH-INTACT SERPL-MCNC: 78.3 PG/ML (ref 18.5–88)
SODIUM SERPL-SCNC: 136 MMOL/L (ref 136–145)
VIT D+METAB SERPL-MCNC: 54.5 NG/ML (ref 30–100)

## 2025-05-09 PROCEDURE — 83970 ASSAY OF PARATHORMONE: CPT

## 2025-05-09 PROCEDURE — 36415 COLL VENOUS BLD VENIPUNCTURE: CPT

## 2025-05-09 PROCEDURE — 80048 BASIC METABOLIC PNL TOTAL CA: CPT

## 2025-05-09 PROCEDURE — 82306 VITAMIN D 25 HYDROXY: CPT

## 2025-05-12 ENCOUNTER — NURSE ONLY (OUTPATIENT)
Age: 60
End: 2025-05-12
Payer: COMMERCIAL

## 2025-05-12 DIAGNOSIS — M81.0 AGE-RELATED OSTEOPOROSIS WITHOUT CURRENT PATHOLOGICAL FRACTURE: Primary | ICD-10-CM

## 2025-05-12 NOTE — PROGRESS NOTES
Patient name and date of birth confirmed. Patient aware they are here today for Prolia injection and teaching. Patient educated on potential side effects. Educational materials given to patient on medication. Laboratory testing reviewed and verified okay to proceed with injection today. Injection given in left upper arm without any concerns. Patient tolerated medication well. Patient questions and concerns answered. Patient instructed to call us with any further questions. Patient aware to follow up in 6 months for next injection and aware to get repeat lab testing beforehand.    Calcium:   Lab Results   Component Value Date    CA 9.8 05/09/2025          Future Appointments   Date Time Provider Department Center   5/12/2025 11:00 AM  [facility] RN RHEUMATOLOGY [facility]   5/13/2025 12:45 PM [facility]   6/6/2025  3:15 PM Cassidy Fonseca MD [facility]   8/26/2025  9:40 AM Susie Madison MD [facility]OMARJORIE [facility]   10/31/2025 11:00 AM Bryon Lerma MD [facility]

## 2025-05-13 ENCOUNTER — OFFICE VISIT (OUTPATIENT)
Dept: FAMILY MEDICINE CLINIC | Facility: CLINIC | Age: 60
End: 2025-05-13
Payer: COMMERCIAL

## 2025-05-13 VITALS
HEART RATE: 69 BPM | BODY MASS INDEX: 24.11 KG/M2 | HEIGHT: 66 IN | TEMPERATURE: 98 F | DIASTOLIC BLOOD PRESSURE: 68 MMHG | OXYGEN SATURATION: 99 % | WEIGHT: 150 LBS | RESPIRATION RATE: 20 BRPM | SYSTOLIC BLOOD PRESSURE: 108 MMHG

## 2025-05-13 DIAGNOSIS — M48.062 LUMBAR STENOSIS WITH NEUROGENIC CLAUDICATION: Primary | ICD-10-CM

## 2025-05-13 DIAGNOSIS — M79.2 NEURALGIA AND NEURITIS: ICD-10-CM

## 2025-05-13 PROCEDURE — 99214 OFFICE O/P EST MOD 30 MIN: CPT | Performed by: FAMILY MEDICINE

## 2025-05-13 PROCEDURE — 3074F SYST BP LT 130 MM HG: CPT | Performed by: FAMILY MEDICINE

## 2025-05-13 PROCEDURE — 3008F BODY MASS INDEX DOCD: CPT | Performed by: FAMILY MEDICINE

## 2025-05-13 PROCEDURE — 3078F DIAST BP <80 MM HG: CPT | Performed by: FAMILY MEDICINE

## 2025-05-13 RX ORDER — HYDROCODONE BITARTRATE AND ACETAMINOPHEN 10; 325 MG/1; MG/1
1 TABLET ORAL EVERY 4 HOURS PRN
Qty: 180 TABLET | Refills: 0 | Status: SHIPPED | OUTPATIENT
Start: 2025-05-13

## 2025-05-13 RX ORDER — HYDROCODONE BITARTRATE AND ACETAMINOPHEN 10; 325 MG/1; MG/1
1 TABLET ORAL EVERY 4 HOURS PRN
Qty: 180 TABLET | Refills: 0 | Status: SHIPPED | OUTPATIENT
Start: 2025-05-13 | End: 2025-05-13

## 2025-05-13 RX ORDER — PREGABALIN 50 MG/1
50 CAPSULE ORAL 3 TIMES DAILY
Qty: 90 CAPSULE | Refills: 1 | Status: SHIPPED | OUTPATIENT
Start: 2025-05-13

## 2025-05-13 NOTE — PROGRESS NOTES
Subjective:   Esha Hernadez is a 60 year old female who presents for Back Pain (Back pain follow up )       History/Other:   History of Present Illness  Esha Hernadez is a 60 year old female with scoliosis and osteoporosis who presents with back pain and nerve pain for management of her symptoms.    She has ongoing back pain and nerve pain following a previous surgery. Fluid accumulation was identified on an MRI, and she has opted to wait for it to be absorbed over time, potentially taking six months or longer.    Her nerve pain originates in her left leg and radiates to her back and buttock. Initially, the pain was bilateral but has since localized to the left side. The pain was not constant prior to surgery but has become persistent postoperatively. The nerve pain may take six months to a year and a half to resolve.    She is currently taking gabapentin up to five times a day but feels 'awful' and 'tired' without significant relief from her nerve pain. She is also taking oxycodone every four hours, which makes her feel tired, and she prefers to switch back to Rougon, which she previously tolerated better. She has been using baclofen intermittently.    She has started physical therapy twice a week at the [facility], which includes light exercises and massage. She plans to try dry needling next week. She had a nurse for a few weeks post-surgery who recommended the therapy location.    She has a history of scoliosis and osteoporosis, which may slow her recovery. She had a DEXA scan and received her first injection from a rheumatologist. She is motivated to improve her condition as her daughter is due in August in [place], and she hopes to be more active by then.    During the review of symptoms, she reports tingling in her left leg. She is unable to sleep on her stomach or back and is experiencing difficulty with prolonged walking and bending forward.   Chief Complaint Reviewed and Verified  Nursing  Notes Reviewed and   Verified  Allergies Reviewed  Medications Reviewed  Problem List   Reviewed  OB Status Reviewed         Tobacco:  She smoked tobacco in the past but quit greater than 12 months ago.  Tobacco Use[1]     Current Medications[2]           Review of Systems:  Review of Systems   Constitutional: Negative.    Musculoskeletal:  Positive for back pain.        Lumbar           Objective:   /68   Pulse 69   Temp 97.5 °F (36.4 °C) (Temporal)   Resp 20   Ht 5' 6\" (1.676 m)   Wt 150 lb (68 kg)   SpO2 99%   BMI 24.21 kg/m²  Estimated body mass index is 24.21 kg/m² as calculated from the following:    Height as of this encounter: 5' 6\" (1.676 m).    Weight as of this encounter: 150 lb (68 kg).  Physical Exam  Vitals reviewed.   Musculoskeletal:      Lumbar back: Tenderness present. Decreased range of motion. Positive left straight leg raise test. Negative right straight leg raise test.   Neurological:      Motor: Motor function is intact.        Results  RADIOLOGY  Lumbar spine MRI: Excessive fluid    DIAGNOSTIC REPORTS  DEXA scan: Osteoporosis     Physical Exam  MUSCULOSKELETAL: Limited forward flexion of the spine to 20 degrees. Surgical scar on back appears normal.  NEUROLOGICAL: Normal motor strength and intact sensation in lower extremities. Positive straight leg raise test on left.      Assessment & Plan:   Assessment & Plan  Lumbar stenosis with neurogenic claudication    Orders:    HYDROcodone-acetaminophen  MG Oral Tab; Take 1 tablet by mouth every 4 (four) hours as needed for Pain.    Neuralgia and neuritis    Orders:    pregabalin 50 MG Oral Cap; Take 1 capsule (50 mg total) by mouth 3 (three) times daily.       Assessment & Plan  Postoperative nerve pain  Chronic postoperative nerve pain in the left leg, radiating to the buttock and back. Gabapentin ineffective with adverse effects. MRI shows excessive fluid. Prefers non-surgical management for fluid.  - Discontinue  gabapentin.  - Initiate pregabalin (Lyrica).  - Transition from oxycodone to hydrocodone ([place]) after one week on pregabalin.  - Schedule follow-up in four to five weeks to assess pain control and medication efficacy.  - Continue physical therapy.  - Consider referral to pain clinic if pain persists.    Osteoporosis  Osteoporosis slowing recovery post-surgery.    Scoliosis  Scoliosis contributing to slow recovery. Initial injection treatment provided by rheumatologist.    Goals of Care  Aims to recover to assist her daughter due in August, motivating rehabilitation.        Return in about 1 month (around 2025).        Quintin Harman DO, 2025, 2:23 PM           [1]   Social History  Tobacco Use   Smoking Status Former    Current packs/day: 0.00    Average packs/day: 0.5 packs/day for 4.0 years (2.0 ttl pk-yrs)    Types: Cigarettes    Start date: 2014    Quit date: 2018    Years since quittin.8    Passive exposure: Never   Smokeless Tobacco Never   Tobacco Comments    Quit smoking   [2]   Current Outpatient Medications   Medication Sig Dispense Refill    pregabalin 50 MG Oral Cap Take 1 capsule (50 mg total) by mouth 3 (three) times daily. 90 capsule 1    HYDROcodone-acetaminophen  MG Oral Tab Take 1 tablet by mouth every 4 (four) hours as needed for Pain. 180 tablet 0    baclofen 10 MG Oral Tab Take 1 tablet (10 mg total) by mouth 2 (two) times daily. 60 tablet 0    oxyCODONE 10 MG Oral Tab Take 1 tablet (10 mg total) by mouth every 4 (four) hours as needed for Pain. 180 tablet 0    traMADol 50 MG Oral Tab Take 1 tablet (50 mg total) by mouth every 6 (six) hours as needed for Pain. For breakthrough pain 30 tablet 5    temazepam 15 MG Oral Cap Take 1 capsule (15 mg total) by mouth nightly as needed for Sleep. 90 capsule 1    acetaminophen 325 MG Oral Tab Take 2 tablets (650 mg total) by mouth 4 (four) times daily.      ondansetron 4 MG Oral Tablet Dispersible Place 1 tablet (4 mg  total) under the tongue every 8 (eight) hours as needed for Nausea. 90 tablet 5    dicyclomine 20 MG Oral Tab Take 1-2 tablets (20-40 mg total) by mouth 3 (three) times daily as needed. 90 tablet 5    Ospemifene (OSPHENA) 60 MG Oral Tab Take 1 tablet by mouth daily. 90 tablet 3    potassium chloride 20 MEQ Oral Tab CR Take 1 tablet (20 mEq total) by mouth daily. 90 tablet 1    Omeprazole 40 MG Oral Capsule Delayed Release Take 1 capsule (40 mg total) by mouth before breakfast. 90 capsule 3    calcium carbonate 500 MG Oral Chew Tab Chew 1 tablet (500 mg total) by mouth daily.      Prucalopride Succinate (MOTEGRITY) 2 MG Oral Tab Take 1 tablet by mouth daily. 90 tablet 3    spironolactone 25 MG Oral Tab Take 1 tablet (25 mg total) by mouth daily.      torsemide 20 MG Oral Tab Take 1 tablet (20 mg total) by mouth 2 (two) times daily.      Digestive Enzymes (DIGESTIVE ENZYME OR) Take 1 capsule by mouth daily.      Multiple Vitamins-Minerals (MULTI-VITAMIN/MINERALS) Oral Tab Take 1 tablet by mouth daily.      docusate sodium 100 MG Oral Cap Take 1 capsule (100 mg total) by mouth daily.      MOVANTIK 25 MG Oral Tab  (Patient not taking: Reported on 5/13/2025)

## 2025-05-13 NOTE — ASSESSMENT & PLAN NOTE
Orders:    pregabalin 50 MG Oral Cap; Take 1 capsule (50 mg total) by mouth 3 (three) times daily.

## 2025-05-13 NOTE — ASSESSMENT & PLAN NOTE
Orders:    HYDROcodone-acetaminophen  MG Oral Tab; Take 1 tablet by mouth every 4 (four) hours as needed for Pain.

## 2025-05-14 RX ORDER — PRUCALOPRIDE 2 MG/1
1 TABLET ORAL DAILY
Qty: 90 TABLET | Refills: 3 | Status: SHIPPED | OUTPATIENT
Start: 2025-05-14

## 2025-05-16 ENCOUNTER — TELEPHONE (OUTPATIENT)
Dept: FAMILY MEDICINE CLINIC | Facility: CLINIC | Age: 60
End: 2025-05-16

## 2025-05-16 NOTE — TELEPHONE ENCOUNTER
traMADol 50 MG Oral Tab, Take 1 tablet (50 mg total) by mouth every 6 (six) hours as needed for Pain. For breakthrough pain, Disp: 30 tablet, Rfl: 5   Key:[id number]

## 2025-06-06 DIAGNOSIS — M48.062 LUMBAR STENOSIS WITH NEUROGENIC CLAUDICATION: ICD-10-CM

## 2025-06-06 RX ORDER — HYDROCODONE BITARTRATE AND ACETAMINOPHEN 10; 325 MG/1; MG/1
1 TABLET ORAL EVERY 4 HOURS PRN
Qty: 180 TABLET | Refills: 0 | Status: SHIPPED | OUTPATIENT
Start: 2025-06-06

## 2025-06-06 NOTE — TELEPHONE ENCOUNTER
05/13/2025  LAST WRITTEN: 05/13/2025  Quantity: 180  Due 06/13/2025  Recent Visits  Date Type Provider Dept   05/13/25 Office Visit Quintin Harman DO [facility]     Future Appointments  Date Type Provider Dept   06/19/25 Appointment Quintin Harman DO [facility]

## 2025-06-19 ENCOUNTER — OFFICE VISIT (OUTPATIENT)
Dept: FAMILY MEDICINE CLINIC | Facility: CLINIC | Age: 60
End: 2025-06-19
Payer: COMMERCIAL

## 2025-06-19 VITALS
TEMPERATURE: 98 F | OXYGEN SATURATION: 97 % | RESPIRATION RATE: 18 BRPM | HEART RATE: 63 BPM | DIASTOLIC BLOOD PRESSURE: 59 MMHG | HEIGHT: 66 IN | WEIGHT: 152 LBS | BODY MASS INDEX: 24.43 KG/M2 | SYSTOLIC BLOOD PRESSURE: 93 MMHG

## 2025-06-19 DIAGNOSIS — M48.062 LUMBAR STENOSIS WITH NEUROGENIC CLAUDICATION: ICD-10-CM

## 2025-06-19 DIAGNOSIS — R35.0 URINARY FREQUENCY: Primary | ICD-10-CM

## 2025-06-19 DIAGNOSIS — N39.0 ACUTE UTI: ICD-10-CM

## 2025-06-19 LAB
APPEARANCE: CLEAR
BILIRUBIN: NEGATIVE
GLUCOSE (URINE DIPSTICK): NEGATIVE MG/DL
KETONES (URINE DIPSTICK): NEGATIVE MG/DL
MULTISTIX LOT#: ABNORMAL NUMERIC
NITRITE, URINE: NEGATIVE
PH, URINE: 7.5 (ref 4.5–8)
SPECIFIC GRAVITY: 1.03 (ref 1–1.03)
URINE-COLOR: YELLOW
UROBILINOGEN,SEMI-QN: 0.2 MG/DL (ref 0–1.9)

## 2025-06-19 PROCEDURE — 99214 OFFICE O/P EST MOD 30 MIN: CPT | Performed by: FAMILY MEDICINE

## 2025-06-19 PROCEDURE — 3078F DIAST BP <80 MM HG: CPT | Performed by: FAMILY MEDICINE

## 2025-06-19 PROCEDURE — 3008F BODY MASS INDEX DOCD: CPT | Performed by: FAMILY MEDICINE

## 2025-06-19 PROCEDURE — 3074F SYST BP LT 130 MM HG: CPT | Performed by: FAMILY MEDICINE

## 2025-06-19 PROCEDURE — 81002 URINALYSIS NONAUTO W/O SCOPE: CPT | Performed by: FAMILY MEDICINE

## 2025-06-19 RX ORDER — SULFAMETHOXAZOLE AND TRIMETHOPRIM 800; 160 MG/1; MG/1
1 TABLET ORAL 2 TIMES DAILY
Qty: 14 TABLET | Refills: 0 | Status: SHIPPED | OUTPATIENT
Start: 2025-06-19

## 2025-06-19 NOTE — PROGRESS NOTES
Subjective:   Esha Hernadez is a 60 year old female who presents for UTI (Patient states has UTI burning sensation and urinary frequency ) and Back Pain (Lower back and nerve pain left leg )       History/Other:   History of Present Illness  Esha Hernadez is a 60 year old female who presents for medication refill and evaluation of new bladder symptoms.    She has new bladder symptoms that began two days ago, including dysuria and increased frequency. Hematuria was noted this morning. She has a history of urinary tract infections, but it has been several years since her last episode. No antibiotic allergies.    She is managing chronic back pain and nerve pain following back surgery. Recent x-rays were performed. Persistent nerve pain radiates from her left toes up her leg, with no improvement. She experiences difficulty sitting comfortably and walking exacerbates her lower back pain. She is undergoing physical therapy twice a week and notes slight improvement in movement and endurance.    Her current medications include hydrocodone every six hours, Lyrica for nerve pain, and baclofen as needed for severe pain. She has discontinued oxycodone. 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 She continues to use tramadol for breakthrough pain when necessary.    She has been traveling frequently to [place] to support her daughter, who is on bed rest in the hospital due to premature rupture of membranes at 31 weeks of pregnancy.   Chief Complaint Reviewed and Verified  Nursing Notes Reviewed and   Verified  Problem List Reviewed  OB Status Reviewed         Tobacco:  She smoked tobacco in the past but quit greater than 12 months ago.  Tobacco Use[1]     Current Medications[2]           Review of Systems:  Review of Systems   Constitutional: Negative.    Genitourinary:  Positive for dysuria and  frequency.   Musculoskeletal:  Positive for back pain.   Neurological:  Positive for numbness. Negative for weakness.          Objective:   BP 93/59   Pulse 63   Temp 97.6 °F (36.4 °C) (Temporal)   Resp 18   Ht 5' 6\" (1.676 m)   Wt 152 lb (68.9 kg)   SpO2 97%   BMI 24.53 kg/m²  Estimated body mass index is 24.53 kg/m² as calculated from the following:    Height as of this encounter: 5' 6\" (1.676 m).    Weight as of this encounter: 152 lb (68.9 kg).  Physical Exam  Vitals reviewed.   Musculoskeletal:      Lumbar back: Spasms and tenderness present. Normal range of motion.        Results  LABS  Urinalysis: Hematuria (06/19/2025)    RADIOLOGY  Lumbar spine X-ray: Hardware intact  Lumbar spine MRI: Fluid accumulation present     Physical Exam  MUSCULOSKELETAL: Improved posture and positioning.      Assessment & Plan:   Assessment & Plan  Urinary frequency    Orders:    POC Urinalysis, Manual Dip without microscopy [18006]    Acute UTI         Lumbar stenosis with neurogenic claudication            Assessment & Plan  Urinary Tract Infection (UTI)  Urinalysis confirmed infection. No antibiotic allergies.  - Prescribed Bactrim twice daily for one week.  - Encouraged increased water intake.  - Advised taking Bactrim in the evening.    Postoperative Nerve Pain  Persistent severe nerve pain post-back surgery. Pregabalin prescribed for management. Surgeon indicated pain may persist for 6 months to 2 years.  - Continue pregabalin and assess effectiveness over three months.  - Consider timing of baclofen around dinner for muscle relaxation and sleep improvement.    Lumbar Stenosis with Neurogenic Claudication  Chronic lower back pain with some improvement from physical therapy. Recovery may take up to a year due to scoliosis and osteoporosis.  - Continue physical therapy twice a week.  - Monitor pain levels and adjust management as needed.        General Health Maintenance  Frequent travel to [place] for daughter's  pregnancy complications. Discussed medication management during travel.  - Ensure adequate medication supply before travel.  - Coordinate with pharmacy for early refills if needed.        Return in about 3 months (around 2025).        Quintin Harman DO, 2025, 5:55 PM           [1]   Social History  Tobacco Use   Smoking Status Former    Current packs/day: 0.00    Average packs/day: 0.5 packs/day for 4.0 years (2.0 ttl pk-yrs)    Types: Cigarettes    Start date: 2014    Quit date: 2018    Years since quittin.9    Passive exposure: Never   Smokeless Tobacco Never   Tobacco Comments    Quit smoking   [2]   Current Outpatient Medications   Medication Sig Dispense Refill    sulfamethoxazole-trimethoprim DS (BACTRIM DS) 800-160 MG Oral Tab per tablet Take 1 tablet by mouth 2 (two) times daily. 14 tablet 0    HYDROcodone-acetaminophen  MG Oral Tab Take 1 tablet by mouth every 4 (four) hours as needed for Pain. 180 tablet 0    Prucalopride Succinate (MOTEGRITY) 2 MG Oral Tab Take 1 tablet by mouth daily. 90 tablet 3    pregabalin 50 MG Oral Cap Take 1 capsule (50 mg total) by mouth 3 (three) times daily. 90 capsule 1    baclofen 10 MG Oral Tab Take 1 tablet (10 mg total) by mouth 2 (two) times daily. 60 tablet 0    traMADol 50 MG Oral Tab Take 1 tablet (50 mg total) by mouth every 6 (six) hours as needed for Pain. For breakthrough pain 30 tablet 5    temazepam 15 MG Oral Cap Take 1 capsule (15 mg total) by mouth nightly as needed for Sleep. 90 capsule 1    acetaminophen 325 MG Oral Tab Take 2 tablets (650 mg total) by mouth 4 (four) times daily.      ondansetron 4 MG Oral Tablet Dispersible Place 1 tablet (4 mg total) under the tongue every 8 (eight) hours as needed for Nausea. 90 tablet 5    dicyclomine 20 MG Oral Tab Take 1-2 tablets (20-40 mg total) by mouth 3 (three) times daily as needed. 90 tablet 5    Ospemifene (OSPHENA) 60 MG Oral Tab Take 1 tablet by mouth daily. 90 tablet 3     potassium chloride 20 MEQ Oral Tab CR Take 1 tablet (20 mEq total) by mouth daily. 90 tablet 1    Omeprazole 40 MG Oral Capsule Delayed Release Take 1 capsule (40 mg total) by mouth before breakfast. 90 capsule 3    calcium carbonate 500 MG Oral Chew Tab Chew 1 tablet (500 mg total) by mouth daily.      MOVANTIK 25 MG Oral Tab  (Patient not taking: Reported on 6/19/2025)      spironolactone 25 MG Oral Tab Take 1 tablet (25 mg total) by mouth daily.      torsemide 20 MG Oral Tab Take 1 tablet (20 mg total) by mouth 2 (two) times daily.      Digestive Enzymes (DIGESTIVE ENZYME OR) Take 1 capsule by mouth daily.      Multiple Vitamins-Minerals (MULTI-VITAMIN/MINERALS) Oral Tab Take 1 tablet by mouth daily.      docusate sodium 100 MG Oral Cap Take 1 capsule (100 mg total) by mouth daily.

## 2025-06-20 RX ORDER — TRAMADOL HYDROCHLORIDE 50 MG/1
50 TABLET ORAL EVERY 6 HOURS PRN
Qty: 30 TABLET | Refills: 5 | Status: SHIPPED | OUTPATIENT
Start: 2025-06-20

## 2025-06-20 NOTE — TELEPHONE ENCOUNTER
Please review. Refill failed protocol.     Recent fills each # 30 : 6/6/25 , 5/28/25 , 5/22/25  Last prescription written: 4/15/25  Last office visit:  6/19/2025    Future Appointments   Date Time Provider Department Center   8/26/2025  9:40 AM Susie Madison MD [facility]   10/31/2025 11:00 AM Bryon Lerma MD [facility]   11/12/2025 10:20 AM Susie Madison MD [facility]      Sent [employer] message to patient the refill request was forwarded to provider.

## 2025-06-26 RX ORDER — DICYCLOMINE HCL 20 MG
TABLET ORAL 3 TIMES DAILY PRN
Qty: 90 TABLET | Refills: 0 | Status: SHIPPED | OUTPATIENT
Start: 2025-06-26

## 2025-06-26 NOTE — TELEPHONE ENCOUNTER
Requested Prescriptions     Pending Prescriptions Disp Refills    DICYCLOMINE 20 MG Oral Tab [Pharmacy Med Name: Dicyclomine Hydrochloride 20 Mg Tab Lann] 90 tablet 0     Sig: Take 1-2 tablets (20-40 mg total) by mouth 3 (three) times daily as needed.     LOV: 4/01/2024  Last Refill: 12/02/2024    (Routed to Office On-Call)

## 2025-06-28 RX ORDER — BACLOFEN 10 MG/1
10 TABLET ORAL NIGHTLY PRN
Qty: 30 TABLET | Refills: 0 | Status: SHIPPED | OUTPATIENT
Start: 2025-06-28

## 2025-06-30 ENCOUNTER — OFFICE VISIT (OUTPATIENT)
Dept: FAMILY MEDICINE CLINIC | Facility: CLINIC | Age: 60
End: 2025-06-30
Payer: COMMERCIAL

## 2025-06-30 VITALS
OXYGEN SATURATION: 97 % | TEMPERATURE: 97 F | WEIGHT: 150 LBS | HEIGHT: 66 IN | SYSTOLIC BLOOD PRESSURE: 125 MMHG | HEART RATE: 69 BPM | DIASTOLIC BLOOD PRESSURE: 72 MMHG | BODY MASS INDEX: 24.11 KG/M2

## 2025-06-30 DIAGNOSIS — R35.0 URINARY FREQUENCY: Primary | ICD-10-CM

## 2025-06-30 LAB
APPEARANCE: CLEAR
BILIRUBIN: NEGATIVE
GLUCOSE (URINE DIPSTICK): NEGATIVE MG/DL
KETONES (URINE DIPSTICK): NEGATIVE MG/DL
LEUKOCYTES: NEGATIVE
NITRITE, URINE: NEGATIVE
OCCULT BLOOD: NEGATIVE
PH, URINE: 6 (ref 4.5–8)
PROTEIN (URINE DIPSTICK): NEGATIVE MG/DL
SPECIFIC GRAVITY: 1.01 (ref 1–1.03)
URINE-COLOR: YELLOW
UROBILINOGEN,SEMI-QN: 0.2 MG/DL (ref 0–1.9)

## 2025-06-30 PROCEDURE — 81002 URINALYSIS NONAUTO W/O SCOPE: CPT | Performed by: FAMILY MEDICINE

## 2025-06-30 PROCEDURE — 3008F BODY MASS INDEX DOCD: CPT | Performed by: FAMILY MEDICINE

## 2025-06-30 PROCEDURE — 99213 OFFICE O/P EST LOW 20 MIN: CPT | Performed by: FAMILY MEDICINE

## 2025-06-30 PROCEDURE — 3078F DIAST BP <80 MM HG: CPT | Performed by: FAMILY MEDICINE

## 2025-06-30 PROCEDURE — 3074F SYST BP LT 130 MM HG: CPT | Performed by: FAMILY MEDICINE

## 2025-06-30 RX ORDER — CIPROFLOXACIN 250 MG/1
250 TABLET, FILM COATED ORAL 2 TIMES DAILY
Qty: 14 TABLET | Refills: 0 | Status: SHIPPED | OUTPATIENT
Start: 2025-06-30 | End: 2025-07-07

## 2025-07-01 DIAGNOSIS — M48.062 LUMBAR STENOSIS WITH NEUROGENIC CLAUDICATION: ICD-10-CM

## 2025-07-01 NOTE — PROGRESS NOTES
Subjective:   Esha Hernadez is a 60 year old female who presents for Burning On Urination (Is still having the same symptoms )       History/Other:   History of Present Illness  Esha Hernadez is a 60 year old female who presents with recurrent urinary symptoms.    She experiences a burning sensation during urination, similar to previous episodes. She was treated with an antibiotic for seven days earlier this month, which initially resolved her symptoms, but they returned approximately two days after completing the course. She also notes a constant urge to urinate and the presence of a small amount of blood in her urine.    She is currently taking water pills, including Spiractyl, and tries to maintain adequate hydration to keep her urine clear.    She experiences significant back pain due to lumbar stenosis with neurogenic claudication, which affects her ability to sit comfortably. The condition is chronic and impacts her mobility and daily activities.   Chief Complaint Reviewed and Verified  Nursing Notes Reviewed and   Verified  Allergies Reviewed  Medications Reviewed  Problem List   Reviewed         Tobacco:  She smoked tobacco in the past but quit greater than 12 months ago.  Tobacco Use[1]     Current Medications[2]           Review of Systems:  Review of Systems   Constitutional: Negative.    Genitourinary:  Positive for frequency and urgency.          Objective:   /72 (BP Location: Left arm, Patient Position: Sitting, Cuff Size: adult)   Pulse 69   Temp 97.2 °F (36.2 °C) (Temporal)   Ht 5' 6\" (1.676 m)   Wt 150 lb (68 kg)   SpO2 97%   BMI 24.21 kg/m²  Estimated body mass index is 24.21 kg/m² as calculated from the following:    Height as of this encounter: 5' 6\" (1.676 m).    Weight as of this encounter: 150 lb (68 kg).  Physical Exam  Vitals reviewed.        Results  LABS  Urinalysis: normal     Physical Exam          Assessment & Plan:   Assessment & Plan  Urinary  frequency    Orders:    POC Urinalysis, Manual Dip without microscopy [[id number]]       Assessment & Plan  Recurrent Urinary Tract Infection (UTI)  Recurrent UTI symptoms post sulfamethoxazole-trimethoprim. Considered ciprofloxacin despite potential myalgia.  - Prescribe ciprofloxacin 500 mg orally twice daily for one week.  - Advise adequate hydration and monitor urine clarity.    Lumbar Stenosis with Neurogenic Claudication  Significant back pain affecting sitting. Awaiting surgeon's guidance on sitting tolerance. Acknowledged impact on mobility.  - Assist with renewal of parking permit for six months to aid mobility.        No follow-ups on file.        Quintin Harman DO, 2025, 7:18 PM           [1]   Social History  Tobacco Use   Smoking Status Former    Current packs/day: 0.00    Average packs/day: 0.5 packs/day for 4.0 years (2.0 ttl pk-yrs)    Types: Cigarettes    Start date: 2014    Quit date: 2018    Years since quittin.9    Passive exposure: Never   Smokeless Tobacco Never   Tobacco Comments    Quit smoking   [2]   Current Outpatient Medications   Medication Sig Dispense Refill    ciprofloxacin 250 MG Oral Tab Take 1 tablet (250 mg total) by mouth 2 (two) times daily for 7 days. 14 tablet 0    baclofen 10 MG Oral Tab Take 1 tablet (10 mg total) by mouth nightly as needed. 30 tablet 0    DICYCLOMINE 20 MG Oral Tab Take 1-2 tablets (20-40 mg total) by mouth 3 (three) times daily as needed. 90 tablet 0    traMADol 50 MG Oral Tab Take 1 tablet (50 mg total) by mouth every 6 (six) hours as needed for Pain. For breakthrough pain 30 tablet 5    sulfamethoxazole-trimethoprim DS (BACTRIM DS) 800-160 MG Oral Tab per tablet Take 1 tablet by mouth 2 (two) times daily. 14 tablet 0    HYDROcodone-acetaminophen  MG Oral Tab Take 1 tablet by mouth every 4 (four) hours as needed for Pain. 180 tablet 0    Prucalopride Succinate (MOTEGRITY) 2 MG Oral Tab Take 1 tablet by mouth daily. 90 tablet 3     pregabalin 50 MG Oral Cap Take 1 capsule (50 mg total) by mouth 3 (three) times daily. 90 capsule 1    temazepam 15 MG Oral Cap Take 1 capsule (15 mg total) by mouth nightly as needed for Sleep. 90 capsule 1    acetaminophen 325 MG Oral Tab Take 2 tablets (650 mg total) by mouth 4 (four) times daily.      ondansetron 4 MG Oral Tablet Dispersible Place 1 tablet (4 mg total) under the tongue every 8 (eight) hours as needed for Nausea. 90 tablet 5    Ospemifene (OSPHENA) 60 MG Oral Tab Take 1 tablet by mouth daily. 90 tablet 3    potassium chloride 20 MEQ Oral Tab CR Take 1 tablet (20 mEq total) by mouth daily. 90 tablet 1    Omeprazole 40 MG Oral Capsule Delayed Release Take 1 capsule (40 mg total) by mouth before breakfast. 90 capsule 3    calcium carbonate 500 MG Oral Chew Tab Chew 1 tablet (500 mg total) by mouth daily.      spironolactone 25 MG Oral Tab Take 1 tablet (25 mg total) by mouth daily.      torsemide 20 MG Oral Tab Take 1 tablet (20 mg total) by mouth 2 (two) times daily.      Digestive Enzymes (DIGESTIVE ENZYME OR) Take 1 capsule by mouth daily.      Multiple Vitamins-Minerals (MULTI-VITAMIN/MINERALS) Oral Tab Take 1 tablet by mouth daily.      docusate sodium 100 MG Oral Cap Take 1 capsule (100 mg total) by mouth daily.      MOVANTIK 25 MG Oral Tab  (Patient not taking: Reported on 6/30/2025)

## 2025-07-02 NOTE — TELEPHONE ENCOUNTER
06/09/2025  LAST WRITTEN: 06/06/2025  Quantity: 180     Recent Visits  Date Type Provider Dept   06/30/25 Office Visit Quintin Harman, DO [facility]   06/19/25 Office Visit Quintin Harman, DO [facility]   05/13/25 Office Visit Quintin Harman, DO [facility]   04/17/25 Office Visit Quintin Harman, DO [facility]   03/25/25 Office Visit Quintin Harman, DO [facility]

## 2025-07-03 RX ORDER — HYDROCODONE BITARTRATE AND ACETAMINOPHEN 10; 325 MG/1; MG/1
1 TABLET ORAL EVERY 4 HOURS PRN
Qty: 180 TABLET | Refills: 0 | Status: SHIPPED | OUTPATIENT
Start: 2025-07-03

## 2025-07-07 NOTE — TELEPHONE ENCOUNTER
Requested Prescriptions     Pending Prescriptions Disp Refills    ONDANSETRON 4 MG Oral Tablet Dispersible [Pharmacy Med Name: Ondansetron Odt 4 Mg Tab Nort] 90 tablet 0     Sig: DISSOLVE ONE TABLETIN MOUTH EVERY EIGHT HOURS AS NEEDED FOR NAUSEA      LOV: 4/01/2024    LR: 1/21/2025    BT    PT has f/u appointment on 12/17/2025

## 2025-07-12 RX ORDER — ONDANSETRON 4 MG/1
TABLET, ORALLY DISINTEGRATING ORAL EVERY 8 HOURS PRN
Qty: 90 TABLET | Refills: 3 | Status: SHIPPED | OUTPATIENT
Start: 2025-07-12

## 2025-07-31 ENCOUNTER — OFFICE VISIT (OUTPATIENT)
Dept: FAMILY MEDICINE CLINIC | Facility: CLINIC | Age: 60
End: 2025-07-31
Payer: COMMERCIAL

## 2025-07-31 VITALS
OXYGEN SATURATION: 98 % | DIASTOLIC BLOOD PRESSURE: 72 MMHG | BODY MASS INDEX: 24.43 KG/M2 | SYSTOLIC BLOOD PRESSURE: 108 MMHG | HEART RATE: 72 BPM | HEIGHT: 66 IN | WEIGHT: 152 LBS

## 2025-07-31 DIAGNOSIS — M48.062 LUMBAR STENOSIS WITH NEUROGENIC CLAUDICATION: ICD-10-CM

## 2025-07-31 DIAGNOSIS — M48.062 LUMBAR STENOSIS WITH NEUROGENIC CLAUDICATION: Primary | ICD-10-CM

## 2025-07-31 PROCEDURE — 3078F DIAST BP <80 MM HG: CPT | Performed by: FAMILY MEDICINE

## 2025-07-31 PROCEDURE — 3074F SYST BP LT 130 MM HG: CPT | Performed by: FAMILY MEDICINE

## 2025-07-31 PROCEDURE — 3008F BODY MASS INDEX DOCD: CPT | Performed by: FAMILY MEDICINE

## 2025-07-31 PROCEDURE — 99214 OFFICE O/P EST MOD 30 MIN: CPT | Performed by: FAMILY MEDICINE

## 2025-07-31 RX ORDER — GABAPENTIN 300 MG/1
300 CAPSULE ORAL 3 TIMES DAILY
Qty: 90 CAPSULE | Refills: 2 | Status: SHIPPED | OUTPATIENT
Start: 2025-07-31

## 2025-07-31 RX ORDER — HYDROCODONE BITARTRATE AND ACETAMINOPHEN 10; 325 MG/1; MG/1
1 TABLET ORAL EVERY 4 HOURS PRN
Qty: 180 TABLET | Refills: 0 | Status: SHIPPED | OUTPATIENT
Start: 2025-07-31 | End: 2025-08-30

## 2025-07-31 RX ORDER — HYDROCODONE BITARTRATE AND ACETAMINOPHEN 10; 325 MG/1; MG/1
1 TABLET ORAL EVERY 4 HOURS PRN
Qty: 180 TABLET | Refills: 0 | Status: SHIPPED | OUTPATIENT
Start: 2025-10-01 | End: 2025-10-31

## 2025-07-31 RX ORDER — HYDROCODONE BITARTRATE AND ACETAMINOPHEN 10; 325 MG/1; MG/1
1 TABLET ORAL EVERY 4 HOURS PRN
Qty: 180 TABLET | Refills: 0 | Status: SHIPPED | OUTPATIENT
Start: 2025-08-31 | End: 2025-09-30

## 2025-07-31 RX ORDER — BACLOFEN 10 MG/1
10 TABLET ORAL NIGHTLY PRN
Qty: 90 TABLET | Refills: 0 | Status: SHIPPED | OUTPATIENT
Start: 2025-07-31

## 2025-08-04 RX ORDER — HYDROCODONE BITARTRATE AND ACETAMINOPHEN 10; 325 MG/1; MG/1
1 TABLET ORAL EVERY 4 HOURS PRN
Qty: 180 TABLET | Refills: 0 | OUTPATIENT
Start: 2025-08-04

## 2025-08-05 DIAGNOSIS — M48.062 LUMBAR STENOSIS WITH NEUROGENIC CLAUDICATION: ICD-10-CM

## 2025-08-06 RX ORDER — OMEPRAZOLE 40 MG/1
40 CAPSULE, DELAYED RELEASE ORAL
Qty: 90 CAPSULE | Refills: 3 | Status: SHIPPED | OUTPATIENT
Start: 2025-08-06

## 2025-08-07 RX ORDER — TRAMADOL HYDROCHLORIDE 50 MG/1
50 TABLET ORAL EVERY 6 HOURS PRN
Qty: 30 TABLET | Refills: 5 | Status: SHIPPED | OUTPATIENT
Start: 2025-08-07

## (undated) DEVICE — MODULE STRL DELIVERY SYS MAS GARFT

## (undated) DEVICE — 3M™ IOBAN™ 2 ANTIMICROBIAL INCISE DRAPE 6650EZ: Brand: IOBAN™ 2

## (undated) DEVICE — SPONGE LAPAROTOMY 18X18IN STERILE 5 PK

## (undated) DEVICE — SUTURE MONOCRYL 4-0 PS-2 L18 IN MONO UNDYED ABS

## (undated) DEVICE — Device

## (undated) DEVICE — ELECTRODE ESURG BLADE L10 FT OD38 IN PLUMEPEN ULTRA PVC

## (undated) DEVICE — UNDYED BRAIDED (POLYGLACTIN 910), SYNTHETIC ABSORBABLE SUTURE: Brand: COATED VICRYL

## (undated) DEVICE — DRESSING TRANS 4.75X4IN ADH HPOAL WTPRF TEGADERM PU STD STRL

## (undated) DEVICE — KIT DRN 18IN 400CC 12.5IN RND 3 SPRG EVAC Y CNCT TUBE HOLE

## (undated) DEVICE — BLANKET WARMING L84 IN X W36 IN L22 IN W13.5 IN BAIR HGGR

## (undated) DEVICE — IMPLANTABLE DEVICE
Type: IMPLANTABLE DEVICE | Site: SPINE LUMBAR | Status: NON-FUNCTIONAL
Brand: SURGIFOAM® ABSORBABLE GELATIN SPONGE, U.S.P.
Removed: 2025-03-10

## (undated) DEVICE — SOL  .9 1000ML BTL

## (undated) DEVICE — BLADE BN MILL OD3.2 MM FINE NA

## (undated) DEVICE — CRADLE PSTN DEVON FOAM ARM LMI

## (undated) DEVICE — LUBRICANT INST DIFFUSER CRTDG MIDAS REX MR7

## (undated) DEVICE — GLOVE SURG 7.5 PROTEXIS PI PWDR FREE BEAD CUFF PLISPRN L11.8

## (undated) DEVICE — SYRINGE 50 ML GRAD NONPYROGENIC DEHP FREE PVC FREE LOK MED

## (undated) DEVICE — SUTURE ETHILON 4-0 699G

## (undated) DEVICE — SEALER ESURG L.226 IN 30 D .236 IN SPACE BP 2 ELECTRODE HMST

## (undated) DEVICE — DRAPE SHEET LG

## (undated) DEVICE — GLOVE SURG 8.5 PROTEXIS PI ORTHO PWDR FREE SMTH BEAD CUFF

## (undated) DEVICE — SUTURE PDS II 3-0 SH

## (undated) DEVICE — MINOR GENERAL: Brand: MEDLINE INDUSTRIES, INC.

## (undated) DEVICE — LINE VAC EXTBLD RECOV

## (undated) DEVICE — DERMABOND LIQUID ADHESIVE

## (undated) DEVICE — SUTURE VICRYL 2-0 CT1 18IN CNTRL RELS BRAID 8 STRN COAT ABS J739D

## (undated) DEVICE — SUTURE VICRYL 1 TP-1 27IN BRAID 4 STRN COAT ABS VIOL

## (undated) DEVICE — DRAPE SHEET LAPAROTOMY

## (undated) DEVICE — SUTURE PDS II 0 CTX

## (undated) DEVICE — PLASTIC HAND: Brand: MEDLINE INDUSTRIES, INC.

## (undated) DEVICE — SUTURE VICRYL 3-0 SH

## (undated) DEVICE — 2% CHLORHEXIDINE SKIN PREP ORANGE 26ML

## (undated) DEVICE — SET AAL W CYA ADPTR

## (undated) DEVICE — 3M™ TEGADERM™ TRANSPARENT FILM DRESSING FRAME STYLE 1629: Brand: 3M™ TEGADERM™

## (undated) DEVICE — DRAPE SRG UNV 131X90IN LWR

## (undated) DEVICE — DRAPE EQUIPMENT C ARM POLY STRAP MOBILE XRAY L72 IN X W42 IN

## (undated) DEVICE — SET BOWL AUTOTRANSF XTRA 125ML

## (undated) DEVICE — POUCH INST 11X7IN 2 ADH STRIP 2 CMPRT STRL STRDRP PLASTIC

## (undated) DEVICE — PEN SURGMRK XL DRPRP CHLRPRP PREP RST INK STRL

## (undated) DEVICE — COVER TBL 72IN HALYARD BSC THK5.5 MIL HVDTY BACK PAD FNFLD 2

## (undated) DEVICE — DRAPE SURG ADH L51 IN X W47 IN STERI-DRAPE CLR

## (undated) DEVICE — KIT PSTN CHEST PILLOW GENTLETOUCH SHRGRD DISP SPINE JCKSN

## (undated) DEVICE — SOLUTION SURG PREP 26 ML DURAPREP 74% ISOPROPYL ALCOHOL 0.7%

## (undated) DEVICE — TRAY CATH CMP CR LUBRI-SIL IC STLK SURESTEP 16FR FOLEY URMTR

## (undated) DEVICE — BAG TRNSF TERUFLEX BLD TRNSF PK CPLR 300ML

## (undated) DEVICE — GOWN SURG AERO BLUE PERF XLG

## (undated) DEVICE — ELECTRODE PT RTN L9 FT VALLEYLAB REM POLYHESIVE ACRYLIC FOAM

## (undated) DEVICE — ABDOMINAL BINDER: Brand: DEROYAL

## (undated) DEVICE — GAMMEX® PI HYBRID SIZE 7.5, STERILE POWDER-FREE SURGICAL GLOVE, POLYISOPRENE AND NEOPRENE BLEND: Brand: GAMMEX

## (undated) DEVICE — SPONGE LAP 18X18 XRAY STRL

## (undated) DEVICE — GAMMEX® PI HYBRID SIZE 7, STERILE POWDER-FREE SURGICAL GLOVE, POLYISOPRENE AND NEOPRENE BLEND: Brand: GAMMEX

## (undated) DEVICE — GLOVE SURG 8 PROTEXIS PI PWDR FREE BEAD CUFF PLISPRN L11.8

## (undated) DEVICE — TOOL DSCT L9 CM MATCH HEAD OD3 MM MR8

## (undated) DEVICE — GLOVE SURG 8 PROTEXIS PI PWDR FREE SMTH BEAD CUFF INTLK

## (undated) DEVICE — RESERVOIR BLD COL XRES B

## (undated) DEVICE — FILTER TRANSFUSION BLD MICROAGGREGATE LOW RESIDUAL VOLUME

## (undated) DEVICE — CAUTERY BLADE 2IN INS E1455

## (undated) DEVICE — TRAY FOLEY BDX 16F STATLOCK

## (undated) DEVICE — 3M™ STERI-STRIP™ REINFORCED ADHESIVE SKIN CLOSURES, R1547, 1/2 IN X 4 IN (12 MM X 100 MM), 6 STRIPS/ENVELOPE: Brand: 3M™ STERI-STRIP™

## (undated) NOTE — MR AVS SNAPSHOT
[facility] SPECIALTY \[facility] [place] [facility]\"" - [place] [address] [address] [id number] [phone]               Thank you for choosing us for your health care visit with Alberto Ladd MD.  We are glad to serve you and happy to provide you with this summary o Take 1 capsule (30 mg total) by mouth nightly as needed for Sleep.    Commonly known as:  RESTORIL                Where to Get Your Medications      These medications were sent to [id number] [facility], [address] [place] [address] [phone], [phone] office. At that time, you will be provided with any authorization numbers or be assured that none are required. You can then schedule your appointment. Failure to obtain required authorization numbers can create reimbursement difficulties for you.         Vane Winters

## (undated) NOTE — LETTER
06/12/18        Patient: Shyann Pepe   YOB: 1965   Date of Visit: 6/12/2018       Dear  Dr. Jaz Dempsey, DO,      Thank you for referring Shyann Pepe to my practice. Please find my assessment and plan below.           ASSE

## (undated) NOTE — Clinical Note
Hi! I don't have a SHELBY. Can you co-sign her ativan prior to MRI for me? I pended it. I did instruct her that she will need a  following administration.   Thank you, Ros Cespedes

## (undated) NOTE — Clinical Note
Emili Melendez just saw [facility]. She's got degenerative listhesis. Her radicular symptoms haven't been present for as long, hoping it'll get better. It's unfortunate the first VIGNESH didn't help. Do you do a second VIGNESH if they don't respond to first? Also would you do facet blocks for her?

## (undated) NOTE — Clinical Note
1/25/2017              [address]        [place] [id number]         To Whom It May Concern,    This is to certify that my patient Kitty Annemarie informed me today that she is taking methadone for chronic pain and is under

## (undated) NOTE — LETTER
WHERE IS YOUR PAIN NOW?  Dagoberto the areas on your body where you feel the described sensations.  Use the appropriate symbol.  Dagoberto the areas of radiation.  Include all affected areas.  Just to complete the picture, please draw in the face.     ACHE:  ^ ^ ^   NUMBNESS:  0000   PINS & NEEDLES:  = = = =                              ^ ^ ^                       0000              = = = =                                    ^ ^ ^                       0000            = = = =      BURNING:  XXXX   STABBING: ////                  XXXX                ////                         XXXX          ////     Please dagoberto the line below indicating your degree of pain right now  with 0 being no pain 10 being the worst pain possible.                                         0             1             2              3             4              5              6              7             8             9             10         Patient Signature:

## (undated) NOTE — LETTER
[address], [facility]   Date:   1/11/2022     Name:   Meghana Heredia    YOB: 1965   MRN:   [id number]       [facility]?   [place] the areas on your body where you feel the descr

## (undated) NOTE — LETTER
[address], [facility]   Date:   2/3/2022     Name:   Amy Vick    YOB: 1965   MRN:   [id number]       [facility]? Dagoberto the areas on your body where you feel the described sensations. Use the appropriate symbol. [place] the areas of radiation. Include all affected areas. Just to complete the picture, please draw in the face. ACHE:  ^ ^ ^   NUMBNESS:  0000   PINS & NEEDLES:  = = = =                              ^ ^ ^                       0000              = = = =                                    ^ ^ ^                       0000            = = = =      BURNING:  XXXX   STABBING: ////                  XXXX                ////                         XXXX          ////     Please dagoberto the line below indicating your degree of pain right now  with 0 being no pain 10 being the worst pain possible.                                          0             1             2              3             4              5              6              7             8             9             10         Patient Signature:

## (undated) NOTE — LETTER
04/19/18        [address] [place] [id number]      Dear Radha White,    [facility] records indicate that you have outstanding lab work and or testing that was ordered for you and has not yet been completed:          CBC With Differential With Kirt

## (undated) NOTE — LETTER
[facility] [address]   Date:   9/5/2023     Name:   Prudencio Beck    YOB: 1965   MRN:   [id number]       [facility]? Dagoberto the areas on your body where you feel the described sensations. Use the appropriate symbol. [place] the areas of radiation. Include all affected areas. Just to complete the picture, please draw in the face. ACHE:  ^ ^ ^   NUMBNESS:  0000   PINS & NEEDLES:  = = = =                              ^ ^ ^                       0000              = = = =                                    ^ ^ ^                       0000            = = = =      BURNING:  XXXX   STABBING: ////                  XXXX                ////                         XXXX          ////     Please dagoberto the line below indicating your degree of pain right now  with 0 being no pain 10 being the worst pain possible.                                          0             1             2              3             4              5              6              7             8             9             10         Patient Signature:

## (undated) NOTE — LETTER
20      Patient: Meenakshi Gaines  : 1965 Visit date: 2020    Dear Kelly Corbett,      I examined your patient in consultation today. She has a cystic mass of the left small finger. We will plan on excision.     Thank you for y

## (undated) NOTE — LETTER
[facility], [place]  Authorization for Invasive Procedures  1.  I hereby authorize Dr. Radha Tran  / Laura Pozo , my physician and whomever may be designated as the doctor's assistant, to perform the following operation and/or procedure: not all, of the potential risks that can occur: fever and allergic reactions, hemolytic reactions, transmission of disease such as hepatitis, AIDS, cytomegalovirus (CMV), and flluid overload.  In the event that I wish to have autologous transfusions of my o desirable in the judgment of my physician.      Signature of Patient:  ________________________________________________ Date: _________Time: _________  Responsible person in case of minor or unconscious: _____________________________Relationship: __________

## (undated) NOTE — LETTER
[address] [facility]   Date:   12/2/2021     Name:   Therese Pereira    YOB: 1965   MRN:   [id number]       WHERE IS YOUR PAIN NOW?   [place] the areas on your body where you feel the descr

## (undated) NOTE — LETTER
[facility] [address], [facility]   Date:   10/12/2023     Name:   Miguel Angel Sheridan    YOB: 1965   MRN:   [id number]       [facility]? Dagoberto the areas on your body where you feel the described sensations. Use the appropriate symbol. [place] the areas of radiation. Include all affected areas. Just to complete the picture, please draw in the face. ACHE:  ^ ^ ^   NUMBNESS:  0000   PINS & NEEDLES:  = = = =                              ^ ^ ^                       0000              = = = =                                    ^ ^ ^                       0000            = = = =      BURNING:  XXXX   STABBING: ////                  XXXX                ////                         XXXX          ////     Please dagoberto the line below indicating your degree of pain right now  with 0 being no pain 10 being the worst pain possible.                                          0             1             2              3             4              5              6              7             8             9             10         Patient Signature:

## (undated) NOTE — LETTER
6/8/2018              [address] [place] [address] Drive [address] [place] [id number]         To Whom it may concern: This is to certify that Mitzy Figueroa had an appointment on 6/8/2018 at 2:52 PM with Guzman Hurtado DO.   Patient has informed m